# Patient Record
Sex: FEMALE | Race: WHITE | NOT HISPANIC OR LATINO | Employment: PART TIME | URBAN - METROPOLITAN AREA
[De-identification: names, ages, dates, MRNs, and addresses within clinical notes are randomized per-mention and may not be internally consistent; named-entity substitution may affect disease eponyms.]

---

## 2017-05-04 ENCOUNTER — ALLSCRIPTS OFFICE VISIT (OUTPATIENT)
Dept: OTHER | Facility: OTHER | Age: 57
End: 2017-05-04

## 2017-05-04 DIAGNOSIS — E87.6 HYPOKALEMIA: ICD-10-CM

## 2017-05-04 DIAGNOSIS — Z12.31 ENCOUNTER FOR SCREENING MAMMOGRAM FOR MALIGNANT NEOPLASM OF BREAST: ICD-10-CM

## 2017-05-04 DIAGNOSIS — J45.20 MILD INTERMITTENT ASTHMA, UNCOMPLICATED: ICD-10-CM

## 2017-05-04 DIAGNOSIS — E78.5 HYPERLIPIDEMIA: ICD-10-CM

## 2017-05-08 ENCOUNTER — GENERIC CONVERSION - ENCOUNTER (OUTPATIENT)
Dept: OTHER | Facility: OTHER | Age: 57
End: 2017-05-08

## 2017-05-08 ENCOUNTER — APPOINTMENT (OUTPATIENT)
Dept: LAB | Facility: CLINIC | Age: 57
End: 2017-05-08
Payer: COMMERCIAL

## 2017-05-08 DIAGNOSIS — E78.5 HYPERLIPIDEMIA: ICD-10-CM

## 2017-05-08 DIAGNOSIS — J45.20 MILD INTERMITTENT ASTHMA, UNCOMPLICATED: ICD-10-CM

## 2017-05-08 LAB
25(OH)D3 SERPL-MCNC: 8.8 NG/ML (ref 30–100)
ALBUMIN SERPL BCP-MCNC: 3.8 G/DL (ref 3.5–5)
ALP SERPL-CCNC: 97 U/L (ref 46–116)
ALT SERPL W P-5'-P-CCNC: 20 U/L (ref 12–78)
ANION GAP SERPL CALCULATED.3IONS-SCNC: 7 MMOL/L (ref 4–13)
AST SERPL W P-5'-P-CCNC: 13 U/L (ref 5–45)
BASOPHILS # BLD AUTO: 0.02 THOUSANDS/ΜL (ref 0–0.1)
BASOPHILS NFR BLD AUTO: 0 % (ref 0–1)
BILIRUB SERPL-MCNC: 0.41 MG/DL (ref 0.2–1)
BUN SERPL-MCNC: 7 MG/DL (ref 5–25)
CALCIUM SERPL-MCNC: 8.9 MG/DL (ref 8.3–10.1)
CHLORIDE SERPL-SCNC: 100 MMOL/L (ref 100–108)
CHOLEST SERPL-MCNC: 231 MG/DL (ref 50–200)
CO2 SERPL-SCNC: 28 MMOL/L (ref 21–32)
CREAT SERPL-MCNC: 0.62 MG/DL (ref 0.6–1.3)
EOSINOPHIL # BLD AUTO: 0.05 THOUSAND/ΜL (ref 0–0.61)
EOSINOPHIL NFR BLD AUTO: 1 % (ref 0–6)
ERYTHROCYTE [DISTWIDTH] IN BLOOD BY AUTOMATED COUNT: 13.5 % (ref 11.6–15.1)
GFR SERPL CREATININE-BSD FRML MDRD: >60 ML/MIN/1.73SQ M
GLUCOSE P FAST SERPL-MCNC: 87 MG/DL (ref 65–99)
HCT VFR BLD AUTO: 41 % (ref 34.8–46.1)
HDLC SERPL-MCNC: 35 MG/DL (ref 40–60)
HGB BLD-MCNC: 13.8 G/DL (ref 11.5–15.4)
LDLC SERPL CALC-MCNC: 165 MG/DL (ref 0–100)
LYMPHOCYTES # BLD AUTO: 1.76 THOUSANDS/ΜL (ref 0.6–4.47)
LYMPHOCYTES NFR BLD AUTO: 26 % (ref 14–44)
MCH RBC QN AUTO: 32.1 PG (ref 26.8–34.3)
MCHC RBC AUTO-ENTMCNC: 33.7 G/DL (ref 31.4–37.4)
MCV RBC AUTO: 95 FL (ref 82–98)
MONOCYTES # BLD AUTO: 0.76 THOUSAND/ΜL (ref 0.17–1.22)
MONOCYTES NFR BLD AUTO: 11 % (ref 4–12)
NEUTROPHILS # BLD AUTO: 4.13 THOUSANDS/ΜL (ref 1.85–7.62)
NEUTS SEG NFR BLD AUTO: 62 % (ref 43–75)
NRBC BLD AUTO-RTO: 0 /100 WBCS
PLATELET # BLD AUTO: 301 THOUSANDS/UL (ref 149–390)
PMV BLD AUTO: 11.2 FL (ref 8.9–12.7)
POTASSIUM SERPL-SCNC: 3.4 MMOL/L (ref 3.5–5.3)
PROT SERPL-MCNC: 7.4 G/DL (ref 6.4–8.2)
RBC # BLD AUTO: 4.3 MILLION/UL (ref 3.81–5.12)
SODIUM SERPL-SCNC: 135 MMOL/L (ref 136–145)
TRIGL SERPL-MCNC: 155 MG/DL
TSH SERPL DL<=0.05 MIU/L-ACNC: 2.55 UIU/ML (ref 0.36–3.74)
WBC # BLD AUTO: 6.73 THOUSAND/UL (ref 4.31–10.16)

## 2017-05-08 PROCEDURE — 36415 COLL VENOUS BLD VENIPUNCTURE: CPT

## 2017-05-08 PROCEDURE — 80061 LIPID PANEL: CPT

## 2017-05-08 PROCEDURE — 84443 ASSAY THYROID STIM HORMONE: CPT

## 2017-05-08 PROCEDURE — 85025 COMPLETE CBC W/AUTO DIFF WBC: CPT

## 2017-05-08 PROCEDURE — 82306 VITAMIN D 25 HYDROXY: CPT

## 2017-05-08 PROCEDURE — 80053 COMPREHEN METABOLIC PANEL: CPT

## 2017-05-11 ENCOUNTER — ALLSCRIPTS OFFICE VISIT (OUTPATIENT)
Dept: OTHER | Facility: OTHER | Age: 57
End: 2017-05-11

## 2017-12-06 ENCOUNTER — ALLSCRIPTS OFFICE VISIT (OUTPATIENT)
Dept: OTHER | Facility: OTHER | Age: 57
End: 2017-12-06

## 2017-12-06 ENCOUNTER — HOSPITAL ENCOUNTER (EMERGENCY)
Facility: HOSPITAL | Age: 57
Discharge: HOME | End: 2017-12-06
Attending: EMERGENCY MEDICINE | Admitting: EMERGENCY MEDICINE
Payer: COMMERCIAL

## 2017-12-06 VITALS
SYSTOLIC BLOOD PRESSURE: 121 MMHG | DIASTOLIC BLOOD PRESSURE: 57 MMHG | WEIGHT: 135 LBS | RESPIRATION RATE: 18 BRPM | OXYGEN SATURATION: 100 % | HEART RATE: 78 BPM | TEMPERATURE: 97.6 F

## 2017-12-06 DIAGNOSIS — S90.851A: ICD-10-CM

## 2017-12-06 DIAGNOSIS — Z12.31 ENCOUNTER FOR SCREENING MAMMOGRAM FOR MALIGNANT NEOPLASM OF BREAST: ICD-10-CM

## 2017-12-06 DIAGNOSIS — S90.859A FOREIGN BODY IN FOOT: Primary | ICD-10-CM

## 2017-12-06 PROCEDURE — 90715 TDAP VACCINE 7 YRS/> IM: CPT | Performed by: EMERGENCY MEDICINE

## 2017-12-06 PROCEDURE — 90471 IMMUNIZATION ADMIN: CPT

## 2017-12-06 PROCEDURE — 99283 EMERGENCY DEPT VISIT LOW MDM: CPT

## 2017-12-06 RX ORDER — PRAVASTATIN SODIUM 20 MG
20 TABLET ORAL DAILY
COMMUNITY
End: 2018-07-19 | Stop reason: SDUPTHER

## 2017-12-06 RX ORDER — MELOXICAM 7.5 MG/1
7.5 TABLET ORAL DAILY
COMMUNITY
End: 2018-02-27

## 2017-12-06 RX ORDER — MELATONIN
2000 DAILY
COMMUNITY
End: 2018-02-05

## 2017-12-06 RX ORDER — ALBUTEROL SULFATE 90 UG/1
2 AEROSOL, METERED RESPIRATORY (INHALATION) EVERY 6 HOURS PRN
COMMUNITY
End: 2019-11-13 | Stop reason: SDUPTHER

## 2017-12-06 RX ORDER — LIDOCAINE HYDROCHLORIDE AND EPINEPHRINE 10; 10 MG/ML; UG/ML
5 INJECTION, SOLUTION INFILTRATION; PERINEURAL ONCE
Status: COMPLETED | OUTPATIENT
Start: 2017-12-06 | End: 2017-12-06

## 2017-12-06 RX ORDER — MONTELUKAST SODIUM 10 MG/1
10 TABLET ORAL
COMMUNITY
End: 2018-02-05 | Stop reason: SDUPTHER

## 2017-12-06 RX ADMIN — TETANUS TOXOID, REDUCED DIPHTHERIA TOXOID AND ACELLULAR PERTUSSIS VACCINE, ADSORBED 0.5 ML: 5; 2.5; 8; 8; 2.5 SUSPENSION INTRAMUSCULAR at 12:14

## 2017-12-06 RX ADMIN — LIDOCAINE HYDROCHLORIDE,EPINEPHRINE BITARTRATE 5 ML: 10; .01 INJECTION, SOLUTION INFILTRATION; PERINEURAL at 11:57

## 2017-12-06 NOTE — DISCHARGE INSTRUCTIONS
Acute Wound Care   AMBULATORY CARE:   An acute wound  is an injury that causes a break in the skin  An acute wound can happen suddenly, last a short time, and may heal on its own  Common signs and symptoms of an acute wound:   · A cut, tear, or gash in your skin    · Bleeding, swelling, pain, or trouble moving the affected area    · Dirt or foreign objects inside the wound     · Milky, yellow, green, or brown pus in the wound     · Red, tender, or warm area around the pus    · Fever  Seek care immediately if:   · You have pus or a foul odor coming from the wound  · You have sudden trouble breathing or chest pain  · Blood soaks through your bandage  Contact your healthcare provider if:   · You have muscle, joint, or body aches, sweating, or a fever  · You have more swelling, redness, or bleeding in your wound  · Your skin is itchy, swollen, or you have a rash  · You have questions or concerns about your condition or care  Treatment for an acute wound  may include any of the following:  · Cleansing  is done with soap and water to wash away germs and decrease the risk of infection  Sterile water further cleans the wound  The cleaning is done under high pressure with a catheter tip and large syringe  A solution that kills germs may also be used  · Debridement  is done to clean and remove objects, dirt, or dead tissues from the open wound  Healthcare providers may also drain the wound to clean out pus  · Closure of the wound  is done with stitches, staples, skin adhesive, or other treatments  This may be done if the wound is wide or deep  Stitches may be needed if the wound is in an area that moves a lot, such as the hands, feet, and joints  Stitches may help to keep the wound from getting infected  They may also decrease the amount of scarring you have  Some wounds may heal better without stitches    Wound care:   · If your wound was closed with thin strips of medical tape, keep them clean and dry  The strips of medical tape will fall off on their own  Do not pull them off  · Keep the bandage clean and dry  Do not remove the bandage over your wound unless your healthcare provider says it is okay  · Wash your hands before and after you take care of your wound to prevent infection  · Clean the wound as directed  If you cannot reach the wound, have someone help you  · If you have packing, make sure all the gauze used to pack the wound is taken out and replaced as directed  Keep track of how many gauze dressings are placed inside the wound  Follow up with your healthcare provider as directed:  Write down your questions so you remember to ask them during your visits  © 2016 [employer] is for End User's use only and may not be sold, redistributed or otherwise used for commercial purposes  All illustrations and images included in CareNotes® are the copyrighted property of A D [employer] M [employer]  or Akil Dawson  The above information is an  only  It is not intended as medical advice for individual conditions or treatments  Talk to your doctor, nurse or pharmacist before following any medical regimen to see if it is safe and effective for you

## 2017-12-06 NOTE — ED PROCEDURE NOTE
PROCEDURE  Foreign Body - Embedded  Date/Time: 12/6/2017 12:06 PM  Performed by: Constanza Bond by: Ileana Lopez     Patient location:  ED  Consent:     Consent obtained:  Verbal    Consent given by:  Patient    Risks discussed:  Bleeding, infection and pain  Universal protocol:     Procedure explained and questions answered to patient or proxy's satisfaction: yes      Patient identity confirmed:  Verbally with patient  Location:     Location:  Foot    Foot location:  R sole    Depth:  Subcutaneous  Anesthesia (see MAR for exact dosages): Anesthesia method:  Local infiltration    Local anesthetic:  Lidocaine 1% WITH epi  Procedure details:     Scalpel size:  11    Incision length:  0 25    Localization method:  Visualized    Removal mechanism:  Hemostat    Foreign bodies recovered:  1    Intact foreign body removal: yes    Post-procedure details:     Neurovascular status: intact      Dressing:  Adhesive bandage    Patient tolerance of procedure:   Tolerated well, no immediate complications

## 2017-12-06 NOTE — ED PROVIDER NOTES
History  Chief Complaint   Patient presents with    Foreign Body in Skin     Pt states that she has a splinter or a thorn in her foot  Pt states that she went to her PCP today and they sent her here to have it removed  Pt states "they said they could not take it out there"  HPI the patient presents with complaints of foreign body in her right foot she was seen by her family doctor who said he could not take the splinter out and sent her to the ER  The patient is in need of a tetanus shot    the patient has no fever chills she thinks is a wooden piece from the floor no other complaints    Prior to Admission Medications   Prescriptions Last Dose Informant Patient Reported? Taking? albuterol (PROVENTIL HFA,VENTOLIN HFA) 90 mcg/act inhaler   Yes Yes   Sig: Inhale 2 puffs every 6 (six) hours as needed for wheezing   cholecalciferol (VITAMIN D3) 1,000 units tablet   Yes Yes   Sig: Take 2,000 Units by mouth daily   meloxicam (MOBIC) 7 5 mg tablet   Yes Yes   Sig: Take 7 5 mg by mouth daily   montelukast (SINGULAIR) 10 mg tablet   Yes Yes   Sig: Take 10 mg by mouth daily at bedtime   pravastatin (PRAVACHOL) 20 mg tablet   Yes Yes   Sig: Take 20 mg by mouth daily   tiotropium (SPIRIVA) 18 mcg inhalation capsule   Yes Yes   Sig: Place 18 mcg into inhaler and inhale daily      Facility-Administered Medications: None       Past Medical History:   Diagnosis Date    Asthma     COPD (chronic obstructive pulmonary disease) (HCC)     Hyperlipidemia        History reviewed  No pertinent surgical history  History reviewed  No pertinent family history  I have reviewed and agree with the history as documented      Social History   Substance Use Topics    Smoking status: Current Every Day Smoker    Smokeless tobacco: Never Used    Alcohol use No        Review of Systems   no fever no chills no leg swelling pain or leg swelling   no numbness or tingling in the foot  Physical Exam  ED Triage Vitals [12/06/17 1046] Temperature Pulse Respirations Blood Pressure SpO2   97 6 °F (36 4 °C) 78 18 121/57 100 %      Temp Source Heart Rate Source Patient Position - Orthostatic VS BP Location FiO2 (%)   Oral Monitor Sitting Right arm --      Pain Score       2           Orthostatic Vital Signs  Vitals:    12/06/17 1046   BP: 121/57   Pulse: 78   Patient Position - Orthostatic VS: Sitting       Physical Exam    well-appearing female no acute distress vital signs are stable she is afebrile   extremity examination   pulses are intact sensation is intact motor strength is intact no cellulitis noted on exam there is a small punctate foreign body appearing material on the plantar surface of the right foot between the 2nd and 3rd metatarsal    ED Medications  Medications   tetanus-diphtheria-acellular pertussis (BOOSTRIX) IM injection 0 5 mL (not administered)   lidocaine-epinephrine (XYLOCAINE/EPINEPHRINE) 1 %-1:100,000 injection 5 mL (5 mL Infiltration Given by Other 12/6/17 2813)       Diagnostic Studies  Results Reviewed     None                 XR foot 3+ views RIGHT    (Results Pending)              Procedures  Procedures       Phone Contacts  ED Phone Contact    ED Course  ED Course       I spoke with patient concerning possibility of foreign body she agreed to allow me to attempt to remove it                            MDM  CritCare Time    Disposition  Final diagnoses:   Foreign body in foot   Splinter of foot without infection, right, initial encounter     Time reflects when diagnosis was documented in both MDM as applicable and the Disposition within this note     Time User Action Codes Description Comment    12/6/2017 12:08 PM Kathy Locke Add [[id number]] Foreign body in foot     12/6/2017 12:09 PM Alba Torres Add Jamie Salts of foot without infection, right, initial encounter       ED Disposition     ED Disposition Condition Comment    Discharge  Antonia Ivy discharge to home/self care      Condition at discharge: Stable        Follow-up Information     Follow up With Specialties Details Why [address], DO Internal Medicine   [address]  [phone]          Patient's Medications   Discharge Prescriptions    No medications on file     No discharge procedures on file      ED Provider  Electronically Signed by           Nasreen Balderrama MD  12/06/17 7705

## 2017-12-07 NOTE — PROGRESS NOTES
Assessment    1  Chronic obstructive pulmonary disease (496) (J44 9)   2  Need for vaccination against Streptococcus pneumoniae (V03 82) (Z23)   3  Visit for screening mammogram (V76 12) (Z12 31)   4  Need for influenza vaccination (V04 81) (Z23)    Plan  Chronic obstructive pulmonary disease    · Montelukast Sodium 10 MG Oral Tablet (Singulair); TAKE 1 TABLET BY MOUTHONCE DAILY   · Albuterol Sulfate (2 5 MG/3ML) 0 083% Inhalation Nebulization Solution   · SPIROMETRY W/ BRONCHO- POC; Status:Complete;   Done: 39QYH2625  Chronic obstructive pulmonary disease, Mild intermittent asthma without complication    · Spiriva HandiHaler 18 MCG Inhalation Capsule; INHALE CONTENTS OFCAPSULE ONCE DAY  Need for influenza vaccination    · Influenza  Need for vaccination against Streptococcus pneumoniae    · Prevnar 13 Intramuscular Suspension  Visit for screening mammogram    · * MAMMO SCREENING BILATERAL W CAD; Status:Canceled - Scheduling;    · * MAMMO SCREENING BILATERAL W CAD; Status:Hold For - Scheduling; Requestedfor:59Xlh5280;     Discussion/Summary    *COPDis advised to take meds as directed  Given Rx for Spiriva and Singulair  painto go to the ED to r/o presence of object in the foot and if still there for them to extract it  ordered today  ordered todayrefused flu vaccine on the basis that she had previous reaction to it  She is not sure if she has allergy to eggs  did not get her mammogram in May 2017 as ordered  She reports she lost the script  Re-ordered today  Apt made by me for January 10th 2018, at 11am  Patient informed  Patient should be there by 10:30 am for registration  The patient was counseled regarding diagnostic results,-- instructions for management,-- risk factor reductions,-- risks and benefits of treatment options,-- importance of compliance with treatment  Possible side effects of new medications were reviewed with the patient/guardian today   The treatment plan was reviewed with the patient/guardian  The patient/guardian understands and agrees with the treatment plan   Educational resources provided:      Chief Complaint  Presents to the clinic for cold and cough      History of Present Illness  HPI: as abovereports she has been having intermittent cough and mucus, with sneezing, for a couple of weeks  Mild to moderate intensity, with intermittent SOB  has ventolin which she uses prn, and has been using it almost daily  review of her records show a Hx of COPD, and she currently smokes about 1/2 daily  Started at age 30 y/o approximately  She also reports right foot pain, for a few days, and she states it started when she stepped on a piece of woof and it pierced her foot  When she was trying to take out the piece of wood, she felt like it broke  Since then, she has not been able to bear weight on that foot  The pain is constant but worse with weight bearing, and better with rest  No radiation of the pain  No pus at the site and no drainage  Hospital Based Practices Required Assessment:  Pain Assessment  the patient states they have pain  The pain is located in the leg pain, burning, cramp  The patient describes the pain as aching and burning  (on a scale of 0 to 10, the patient rates the pain at 8 )   Prefered Language is  english  Primary Language is  [facility]  Review of Systems   Constitutional: No fever, no chills, feels well, no tiredness, no recent weight gain or loss  Cardiovascular: no complaints of slow or fast heart rate, no chest pain, no palpitations, no leg claudication or lower extremity edema  Respiratory: cough,-- wheezing-- and-- shortness of breath during exertion, but-- as noted in HPI  Gastrointestinal: no complaints of abdominal pain, no constipation, no nausea or diarrhea, no vomiting, no bloody stools  Musculoskeletal: arthralgias-- and-- myalgias, but-- as noted in HPI-- and-- no joint swelling    Integumentary: no complaints of skin rash or lesion, no itching or dry skin, no skin wounds  ROS reviewed  Active Problems  1  Absence of teeth (520 0) (K00 0)   2  Arthritis (716 90) (M19 90)   3  Chronic back pain (724 5,338 29) (M54 9,G89 29)   4  Chronic obstructive pulmonary disease (496) (J44 9)   5  Decreased hearing of left ear (389 9) (H91 92)   6  Dyslipidemia (272 4) (E78 5)   7  Menopause (627 2) (Z78 0)   8  Mild intermittent asthma without complication (682 32) (K86 55)   9  Varicose veins (454 9) (I83 90)   10  Visit for screening mammogram (V76 12) (Z12 31)   11  Vitamin D deficiency (268 9) (E55 9)    Past Medical History  Active Problems And Past Medical History Reviewed: The active problems and past medical history were reviewed and updated today  Surgical History  Surgical History Reviewed: The surgical history was reviewed and updated today  Social History     · Current every day smoker (305 1) (F17 200)   · 1/2 pack/day, started at about age 28 y/o   · Denied: History of Current smoker  The social history was reviewed and updated today  The social history was reviewed and is unchanged  Family History  Family History Reviewed: The family history was reviewed and updated today  Current Meds   1  Meloxicam 7 5 MG Oral Tablet; TAKE 1 TABLET DAILY AS NEEDED; Therapy: 82EJO9700 to (Evaluate:20Mar2013); Last Rx:59Oxg6604 Ordered   2  Montelukast Sodium 10 MG Oral Tablet; TAKE 1 TABLET BY MOUTH ONCE DAILY; Therapy: 96JPD1767 to (Evaluate:99Tqc5119); Last Rx:84Sug3098 Ordered   3  Nebulizer Device; USE AS DIRECTED; Therapy: 37ACZ4096 to (Last Rx:46Mlg3645) Ordered   4  Pravastatin Sodium 20 MG Oral Tablet; TAKE 1 TABLET AT BEDTIME; Therapy: 61OBC7230 to (Mari Jang)  Requested for: 25DEO9068; Last Rx:02Nov2017 Ordered   5  Spiriva HandiHaler 18 MCG Inhalation Capsule; INHALE CONTENTS OF CAPSULE ONCE DAY; Therapy: 34ALP0613 to (Last RZ:66CUS6754)  Requested for: 68JZH6686 Ordered   6   Ventolin  (90 Base) MCG/ACT Inhalation Aerosol Solution; inhale 1-2 puffs every 4-6 hours only for wheezing and severe shortness of breath not for daily use; Therapy: 94LPH3741 to (Tulio Pro)  Requested for: 18RMZ1888; Last NR:03GXI1285 Ordered   7  Vitamin D3 2000 UNIT Oral Capsule; TAKE 1 TABLET BY MOUTH ONCE DAILY; Therapy: 98ULF6946 to (Pawan Barreto)  Requested for: 94QUL1881; Last Rx:78Agz9968 Ordered    The medication list was reviewed and updated today  Allergies  1  No Known Drug Allergies  2  Seasonal    Vitals   Recorded: 65BPC8558 08:30AM   Temperature 97 7 F   Heart Rate 76   Systolic 763, RUE, Sitting   Diastolic 76, RUE, Sitting   Height 5 ft 1 5 in   Weight 135 lb    BMI Calculated 25 1   BSA Calculated 1 61       Physical Exam   Constitutional  General appearance: No acute distress, well appearing and well nourished  well developed,-- normal body odor,-- does not smell of feces,-- does not smell of urine,-- well nourished,-- clothing appropriate-- and-- well groomed  Eyes  Conjunctiva and lids: No swelling, erythema or discharge  Pupils and irises: Equal, round and reactive to light  Ears, Nose, Mouth, and Throat  Nasal mucosa, septum, and turbinates: Abnormal  -- 1+ turbinates, no significant nasal discharge  Oropharynx: Normal with no erythema, edema, exudate or lesions  Pulmonary  Respiratory effort: No increased work of breathing or signs of respiratory distress  Auscultation of lungs: Abnormal  -- no congestion in the chest  Minimal intermittent expiratory wheezes  No decreased breath sounds  Cardiovascular  Auscultation of heart: Normal rate and rhythm, normal S1 and S2, without murmurs  Skin  Skin and subcutaneous tissue: Normal without rashes or lesions     Psychiatric  Orientation to person, place, and time: Normal    Mood and affect: Normal          Attending Note  Collaborating Physician Note: Collaborating Physician: I supervised the Advanced Practitioner-- and-- I agree with the Advanced Practitioner note        Signatures   Electronically signed by : Carmen Sanford; Dec  6 2017 10:26AM EST                       (Author)    Electronically signed by : Nathanael Ramsay DO; Dec  6 2017 10:45AM EST                       (Review)

## 2018-01-10 ENCOUNTER — HOSPITAL ENCOUNTER (OUTPATIENT)
Dept: MAMMOGRAPHY | Facility: HOSPITAL | Age: 58
Discharge: HOME | End: 2018-01-10
Payer: COMMERCIAL

## 2018-01-10 DIAGNOSIS — Z12.31 ENCOUNTER FOR SCREENING MAMMOGRAM FOR MALIGNANT NEOPLASM OF BREAST: ICD-10-CM

## 2018-01-10 PROCEDURE — 77067 SCR MAMMO BI INCL CAD: CPT

## 2018-01-12 VITALS
BODY MASS INDEX: 23.21 KG/M2 | TEMPERATURE: 97.8 F | DIASTOLIC BLOOD PRESSURE: 82 MMHG | HEIGHT: 62 IN | SYSTOLIC BLOOD PRESSURE: 124 MMHG | WEIGHT: 126.1 LBS | HEART RATE: 62 BPM

## 2018-01-14 NOTE — RESULT NOTES
Verified Results  (1) COMPREHENSIVE METABOLIC PANEL [id number] 53:86WT May Reasoner Order Number: [id number]     Test Name Result Flag Reference   SODIUM 135 mmol/L L 136-145   POTASSIUM 3 4 mmol/L L 3 5-5 3   CHLORIDE 100 mmol/L  100-108   CARBON DIOXIDE 28 mmol/L  21-32   ANION GAP (CALC) 7 mmol/L  4-13   BLOOD UREA NITROGEN 7 mg/dL  5-25   CREATININE 0 62 mg/dL  0 60-1 30   Standardized to IDMS reference method   CALCIUM 8 9 mg/dL  8 3-10 1   BILI, TOTAL 0 41 mg/dL  0 20-1 00   ALK PHOSPHATAS 97 U/L     ALT (SGPT) 20 U/L  12-78   AST(SGOT) 13 U/L  5-45   ALBUMIN 3 8 g/dL  3 5-5 0   TOTAL PROTEIN 7 4 g/dL  6 4-8 2   eGFR Non-African American      >60 0 ml/min/1 73sq m   [facility] Disease Education Program recommendations are as follows:  GFR calculation is accurate only with a steady state creatinine  Chronic Kidney disease less than 60 ml/min/1 73 sq  meters  Kidney failure less than 15 ml/min/1 73 sq  meters  GLUCOSE FASTING 87 mg/dL  65-99     (1) CBC/PLT/DIFF 22MMO0722 09:55AM May Reasoner Order Number: [id number]     Test Name Result Flag Reference   WBC COUNT 6 73 Thousand/uL  4 31-10 16   RBC COUNT 4 30 Million/uL  3 81-5 12   HEMOGLOBIN 13 8 g/dL  11 5-15 4   HEMATOCRIT 41 0 %  34 8-46  1   MCV 95 fL  82-98   MCH 32 1 pg  26 8-34 3   MCHC 33 7 g/dL  31 4-37 4   RDW 13 5 %  11 6-15 1   MPV 11 2 fL  8 9-12 7   PLATELET COUNT 558 Thousands/uL  149-390   nRBC AUTOMATED 0 /100 WBCs     NEUTROPHILS RELATIVE PERCENT 62 %  43-75   LYMPHOCYTES RELATIVE PERCENT 26 %  14-44   MONOCYTES RELATIVE PERCENT 11 %  4-12   EOSINOPHILS RELATIVE PERCENT 1 %  0-6   BASOPHILS RELATIVE PERCENT 0 %  0-1   NEUTROPHILS ABSOLUTE COUNT 4 13 Thousands/? ??L  1 85-7 62   LYMPHOCYTES ABSOLUTE COUNT 1 76 Thousands/? ??L  0 60-4 47   MONOCYTES ABSOLUTE COUNT 0 76 Thousand/? ??L  0 17-1 22   EOSINOPHILS ABSOLUTE COUNT 0 05 Thousand/? ??L  0 00-0 61   BASOPHILS ABSOLUTE COUNT 0 02 Thousands/? ? ? L 0  00-0 10     (1) TSH WITH FT4 REFLEX [id number] 09:55AM Carla Martinez Order Number: [id number]     Test Name Result Flag Reference   TSH 2 550 uIU/mL  0 358-3 740   Patients undergoing fluorescein dye angiography may retain small amounts of fluorescein in the body for 48-72 hours post procedure  Samples containing fluorescein can produce falsely depressed TSH values  If the patient had this procedure,a specimen should be resubmitted post fluorescein clearance  The recommended reference ranges for TSH during pregnancy are as follows:  First trimester 0 1 to 2 5 uIU/mL  Second trimester  0 2 to 3 0 uIU/mL  Third trimester 0 3 to 3 0 uIU/m     (1) VITAMIN D 25-HYDROXY 04HTH2102 09:55AM Carla Martinez Order Number: [id number]     Test Name Result Flag Reference   VIT D 25-HYDROX 8 8 ng/mL L 30 0-100 0   This assay is a certified procedure of the CDC Vitamin D Standardization Certification Program (VDSCP)     Deficiency <20ng/ml   Insufficiency 20-30ng/ml   Sufficient  ng/ml     *Patients undergoing fluorescein dye angiography may retain small amounts of fluorescein in the body for 48-72 hours post procedure  Samples containing fluorescein can produce falsely elevated Vitamin D values  If the patient had this procedure, a specimen should be resubmitted post fluorescein clearance       (1) LIPID PANEL FASTING W DIRECT LDL REFLEX [id number] 09:55AM Carla Martinez Order Number: [id number]     Test Name Result Flag Reference   CHOLESTEROL 231 mg/dL H    LDL CHOLESTEROL CALCULATED 165 mg/dL H 0-100   Triglyceride:         Normal              <150 mg/dl       Borderline High    150-199 mg/dl       High               200-499 mg/dl       Very High          >499 mg/dl  Cholesterol:         Desirable        <200 mg/dl      Borderline High  200-239 mg/dl      High             >239 mg/dl  HDL Cholesterol:        High    >59 mg/dL      Low     <41 mg/dL  LDL Cholesterol: Optimal          <100 mg/dl        Near Optimal     100-129 mg/dl        Above Optimal          Borderline High   130-159 mg/dl          High              160-189 mg/dl          Very High        >189 mg/dl  LDL CALCULATED:    This screening LDL is a calculated result  It does not have the accuracy of the Direct Measured LDL in the monitoring of patients with hyperlipidemia and/or statin therapy  Direct Measure LDL (JSV865) must be ordered separately in these patients  TRIGLYCERIDES 155 mg/dL H <=150   Specimen collection should occur prior to N-Acetylcysteine or Metamizole administration due to the potential for falsely depressed results  HDL,DIRECT 35 mg/dL L 40-60   Specimen collection should occur prior to Metamizole administration due to the potential for falsely depressed results         Plan  Dyslipidemia    · From  Pravastatin Sodium 40 MG Oral Tablet TAKE 1 TABLET DAILY To  Pravastatin Sodium 20 MG Oral Tablet TAKE 1 TABLET AT BEDTIME

## 2018-01-15 VITALS
HEART RATE: 66 BPM | HEIGHT: 62 IN | DIASTOLIC BLOOD PRESSURE: 80 MMHG | WEIGHT: 126.54 LBS | SYSTOLIC BLOOD PRESSURE: 114 MMHG | TEMPERATURE: 97.9 F | BODY MASS INDEX: 23.29 KG/M2

## 2018-01-23 VITALS
WEIGHT: 135 LBS | SYSTOLIC BLOOD PRESSURE: 140 MMHG | HEART RATE: 76 BPM | DIASTOLIC BLOOD PRESSURE: 76 MMHG | BODY MASS INDEX: 24.84 KG/M2 | HEIGHT: 62 IN | TEMPERATURE: 97.7 F

## 2018-01-31 ENCOUNTER — APPOINTMENT (EMERGENCY)
Dept: RADIOLOGY | Facility: HOSPITAL | Age: 58
End: 2018-01-31
Payer: COMMERCIAL

## 2018-01-31 ENCOUNTER — HOSPITAL ENCOUNTER (EMERGENCY)
Facility: HOSPITAL | Age: 58
Discharge: HOME | End: 2018-01-31
Attending: EMERGENCY MEDICINE
Payer: COMMERCIAL

## 2018-01-31 ENCOUNTER — APPOINTMENT (EMERGENCY)
Dept: CT IMAGING | Facility: HOSPITAL | Age: 58
End: 2018-01-31
Payer: COMMERCIAL

## 2018-01-31 VITALS
DIASTOLIC BLOOD PRESSURE: 59 MMHG | HEART RATE: 76 BPM | RESPIRATION RATE: 18 BRPM | TEMPERATURE: 98.8 F | BODY MASS INDEX: 23.24 KG/M2 | SYSTOLIC BLOOD PRESSURE: 108 MMHG | WEIGHT: 125 LBS | OXYGEN SATURATION: 98 %

## 2018-01-31 DIAGNOSIS — R07.9 CHEST PAIN: Primary | ICD-10-CM

## 2018-01-31 LAB
ALBUMIN SERPL BCP-MCNC: 3.8 G/DL (ref 3.5–5)
ALP SERPL-CCNC: 95 U/L (ref 46–116)
ALT SERPL W P-5'-P-CCNC: 27 U/L (ref 12–78)
ANION GAP SERPL CALCULATED.3IONS-SCNC: 8 MMOL/L (ref 4–13)
AST SERPL W P-5'-P-CCNC: 19 U/L (ref 5–45)
ATRIAL RATE: 65 BPM
ATRIAL RATE: 69 BPM
BASOPHILS # BLD AUTO: 0.01 THOUSANDS/ΜL (ref 0–0.1)
BASOPHILS NFR BLD AUTO: 0 % (ref 0–1)
BILIRUB SERPL-MCNC: 0.2 MG/DL (ref 0.2–1)
BUN SERPL-MCNC: 7 MG/DL (ref 5–25)
CALCIUM SERPL-MCNC: 9.1 MG/DL (ref 8.3–10.1)
CHLORIDE SERPL-SCNC: 103 MMOL/L (ref 100–108)
CO2 SERPL-SCNC: 30 MMOL/L (ref 21–32)
CREAT SERPL-MCNC: 0.69 MG/DL (ref 0.6–1.3)
DEPRECATED D DIMER PPP: 1273 NG/ML (FEU) (ref 0–424)
EOSINOPHIL # BLD AUTO: 0.05 THOUSAND/ΜL (ref 0–0.61)
EOSINOPHIL NFR BLD AUTO: 1 % (ref 0–6)
ERYTHROCYTE [DISTWIDTH] IN BLOOD BY AUTOMATED COUNT: 13.3 % (ref 11.6–15.1)
GFR SERPL CREATININE-BSD FRML MDRD: 97 ML/MIN/1.73SQ M
GLUCOSE SERPL-MCNC: 87 MG/DL (ref 65–140)
HCT VFR BLD AUTO: 44.7 % (ref 34.8–46.1)
HGB BLD-MCNC: 14.6 G/DL (ref 11.5–15.4)
LIPASE SERPL-CCNC: 164 U/L (ref 73–393)
LYMPHOCYTES # BLD AUTO: 1.5 THOUSANDS/ΜL (ref 0.6–4.47)
LYMPHOCYTES NFR BLD AUTO: 35 % (ref 14–44)
MCH RBC QN AUTO: 31.3 PG (ref 26.8–34.3)
MCHC RBC AUTO-ENTMCNC: 32.7 G/DL (ref 31.4–37.4)
MCV RBC AUTO: 96 FL (ref 82–98)
MONOCYTES # BLD AUTO: 0.7 THOUSAND/ΜL (ref 0.17–1.22)
MONOCYTES NFR BLD AUTO: 16 % (ref 4–12)
NEUTROPHILS # BLD AUTO: 2.03 THOUSANDS/ΜL (ref 1.85–7.62)
NEUTS SEG NFR BLD AUTO: 48 % (ref 43–75)
P AXIS: 28 DEGREES
P AXIS: 32 DEGREES
PLATELET # BLD AUTO: 234 THOUSANDS/UL (ref 149–390)
PMV BLD AUTO: 10 FL (ref 8.9–12.7)
POTASSIUM SERPL-SCNC: 3.8 MMOL/L (ref 3.5–5.3)
PR INTERVAL: 136 MS
PR INTERVAL: 140 MS
PROT SERPL-MCNC: 7.8 G/DL (ref 6.4–8.2)
QRS AXIS: 11 DEGREES
QRS AXIS: 14 DEGREES
QRSD INTERVAL: 76 MS
QRSD INTERVAL: 76 MS
QT INTERVAL: 368 MS
QT INTERVAL: 390 MS
QTC INTERVAL: 394 MS
QTC INTERVAL: 405 MS
RBC # BLD AUTO: 4.67 MILLION/UL (ref 3.81–5.12)
SODIUM SERPL-SCNC: 141 MMOL/L (ref 136–145)
T WAVE AXIS: 48 DEGREES
T WAVE AXIS: 53 DEGREES
TROPONIN I SERPL-MCNC: <0.02 NG/ML
TROPONIN I SERPL-MCNC: <0.02 NG/ML
VENTRICULAR RATE: 65 BPM
VENTRICULAR RATE: 69 BPM
WBC # BLD AUTO: 4.29 THOUSAND/UL (ref 4.31–10.16)

## 2018-01-31 PROCEDURE — 84484 ASSAY OF TROPONIN QUANT: CPT | Performed by: EMERGENCY MEDICINE

## 2018-01-31 PROCEDURE — 85379 FIBRIN DEGRADATION QUANT: CPT | Performed by: EMERGENCY MEDICINE

## 2018-01-31 PROCEDURE — 71046 X-RAY EXAM CHEST 2 VIEWS: CPT

## 2018-01-31 PROCEDURE — 93005 ELECTROCARDIOGRAM TRACING: CPT

## 2018-01-31 PROCEDURE — 74177 CT ABD & PELVIS W/CONTRAST: CPT

## 2018-01-31 PROCEDURE — 36415 COLL VENOUS BLD VENIPUNCTURE: CPT | Performed by: EMERGENCY MEDICINE

## 2018-01-31 PROCEDURE — 99285 EMERGENCY DEPT VISIT HI MDM: CPT

## 2018-01-31 PROCEDURE — 85025 COMPLETE CBC W/AUTO DIFF WBC: CPT | Performed by: EMERGENCY MEDICINE

## 2018-01-31 PROCEDURE — 80053 COMPREHEN METABOLIC PANEL: CPT | Performed by: EMERGENCY MEDICINE

## 2018-01-31 PROCEDURE — 96374 THER/PROPH/DIAG INJ IV PUSH: CPT

## 2018-01-31 PROCEDURE — 71275 CT ANGIOGRAPHY CHEST: CPT

## 2018-01-31 PROCEDURE — 83690 ASSAY OF LIPASE: CPT | Performed by: EMERGENCY MEDICINE

## 2018-01-31 RX ORDER — KETOROLAC TROMETHAMINE 30 MG/ML
15 INJECTION, SOLUTION INTRAMUSCULAR; INTRAVENOUS ONCE
Status: COMPLETED | OUTPATIENT
Start: 2018-01-31 | End: 2018-01-31

## 2018-01-31 RX ADMIN — KETOROLAC TROMETHAMINE 15 MG: 30 INJECTION, SOLUTION INTRAMUSCULAR at 02:37

## 2018-01-31 RX ADMIN — IOHEXOL 100 ML: 350 INJECTION, SOLUTION INTRAVENOUS at 04:03

## 2018-01-31 NOTE — ED NOTES
Patient transported to [address] [place] [address] Way, [address] [place] [address]  01/31/18 5475

## 2018-01-31 NOTE — DISCHARGE INSTRUCTIONS
Chest Pain   WHAT YOU NEED TO KNOW:   Chest pain can be caused by a range of conditions, from not serious to life-threatening  Chest pain can be a symptom of a digestive problem, such as acid reflux or a stomach ulcer  An anxiety attack or a strong emotion, such as anger, can also cause chest pain  Infection, inflammation, or a fracture in the bones or cartilage in your chest can cause pain or discomfort  Sometimes chest pain or pressure is caused by poor blood flow to your heart (angina)  Chest pain may also be caused by life-threatening conditions such as a heart attack or blood clot in your lungs  DISCHARGE INSTRUCTIONS:   Call 911 if:   · You have any of the following signs of a heart attack:      ¨ Squeezing, pressure, or pain in your chest that lasts longer than 5 minutes or returns    ¨ Discomfort or pain in your back, neck, jaw, stomach, or arm     ¨ Trouble breathing    ¨ Nausea or vomiting    ¨ Lightheadedness or a sudden cold sweat, especially with chest pain or trouble breathing    Return to the emergency department if:   · You have chest discomfort that gets worse, even with medicine  · You cough or vomit blood  · Your bowel movements are black or bloody  · You cannot stop vomiting, or it hurts to swallow  Contact your healthcare provider if:   · You have questions or concerns about your condition or care  Medicines:   · Medicines  may be given to treat the cause of your chest pain  Examples include pain medicine, anxiety medicine, or medicines to increase blood flow to your heart  · Do not take certain medicines without asking your healthcare provider first   These include NSAIDs, herbal or vitamin supplements, or hormones (estrogen or progestin)  · Take your medicine as directed  Contact your healthcare provider if you think your medicine is not helping or if you have side effects  Tell him or her if you are allergic to any medicine   Keep a list of the medicines, vitamins, and herbs you take  Include the amounts, and when and why you take them  Bring the list or the pill bottles to follow-up visits  Carry your medicine list with you in case of an emergency  Follow up with your healthcare provider within 72 hours, or as directed: You may need to return for more tests to find the cause of your chest pain  You may be referred to a specialist, such as a cardiologist or gastroenterologist  Write down your questions so you remember to ask them during your visits  Healthy living tips: The following are general healthy guidelines  If your chest pain is caused by a heart problem, your healthcare provider will give you specific guidelines to follow  · Do not smoke  Nicotine and other chemicals in cigarettes and cigars can cause lung and heart damage  Ask your healthcare provider for information if you currently smoke and need help to quit  E-cigarettes or smokeless tobacco still contain nicotine  Talk to your healthcare provider before you use these products  · Eat a variety of healthy, low-fat foods  Healthy foods include fruits, vegetables, whole-grain breads, low-fat dairy products, beans, lean meats, and fish  Ask for more information about a heart healthy diet  · Ask about activity  Your healthcare provider will tell you which activities to limit or avoid  Ask when you can drive, return to work, and have sex  Ask about the best exercise plan for you  · Maintain a healthy weight  Ask your healthcare provider how much you should weigh  Ask him or her to help you create a weight loss plan if you are overweight  · Get the flu and pneumonia vaccines  All adults should get the influenza (flu) vaccine  Get it every year as soon as it becomes available  The pneumococcal vaccine is given to adults aged 72 years or older  The vaccine is given every 5 years to prevent pneumococcal disease, such as pneumonia    © 2017 [employer] Information is for End User's use only and may not be sold, redistributed or otherwise used for commercial purposes  All illustrations and images included in CareNotes® are the copyrighted property of A D A M [employer] Inc  or Akil Dawson  The above information is an  only  It is not intended as medical advice for individual conditions or treatments  Talk to your doctor, nurse or pharmacist before following any medical regimen to see if it is safe and effective for you

## 2018-02-03 NOTE — ED PROVIDER NOTES
History  Chief Complaint   Patient presents with    Abdominal Pain     Patient c/o epigastric pain that started 2 days ago  Also c/o "doing a lot of coughing "  Denies n/v/d  History provided by:  Patient   used: No    Abdominal Pain   Associated symptoms: chest pain and cough    Associated symptoms: no chills, no diarrhea, no dysuria, no fever, no nausea, no shortness of breath, no sore throat and no vomiting      Pt is a 61 y/o female presenting to ED with substernal and epigastric pain  Started 2 days ago  Constant for the last day  No n/v/d  No back pain  Has not had simialr before  Smoker  Coughing makes pain worse/ no h/o blood clots  No calf pain or swelling  No f/c  No ha  Low risk for PE  Heart score is less then 3    mdm ddimer, cardiac work up, treat pain, delta ecg and trop if negative work up    Pt discharge with stress test, also to follow up with gyn regarding endometrium findings and aware of nodes in chest and needs pcp follow up      Prior to Admission Medications   Prescriptions Last Dose Informant Patient Reported? Taking? albuterol (PROVENTIL HFA,VENTOLIN HFA) 90 mcg/act inhaler   Yes No   Sig: Inhale 2 puffs every 6 (six) hours as needed for wheezing   cholecalciferol (VITAMIN D3) 1,000 units tablet   Yes No   Sig: Take 2,000 Units by mouth daily   meloxicam (MOBIC) 7 5 mg tablet   Yes No   Sig: Take 7 5 mg by mouth daily   montelukast (SINGULAIR) 10 mg tablet   Yes No   Sig: Take 10 mg by mouth daily at bedtime   pravastatin (PRAVACHOL) 20 mg tablet   Yes No   Sig: Take 20 mg by mouth daily   tiotropium (SPIRIVA) 18 mcg inhalation capsule   Yes No   Sig: Place 18 mcg into inhaler and inhale daily      Facility-Administered Medications: None       Past Medical History:   Diagnosis Date    Asthma     COPD (chronic obstructive pulmonary disease) (HCC)     Hyperlipidemia        History reviewed  No pertinent surgical history  History reviewed   No pertinent family history  I have reviewed and agree with the history as documented  Social History   Substance Use Topics    Smoking status: Current Every Day Smoker    Smokeless tobacco: Never Used    Alcohol use No        Review of Systems   Constitutional: Negative for chills, diaphoresis and fever  HENT: Negative for congestion and sore throat  Respiratory: Positive for cough  Negative for shortness of breath, wheezing and stridor  Cardiovascular: Positive for chest pain  Negative for palpitations and leg swelling  Gastrointestinal: Positive for abdominal pain  Negative for blood in stool, diarrhea, nausea and vomiting  Genitourinary: Negative for dysuria, frequency and urgency  Musculoskeletal: Negative for neck pain and neck stiffness  Skin: Negative for pallor and rash  Neurological: Negative for dizziness, syncope, weakness, light-headedness and headaches  All other systems reviewed and are negative  Physical Exam  ED Triage Vitals   Temperature Pulse Respirations Blood Pressure SpO2   01/31/18 0115 01/31/18 0114 01/31/18 0114 01/31/18 0114 01/31/18 0114   98 8 °F (37 1 °C) 81 20 140/79 99 %      Temp Source Heart Rate Source Patient Position - Orthostatic VS BP Location FiO2 (%)   01/31/18 0115 01/31/18 0114 01/31/18 0114 01/31/18 0114 --   Oral Monitor Lying Left arm       Pain Score       01/31/18 0115       Worst Possible Pain           Orthostatic Vital Signs  Vitals:    01/31/18 0114 01/31/18 0415 01/31/18 0620 01/31/18 0700   BP: 140/79 116/55 112/60 108/59   Pulse: 81 97 68 76   Patient Position - Orthostatic VS: Lying Sitting Lying Sitting       Physical Exam   Constitutional: She is oriented to person, place, and time  She appears well-developed and well-nourished  HENT:   Head: Normocephalic and atraumatic  Eyes: Pupils are equal, round, and reactive to light  Neck: Neck supple     Cardiovascular: Normal rate, regular rhythm, normal heart sounds and intact distal pulses  Pulmonary/Chest: Effort normal and breath sounds normal  No respiratory distress  Abdominal: Soft  Bowel sounds are normal  There is no tenderness  Musculoskeletal: Normal range of motion  She exhibits no edema or tenderness  Neurological: She is alert and oriented to person, place, and time  Skin: Skin is warm and dry  Capillary refill takes less than 2 seconds  No rash noted  No erythema  Vitals reviewed  ED Medications  Medications   ketorolac (TORADOL) injection 15 mg (15 mg Intravenous Given 1/31/18 0237)   iohexol (OMNIPAQUE) 350 MG/ML injection (SINGLE-DOSE) 100 mL (100 mL Intravenous Given 1/31/18 0403)       Diagnostic Studies  Results Reviewed     Procedure Component Value Units Date/Time    Troponin I [[id number]]  (Normal) Collected:  01/31/18 0617    Lab Status:  Final result Specimen:  Blood from Arm, Left Updated:  01/31/18 0659     Troponin I <0 02 ng/mL     Narrative:         [employer] Chemistry analyzer 99% cutoff is > 0 04 ng/mL in network labs    o cTnI 99% cutoff is useful only when applied to patients in the clinical setting of myocardial ischemia  o cTnI 99% cutoff should be interpreted in the context of clinical history, ECG findings and possibly cardiac imaging to establish correct diagnosis  o cTnI 99% cutoff may be suggestive but clearly not indicative of a coronary event without the clinical setting of myocardial ischemia  Troponin I [[id number]]  (Normal) Collected:  01/31/18 0235    Lab Status:  Final result Specimen:  Blood from Arm, Right Updated:  01/31/18 0306     Troponin I <0 02 ng/mL     Narrative:         [employer] Chemistry analyzer 99% cutoff is > 0 04 ng/mL in network labs    o cTnI 99% cutoff is useful only when applied to patients in the clinical setting of myocardial ischemia  o cTnI 99% cutoff should be interpreted in the context of clinical history, ECG findings and possibly cardiac imaging to establish correct diagnosis    o cTnI 99% cutoff may be suggestive but clearly not indicative of a coronary event without the clinical setting of myocardial ischemia  Comprehensive metabolic panel [[id number]] Collected:  01/31/18 0235    Lab Status:  Final result Specimen:  Blood from Arm, Right Updated:  01/31/18 0304     Sodium 141 mmol/L      Potassium 3 8 mmol/L      Chloride 103 mmol/L      CO2 30 mmol/L      Anion Gap 8 mmol/L      BUN 7 mg/dL      Creatinine 0 69 mg/dL      Glucose 87 mg/dL      Calcium 9 1 mg/dL      AST 19 U/L      ALT 27 U/L      Alkaline Phosphatase 95 U/L      Total Protein 7 8 g/dL      Albumin 3 8 g/dL      Total Bilirubin 0 20 mg/dL      eGFR 97 ml/min/1 73sq m     Narrative:         National Kidney Disease Education Program recommendations are as follows:  GFR calculation is accurate only with a steady state creatinine  Chronic Kidney disease less than 60 ml/min/1 73 sq  meters  Kidney failure less than 15 ml/min/1 73 sq  meters      Lipase [[id number]]  (Normal) Collected:  01/31/18 0235    Lab Status:  Final result Specimen:  Blood from Arm, Right Updated:  01/31/18 0304     Lipase 164 u/L     D-Dimer [[id number]]  (Abnormal) Collected:  01/31/18 0235    Lab Status:  Final result Specimen:  Blood from Arm, Right Updated:  01/31/18 0301     D-Dimer, Quant 1,273 (H) ng/ml (FEU)     CBC and differential [[id number]]  (Abnormal) Collected:  01/31/18 0235    Lab Status:  Final result Specimen:  Blood from Arm, Right Updated:  01/31/18 0248     WBC 4 29 (L) Thousand/uL      RBC 4 67 Million/uL      Hemoglobin 14 6 g/dL      Hematocrit 44 7 %      MCV 96 fL      MCH 31 3 pg      MCHC 32 7 g/dL      RDW 13 3 %      MPV 10 0 fL      Platelets 907 Thousands/uL      Neutrophils Relative 48 %      Lymphocytes Relative 35 %      Monocytes Relative 16 (H) %      Eosinophils Relative 1 %      Basophils Relative 0 %      Neutrophils Absolute 2 03 Thousands/µL      Lymphocytes Absolute 1 50 Thousands/µL      Monocytes Absolute 0 70 Thousand/µL Eosinophils Absolute 0 05 Thousand/µL      Basophils Absolute 0 01 Thousands/µL                  XR chest 2 views   Final Result by Jose Carson DO (01/31 1084)      No active pulmonary disease  Workstation performed: [id number]         PE Study with CT Abdomen and Pelvis with contrast   Final Result by Jose Carson DO (01/31 0018)   1  No CT evidence of pulmonary embolism  2   Scattered, nonspecific groundglass infiltrates, likely related to the patient's history of emphysema  3   Hiatal hernia  4   Mild abnormal appearance of the endometrium  Recommend follow-up ultrasound of the pelvis for further evaluation  Findings are consistent with the preliminary report from [facility] which was provided shortly after completion of the exam                       Workstation performed: [id number]                    Procedures  Procedures       Phone Contacts  ED Phone Contact    ED Course  ED Course as of Feb 03 1605 Wed Jan 31, 2018   0446 1  Nonspecific small mediastinal and hilar nodes are present including esophageal nodes distally  2  Subtle groundglass attenuation and peripheral septal thickening may reflect minimal interstitial  edema  Equivocal endometrial prominence measuring up to 11 mm versus artifact of peripheral uterine  enhancement  If the patient is postmenopausal, pelvic sonography recommended to ensure a  normal/thin appearance of the endometrium  0150 Patient without pain currently  Will do delta EKG troponin at 5:35 a m  Josh Anderson [id number] Repeat ECG shows rate of 65, normal sinus, normal QRS, normal intervals, no significant ST or T-wave changes, no chills from previous EKG from 3 hours ago  Independently interpreted by me                                  MDM  CritCare Time    Disposition  Final diagnoses:   Chest pain     Time reflects when diagnosis was documented in both MDM as applicable and the Disposition within this note     Time User Action Codes Description Comment    1/31/2018  7:10 AM Rufina Ball Add [R07 9] Chest pain       ED Disposition     ED Disposition Condition Comment    Discharge  [facility] discharge to home/self care  Condition at discharge: Good        Follow-up Information     Follow up With Specialties Details Why Contact Info Additional 0524 Roddy Zeenat, DO Internal Medicine In 3 days  [facility] [address]       [address] Emergency Department Emergency Medicine  As needed, If symptoms worsen, chest pain, shortness of breath, lightheaded, dizzy, sweating or feeling worse overall [address] [id number] [phone] [facility] ED, [address], [place], [place], [address]        Discharge Medication List as of 1/31/2018  7:10 AM      CONTINUE these medications which have NOT CHANGED    Details   albuterol (PROVENTIL HFA,VENTOLIN HFA) 90 mcg/act inhaler Inhale 2 puffs every 6 (six) hours as needed for wheezing, Historical Med      cholecalciferol (VITAMIN D3) 1,000 units tablet Take 2,000 Units by mouth daily, Historical Med      meloxicam (MOBIC) 7 5 mg tablet Take 7 5 mg by mouth daily, Historical Med      montelukast (SINGULAIR) 10 mg tablet Take 10 mg by mouth daily at bedtime, Historical Med      pravastatin (PRAVACHOL) 20 mg tablet Take 20 mg by mouth daily, Historical Med      tiotropium (SPIRIVA) 18 mcg inhalation capsule Place 18 mcg into inhaler and inhale daily, Historical Med             Outpatient Discharge Orders  Stress test only, exercise   Standing Status: Future  Standing Exp   Date: 01/31/22         ED Provider  Electronically Signed by           Terry Magaña MD  02/03/18 9133

## 2018-02-05 ENCOUNTER — OFFICE VISIT (OUTPATIENT)
Dept: INTERNAL MEDICINE CLINIC | Facility: CLINIC | Age: 58
End: 2018-02-05
Payer: COMMERCIAL

## 2018-02-05 VITALS
DIASTOLIC BLOOD PRESSURE: 60 MMHG | TEMPERATURE: 97.7 F | SYSTOLIC BLOOD PRESSURE: 106 MMHG | HEIGHT: 62 IN | HEART RATE: 80 BPM | WEIGHT: 129.19 LBS | BODY MASS INDEX: 23.77 KG/M2

## 2018-02-05 DIAGNOSIS — R93.5 ABNORMAL CT OF THE ABDOMEN: ICD-10-CM

## 2018-02-05 DIAGNOSIS — J43.9 PULMONARY EMPHYSEMA, UNSPECIFIED EMPHYSEMA TYPE (HCC): ICD-10-CM

## 2018-02-05 DIAGNOSIS — R79.89 ELEVATED D-DIMER: Primary | ICD-10-CM

## 2018-02-05 DIAGNOSIS — R10.13 DYSPEPSIA: ICD-10-CM

## 2018-02-05 DIAGNOSIS — R93.5 ABNORMAL CT OF THE ABDOMEN: Primary | ICD-10-CM

## 2018-02-05 PROBLEM — I83.90 VARICOSE VEINS: Status: ACTIVE | Noted: 2017-05-04

## 2018-02-05 PROBLEM — M54.9 CHRONIC BACK PAIN: Status: ACTIVE | Noted: 2017-05-04

## 2018-02-05 PROBLEM — G89.29 CHRONIC BACK PAIN: Status: ACTIVE | Noted: 2017-05-04

## 2018-02-05 PROBLEM — K00.0: Status: ACTIVE | Noted: 2017-05-04

## 2018-02-05 PROBLEM — Z78.0 MENOPAUSE: Status: ACTIVE | Noted: 2017-05-04

## 2018-02-05 PROBLEM — E55.9 VITAMIN D DEFICIENCY: Status: ACTIVE | Noted: 2017-05-11

## 2018-02-05 PROBLEM — J45.20 MILD INTERMITTENT ASTHMA WITHOUT COMPLICATION: Status: ACTIVE | Noted: 2017-05-04

## 2018-02-05 PROBLEM — H91.92 DECREASED HEARING OF LEFT EAR: Status: ACTIVE | Noted: 2017-05-11

## 2018-02-05 PROCEDURE — 99213 OFFICE O/P EST LOW 20 MIN: CPT | Performed by: NURSE PRACTITIONER

## 2018-02-05 RX ORDER — ASPIRIN 81 MG/1
81 TABLET, CHEWABLE ORAL DAILY
Qty: 90 TABLET | Refills: 1 | Status: SHIPPED | OUTPATIENT
Start: 2018-02-05

## 2018-02-05 RX ORDER — ACETAMINOPHEN 160 MG
1 TABLET,DISINTEGRATING ORAL DAILY
COMMUNITY
Start: 2017-05-11 | End: 2019-11-13 | Stop reason: SDUPTHER

## 2018-02-05 RX ORDER — MONTELUKAST SODIUM 10 MG/1
10 TABLET ORAL
Qty: 90 TABLET | Refills: 1 | Status: SHIPPED | OUTPATIENT
Start: 2018-02-05 | End: 2018-02-13 | Stop reason: SDUPTHER

## 2018-02-05 RX ORDER — POTASSIUM CHLORIDE 600 MG/1
1 TABLET, FILM COATED, EXTENDED RELEASE ORAL DAILY
COMMUNITY
Start: 2017-05-11 | End: 2018-02-05

## 2018-02-05 RX ORDER — RANITIDINE 150 MG/1
150 TABLET ORAL 2 TIMES DAILY
Qty: 90 TABLET | Refills: 1 | Status: SHIPPED | OUTPATIENT
Start: 2018-02-05 | End: 2018-05-23 | Stop reason: SDUPTHER

## 2018-02-05 RX ORDER — FLUTICASONE PROPIONATE 50 MCG
2 SPRAY, SUSPENSION (ML) NASAL DAILY
COMMUNITY
Start: 2013-02-18 | End: 2018-02-13 | Stop reason: SDUPTHER

## 2018-02-05 RX ORDER — ALBUTEROL SULFATE 2.5 MG/3ML
1 SOLUTION RESPIRATORY (INHALATION)
COMMUNITY
Start: 2013-02-18 | End: 2018-02-08 | Stop reason: SDDI

## 2018-02-05 NOTE — PROGRESS NOTES
Hero Kessler  Noticed there was some comment made about endometrium on Ct  You ordered an [address] but it says it is "non-Ob " I was wondering if that shouldn't be trans-vaginal for a better look at that?  Thanks, Dr Enrico Perera

## 2018-02-05 NOTE — PROGRESS NOTES
I have supervised the advanced practitioner and agree with the assessment and plan except that a transvaginal US might be preferable in this situation      Dr Marquita Gillette

## 2018-02-05 NOTE — PATIENT INSTRUCTIONS
Asthma   WHAT YOU NEED TO KNOW:   Asthma is a lung disease that makes breathing difficult  Chronic inflammation and reactions to triggers narrow the airways in the lungs  Asthma can become life-threatening if it is not managed  DISCHARGE INSTRUCTIONS:   Return to the emergency department if:   · You have severe shortness of breath  · Your lips or nails turn blue or gray  · The skin around your neck and ribs pulls in with each breath  · You have shortness of breath, even after you take your short-term medicine as directed  · Your peak flow numbers are in the red zone of your AAP  Contact your healthcare provider if:   · You run out of medicine before your next refill is due  · Your symptoms get worse  · You need to take more medicine than usual to control your symptoms  · You have questions or concerns about your condition or care  Medicines:   · Medicines  decrease inflammation, open airways, and make it easier to breathe  Medicines may be inhaled, taken as a pill, or injected  Short-term medicines relieve your symptoms quickly  Long-term medicines are used to prevent future attacks  You may also need medicine to help control your allergies  Ask your healthcare provider for more information about the medicine you are given and how to take it safely  · Take your medicine as directed  Contact your healthcare provider if you think your medicine is not helping or if you have side effects  Tell him of her if you are allergic to any medicine  Keep a list of the medicines, vitamins, and herbs you take  Include the amounts, and when and why you take them  Bring the list or the pill bottles to follow-up visits  Carry your medicine list with you in case of an emergency  Follow up with your healthcare provider as directed: You will need to return to make sure your medicine is working and your symptoms are controlled   You may be referred to an asthma specialist  Jacy Edmonds may be asked to keep a record of your peak flow values and bring it with you to your appointments  Write down your questions so you remember to ask them during your visits  Manage your symptoms and prevent future attacks:   · Follow your Asthma Action Plan (AAP)  This is a written plan that you and your healthcare provider create  It explains which medicine you need and when to change doses if necessary  It also explains how you can monitor symptoms and use a peak flow meter  The meter measures how well your lungs are working  · Manage other health conditions , such as allergies, acid reflux, and sleep apnea  · Identify and avoid triggers  These may include pets, dust mites, mold, and cockroaches  · Do not smoke or be around others who smoke  Nicotine and other chemicals in cigarettes and cigars can cause lung damage  Ask your healthcare provider for information if you currently smoke and need help to quit  E-cigarettes or smokeless tobacco still contain nicotine  Talk to your healthcare provider before you use these products  · Ask about the flu vaccine  The flu can make your asthma worse  You may need a yearly flu shot  © 2017 [facility] Information is for End User's use only and may not be sold, redistributed or otherwise used for commercial purposes  All illustrations and images included in CareNotes® are the copyrighted property of A D A M [employer]  or Akil Dawson  The above information is an  only  It is not intended as medical advice for individual conditions or treatments  Talk to your doctor, nurse or pharmacist before following any medical regimen to see if it is safe and effective for you

## 2018-02-05 NOTE — PROGRESS NOTES
Assessment/Plan:    Asthma/COPD   - Dulera 100/5 per actuation  1 puff BID daily  Spiriva stopped as insurance does not pay for it    Elevated d-dimer  -     aspirin 81 mg chewable tablet; Chew 1 tablet (81 mg total) daily    Abnormal CT of the abdomen  -     US pelvis complete non OB; Future    Dyspepsia  -     ranitidine (ZANTAC) 150 mg tablet; Take 1 tablet (150 mg total) by mouth 2 (two) times a day      Vitals:    02/05/18 1139   BP: 106/60   Pulse: 80   Temp: 97 7 °F (36 5 °C)     Scheduled Meds:  Continuous Infusions:  No current facility-administered medications for this visit  PRN Meds:     Other orders/current med        -     Ventolin inhaler 90 mcg/actuation  -     albuterol (2 5 mg/3 mL) 0 083 % nebulizer solution; Inhale 1 each  -     Cholecalciferol (VITAMIN D3) 2000 units capsule; Take 1 tablet by mouth daily  -     fluticasone (FLONASE) 50 mcg/act nasal spray; 2 sprays into each nostril daily        Subjective:   Patient was seen at the ED for chest pain and abd pain  Patient ID: Grady Lee is a 62 y o  female  HPI  Lab tests were mostly negative except for positive D-dimer and abnormal abd CT of the uterus  EKG at the ED was negative  Echo Stress test has been ordered  She will get it done tomorrow  MA to give her directions  She reports she no longer has the chest pain, which yesterday was achy, sharp, radiation within the left chest from the sternal area outward  The abd pain was in the epigastric area  She says is worse with drinking coffee and sometimes associated with with bloating and gas  Denies diarrhea, constipation, dizziness, or syncope  CT of the abd showed mild stool retention  Hiatal hernia seen on CT of abd, filled with fat  She ran out of the spiriva  This is refilled today  Has SOB on exertion  Has a current diagnosis of asthma and emphysema         The following portions of the patient's history were reviewed and updated as appropriate: allergies, current medications, past family history, past medical history, past social history, past surgical history and problem list     Review of Systems    Constitutional: Negative for appetite change  Negative for activity change, fatigue and unexpected weight change  Respiratory: Negative for wheezing, cough  SOB with exertion  Hx of asthma  Cardiovascular: Negative for palpitations  Reports Intermittent chest pain  Gastrointestinal: Negative for abdominal pain, nausea and vomiting  Negative for diarrhea or constipation  Negative for blood in stool  Has a BM daily  Musculoskeletal: Negative for arthralgias  Neurological: Negative for dizziness, light-headedness and headaches  Objective:     Physical Exam    GEN: AAOx3, NAD  HEENT: PEERLA, EOMI, MM moist  No scleral icterus  CV: RRR, nml S1/S2, no murmur appreciated  Lungs: CTA bilaterally, no w/r/r  Abd: Soft, NT  +NABS  No rebound/guarding  Except for pain in the epigastric pain on palpation  No mass effect     Neuro: No focal deficits  Skin: No rashes or lesions noted  Psych: Normal mood and affect

## 2018-02-06 ENCOUNTER — HOSPITAL ENCOUNTER (OUTPATIENT)
Dept: NON INVASIVE DIAGNOSTICS | Facility: CLINIC | Age: 58
Discharge: HOME | End: 2018-02-06
Payer: COMMERCIAL

## 2018-02-06 DIAGNOSIS — R07.9 CHEST PAIN: ICD-10-CM

## 2018-02-06 PROCEDURE — 93016 CV STRESS TEST SUPVJ ONLY: CPT | Performed by: INTERNAL MEDICINE

## 2018-02-06 PROCEDURE — 93018 CV STRESS TEST I&R ONLY: CPT | Performed by: INTERNAL MEDICINE

## 2018-02-06 PROCEDURE — 93017 CV STRESS TEST TRACING ONLY: CPT

## 2018-02-07 LAB
CHEST PAIN STATEMENT: NORMAL
MAX DIASTOLIC BP: 70 MMHG
MAX HEART RATE: 150 BPM
MAX PREDICTED HEART RATE: 163 BPM
MAX. SYSTOLIC BP: 154 MMHG
PROTOCOL NAME: NORMAL
TARGET HR FORMULA: NORMAL
TEST INDICATION: NORMAL
TIME IN EXERCISE PHASE: NORMAL

## 2018-02-08 ENCOUNTER — HOSPITAL ENCOUNTER (OUTPATIENT)
Dept: RADIOLOGY | Age: 58
Discharge: HOME | End: 2018-02-08
Payer: COMMERCIAL

## 2018-02-08 ENCOUNTER — OFFICE VISIT (OUTPATIENT)
Dept: URGENT CARE | Age: 58
End: 2018-02-08
Payer: COMMERCIAL

## 2018-02-08 ENCOUNTER — APPOINTMENT (OUTPATIENT)
Dept: RADIOLOGY | Age: 58
End: 2018-02-08
Attending: PHYSICIAN ASSISTANT
Payer: COMMERCIAL

## 2018-02-08 VITALS
HEIGHT: 60 IN | TEMPERATURE: 98 F | HEART RATE: 76 BPM | SYSTOLIC BLOOD PRESSURE: 142 MMHG | DIASTOLIC BLOOD PRESSURE: 73 MMHG | OXYGEN SATURATION: 97 % | RESPIRATION RATE: 20 BRPM | WEIGHT: 134 LBS | BODY MASS INDEX: 26.31 KG/M2

## 2018-02-08 DIAGNOSIS — R93.5 ABNORMAL CT OF THE ABDOMEN: ICD-10-CM

## 2018-02-08 DIAGNOSIS — S69.91XA INJURY OF RIGHT WRIST, INITIAL ENCOUNTER: Primary | ICD-10-CM

## 2018-02-08 DIAGNOSIS — S69.91XD INJURY OF RIGHT WRIST, SUBSEQUENT ENCOUNTER: ICD-10-CM

## 2018-02-08 PROCEDURE — 73110 X-RAY EXAM OF WRIST: CPT

## 2018-02-08 PROCEDURE — 99283 EMERGENCY DEPT VISIT LOW MDM: CPT | Performed by: FAMILY MEDICINE

## 2018-02-08 PROCEDURE — 76830 TRANSVAGINAL US NON-OB: CPT

## 2018-02-08 PROCEDURE — 76856 US EXAM PELVIC COMPLETE: CPT

## 2018-02-08 PROCEDURE — 29260 STRAPPING OF ELBOW OR WRIST: CPT | Performed by: FAMILY MEDICINE

## 2018-02-08 PROCEDURE — 73090 X-RAY EXAM OF FOREARM: CPT

## 2018-02-08 PROCEDURE — G0382 LEV 3 HOSP TYPE B ED VISIT: HCPCS | Performed by: FAMILY MEDICINE

## 2018-02-08 PROCEDURE — 73130 X-RAY EXAM OF HAND: CPT

## 2018-02-08 NOTE — PROGRESS NOTES
[facility]        NAME: Rosa Ramírez is a 62 y o  female  : 1960    MRN: [id number]  DATE: 2018  TIME: 4:54 PM    Assessment and Plan   Injury of right wrist, initial encounter [S69 [id number]]  1  Injury of right wrist, initial encounter  XR forearm 2 vw right    XR wrist 3+ vw right    Compression bandages    CANCELED: XR hand 2 vw right         Patient Instructions       Ice, elevate, Ace wrap for comfort during the day  Motrin as directed  Follow up with PCP or orthopedics if no improvement  Proceed to  ER if symptoms worsen  Chief Complaint     Chief Complaint   Patient presents with    Wrist Injury         History of Present Illness   Rosa Ramírez presents to the clinic c/o    51-year-old woman RHD female states that she tripped going up a step today in her basement, hit her right wrist against the corner of the wall  She is complaining of right wrist pain and pain into her forearm and hand  She took aspirin and applied ice with some relief  Review of Systems   Review of Systems   Constitutional: Negative  Musculoskeletal:        Right wrist pain, right hand pain  Skin: Negative  All other systems reviewed and are negative          Current Medications     Long-Term Prescriptions   Medication Sig Dispense Refill    aspirin 81 mg chewable tablet Chew 1 tablet (81 mg total) daily 90 tablet 1    Cholecalciferol (VITAMIN D3) 2000 units capsule Take 1 tablet by mouth daily      fluticasone (FLONASE) 50 mcg/act nasal spray 2 sprays into each nostril daily      meloxicam (MOBIC) 7 5 mg tablet Take 7 5 mg by mouth daily      montelukast (SINGULAIR) 10 mg tablet Take 1 tablet (10 mg total) by mouth daily at bedtime 90 tablet 1    pravastatin (PRAVACHOL) 20 mg tablet Take 20 mg by mouth daily      ranitidine (ZANTAC) 150 mg tablet Take 1 tablet (150 mg total) by mouth 2 (two) times a day 90 tablet 1    tiotropium (SPIRIVA) 18 mcg inhalation capsule Place 18 mcg into inhaler and inhale daily         Current Allergies     Allergies as of 02/08/2018 - Reviewed 02/08/2018   Allergen Reaction Noted    Seasonal ic  [cholestatin]  05/04/2017            The following portions of the patient's history were reviewed and updated as appropriate: allergies, current medications, past family history, past medical history, past social history, past surgical history and problem list     Objective   /73   Pulse 76   Temp 98 °F (36 7 °C) (Temporal)   Resp 20   Ht 5' (1 524 m)   Wt 60 8 kg (134 lb)   SpO2 97%   BMI 26 17 kg/m²        Physical Exam     Physical Exam   Constitutional: She is oriented to person, place, and time  She appears well-developed and well-nourished  Musculoskeletal:        Right wrist: She exhibits normal range of motion, no tenderness, no bony tenderness, no swelling and no effusion  Neurological: She is alert and oriented to person, place, and time  Skin: Skin is warm and dry  No erythema  Psychiatric: She has a normal mood and affect  Her behavior is normal    Nursing note and vitals reviewed  Xrays reviewed: no acute findings noted  Preliminary report  Will await official radiology report

## 2018-02-08 NOTE — PATIENT INSTRUCTIONS
Ice, elevate, Ace wrap for comfort during the day  Motrin as directed  Follow up with PCP or orthopedics if no improvement  Proceed to  ER if symptoms worsen  Wrist Injury   WHAT YOU NEED TO KNOW:   A wrist injury happens when the tissues of your wrist joint are damaged  Your wrist joint is made up of tendons, ligaments, nerves, and bones  Two common types of injuries that can happen to your wrist are sprains and strains  A sprain can happen when the ligaments are stretched or torn  Ligaments are bands of elastic tissue that connect and hold the bones together  A strain happens when a tendon or muscle is overused, stretched, or torn  Tendons attach your hand and arm muscles to the bones of the wrist    DISCHARGE INSTRUCTIONS:   Medicines:   · NSAIDs:  These medicines decrease swelling, pain, and fever  NSAIDs are available without a doctor's order  Ask which medicine is right for you  Ask how much to take and when to take it  Take as directed  NSAIDs can cause stomach bleeding and kidney problems if not taken correctly  · Pain medicine: You may be given a prescription medicine to decrease pain  Do not wait until the pain is severe before you take this medicine  · Take your medicine as directed  Contact your healthcare provider if you think your medicine is not helping or if you have side effects  Tell him or her if you are allergic to any medicine  Keep a list of the medicines, vitamins, and herbs you take  Include the amounts, and when and why you take them  Bring the list or the pill bottles to follow-up visits  Carry your medicine list with you in case of an emergency  Follow up with your healthcare provider as directed:  Write down your questions so you remember to ask them during your visits  Manage your symptoms:   · Wrist supports:  A cast or splint may be put on your fingers, hand, and wrist to support your wrist and prevent further damage  Wear these as directed   Ask for instructions on how to bathe while you are wearing a splint or case  · Rest:  You may need to rest your wrist for at least 48 hours and avoid activities that cause pain  Ask what activities you should avoid and for how long  · Ice:  Ice helps decrease swelling and pain  Ice may also help prevent tissue damage  Use an ice pack or put crushed ice in a plastic bag  Cover it with a towel and place it on your injured wrist for 15 to 20 minutes every hour as directed  · Compression:  Your healthcare provider may suggest you wrap your wrist with an elastic bandage  This will help decrease swelling, support your wrist, and help it heal  Wear your wrist wrap as directed  Ask for instructions on how to wrap your wrist     · Elevation:  When you sit or lie down, keep your wrist at or above the level of your heart  This may help decrease pain and swelling  Physical therapy:  Your healthcare provider may recommend that you go to physical therapy  A physical therapist shows you how to do exercises that can help to strengthen your wrist and improve its range of movement  These exercises may also help decrease your pain  Prevent another wrist injury:   · Do strengthening exercises: Your healthcare provider or physical therapist may suggest that you do exercises to strengthen your hand and arm muscles  Ask when you may return to your regular physical activities or sports  If you start to exercise too soon it may cause you to injure your wrist again  · Protect your wrists:  Wrist guard splints or protective tape can help to support your wrist during exercise and sports  These devices may also keep your wrist from bending too far back  Ask for more information about the type of wrist support that you should use  Contact your healthcare provider if:   · You have a fever  · The bruising, swelling, or pain in your wrist gets worse  · You have questions or concerns about your condition or care    Seek care immediately or call 911 if:   · The skin on or near your wrist or hand feels cold, or it turns blue or white  · The skin on or near your wrist or hand is very tight, raised, and swollen  · You have new trouble moving and using your hands, fingers, or wrist     · Your wrist, hands, or fingers become swollen, red, numb, or they tingle  · Your wrist has any open wounds, including from surgery, that are red, swollen, warm, or have pus coming from them  © 2017 [facility] Information is for End User's use only and may not be sold, redistributed or otherwise used for commercial purposes  All illustrations and images included in CareNotes® are the copyrighted property of [employer]  or Akil Dawson  The above information is an  only  It is not intended as medical advice for individual conditions or treatments  Talk to your doctor, nurse or pharmacist before following any medical regimen to see if it is safe and effective for you

## 2018-02-13 ENCOUNTER — OFFICE VISIT (OUTPATIENT)
Dept: INTERNAL MEDICINE CLINIC | Facility: CLINIC | Age: 58
End: 2018-02-13
Payer: COMMERCIAL

## 2018-02-13 VITALS
TEMPERATURE: 98.1 F | WEIGHT: 132.28 LBS | DIASTOLIC BLOOD PRESSURE: 82 MMHG | SYSTOLIC BLOOD PRESSURE: 112 MMHG | HEIGHT: 60 IN | HEART RATE: 72 BPM | BODY MASS INDEX: 25.97 KG/M2

## 2018-02-13 DIAGNOSIS — J43.9 PULMONARY EMPHYSEMA, UNSPECIFIED EMPHYSEMA TYPE (HCC): ICD-10-CM

## 2018-02-13 DIAGNOSIS — J30.9 CHRONIC ALLERGIC RHINITIS, UNSPECIFIED SEASONALITY, UNSPECIFIED TRIGGER: ICD-10-CM

## 2018-02-13 DIAGNOSIS — M79.601 ARM PAIN, DIFFUSE, RIGHT: Primary | ICD-10-CM

## 2018-02-13 DIAGNOSIS — E55.9 VITAMIN D DEFICIENCY: ICD-10-CM

## 2018-02-13 PROCEDURE — 99214 OFFICE O/P EST MOD 30 MIN: CPT | Performed by: NURSE PRACTITIONER

## 2018-02-13 RX ORDER — ACETAMINOPHEN 160 MG
2000 TABLET,DISINTEGRATING ORAL DAILY
Qty: 90 CAPSULE | Refills: 3 | Status: SHIPPED | OUTPATIENT
Start: 2018-02-13 | End: 2018-02-27

## 2018-02-13 RX ORDER — FLUTICASONE PROPIONATE 50 MCG
2 SPRAY, SUSPENSION (ML) NASAL DAILY
Qty: 16 G | Refills: 0 | Status: SHIPPED | OUTPATIENT
Start: 2018-02-13 | End: 2018-04-26 | Stop reason: SDUPTHER

## 2018-02-13 RX ORDER — CYCLOBENZAPRINE HCL 5 MG
10 TABLET ORAL 3 TIMES DAILY PRN
Qty: 30 TABLET | Refills: 0 | Status: SHIPPED | OUTPATIENT
Start: 2018-02-13 | End: 2018-02-27 | Stop reason: SDUPTHER

## 2018-02-13 RX ORDER — NAPROXEN 500 MG/1
500 TABLET ORAL 2 TIMES DAILY WITH MEALS
Qty: 60 TABLET | Refills: 0 | Status: SHIPPED | OUTPATIENT
Start: 2018-02-13 | End: 2018-03-15 | Stop reason: SDUPTHER

## 2018-02-13 RX ORDER — MONTELUKAST SODIUM 10 MG/1
10 TABLET ORAL
Qty: 90 TABLET | Refills: 0 | Status: SHIPPED | OUTPATIENT
Start: 2018-02-13 | End: 2018-07-06 | Stop reason: SDUPTHER

## 2018-02-13 NOTE — PROGRESS NOTES
Assessment/Plan:   Diagnoses and all orders for this visit:    Arm pain, diffuse, right  -     cyclobenzaprine (FLEXERIL) 5 mg tablet; Take 2 tablets (10 mg total) by mouth 3 (three) times a day as needed for muscle spasms  -     naproxen (EC NAPROSYN) 500 MG EC tablet; Take 1 tablet (500 mg total) by mouth 2 (two) times a day with meals    Pulmonary emphysema, unspecified emphysema type (HCC)  -     montelukast (SINGULAIR) 10 mg tablet; Take 1 tablet (10 mg total) by mouth daily at bedtime    Vitamin D deficiency  -     Cholecalciferol (VITAMIN D3) 2000 units capsule; Take 1 capsule (2,000 Units total) by mouth daily    Chronic allergic rhinitis, unspecified seasonality, unspecified trigger  -     fluticasone (FLONASE) 50 mcg/act nasal spray; 2 sprays into each nostril daily      Patient was taken to the  for helping making apt with GYN  She will f/u with me in 2 weeks for re-evaluation of the pain  Sooner if s/s worsen  Subjective:  Presents to the clinic for follow-up pain secondary to fall     Patient ID: Norm Monet is a 62 y o  female  HPI  Patient reports that she fell at home and hurt her wrist and arm  She went to the urgent care clinic and x-rays done at clinic at that time was negative for fracture  She reports that this pain on the right wrist and right arm are of moderate intensity, constant, achy and squeezing pain  Worse when lifting any objects and better with rest   She was not prescribed any pain medications at the urgent clinic but she has been taking Motrin 200 mg over-the-counter  She reports that she is not helping her with the pain  She reports she has a also feeling some pain on the right foot, of mild-to-moderate intensity  No radiation of the pain  Does not increase with weight-bearing or walking  The transvaginal ultrasound of the abdomen secondary to abnormality on her uterus was negative except for inability for proper visualization of the endometrium    The radiologist recommended saline sonohysterography or sampling  Patient is advised to make an appointment with the gyn for further evaluation  Requesting refill for vitamin-D, Flonase, and singulair  These meds sent to pharmacy  The following portions of the patient's history were reviewed and updated as appropriate: allergies, current medications, past family history, past medical history, past social history, past surgical history and problem list     Review of Systems   Constitutional: Negative for activity change, appetite change, fatigue and unexpected weight change  Respiratory: Negative for apnea, chest tightness, shortness of breath and wheezing  Cardiovascular: Negative for chest pain and palpitations  Genitourinary: Negative for frequency, hematuria, menstrual problem, urgency and vaginal bleeding  Musculoskeletal: Positive for myalgias (right arm pain; as described above) and neck pain (secondary to fall, mild intensity, tightening pain, no radiation  )  Negative for back pain, gait problem and neck stiffness  Objective:    Vitals:    02/13/18 0856   BP: 112/82   Pulse: 72   Temp: 98 1 °F (36 7 °C)        Physical Exam   Constitutional: She is oriented to person, place, and time  She appears well-developed and well-nourished  No distress  Cardiovascular: Normal rate, regular rhythm and normal heart sounds  No murmur heard  Pulmonary/Chest: Effort normal and breath sounds normal  No respiratory distress  She has no wheezes  Musculoskeletal: Normal range of motion  She exhibits tenderness (palpation of the right wrist, and right forearm elicited pain  Decreased ROM on the R wrist secondary to pain  No swelling or redness  Movement of fingers WNL  )  She exhibits no edema  Neurological: She is alert and oriented to person, place, and time  Skin: Skin is warm and dry  She is not diaphoretic

## 2018-02-13 NOTE — PATIENT INSTRUCTIONS
Below are four things you can do to prevent falls:   1  Begin an exercise program to improve your leg strength & balance  2  Ask your doctor or pharmacist to review your medicines   3  Get annual eye check-ups & update your eyeglasses  4  Make your home safer by:  · Removing clutter & tripping hazards  · Putting railings on all stairs & adding grab bars in the bathroom  · Having good lighting, especially on stairs    Contact your local community or senior center for information on exercise, fall prevention programs, or options for improving home safety

## 2018-02-14 ENCOUNTER — OFFICE VISIT (OUTPATIENT)
Dept: OBGYN CLINIC | Facility: HOSPITAL | Age: 58
End: 2018-02-14
Payer: COMMERCIAL

## 2018-02-14 VITALS — BODY MASS INDEX: 25.97 KG/M2 | DIASTOLIC BLOOD PRESSURE: 80 MMHG | WEIGHT: 133 LBS | SYSTOLIC BLOOD PRESSURE: 121 MMHG

## 2018-02-14 DIAGNOSIS — Z12.39 SCREENING FOR BREAST CANCER: Primary | ICD-10-CM

## 2018-02-14 DIAGNOSIS — Z01.419 CERVICAL SMEAR, AS PART OF ROUTINE GYNECOLOGICAL EXAMINATION: ICD-10-CM

## 2018-02-14 PROCEDURE — G0145 SCR C/V CYTO,THINLAYER,RESCR: HCPCS | Performed by: OBSTETRICS & GYNECOLOGY

## 2018-02-14 PROCEDURE — 99203 OFFICE O/P NEW LOW 30 MIN: CPT | Performed by: OBSTETRICS & GYNECOLOGY

## 2018-02-14 PROCEDURE — 87624 HPV HI-RISK TYP POOLED RSLT: CPT | Performed by: OBSTETRICS & GYNECOLOGY

## 2018-02-14 NOTE — PROGRESS NOTES
Loli Kaminski is a 62 y o  female who presents for annual exam  She has been menopausal for the past 12 years, no PMB, no pelvic pain, no abnormal discharge, no incontinence  Patient has not had a pap for at least ten years and per her they have all been normal  She denies history of any STDs  The patient has no complaints today  The patient is not sexually active  GYN screening history: no prior history of gyn screening tests  The patient is not taking hormone replacement therapy  Patient denies post-menopausal vaginal bleeding    The patient wears seatbelts: yes  The patient participates in regular exercise: no  The patient reports that there is not domestic violence in her life  Menstrual History:  OB History     No data available           No LMP recorded  Patient is postmenopausal        The following portions of the patient's history were reviewed and updated as appropriate: allergies, current medications, past family history, past medical history, past social history, past surgical history and problem list     Review of Systems  Review of Systems   Constitutional: Negative for chills and fever  Eyes: Negative for visual disturbance  Respiratory: Negative for chest tightness and shortness of breath  Cardiovascular: Negative for chest pain  Gastrointestinal: Negative for abdominal pain, nausea and vomiting  Genitourinary: Negative for vaginal bleeding  Neurological: Negative for dizziness and headaches       Objective      /80   Wt 60 3 kg (133 lb)   BMI 25 97 kg/m²     General:   alert and oriented, in no acute distress   Heart: S1, S2 normal   Lungs: clear to auscultation bilaterally   Abdomen: soft, non-tender, without masses or organomegaly   Vulva: normal   Vagina: normal mucosa, normal discharge   Cervix: anteverted, multiparous appearance, no cervical motion tenderness and no lesions   Uterus: normal size, mobile, non-tender   Adnexa: normal adnexa Assessment/Plan    62year old [id number] here for annual exam      1  Annual exam  -mammogram up to date and wnl (per her PCP)  -Pap collected today     Diagnoses and all orders for this visit:    Screening for breast cancer      -      current    Cervical smear, as part of routine gynecological examination  -     Liquid-based pap, screening;  Future  -     Liquid-based pap, screening

## 2018-02-16 LAB
HPV RRNA GENITAL QL NAA+PROBE: NORMAL
LAB AP GYN PRIMARY INTERPRETATION: NORMAL
Lab: NORMAL
PATH INTERP SPEC-IMP: NORMAL

## 2018-02-27 ENCOUNTER — OFFICE VISIT (OUTPATIENT)
Dept: INTERNAL MEDICINE CLINIC | Facility: CLINIC | Age: 58
End: 2018-02-27
Payer: COMMERCIAL

## 2018-02-27 VITALS
WEIGHT: 132.72 LBS | BODY MASS INDEX: 26.06 KG/M2 | SYSTOLIC BLOOD PRESSURE: 118 MMHG | HEIGHT: 60 IN | HEART RATE: 84 BPM | TEMPERATURE: 97.7 F | DIASTOLIC BLOOD PRESSURE: 72 MMHG

## 2018-02-27 DIAGNOSIS — M79.601 ARM PAIN, DIFFUSE, RIGHT: ICD-10-CM

## 2018-02-27 PROCEDURE — 99213 OFFICE O/P EST LOW 20 MIN: CPT | Performed by: NURSE PRACTITIONER

## 2018-02-27 RX ORDER — CYCLOBENZAPRINE HCL 5 MG
5 TABLET ORAL
Qty: 30 TABLET | Refills: 0 | Status: SHIPPED | OUTPATIENT
Start: 2018-02-27 | End: 2018-04-11 | Stop reason: SDUPTHER

## 2018-02-27 NOTE — PATIENT INSTRUCTIONS
Wrist Injury   WHAT YOU NEED TO KNOW:   A wrist injury happens when the tissues of your wrist joint are damaged  Your wrist joint is made up of tendons, ligaments, nerves, and bones  Two common types of injuries that can happen to your wrist are sprains and strains  A sprain can happen when the ligaments are stretched or torn  Ligaments are bands of elastic tissue that connect and hold the bones together  A strain happens when a tendon or muscle is overused, stretched, or torn  Tendons attach your hand and arm muscles to the bones of the wrist    DISCHARGE INSTRUCTIONS:   Medicines:   · NSAIDs:  These medicines decrease swelling, pain, and fever  NSAIDs are available without a doctor's order  Ask which medicine is right for you  Ask how much to take and when to take it  Take as directed  NSAIDs can cause stomach bleeding and kidney problems if not taken correctly  · Pain medicine: You may be given a prescription medicine to decrease pain  Do not wait until the pain is severe before you take this medicine  · Take your medicine as directed  Contact your healthcare provider if you think your medicine is not helping or if you have side effects  Tell him of her if you are allergic to any medicine  Keep a list of the medicines, vitamins, and herbs you take  Include the amounts, and when and why you take them  Bring the list or the pill bottles to follow-up visits  Carry your medicine list with you in case of an emergency  Follow up with your healthcare provider as directed:  Write down your questions so you remember to ask them during your visits  Manage your symptoms:   · Wrist supports:  A cast or splint may be put on your fingers, hand, and wrist to support your wrist and prevent further damage  Wear these as directed  Ask for instructions on how to bathe while you are wearing a splint or case  · Rest:  You may need to rest your wrist for at least 48 hours and avoid activities that cause pain   Ask what activities you should avoid and for how long  · Ice:  Ice helps decrease swelling and pain  Ice may also help prevent tissue damage  Use an ice pack or put crushed ice in a plastic bag  Cover it with a towel and place it on your injured wrist for 15 to 20 minutes every hour as directed  · Compression:  Your healthcare provider may suggest you wrap your wrist with an elastic bandage  This will help decrease swelling, support your wrist, and help it heal  Wear your wrist wrap as directed  Ask for instructions on how to wrap your wrist     · Elevation:  When you sit or lie down, keep your wrist at or above the level of your heart  This may help decrease pain and swelling  Physical therapy:  Your healthcare provider may recommend that you go to physical therapy  A physical therapist shows you how to do exercises that can help to strengthen your wrist and improve its range of movement  These exercises may also help decrease your pain  Prevent another wrist injury:   · Do strengthening exercises: Your healthcare provider or physical therapist may suggest that you do exercises to strengthen your hand and arm muscles  Ask when you may return to your regular physical activities or sports  If you start to exercise too soon it may cause you to injure your wrist again  · Protect your wrists:  Wrist guard splints or protective tape can help to support your wrist during exercise and sports  These devices may also keep your wrist from bending too far back  Ask for more information about the type of wrist support that you should use  Contact your healthcare provider if:   · You have a fever  · The bruising, swelling, or pain in your wrist gets worse  · You have questions or concerns about your condition or care  Return to the emergency department if:   · The skin on or near your wrist or hand feels cold, or it turns blue or white      · The skin on or near your wrist or hand is very tight, raised, and swollen  · You have new trouble moving and using your hands, fingers, or wrist     · Your wrist, hands, or fingers become swollen, red, numb, or they tingle  · Your wrist has any open wounds, including from surgery, that are red, swollen, warm, or have pus coming from them  © 2017 [employer] Information is for End User's use only and may not be sold, redistributed or otherwise used for commercial purposes  All illustrations and images included in CareNotes® are the copyrighted property of [employer]  or Akil Dawson  The above information is an  only  It is not intended as medical advice for individual conditions or treatments  Talk to your doctor, nurse or pharmacist before following any medical regimen to see if it is safe and effective for you

## 2018-02-27 NOTE — PROGRESS NOTES
Assessment/Plan:     Diagnoses and all orders for this visit:    Arm pain, diffuse, right  -     cyclobenzaprine (FLEXERIL) 5 mg tablet; Take 1 tablet (5 mg total) by mouth daily at bedtime  -     Continue taking naproxen for the pain prn  -     OK to use a wrist splint when at work  -     If pain returns, RTC to see me  Otherwise f/u with me prn  -     Can return to work tomorrow AM          Subjective: Presents to the clinic for f/u fiffuse R arm and wrist pain after a fall at home     Patient ID: Stephen Tucker is a 62 y o  female  HPI  Was last seen at the clinic by me on Feb 13th 2018  Was treated with naproxen and cyclobenzaprine  She reports the pain on the arm is completely gone and on the wrist is almost all gone except for residual intermittent discomfort, but not actual pain  The pain on the R ankle/foot has completely resolved  The pain now rates at 2/10 when it occurs, worse with overuse, better with rest and med  No radiation  Achy  No weakness or numbness/tingling of the fingers  The following portions of the patient's history were reviewed and updated as appropriate: allergies, current medications, past family history, past medical history, past social history, past surgical history and problem list     Review of Systems   Constitutional: Negative for appetite change, chills, fatigue and fever  Respiratory: Negative for cough, chest tightness, shortness of breath and wheezing  Cardiovascular: Negative for chest pain and palpitations  Musculoskeletal: Positive for myalgias (minimal residual pain, R wrist)  Negative for back pain, gait problem, joint swelling and neck pain  Skin: Negative for color change, pallor, rash and wound  Objective:    /72   Pulse 84   Temp 97 7 °F (36 5 °C)   Ht 5' (1 524 m)   Wt 60 2 kg (132 lb 11 5 oz)   BMI 25 92 kg/m²        Physical Exam   Constitutional: She is oriented to person, place, and time   She appears well-developed and well-nourished  No distress  Cardiovascular: Normal rate, regular rhythm and normal heart sounds  No murmur heard  Pulmonary/Chest: Effort normal and breath sounds normal  No respiratory distress  She has no wheezes  Musculoskeletal: Normal range of motion  She exhibits tenderness (minimal discomfort of the R wrist with palpation  Full ROM with no pain  Squeeze strength  5/5, no weakness, cap refill < 3 secs  )  She exhibits no edema or deformity  Neurological: She is alert and oriented to person, place, and time  Skin: She is not diaphoretic  Psychiatric: She has a normal mood and affect   Her behavior is normal  Judgment and thought content normal

## 2018-02-27 NOTE — LETTER
February 27, 2018     Patient: Claudetta Repine   YOB: 1960   Date of Visit: 2/27/2018       To Whom it May Concern:    Claudetta Repine was seen in my clinic on 2/27/2018  She may return to work on 02/28/2018  She was seen initially on 02/13/2018 and has been off work since that time  If you have any questions or concerns, please don't hesitate to call           Sincerely,          MARVA Serrato        CC: No Recipients

## 2018-03-15 ENCOUNTER — TELEPHONE (OUTPATIENT)
Dept: INTERNAL MEDICINE CLINIC | Facility: CLINIC | Age: 58
End: 2018-03-15

## 2018-03-15 DIAGNOSIS — M19.90 ARTHRITIS: Primary | ICD-10-CM

## 2018-03-15 DIAGNOSIS — M79.601 ARM PAIN, DIFFUSE, RIGHT: ICD-10-CM

## 2018-03-15 RX ORDER — NAPROXEN 500 MG/1
500 TABLET ORAL 2 TIMES DAILY WITH MEALS
Qty: 60 TABLET | Refills: 0 | Status: SHIPPED | OUTPATIENT
Start: 2018-03-15 | End: 2018-03-20

## 2018-03-20 ENCOUNTER — TELEPHONE (OUTPATIENT)
Dept: INTERNAL MEDICINE CLINIC | Facility: CLINIC | Age: 58
End: 2018-03-20

## 2018-03-20 DIAGNOSIS — M19.90 ARTHRITIS: Primary | ICD-10-CM

## 2018-03-20 RX ORDER — NAPROXEN 375 MG/1
375 TABLET ORAL 2 TIMES DAILY WITH MEALS
Qty: 180 TABLET | Refills: 0 | Status: SHIPPED | OUTPATIENT
Start: 2018-03-20 | End: 2018-05-15 | Stop reason: SDUPTHER

## 2018-03-21 NOTE — TELEPHONE ENCOUNTER
PHARMACIST MADE AWARE NEW SCRIPT SENT  ORIGINAL MESSAGE WAS TAKEN INCORRECTLY   IF SHOULD SAY EITHER 250

## 2018-04-11 DIAGNOSIS — M79.601 ARM PAIN, DIFFUSE, RIGHT: ICD-10-CM

## 2018-04-11 RX ORDER — CYCLOBENZAPRINE HCL 5 MG
5 TABLET ORAL
Qty: 30 TABLET | Refills: 0 | Status: SHIPPED | OUTPATIENT
Start: 2018-04-11 | End: 2018-05-22 | Stop reason: SDUPTHER

## 2018-04-26 DIAGNOSIS — J30.9 ALLERGIC SINUSITIS: Primary | ICD-10-CM

## 2018-04-26 RX ORDER — FLUTICASONE PROPIONATE 50 MCG
2 SPRAY, SUSPENSION (ML) NASAL DAILY
Qty: 16 G | Refills: 0 | Status: SHIPPED | OUTPATIENT
Start: 2018-04-26 | End: 2018-05-23 | Stop reason: SDUPTHER

## 2018-05-07 DIAGNOSIS — M79.601 ARM PAIN, DIFFUSE, RIGHT: ICD-10-CM

## 2018-05-07 RX ORDER — CYCLOBENZAPRINE HCL 5 MG
5 TABLET ORAL
Qty: 30 TABLET | Refills: 0 | OUTPATIENT
Start: 2018-05-07

## 2018-05-08 NOTE — TELEPHONE ENCOUNTER
Called patient  Message indicated "voicemail box not set up yet" could not leave a message -- will try another day

## 2018-05-14 ENCOUNTER — OFFICE VISIT (OUTPATIENT)
Dept: INTERNAL MEDICINE CLINIC | Facility: CLINIC | Age: 58
End: 2018-05-14
Payer: COMMERCIAL

## 2018-05-14 VITALS
WEIGHT: 128.75 LBS | TEMPERATURE: 98.3 F | BODY MASS INDEX: 25.28 KG/M2 | SYSTOLIC BLOOD PRESSURE: 122 MMHG | HEART RATE: 84 BPM | HEIGHT: 60 IN | DIASTOLIC BLOOD PRESSURE: 80 MMHG

## 2018-05-14 DIAGNOSIS — R29.898 BILATERAL ARM WEAKNESS: Primary | ICD-10-CM

## 2018-05-14 PROCEDURE — 99213 OFFICE O/P EST LOW 20 MIN: CPT | Performed by: NURSE PRACTITIONER

## 2018-05-14 NOTE — PATIENT INSTRUCTIONS

## 2018-05-14 NOTE — PROGRESS NOTES
Assessment/Plan:     Diagnoses and all orders for this visit:    Bilateral arm weakness  -     Ambulatory referral to Physical Therapy; Future  -     EMG 2 Limb Upper Extremity; Future        Subjective: patient presents to the clinic for B/l arm pain and weakness     Patient ID: Scooter Whitehead is a 62 y o  female  HPI  She has been treated with naproxen and flexeril since Feb for B/L arm pain  The pain had completely resolved but returned after she stopped the meds  Her record show x-ray of the R and L hands and R fore arm in 2018 are all negative  She says she sometimes experiences some weakness to the arms when the pain is very elevated  She is not sure what makes the pain worse, but thinks it might be after using her arms a lot  Better with med  Denies neck pain or shoulder pain  Pain described as achy  No radiation  Intermittent  Today EMG will be ordered  She is also referred to P/T  If no improvement, we will refer to orthopedic specialist      The following portions of the patient's history were reviewed and updated as appropriate: allergies, current medications, past family history, past medical history, past social history, past surgical history and problem list     Review of Systems   Constitutional: Negative for appetite change, diaphoresis, fatigue and unexpected weight change  Respiratory: Negative for cough, chest tightness, shortness of breath and wheezing  Cardiovascular: Negative for chest pain and palpitations  Musculoskeletal: Positive for myalgias  Negative for gait problem, joint swelling, neck pain and neck stiffness  Neurological: Positive for weakness (arms, as described above)  Negative for tremors, syncope and numbness           Objective:      /80 (BP Location: Right arm, Patient Position: Sitting, Cuff Size: Adult)   Pulse 84   Temp 98 3 °F (36 8 °C) (Oral)   Ht 5' (1 524 m)   Wt 58 4 kg (128 lb 12 oz)   BMI 25 14 kg/m²        Physical Exam   Constitutional: She appears well-developed and well-nourished  No distress  Cardiovascular: Normal rate, regular rhythm and normal heart sounds  No murmur heard  Pulmonary/Chest: Effort normal and breath sounds normal  No respiratory distress  She has no wheezes  Musculoskeletal: She exhibits tenderness (palpation of the upper arm elicited minimal muscular pain, mainly on the upper arms, B/L)  She exhibits no edema or deformity  Pushing or pulling did not elicit any pain, however mild weakness was noted when pushing against resistance but not with squeezing  4/5  Distal pulses 2/2  Skin WNL  Skin: She is not diaphoretic

## 2018-05-15 DIAGNOSIS — M19.90 ARTHRITIS: ICD-10-CM

## 2018-05-15 NOTE — TELEPHONE ENCOUNTER
PATIENT HAD APPOINTMENT ON 05/14/2018 AND YOU DIDN'T SENT THE NAPROXEN TO THE PHARMACY AND SH SAID SHE NEEDS THE MEDICATION

## 2018-05-16 RX ORDER — NAPROXEN 375 MG/1
375 TABLET ORAL 2 TIMES DAILY WITH MEALS
Qty: 180 TABLET | Refills: 0 | Status: SHIPPED | OUTPATIENT
Start: 2018-05-16 | End: 2019-11-13

## 2018-05-18 ENCOUNTER — HOSPITAL ENCOUNTER (OUTPATIENT)
Dept: NEUROLOGY | Facility: AMBULATORY SURGERY CENTER | Age: 58
Discharge: HOME | End: 2018-05-18
Payer: COMMERCIAL

## 2018-05-18 DIAGNOSIS — R29.898 BILATERAL ARM WEAKNESS: ICD-10-CM

## 2018-05-18 PROCEDURE — 95886 MUSC TEST DONE W/N TEST COMP: CPT | Performed by: PSYCHIATRY & NEUROLOGY

## 2018-05-18 PROCEDURE — 95912 NRV CNDJ TEST 11-12 STUDIES: CPT | Performed by: PSYCHIATRY & NEUROLOGY

## 2018-05-21 ENCOUNTER — EVALUATION (OUTPATIENT)
Dept: PHYSICAL THERAPY | Facility: CLINIC | Age: 58
End: 2018-05-21
Payer: COMMERCIAL

## 2018-05-21 DIAGNOSIS — R29.898 BILATERAL ARM WEAKNESS: ICD-10-CM

## 2018-05-21 PROCEDURE — G8984 CARRY CURRENT STATUS: HCPCS | Performed by: PHYSICAL THERAPIST

## 2018-05-21 PROCEDURE — 97110 THERAPEUTIC EXERCISES: CPT | Performed by: PHYSICAL THERAPIST

## 2018-05-21 PROCEDURE — 97162 PT EVAL MOD COMPLEX 30 MIN: CPT | Performed by: PHYSICAL THERAPIST

## 2018-05-21 PROCEDURE — G8985 CARRY GOAL STATUS: HCPCS | Performed by: PHYSICAL THERAPIST

## 2018-05-21 NOTE — PROGRESS NOTES
PT Evaluation     Today's date: 2018  Patient name: Scooter Whitehead  : 1960  MRN: [id number]  Referring provider: MARVA Culp  Dx:   Encounter Diagnosis     ICD-10-CM    1  Bilateral arm weakness R29 898 Ambulatory referral to Physical Therapy                  Assessment    Assessment details: Pt presents with s/s consistent with a mild R wrist sprain however pt demonstrated mildly inconsistent clinical exam, floating pain, and (+) cogwheeling during MMT indicative of possible inorganic pain  She demonstrates significant impairments of R > L elbow, forearm, wrist, and hand strength with pain on the R  Understanding of Dx/Px/POC: fair   Prognosis: fair    Goals  STG - 4 wks  1) pt will demonstrate > 25 lbs  strength  2) pt will demonstrate > 4-/5 B UE strength  3) pt will improve pain levels > 50%    LTG - 8 wks  1) pt will report a resolution of functional limitations  2) pt will improve pain levels > 75%  3) pt will demonstrate > 30 lbs  strength  4) pt will demonstrate > 4/5 B UE strength    Plan  Frequency: 2x week  Duration in weeks: 8  Treatment plan discussed with: patient  Plan details: Pt will benefit from skilled PT services to address her impairments in order to decrease pain and improve function  Subjective Evaluation    History of Present Illness  Mechanism of injury: Pt is a 62 y o  female with PMHx significant for asthma, COPD  She reports sudden onset of R forearm and wrist pain s/p fall 2018  She reports (+) floating pain in her R forearm, wrist, and hand but denies R palmar hand pain  She now also reports insidious onset of L hand weakness with 2 episodes of dropping things 1 wk ago  She denies paraesthesias, cv pain, radicular symptoms, neuro Hx    Pain  Current pain ratin  At best pain ratin  At worst pain ratin  Relieving factors: rest    Hand dominance: right      Diagnostic Tests  X-ray: normal  EMG: normal  Treatments  No previous or current treatments  Patient Goals  Patient goals for therapy: decreased pain and increased strength          Objective     Palpation     Right Tenderness of the wrist extensors  Trigger point to wrist extensors  Tenderness     Right Elbow   No tenderness in the lateral epicondyle, olecranon process and radial head  Right Wrist/Hand   Tenderness in the scaphoid  No tenderness in the common extensor tendon, lateral epicondyle, olecranon process and triangular fibrocartilage complex        Additional Tenderness Details  ttp: (-) anatomical snuff box    Neurological Testing     Reflexes   Left   Biceps (C5/C6): trace (1+)  Brachioradialis (C6): absent (0)  Triceps (C7): absent (0)    Right   Biceps (C5/C6): trace (1+)  Brachioradialis (C6): absent (0)  Triceps (C7): absent (0)    Active Range of Motion   Cervical/Thoracic Spine   Cervical    Flexion: 40 degrees   Extension: 45 degrees   Left lateral flexion: 25 degrees   Right lateral flexion: 30 degrees   Left rotation: 52 degrees   Right rotation: 51 degrees     Right Elbow   Flexion: 140 degrees   Extension: 0 degrees   Forearm supination: 80 degrees   Forearm pronation: 80 degrees     Right Wrist   Wrist flexion: 60 degrees   Wrist extension: 60 degrees   Radial deviation: 30 degrees   Ulnar deviation: 30 degrees     Passive Range of Motion     Right Wrist   Wrist flexion: 80 degrees with pain  Wrist extension: 70 degrees with pain  Radial deviation: 30 degrees   Ulnar deviation: 40 degrees     Strength/Myotome Testing     Left Elbow   Flexion: 3+  Extension: 3+  Forearm supination: 3+  Forearm pronation: 3+    Right Elbow   Flexion: 3+  Extension: 3+  Forearm supination: 3+  Forearm pronation: 3+    Left Wrist/Hand   Wrist extension: 3+  Wrist flexion: 3+  Radial deviation: 3+  Ulnar deviation: 3+     (2nd hand position)     Trial 1: 20    Trial 2: 10    Trial 3: 15    Right Wrist/Hand   Wrist extension: 3  Wrist flexion: 3  Radial deviation: 3  Ulnar deviation: 3     (2nd hand position)     Trial 1: 20    Trial 2: 5    Trial 3: 10    Additional Strength Details  O: (+) cogwheeling noted R > L during elbow flex/ext MMTs    Tests   Cervical     Left   Negative Spurling's sign  Right   Negative Spurling's sign  Right Wrist/Hand   Positive scapholunate shear  Negative extrinsic extensor tightness, extrinsic flexor tightness, lunotriquetral shear and ulnotriquetral shear  Precautions:  PMHx: asthma, COPD    Daily Treatment Diary     Manual  5/21            Jane R wrist extensors             Laser: R wrist roxane                                                        Exercise Diary  5/21            digiflex B/  3"x10            Resisted B elbow flexion Y/  1x10            Resisted R wrist flexion Y/  1x10            Resisted L wrist flexion Y/  2x10            Resisted R wrist extension Y/  1x10            Resisted L wrist extension Y/  2x10            Resisted B elbow extension             Resisted R supi/pronation             Resisted L supi/pronation                                                                                                                                                                Modalities

## 2018-05-22 DIAGNOSIS — M79.601 ARM PAIN, DIFFUSE, RIGHT: ICD-10-CM

## 2018-05-22 RX ORDER — CYCLOBENZAPRINE HCL 5 MG
5 TABLET ORAL
Qty: 30 TABLET | Refills: 0 | Status: SHIPPED | OUTPATIENT
Start: 2018-05-22 | End: 2018-06-18 | Stop reason: SDUPTHER

## 2018-05-23 ENCOUNTER — OFFICE VISIT (OUTPATIENT)
Dept: PHYSICAL THERAPY | Facility: CLINIC | Age: 58
End: 2018-05-23
Payer: COMMERCIAL

## 2018-05-23 DIAGNOSIS — R10.13 DYSPEPSIA: ICD-10-CM

## 2018-05-23 DIAGNOSIS — R29.898 BILATERAL ARM WEAKNESS: Primary | ICD-10-CM

## 2018-05-23 DIAGNOSIS — J30.9 ALLERGIC SINUSITIS: ICD-10-CM

## 2018-05-23 PROCEDURE — 97140 MANUAL THERAPY 1/> REGIONS: CPT

## 2018-05-23 PROCEDURE — 97110 THERAPEUTIC EXERCISES: CPT

## 2018-05-23 RX ORDER — FLUTICASONE PROPIONATE 50 MCG
2 SPRAY, SUSPENSION (ML) NASAL DAILY
Qty: 1 BOTTLE | Refills: 2 | Status: SHIPPED | OUTPATIENT
Start: 2018-05-23 | End: 2018-10-03 | Stop reason: SDUPTHER

## 2018-05-23 RX ORDER — RANITIDINE 150 MG/1
150 TABLET ORAL 2 TIMES DAILY
Qty: 90 TABLET | Refills: 1 | Status: SHIPPED | OUTPATIENT
Start: 2018-05-23 | End: 2018-05-31 | Stop reason: SDUPTHER

## 2018-05-23 NOTE — PROGRESS NOTES
Daily Note     Today's date: 2018  Patient name: William Patel  : 1960  MRN: [id number]  Referring provider: MARVA Montilla  Dx:   Encounter Diagnosis     ICD-10-CM    1  Bilateral arm weakness R29 898                   Subjective: Pt reports good compliance with HEP  Objective: See treatment diary below    Assessment: Pt demonstrated fair tolerance to TE  Plan: Assess response NV  Precautions:  PMHx: asthma, COPD     Daily Treatment Diary      Manual                     Graston R wrist extensors    5 min                   Laser: R wrist roxane    2300J                                                                                                 Exercise Diary                     digiflex B/  3"x10  R/yellow   L/red   3"x10   ea                   Resisted B elbow flexion Y/  1x10  Y/   2x10                   Resisted R wrist flexion Y/  1x10  Y/   1x10                   Resisted L wrist flexion Y/  2x10  Y/   2x10                   Resisted R wrist extension Y/  1x10  Y/   1x10                   Resisted L wrist extension Y/  2x10  Y/   2x20                   Resisted B elbow extension    Y/   2x10                   Resisted R supi/pronation    1#/   1x10                   Resisted L supi/pronation    2#/   2x10

## 2018-05-30 ENCOUNTER — OFFICE VISIT (OUTPATIENT)
Dept: PHYSICAL THERAPY | Facility: CLINIC | Age: 58
End: 2018-05-30
Payer: COMMERCIAL

## 2018-05-30 DIAGNOSIS — R29.898 BILATERAL ARM WEAKNESS: Primary | ICD-10-CM

## 2018-05-30 PROCEDURE — 97140 MANUAL THERAPY 1/> REGIONS: CPT

## 2018-05-30 PROCEDURE — 97110 THERAPEUTIC EXERCISES: CPT

## 2018-05-31 ENCOUNTER — OFFICE VISIT (OUTPATIENT)
Dept: PHYSICAL THERAPY | Facility: CLINIC | Age: 58
End: 2018-05-31
Payer: COMMERCIAL

## 2018-05-31 DIAGNOSIS — R29.898 BILATERAL ARM WEAKNESS: Primary | ICD-10-CM

## 2018-05-31 DIAGNOSIS — R10.13 DYSPEPSIA: ICD-10-CM

## 2018-05-31 PROCEDURE — 97140 MANUAL THERAPY 1/> REGIONS: CPT

## 2018-05-31 PROCEDURE — 97110 THERAPEUTIC EXERCISES: CPT

## 2018-05-31 NOTE — PROGRESS NOTES
Daily Note     Today's date: 2018  Patient name: Kylie Garcia  : 1960  MRN: [id number]  Referring provider: MARVA Tejeda  Dx:   Encounter Diagnosis     ICD-10-CM    1  Bilateral arm weakness R29 898        Start Time:   Stop Time:   Total time in clinic (min): 44 minutes    Subjective: Patient reports her left arm is good but her right still bothers her  She stated she gets random burning pains throughout her forearm but does feel better at the front of her right wrist       Objective: See treatment diary below      Assessment: Slight increase in pain with R wrist flexion  C/o burning sensations on dorsum of R hand randomly with different TE  No progressions made due to soreness today  Cont  To monitor sx's and progress as livan  Plan: Continue per plan of care  Precautions:  PMHx: asthma, COPD     Daily Treatment Diary      Manual                 Graston R wrist extensors    5 min  5 min  5 min               Laser: R wrist roxane    2300J  2000J  2000J                                                                                             Exercise Diary                 digiflex B/  3"x10  R/yellow   L/red   3"x10   ea  R/yellow   L/red   3"x5   ea Yellow x10  Y 3" x10 ea               Resisted B elbow flexion Y/  1x10  Y/   2x10  Y/2x10  Y/2x10               Resisted R wrist flexion Y/  1x10  Y/   1x10  Y/2x10  Y/ 2x10               Resisted L wrist flexion Y/  2x10  Y/   2x10  Y/   2x10  Y/2x10               Resisted R wrist extension Y/  1x10  Y/   1x10  Y/   2x10  Y/2x10               Resisted L wrist extension Y/  2x10  Y/   2x20  Y/   2x20  Y/2x10               Resisted B elbow extension    Y/   2x10  Y/ 2x10 Y 3x10               Resisted R supi/pronation    1#/   1x10  1# 2x10  1# 2x10               Resisted L supi/pronation    2#/   2x10  2# 2x10  2# 2x10

## 2018-06-01 RX ORDER — RANITIDINE 150 MG/1
150 TABLET ORAL 2 TIMES DAILY
Qty: 180 TABLET | Refills: 1 | Status: SHIPPED | OUTPATIENT
Start: 2018-06-01 | End: 2019-04-05 | Stop reason: SDUPTHER

## 2018-06-04 ENCOUNTER — OFFICE VISIT (OUTPATIENT)
Dept: PHYSICAL THERAPY | Facility: CLINIC | Age: 58
End: 2018-06-04
Payer: COMMERCIAL

## 2018-06-04 DIAGNOSIS — R29.898 BILATERAL ARM WEAKNESS: Primary | ICD-10-CM

## 2018-06-04 PROCEDURE — G8985 CARRY GOAL STATUS: HCPCS

## 2018-06-04 PROCEDURE — G8984 CARRY CURRENT STATUS: HCPCS

## 2018-06-04 PROCEDURE — 97140 MANUAL THERAPY 1/> REGIONS: CPT

## 2018-06-04 PROCEDURE — 97110 THERAPEUTIC EXERCISES: CPT

## 2018-06-04 NOTE — PROGRESS NOTES
Daily Note     Today's date: 2018  Patient name: Yumiko Parkinson  : 1960  MRN: [id number]  Referring provider: MARVA Esposito  Dx:   Encounter Diagnosis     ICD-10-CM    1  Bilateral arm weakness R29 898                   Subjective: Patient reports 5/10 right wrist pain pre tx  Objective: See treatment diary below      Assessment: Pt demonstrated good tolerance to all strength progressions and reported decreased wrist pain following laser  Plan: Continue poc as per PT  Precautions:  PMHx: asthma, COPD     Daily Treatment Diary      Manual               Graston R wrist extensors    5 min  5 min  5 min  5 min             Laser: R wrist roxane    2300J  2000J  2000J  2000J                                                                                           Exercise Diary               digiflex B/  3"x10  R/yellow   L/red   3"x10   ea  R/yellow   L/red   3"x5   ea Yellow x10  Y 3" x10 ea  R/yellow  L red  3"x10 ea             Resisted B elbow flexion Y/  1x10  Y/   2x10  Y/2x10  Y/2x10  Y/2x12             Resisted R wrist flexion Y/  1x10  Y/   1x10  Y/2x10  Y/ 2x10  Y/2x12             Resisted L wrist flexion Y/  2x10  Y/   2x10  Y/   2x10  Y/2x10  Y/2x12             Resisted R wrist extension Y/  1x10  Y/   1x10  Y/   2x10  Y/2x10  Y/2x12             Resisted L wrist extension Y/  2x10  Y/   2x20  Y/   2x20  Y/2x10  Y/2x12             Resisted B elbow extension    Y/   2x10  Y/ 2x10 Y 3x10 Y/3x12             Resisted R supi/pronation    1#/   1x10  1# 2x10  1# 2x10  1# 2x12             Resisted L supi/pronation    2#/   2x10  2# 2x10  2# 2x10  2# 2x12

## 2018-06-06 ENCOUNTER — OFFICE VISIT (OUTPATIENT)
Dept: PHYSICAL THERAPY | Facility: CLINIC | Age: 58
End: 2018-06-06
Payer: COMMERCIAL

## 2018-06-06 DIAGNOSIS — R29.898 BILATERAL ARM WEAKNESS: Primary | ICD-10-CM

## 2018-06-06 PROCEDURE — 97110 THERAPEUTIC EXERCISES: CPT | Performed by: PHYSICAL THERAPIST

## 2018-06-06 NOTE — PROGRESS NOTES
Daily Note     Today's date: 2018  Patient name: Rebeka Bautista  : 1960  MRN: [id number]  Referring provider: MARVA Robins  Dx:   Encounter Diagnosis     ICD-10-CM    1  Bilateral arm weakness R29 898                   Subjective: Pt reports no L UE pain x 3 days, no episodes of dropping things since IE  She reports dorsal hand "burning" with gripping and lifting  Objective: See treatment diary below  Added R finger tendon glides to POC    Assessment: Pt demonstrated improved R wrist extensor fascial restrictions and tolerance to strengthening  Plan: Assess response to tendon glides NV  Precautions:  PMHx: asthma, COPD     Daily Treatment Diary      Manual             Graston R wrist extensors    5 min  5 min  5 min  5 min  5 min           Laser: R wrist roxane    2300J  2000J  2000J  2000J  2000J                                                                                         Exercise Diary             digiflex B/  3"x10  R/yellow   L/red   3"x10   ea  R/yellow   L/red   3"x5   ea Yellow x10  Y 3" x10 ea  R/yellow  L red  3"x10 ea  R/yellow   3"x15   L/red   2"A42           Resisted B elbow flexion Y/  1x10  Y/   2x10  Y/2x10  Y/2x10  Y/2x12  2#/   neutral/   2x12           Resisted R wrist flexion Y/  1x10  Y/   1x10  Y/2x10  Y/ 2x10  Y/2x12  1#/2x12           Resisted L wrist flexion Y/  2x10  Y/   2x10  Y/   2x10  Y/2x10  Y/2x12  2#/2x12           Resisted R wrist extension Y/  1x10  Y/   1x10  Y/   2x10  Y/2x10  Y/2x12  1#/2x12           Resisted L wrist extension Y/  2x10  Y/   2x20  Y/   2x20  Y/2x10  Y/2x12  2#/3x10           Resisted B elbow extension    Y/   2x10  Y/ 2x10 Y 3x10 Y/3x12  Y/3x12           Resisted R supi/pronation    1#/   1x10  1# 2x10  1# 2x10  1# 2x12  1#/   2x12           Resisted L supi/pronation    2#/   2x10  2# 2x10  2# 2x10  2# 2x12  2#/   2x12           R finger tendon glides            5 sets

## 2018-06-07 ENCOUNTER — OFFICE VISIT (OUTPATIENT)
Dept: INTERNAL MEDICINE CLINIC | Facility: CLINIC | Age: 58
End: 2018-06-07
Payer: COMMERCIAL

## 2018-06-07 VITALS
HEIGHT: 64 IN | SYSTOLIC BLOOD PRESSURE: 116 MMHG | WEIGHT: 130.07 LBS | TEMPERATURE: 98.2 F | HEART RATE: 84 BPM | DIASTOLIC BLOOD PRESSURE: 76 MMHG | BODY MASS INDEX: 22.21 KG/M2

## 2018-06-07 DIAGNOSIS — R29.898 BILATERAL ARM WEAKNESS: Primary | ICD-10-CM

## 2018-06-07 PROCEDURE — 3008F BODY MASS INDEX DOCD: CPT | Performed by: NURSE PRACTITIONER

## 2018-06-07 PROCEDURE — 99213 OFFICE O/P EST LOW 20 MIN: CPT | Performed by: NURSE PRACTITIONER

## 2018-06-07 NOTE — PROGRESS NOTES
Assessment/Plan:     Diagnoses linked to this encounter  B/L arm weakness       -    Patient is advised to continue with P/T       -    RTC after completion of P/T and we will re-evaluate at that time        Subjective: patient presents to the clinic for f/u arm weakness     Patient ID: Antonia Ivy is a 62 y o  female  HPI  She was last seen by me about 2 weeks ago with c/o arm pain and weakness  She was referred to P/T  She has started P/T and states the she is seeing some improvement  Before starting therapy, she was barely able to lift 2 plates  Now she is able to lift 3-4 plates at a time  The pain on her forearm persist, slightly improved, mild intensity, achy, worse with exertion, better with rest  She is advised to continue with therapy and we will re-evaluate after completion of therapy  Her EMG test is negative  The following portions of the patient's history were reviewed and updated as appropriate: allergies, current medications, past family history, past medical history, past social history, past surgical history and problem list     Review of Systems   Constitutional: Negative for appetite change, diaphoresis, fatigue and unexpected weight change  Respiratory: Negative for cough, chest tightness, shortness of breath and wheezing  Cardiovascular: Negative for chest pain and palpitations  Musculoskeletal: Positive for myalgias (R fore arm, improving)  Negative for back pain, joint swelling and neck stiffness  Skin: Negative for color change, pallor, rash and wound  Neurological: Positive for weakness (improving)  Negative for tremors, numbness and headaches  Objective:      /76 (BP Location: Left arm, Patient Position: Sitting, Cuff Size: Standard)   Pulse 84   Temp 98 2 °F (36 8 °C)   Ht 5' 3 6" (1 615 m)   Wt 59 kg (130 lb 1 1 oz)   BMI 22 61 kg/m²      Physical Exam   Constitutional: She appears well-developed and well-nourished  No distress     Neck: Normal range of motion  Neck supple  Cardiovascular: Normal rate and regular rhythm  No murmur heard  Pulmonary/Chest: Effort normal and breath sounds normal  No respiratory distress  She has no wheezes  Musculoskeletal: Normal range of motion  She exhibits tenderness (mild tenderness to palpation, Full ROM, No weakness to pushing and pulling against resistance  pulses 2/2)  She exhibits no edema or deformity  Lymphadenopathy:     She has no cervical adenopathy  Skin: Skin is warm and dry  No rash noted  She is not diaphoretic  No erythema  No pallor

## 2018-06-07 NOTE — PATIENT INSTRUCTIONS
Fatigue   AMBULATORY CARE:   Fatigue  is mental and physical exhaustion that does not get better with rest  Fatigue may make daily activities difficult or cause extreme sleepiness  It is normal to feel tired sometimes, but long-term fatigue may be a sign of serious illness  Seek care immediately if:   · You have chest pain  · You have difficulty breathing  Contact your healthcare provider if:   · You have a cough that gets worse, or does not go away  · You see blood in your urine or bowel movement  · You have numbness or tingling around your mouth or in an arm or leg  · You faint, feel dizzy, or have vision changes  · You have swelling in your lymph nodes  · You are a woman and have vaginal bleeding that is not normal for you, or is not expected  · You lose weight without trying, or you have trouble eating  · You feel weak or have muscle pain  · You have pain or swelling in your joints  · You have questions or concerns about your condition or care  Manage fatigue:   · Keep a fatigue diary  Include anything that makes you feel more tired or less tired  Bring the diary with you to follow-up visits with your provider  · Exercise as directed  Exercise can help you feel more alert  Exercise can also help you manage stress or relieve depression  Try to get at least 30 minutes of exercise most days of the week  · Keep a regular sleep schedule  Go to bed and wake up at the same times every day  Limit naps to 1 hour each day  A nap can improve fatigue, but a long nap may make it harder to go to sleep at night  · Plan and limit your activities  Limit the number of activities such as shopping and cleaning you do each day  If possible, try to spread out your trips throughout the week  Plan ahead so you are not rushing to get something done  Only do activities that you have the energy to complete  Take breaks between activities  Ask for help if you need it   Another person may be able to drive you or help with daily activities  · Eat a variety of healthy foods  Healthy foods include fruits, vegetables, whole-grain breads, low-fat dairy products, beans, lean meats, and fish  Good nutrition can help manage fatigue  · Limit caffeine and alcohol  These can make it difficult to fall or stay asleep  Women should limit alcohol to 1 drink a day  Men should limit alcohol to 2 drinks a day  A drink of alcohol is 12 ounces of beer, 5 ounces of wine, or 1½ ounces of liquor  Ask our healthcare provider how much caffeine is safe for you  · Do not smoke  Nicotine and other chemicals in cigarettes and cigars can cause lung damage and increase fatigue  Ask your healthcare provider for information if you currently smoke and need help to quit  E-cigarettes or smokeless tobacco still contain nicotine  Talk to your healthcare provider before you use these products  Follow up with your healthcare provider as directed: You may need more tests  Your healthcare provider may refer you to a specialist  Write down your questions so you remember to ask them during your visits  © 2017 [employer] Information is for End User's use only and may not be sold, redistributed or otherwise used for commercial purposes  All illustrations and images included in CareNotes® are the copyrighted property of [employer]  or [employer]  The above information is an  only  It is not intended as medical advice for individual conditions or treatments  Talk to your doctor, nurse or pharmacist before following any medical regimen to see if it is safe and effective for you

## 2018-06-11 ENCOUNTER — OFFICE VISIT (OUTPATIENT)
Dept: PHYSICAL THERAPY | Facility: CLINIC | Age: 58
End: 2018-06-11
Payer: COMMERCIAL

## 2018-06-11 DIAGNOSIS — R29.898 BILATERAL ARM WEAKNESS: Primary | ICD-10-CM

## 2018-06-11 PROCEDURE — 97110 THERAPEUTIC EXERCISES: CPT

## 2018-06-11 NOTE — PROGRESS NOTES
Daily Note     Today's date: 2018  Patient name: Antonia Ivy  : 1960  MRN: [id number]  Referring provider: MARVA Farmer  Dx:   Encounter Diagnosis     ICD-10-CM    1  Bilateral arm weakness R29 898                   Subjective: Pt reports mild pain on the top and bottom of her right wrist upon waking today, good response to last tx session  Objective: See treatment diary below    Assessment: Pt demonstrated increased tolerance to  strengthening, good response to tendon glides  Plan: Continue poc as per PT  Georgette Ormond Precautions:  PMHx: asthma, COPD     Daily Treatment Diary      Manual           Graston R wrist extensors    5 min  5 min  5 min  5 min  5 min  5 min         Laser: R wrist roxane    2300J  2000J  2000J  2000J  2000J  2000J                                                                                       Exercise Diary           digiflex B/  3"x10  R/yellow   L/red   3"x10   ea  R/yellow   L/red   3"x5   ea Yellow x10  Y 3" x10 ea  R/yellow  L red  3"x10 ea  R/yellow   3"x15   L/red   3"x15  R/yellow   3"x20   L/red   3"x20         Resisted B elbow flexion Y/  1x10  Y/   2x10  Y/2x10  Y/2x10  Y/2x12  2#/   neutral/   2x12  2#/   neutral/   2x12         Resisted R wrist flexion Y/  1x10  Y/   1x10  Y/2x10  Y/ 2x10  Y/2x12  1#/2x12 1#/ 2x12         Resisted L wrist flexion Y/  2x10  Y/   2x10  Y/   2x10  Y/2x10  Y/2x12  2#/2x12  2#/ 2x12         Resisted R wrist extension Y/  1x10  Y/   1x10  Y/   2x10  Y/2x10  Y/2x12  1#/2x12  1#/ 2x12         Resisted L wrist extension Y/  2x10  Y/   2x20  Y/   2x20  Y/2x10  Y/2x12  2#/3x10  2#/ 3x12         Resisted B elbow extension    Y/   2x10  Y/ 2x10 Y 3x10 Y/3x12  Y/3x12 Y/ 3x15         Resisted R supi/pronation    1#/   1x10  1# 2x10  1# 2x10  1# 2x12  1#/   2x12  1#  3x10         Resisted L supi/pronation    2#/   2x10  2# 2x10  2# 2x10  2# 2x12  2#/   2x12  2#  3x10         R finger tendon glides            5 sets  5 sets

## 2018-06-13 ENCOUNTER — OFFICE VISIT (OUTPATIENT)
Dept: PHYSICAL THERAPY | Facility: CLINIC | Age: 58
End: 2018-06-13
Payer: COMMERCIAL

## 2018-06-13 DIAGNOSIS — R29.898 BILATERAL ARM WEAKNESS: Primary | ICD-10-CM

## 2018-06-13 PROCEDURE — 97110 THERAPEUTIC EXERCISES: CPT | Performed by: PHYSICAL THERAPIST

## 2018-06-13 NOTE — PROGRESS NOTES
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  She reports a resolution of L UE pain and dropping items  Objective: See treatment diary below    Assessment: Pt demonstrated improved R extensor tendon fascial restrictions  Plan: Cont  POC  Precautions:  PMHx: asthma, COPD     Daily Treatment Diary      Manual         Graston R wrist extensors    5 min  5 min  5 min  5 min  5 min  5 min  3 min       Laser: R wrist roxane    2300J  2000J  2000J  2000J  J  J  J                                                                                     Exercise Diary         digiflex B/  3"x10  R/yellow   L/red   3"x10   ea  R/yellow   L/red   3"x5   ea Yellow x10  Y 3" x10 ea  R/yellow  L red  3"x10 ea  R/yellow   3"x15   L/red   3"x15  R/yellow   3"x20   L/red   3"x20  R/yellow   3"x20   L/red   3"x30       Resisted B elbow flexion Y/  1x10  Y/   2x10  Y/2x10  Y/2x10  Y/2x12  2#/   neutral/   2x12  2#/   neutral/   2x12  2#/   neutral/   3x10       Resisted R wrist flexion Y/  1x10  Y/   1x10  Y/2x10  Y/ 2x10  Y/2x12  1#/2x12 1#/ 2x12  1#/   2x12       Resisted L wrist flexion Y/  2x10  Y/   2x10  Y/   2x10  Y/2x10  Y/2x12  2#/2x12  2#/ 2x12  2#/   3x10       Resisted R wrist extension Y/  1x10  Y/   1x10  Y/   2x10  Y/2x10  Y/2x12  1#/2x12  1#/ 2x12  1#/   2x12       Resisted L wrist extension Y/  2x10  Y/   2x20  Y/   2x20  Y/2x10  Y/2x12  2#/3x10  2#/ 3x12  2#/   3x10       Resisted B elbow extension    Y/   2x10  Y/ 2x10 Y 3x10 Y/3x12  Y/3x12 Y/ 3x15  Y/   3x15       Resisted R supi/pronation    1#/   1x10  1# 2x10  1# 2x10  1# 2x12  1#/   2x12  1#  3x10  1#/   2x12       Resisted L supi/pronation    2#/   2x10  2# 2x10  2# 2x10  2# 2x12  2#/   2x12  2#  3x10  2#/   3x10       R finger tendon glides            5 sets  5 sets  5 sets

## 2018-06-18 ENCOUNTER — OFFICE VISIT (OUTPATIENT)
Dept: PHYSICAL THERAPY | Facility: CLINIC | Age: 58
End: 2018-06-18
Payer: COMMERCIAL

## 2018-06-18 DIAGNOSIS — R29.898 BILATERAL ARM WEAKNESS: Primary | ICD-10-CM

## 2018-06-18 DIAGNOSIS — M79.601 ARM PAIN, DIFFUSE, RIGHT: ICD-10-CM

## 2018-06-18 PROCEDURE — 97110 THERAPEUTIC EXERCISES: CPT

## 2018-06-18 RX ORDER — CYCLOBENZAPRINE HCL 5 MG
5 TABLET ORAL
Qty: 30 TABLET | Refills: 0 | Status: SHIPPED | OUTPATIENT
Start: 2018-06-18 | End: 2018-07-16 | Stop reason: SDUPTHER

## 2018-06-18 NOTE — PROGRESS NOTES
Daily Note     Today's date: 2018  Patient name: Kalen Ordoñez  : 1960  MRN: [id number]  Referring provider: MARVA Perdue  Dx:   Encounter Diagnosis     ICD-10-CM    1  Bilateral arm weakness R29 898                   Subjective: Pt reports 3/10 right radial wrist pain with driving earlier today  Pt reports good response to last tx session  Objective: See treatment diary below    Assessment: Pt demonstrated increased tolerance to  and wrist strengthening, low irritability  Plan: Cont  POC as per PT  Precautions:  PMHx: asthma, COPD     Daily Treatment Diary      Manual       Graston R wrist extensors    5 min  5 min  5 min  5 min  5 min  5 min  3 min  3 min     Laser: R wrist roxane    2300J  2000J  2000J  2000J  2000J  2000J  J  J                                                                                   Exercise Diary    6     digiflex B/  3"x10  R/yellow   L/red   3"x10   ea  R/yellow   L/red   3"x5   ea Yellow x10  Y 3" x10 ea  R/yellow  L red  3"x10 ea  R/yellow   3"x15   L/red   3"x15  R/yellow   3"x20   L/red   3"x20  R/yellow   3"x20   L/red   3"x30   R/yellow   3"x25   L/red   3"x35     Resisted B elbow flexion Y/  1x10  Y/   2x10  Y/2x10  Y/2x10  Y/2x12  2#/   neutral/   2x12  2#/   neutral/   2x12  2#/   neutral/   3x10   2#/   neutral/   3x12     Resisted R wrist flexion Y/  1x10  Y/   1x10  Y/2x10  Y/ 2x10  Y/2x12  1#/2x12 1#/ 2x12  1#/   2x12  1#/  3x10     Resisted L wrist flexion Y/  2x10  Y/   2x10  Y/   2x10  Y/2x10  Y/2x12  2#/2x12  2#/ 2x12  2#/   3x10  2#/  3x12     Resisted R wrist extension Y/  1x10  Y/   1x10  Y/   2x10  Y/2x10  Y/2x12  1#/2x12  1#/ 2x12  1#/   2x12  1#/  3x10     Resisted L wrist extension Y/  2x10  Y/   2x20  Y/   2x20  Y/2x10  Y/2x12  2#/3x10  2#/ 3x12  2#/   3x10  2#/  3x12     Resisted B elbow extension    Y/   2x10  Y/ 2x10 Y 3x10 Y/3x12  Y/3x12 Y/ 3x15  Y/   3x15  Y/  3x15     Resisted R supi/pronation    1#/   1x10  1# 2x10  1# 2x10  1# 2x12  1#/   2x12  1#  3x10  1#/   2x12  1#/  2x12     Resisted L supi/pronation    2#/   2x10  2# 2x10  2# 2x10  2# 2x12  2#/   2x12  2#  3x10  2#/   3x10  2#/  3x10     R finger tendon glides            5 sets  5 sets  5 sets

## 2018-06-20 ENCOUNTER — OFFICE VISIT (OUTPATIENT)
Dept: PHYSICAL THERAPY | Facility: CLINIC | Age: 58
End: 2018-06-20
Payer: COMMERCIAL

## 2018-06-20 DIAGNOSIS — R29.898 BILATERAL ARM WEAKNESS: Primary | ICD-10-CM

## 2018-06-20 PROCEDURE — 97110 THERAPEUTIC EXERCISES: CPT

## 2018-06-20 NOTE — PROGRESS NOTES
Daily Note     Today's date: 2018  Patient name: Jailyn Lagos  : 1960  MRN: [id number]  Referring provider: MARVA Giron  Dx:   Encounter Diagnosis     ICD-10-CM    1  Bilateral arm weakness R29 898                   Subjective: Pt reports no wrist pain today with work activities  Objective: See treatment diary below    Assessment: Pt demonstrated increased  strength, decreased intensity of pain with ADLs  Pt required vcs to rest between sets of Lino  Plan: Cont  POC as per PT  Precautions:  PMHx: asthma, COPD     Daily Treatment Diary      Manual  /   Graston R wrist extensors    5 min  5 min  5 min  5 min  5 min  5 min  3 min  3 min  3 min   Laser: R wrist roxane    2300J  2000J  2000J  2000J  2000J  J  J  J  J                                                                                 Exercise Diary    6  6/   digiflex B/  3"x10  R/yellow   L/red   3"x10   ea  R/yellow   L/red   3"x5   ea Yellow x10  Y 3" x10 ea  R/yellow  L red  3"x10 ea  R/yellow   3"x15   L/red   3"x15  R/yellow   3"x20   L/red   3"x20  R/yellow   3"x20   L/red   3"x30   R/yellow   3"x25   L/red   3"x35    R/yellow   3"x30   L/red   3"x40   Resisted B elbow flexion Y/  1x10  Y/   2x10  Y/2x10  Y/2x10  Y/2x12  2#/   neutral/   2x12  2#/   neutral/   2x12  2#/   neutral/   3x10   2#/   neutral/   3x12  2#/   neutral/   3x12   Resisted R wrist flexion Y/  1x10  Y/   1x10  Y/2x10  Y/ 2x10  Y/2x12  1#/2x12 1#/ 2x12  1#/   2x12  1#/  3x10   1#/  3x10   Resisted L wrist flexion Y/  2x10  Y/   2x10  Y/   2x10  Y/2x10  Y/2x12  2#/2x12  2#/ 2x12  2#/   3x10  2#/  3x12  2#/  3x12   Resisted R wrist extension Y/  1x10  Y/   1x10  Y/   2x10  Y/2x10  Y/2x12  1#/2x12  1#/ 2x12  1#/   2x12  1#/  3x10   1#/  3x10   Resisted L wrist extension Y/  2x10  Y/   2x20  Y/   2x20  Y/2x10  Y/2x12  2#/3x10  2#/ 3x12  2#/   3x10  2#/  3x12  2#/  3x12   Resisted B elbow extension    Y/   2x10  Y/ 2x10 Y 3x10 Y/3x12  Y/3x12 Y/ 3x15  Y/   3x15  Y/  3x15  Y/  3x15   Resisted R supi/pronation    1#/   1x10  1# 2x10  1# 2x10  1# 2x12  1#/   2x12  1#  3x10  1#/   2x12  1#/  2x12  1#/  2x12   Resisted L supi/pronation    2#/   2x10  2# 2x10  2# 2x10  2# 2x12  2#/   2x12  2#  3x10  2#/   3x10  2#/  3x10  2#/  3x10   R finger tendon glides            5 sets  5 sets  5 sets  5 sets 5 sets

## 2018-06-25 ENCOUNTER — OFFICE VISIT (OUTPATIENT)
Dept: PHYSICAL THERAPY | Facility: CLINIC | Age: 58
End: 2018-06-25
Payer: COMMERCIAL

## 2018-06-25 DIAGNOSIS — R29.898 BILATERAL ARM WEAKNESS: Primary | ICD-10-CM

## 2018-06-25 PROCEDURE — 97110 THERAPEUTIC EXERCISES: CPT

## 2018-06-25 NOTE — PROGRESS NOTES
Daily Note     Today's date: 2018  Patient name: Arin Bowman  : 1960  MRN: [id number]  Referring provider: MARVA Leggett  Dx:   Encounter Diagnosis     ICD-10-CM    1  Bilateral arm weakness R29 898                   Subjective: Pt reports very mild radial right wrist pain with driving over the past couple of days  Objective: See treatment diary below    Assessment: Pt demonstrated increased  and wrist strength, fatigue with R wrist extensor strengthening  Plan: Cont  POC as per PT  Precautions:  PMHx: asthma, COPD     Daily Treatment Diary      Manual     Graston R wrist extensors  3 min          3 min   Laser: R wrist roxane                                                                                   Exercise Diary     digiflex   R/yellow   3"x30   L/red   3"x40            R/yellow   3"x30   L/red   3"x40   Resisted B elbow flexion 2#/   neutral/   3x12          2#/   neutral/   3x12   Resisted R wrist flexion 1#/  3x12           1#/  3x10   Resisted L wrist flexion  2#/  3x15          2#/  3x12   Resisted R wrist extension 1#/  3x12           1#/  3x10   Resisted L wrist extension  2#/  3x15          2#/  3x12   Resisted B elbow extension  Y/  3x18          Y/  3x15   Resisted R supi/pronation  1#/  3x10          1#/  2x12   Resisted L supi/pronation  2#/  3x12          2#/  3x10   R finger tendon glides  5 sets         5 sets

## 2018-06-27 ENCOUNTER — OFFICE VISIT (OUTPATIENT)
Dept: PHYSICAL THERAPY | Facility: CLINIC | Age: 58
End: 2018-06-27
Payer: COMMERCIAL

## 2018-06-27 DIAGNOSIS — R29.898 BILATERAL ARM WEAKNESS: Primary | ICD-10-CM

## 2018-06-27 PROCEDURE — 97110 THERAPEUTIC EXERCISES: CPT

## 2018-06-27 NOTE — PROGRESS NOTES
Daily Note     Today's date: 2018  Patient name: Ana Gongora  : 1960  MRN: [id number]  Referring provider: MARVA Lima  Dx:   Encounter Diagnosis     ICD-10-CM    1  Bilateral arm weakness R29 898                   Subjective: Pt reports continued mild radial right wrist pain since last tx session  Objective: See treatment diary below    Assessment: Pt demonstrated fatigue with right wrist extension strengthening, low pain levels with ADLs  Plan: Cont  POC as per PT  Precautions:  PMHx: asthma, COPD     Daily Treatment Diary      Manual     Graston R wrist extensors  3 min 3 min         3 min   Laser: R wrist roxane                                                                                   Exercise Diary     digiflex   R/yellow   3"x30   L/red   3"x40  R/yellow   3"x35   L/red   3"x45           R/yellow   3"x30   L/red   3"x40   Resisted B elbow flexion 2#/   neutral/   3x12 2#/   neutral/   3x12         2#/   neutral/   3x12   Resisted R wrist flexion 1#/  3x12 1#/  3x12          1#/  3x10   Resisted L wrist flexion  2#/  3x15 2#/  3x15         2#/  3x12   Resisted R wrist extension 1#/  3x12 1#/  3x12          1#/  3x10   Resisted L wrist extension  2#/  3x15 2#/  3x15         2#/  3x12   Resisted B elbow extension  Y/  3x18 Y/  3x18         Y/  3x15   Resisted R supi/pronation  1#/  3x10 1#  3x10         1#/  2x12   Resisted L supi/pronation  2#/  3x12 2#/  3x12         2#/  3x10   R finger tendon glides  5 sets 5 sets        5 sets

## 2018-07-02 ENCOUNTER — OFFICE VISIT (OUTPATIENT)
Dept: PHYSICAL THERAPY | Facility: CLINIC | Age: 58
End: 2018-07-02
Payer: COMMERCIAL

## 2018-07-02 DIAGNOSIS — S63.501D RIGHT WRIST SPRAIN, SUBSEQUENT ENCOUNTER: ICD-10-CM

## 2018-07-02 DIAGNOSIS — R29.898 BILATERAL ARM WEAKNESS: Primary | ICD-10-CM

## 2018-07-02 PROCEDURE — 97110 THERAPEUTIC EXERCISES: CPT | Performed by: PHYSICAL THERAPIST

## 2018-07-02 PROCEDURE — G8985 CARRY GOAL STATUS: HCPCS | Performed by: PHYSICAL THERAPIST

## 2018-07-02 PROCEDURE — G8984 CARRY CURRENT STATUS: HCPCS | Performed by: PHYSICAL THERAPIST

## 2018-07-02 NOTE — PROGRESS NOTES
Daily Note     Today's date: 2018  Patient name: Aroldo Zapata  : 1960  MRN: [id number]  Referring provider: MARVA Goodman  Dx:   No diagnosis found  Subjective: Pt reports R dorsal, volar, and ulnar wrist pain since an episode of her fingers "locking up" while trying to open a tight jar lid this weekend  Objective: See treatment diary below  Decreased intensity of R UE strengthening    Assessment: Pt demonstrated good tolerance to current R UE strengthening intensity  Plan: Assess response NV  Precautions:  PMHx: asthma, COPD     Daily Treatment Diary      Manual            Graston R wrist extensors  3 min 3 min  5 min          Laser: R wrist roxane                                                                                          Exercise Diary            digiflex   R/yellow   3"x30   L/red   3"x40  R/yellow   3"x35   L/red   3"x45  R/yellow   3"x30   L/red   3"x45          Resisted B elbow flexion 2#/   neutral/   3x12 2#/   neutral/   3x12  2#/   neutral/   3x12          Resisted R wrist flexion 1#/  3x12 1#/  3x12  1#/   3x10          Resisted L wrist flexion  2#/  3x15 2#/  3x15  2#/   3x15          Resisted R wrist extension 1#/  3x12 1#/  3x12  1#/   3x10          Resisted L wrist extension  2#/  3x15 2#/  3x15  2#/   3x15          Resisted B elbow extension  Y/  3x18 Y/  3x18  Y/   3x18          Resisted R supi/pronation  1#/  3x10 1#  3x10  1#/   3x10          Resisted L supi/pronation  2#/  3x12 2#/  3x12  2#/   3x15          R finger tendon glides  5 sets 5 sets  5 sets

## 2018-07-05 ENCOUNTER — OFFICE VISIT (OUTPATIENT)
Dept: PHYSICAL THERAPY | Facility: CLINIC | Age: 58
End: 2018-07-05
Payer: COMMERCIAL

## 2018-07-05 DIAGNOSIS — S63.501D RIGHT WRIST SPRAIN, SUBSEQUENT ENCOUNTER: ICD-10-CM

## 2018-07-05 DIAGNOSIS — R29.898 BILATERAL ARM WEAKNESS: Primary | ICD-10-CM

## 2018-07-05 PROCEDURE — 97140 MANUAL THERAPY 1/> REGIONS: CPT

## 2018-07-05 PROCEDURE — 97110 THERAPEUTIC EXERCISES: CPT

## 2018-07-05 NOTE — PROGRESS NOTES
Daily Note     Today's date: 2018  Patient name: Filiberto Suresh  : 1960  MRN: [id number]  Referring provider: MARVA Kaur  Dx:   Encounter Diagnosis     ICD-10-CM    1  Bilateral arm weakness R29 898    2  Right wrist sprain, subsequent encounter S63 284D                   Subjective: Pt reports having an "aching" feeling directly  Near the bottom of her thumb only on the right hand  Notes that she has been using her hands a lot at work which my have caused the minimal pain 1-2/10  Objective: See treatment diary below  Decreased intensity of R UE strengthening    Assessment: Pt demonstrated good tolerance to current R UE strengthening intensity  Minimal fatigue noted in R> L  Plan: Assess response NV  Precautions:  PMHx: asthma, COPD     Daily Treatment Diary      Manual           Graston R wrist extensors  3 min 3 min  5 min 5 min         Laser: R wrist roxane  2000J 2000J  2000J 2000J                                                                                       Exercise Diary           digiflex   R/yellow   3"x30   L/red   3"x40  R/yellow   3"x35   L/red   3"x45  R/yellow   3"x30   L/red   3"x45 R/yellow 3"x30 L/red 3"x45         Resisted B elbow flexion 2#/   neutral/   3x12 2#/   neutral/   3x12  2#/   neutral/   3x12 2#/ neutral/ 3x12         Resisted R wrist flexion 1#/  3x12 1#/  3x12  1#/   3x10 1#/ 3x10         Resisted L wrist flexion  2#/  3x15 2#/  3x15  2#/   3x15 2#/ 3x15         Resisted R wrist extension 1#/  3x12 1#/  3x12  1#/   3x10 1#/ 3x12         Resisted L wrist extension  2#/  3x15 2#/  3x15  2#/   3x15 2#/3x15         Resisted B elbow extension  Y/  3x18 Y/  3x18  Y/   3x18 Y/3x18         Resisted R supi/pronation  1#/  3x10 1#  3x10  1#/   3x10 1#/ 3x10         Resisted L supi/pronation  2#/  3x12 2#/  3x12  2#/   3x15 2#/ 3x15         R finger tendon glides  5 sets 5 sets  5 sets 5 sets

## 2018-07-06 DIAGNOSIS — J43.9 PULMONARY EMPHYSEMA, UNSPECIFIED EMPHYSEMA TYPE (HCC): ICD-10-CM

## 2018-07-06 RX ORDER — MONTELUKAST SODIUM 10 MG/1
10 TABLET ORAL DAILY
Qty: 30 TABLET | Refills: 0 | Status: SHIPPED | OUTPATIENT
Start: 2018-07-06 | End: 2018-08-05 | Stop reason: SDUPTHER

## 2018-07-09 ENCOUNTER — OFFICE VISIT (OUTPATIENT)
Dept: PHYSICAL THERAPY | Facility: CLINIC | Age: 58
End: 2018-07-09
Payer: COMMERCIAL

## 2018-07-09 DIAGNOSIS — R29.898 BILATERAL ARM WEAKNESS: Primary | ICD-10-CM

## 2018-07-09 DIAGNOSIS — S63.501D RIGHT WRIST SPRAIN, SUBSEQUENT ENCOUNTER: ICD-10-CM

## 2018-07-09 PROCEDURE — 97140 MANUAL THERAPY 1/> REGIONS: CPT | Performed by: PHYSICAL MEDICINE & REHABILITATION

## 2018-07-09 PROCEDURE — 97110 THERAPEUTIC EXERCISES: CPT | Performed by: PHYSICAL MEDICINE & REHABILITATION

## 2018-07-09 NOTE — PROGRESS NOTES
Daily Note     Today's date: 2018  Patient name: Laine Summers  : 1960  MRN: [id number]  Referring provider: MARVA Nesbitt  Dx:   Encounter Diagnosis     ICD-10-CM    1  Bilateral arm weakness R29 898    2  Right wrist sprain, subsequent encounter S63 329D                   Subjective: Patient reports "a little bit" of intermittent pain in the right wrist and forearm today, notes "it comes and goes "    Objective: See treatment diary below    Assessment: Patient demonstrates fair tolerance to intervention as charted, moves through TE slowly and cautiously with minimal symptom provocation  Will consider increasing resistance NV secondary to minimal objective difficulty with current program      Plan: Continue to progress per patient tolerance NV  Precautions:  PMHx: asthma, COPD     Daily Treatment Diary      Manual          Graston R wrist extensors  3 min 3 min  5 min 5 min 10'        Laser: R wrist roxane  2000J 2000J  2000J 2000J NP- in use                                                                                      Exercise Diary          digiflex   R/yellow   3"x30   L/red   3"x40  R/yellow   3"x35   L/red   3"x45  R/yellow   3"x30   L/red   3"x45 R/yellow 3"x30 L/red 3"x45 R/yellow 30x3", L/Red 45x3"        Resisted B elbow flexion 2#/   neutral/   3x12 2#/   neutral/   3x12  2#/   neutral/   3x12 2#/ neutral/ 3x12 2# neutral/ 3x12        Resisted R wrist flexion 1#/  3x12 1#/  3x12  1#/   3x10 1#/ 3x10 1# 3x10        Resisted L wrist flexion  2#/  3x15 2#/  3x15  2#/   3x15 2#/ 3x15 2# 3x15        Resisted R wrist extension 1#/  3x12 1#/  3x12  1#/   3x10 1#/ 3x12 1# 3x12        Resisted L wrist extension  2#/  3x15 2#/  3x15  2#/   3x15 2#/3x15 2# 3x15        Resisted B elbow extension  Y/  3x18 Y/  3x18  Y/   3x18 Y/3x18 R/2x10        Resisted R supi/pronation  1#/  3x10 1#  3x10  1#/   3x10 1#/ 3x10 1# 3x10        Resisted L supi/pronation  2#/  3x12 2#/  3x12  2#/   3x15 2#/ 3x15 2# 3x15        R finger tendon glides  5 sets 5 sets  5 sets 5 sets 5 sets

## 2018-07-11 ENCOUNTER — OFFICE VISIT (OUTPATIENT)
Dept: PHYSICAL THERAPY | Facility: CLINIC | Age: 58
End: 2018-07-11
Payer: COMMERCIAL

## 2018-07-11 DIAGNOSIS — R29.898 BILATERAL ARM WEAKNESS: Primary | ICD-10-CM

## 2018-07-11 DIAGNOSIS — S63.501D RIGHT WRIST SPRAIN, SUBSEQUENT ENCOUNTER: ICD-10-CM

## 2018-07-11 PROCEDURE — 97110 THERAPEUTIC EXERCISES: CPT

## 2018-07-11 PROCEDURE — 97140 MANUAL THERAPY 1/> REGIONS: CPT

## 2018-07-11 NOTE — PROGRESS NOTES
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  Objective: See treatment diary below    Assessment: Patient demonstrated increased tolerance to B wrist strengthening but continues to demonstrate mild irritability with work activities 2* R wrist pain  Plan: Continue to progress per patient tolerance NV  Precautions:  PMHx: asthma, COPD     Daily Treatment Diary      Manual         Jane R wrist extensors  3 min 3 min  5 min 5 min 10' 5 min       Laser: R wrist roxane   NP- in use                                                                                      Exercise Diary         digiflex   R/yellow   3"x30   L/red   3"x40  R/yellow   3"x35   L/red   3"x45  R/yellow   3"x30   L/red   3"x45 R/yellow 3"x30 L/red 3"x45 R/yellow 30x3", L/Red 45x3" R/yellow 35x3", L/Red 45x3"       Resisted B elbow flexion 2#/   neutral/   3x12 2#/   neutral/   3x12  2#/   neutral/   3x12 2#/ neutral/ 3x12 2# neutral/ 3x12 2# neutral/ 3x15       Resisted R wrist flexion 1#/  3x12 1#/  3x12  1#/   3x10 1#/ 3x10 1# 3x10 1# 3x12       Resisted L wrist flexion  2#/  3x15 2#/  3x15  2#/   3x15 2#/ 3x15 2# 3x15 2# 3x18       Resisted R wrist extension 1#/  3x12 1#/  3x12  1#/   3x10 1#/ 3x12 1# 3x12 1# 3x12       Resisted L wrist extension  2#/  3x15 2#/  3x15  2#/   3x15 2#/3x15 2# 3x15 2# 3x18       Resisted B elbow extension  Y/  3x18 Y/  3x18  Y/   3x18 Y/3x18 R/2x10 R/3x10       Resisted R supi/pronation  1#/  3x10 1#  3x10  1#/   3x10 1#/ 3x10 1# 3x10 1# 3x12       Resisted L supi/pronation  2#/  3x12 2#/  3x12  2#/   3x15 2#/ 3x15 2# 3x15 2# 3x18       R finger tendon glides  5 sets 5 sets  5 sets 5 sets 5 sets 5 sets

## 2018-07-16 ENCOUNTER — OFFICE VISIT (OUTPATIENT)
Dept: PHYSICAL THERAPY | Facility: CLINIC | Age: 58
End: 2018-07-16
Payer: COMMERCIAL

## 2018-07-16 DIAGNOSIS — M79.601 ARM PAIN, DIFFUSE, RIGHT: ICD-10-CM

## 2018-07-16 DIAGNOSIS — R29.898 BILATERAL ARM WEAKNESS: Primary | ICD-10-CM

## 2018-07-16 DIAGNOSIS — S63.501D RIGHT WRIST SPRAIN, SUBSEQUENT ENCOUNTER: ICD-10-CM

## 2018-07-16 PROCEDURE — 97140 MANUAL THERAPY 1/> REGIONS: CPT

## 2018-07-16 PROCEDURE — 97110 THERAPEUTIC EXERCISES: CPT

## 2018-07-16 RX ORDER — CYCLOBENZAPRINE HCL 5 MG
5 TABLET ORAL
Qty: 60 TABLET | Refills: 1 | Status: SHIPPED | OUTPATIENT
Start: 2018-07-16 | End: 2019-11-13

## 2018-07-16 NOTE — PROGRESS NOTES
Daily Note     Today's date: 2018  Patient name: Aleyda Hudson  : 1960  MRN: [id number]  Referring provider: MARVA Posadas  Dx:   Encounter Diagnosis     ICD-10-CM    1  Bilateral arm weakness R29 898    2  Right wrist sprain, subsequent encounter S63 501D      1:1 with PTA CR 9:30- 10:15  Subjective: Pain currently minimal but states that she did not have to work today  Patient does report overall less pain  Objective: See treatment diary below    Assessment: No complaints of increased pain with current POC  General fatigue observed with cues provided for proper technique with pronation/supination  Plan: Continue to progress per patient tolerance NV  Precautions:  PMHx: asthma, COPD     Daily Treatment Diary      Manual        Graston R wrist extensors  3 min 3 min  5 min 5 min 10' 5 min 5 min      Laser: R wrist roxane   NP- in use                                                                                     Exercise Diary        digiflex   R/yellow   3"x30   L/red   3"x40  R/yellow   3"x35   L/red   3"x45  R/yellow   3"x30   L/red   3"x45 R/yellow 3"x30 L/red 3"x45 R/yellow 30x3", L/Red 45x3" R/yellow 35x3", L/Red 45x3" R/yellow  35x3"  L/Red  45x3"      Resisted B elbow flexion 2#/   neutral/   3x12 2#/   neutral/   3x12  2#/   neutral/   3x12 2#/ neutral/ 3x12 2# neutral/ 3x12 2# neutral/ 3x15 2#  Neutral  3x15      Resisted R wrist flexion 1#/  3x12 1#/  3x12  1#/   3x10 1#/ 3x10 1# 3x10 1# 3x12 1#  3x12      Resisted L wrist flexion  2#/  3x15 2#/  3x15  2#/   3x15 2#/ 3x15 2# 3x15 2# 3x18 2#  3x18      Resisted R wrist extension 1#/  3x12 1#/  3x12  1#/   3x10 1#/ 3x12 1# 3x12 1# 3x12 1#  3x12      Resisted L wrist extension  2#/  3x15 2#/  3x15  2#/   3x15 2#/3x15 2# 3x15 2# 3x18 2#  3x18      Resisted B elbow extension  Y/  3x18 Y/  3x18  Y/   3x18 Y/3x18 R/2x10 R/3x10 R  3x10      Resisted R supi/pronation  1#/  3x10 1#  3x10  1#/   3x10 1#/ 3x10 1# 3x10 1# 3x12 1#  3x12      Resisted L supi/pronation  2#/  3x12 2#/  3x12  2#/   3x15 2#/ 3x15 2# 3x15 2# 3x18 2#  3x18      R finger tendon glides  5 sets 5 sets  5 sets 5 sets 5 sets 5 sets 5 sets

## 2018-07-18 ENCOUNTER — OFFICE VISIT (OUTPATIENT)
Dept: PHYSICAL THERAPY | Facility: CLINIC | Age: 58
End: 2018-07-18
Payer: COMMERCIAL

## 2018-07-18 DIAGNOSIS — S63.501D RIGHT WRIST SPRAIN, SUBSEQUENT ENCOUNTER: ICD-10-CM

## 2018-07-18 DIAGNOSIS — R29.898 BILATERAL ARM WEAKNESS: Primary | ICD-10-CM

## 2018-07-18 PROCEDURE — 97140 MANUAL THERAPY 1/> REGIONS: CPT

## 2018-07-18 PROCEDURE — 97110 THERAPEUTIC EXERCISES: CPT

## 2018-07-18 NOTE — PROGRESS NOTES
Daily Note     Today's date: 2018  Patient name: Scooter Whitehead  : 1960  MRN: [id number]  Referring provider: MARVA Culp  Dx:   Encounter Diagnosis     ICD-10-CM    1  Bilateral arm weakness R29 898    2  Right wrist sprain, subsequent encounter S63 501D                 Subjective: Pain reports /10 right radial wrist pain pre tx  Objective: See treatment diary below    Assessment: Pt demonstrated difficulty progressing B wrist strengthening 2* fatigue  Pt demonstrated low irritability  Plan: Continue to progress strengthening as per pt tolerance  Precautions:  PMHx: asthma, COPD     Daily Treatment Diary      Manual       Graston R wrist extensors  3 min 3 min  5 min 5 min 10' 5 min 5 min 5 min     Laser: R wrist roxane   NP- in use                                                                                    Exercise Diary       digiflex   R/yellow   3"x30   L/red   3"x40  R/yellow   3"x35   L/red   3"x45  R/yellow   3"x30   L/red   3"x45 R/yellow 3"x30 L/red 3"x45 R/yellow 30x3", L/Red 45x3" R/yellow 35x3", L/Red 45x3" R/yellow  35x3"  L/Red  45x3" R/Red  20x3"  L/Green  30x3"     Resisted B elbow flexion 2#/   neutral/   3x12 2#/   neutral/   3x12  2#/   neutral/   3x12 2#/ neutral/ 3x12 2# neutral/ 3x12 2# neutral/ 3x15 2#  Neutral  3x15 2#  Neutral  3x18     Resisted R wrist flexion 1#/  3x12 1#/  3x12  1#/   3x10 1#/ 3x10 1# 3x10 1# 3x12 1#  3x12 1#  3x14     Resisted L wrist flexion  2#/  3x15 2#/  3x15  2#/   3x15 2#/ 3x15 2# 3x15 2# 3x18 2#  3x18 2#  3x18     Resisted R wrist extension 1#/  3x12 1#/  3x12  1#/   3x10 1#/ 3x12 1# 3x12 1# 3x12 1#  3x12 1#  3x14     Resisted L wrist extension  2#/  3x15 2#/  3x15  2#/   3x15 2#/3x15 2# 3x15 2# 3x18 2#  3x18 2#  3x18     Resisted B elbow extension  Y/  3x18 Y/  3x18  Y/   3x18 Y/3x18 R/2x10 R/3x10 R  3x10 Resisted R supi/pronation  1#/  3x10 1#  3x10  1#/   3x10 1#/ 3x10 1# 3x10 1# 3x12 1#  3x12 1#  3x14     Resisted L supi/pronation  2#/  3x12 2#/  3x12  2#/   3x15 2#/ 3x15 2# 3x15 2# 3x18 2#  3x18 2#  3x20     R finger tendon glides  5 sets 5 sets  5 sets 5 sets 5 sets 5 sets 5 sets 5 sets

## 2018-07-19 DIAGNOSIS — E78.49 OTHER HYPERLIPIDEMIA: Primary | ICD-10-CM

## 2018-07-19 RX ORDER — PRAVASTATIN SODIUM 20 MG
20 TABLET ORAL DAILY
Qty: 90 TABLET | Refills: 1 | Status: SHIPPED | OUTPATIENT
Start: 2018-07-19 | End: 2018-07-20

## 2018-07-19 NOTE — TELEPHONE ENCOUNTER
Pt came in to request Rx refill Pravastatin Sodium 20 MG to be sent to [facility], [address] [place] [address] Olivier Stinson  Pt states she is completely out Rx

## 2018-07-20 ENCOUNTER — TELEPHONE (OUTPATIENT)
Dept: INTERNAL MEDICINE CLINIC | Facility: CLINIC | Age: 58
End: 2018-07-20

## 2018-07-20 DIAGNOSIS — E78.5 DYSLIPIDEMIA: Primary | ICD-10-CM

## 2018-07-20 RX ORDER — ATORVASTATIN CALCIUM 20 MG/1
20 TABLET, FILM COATED ORAL DAILY
Qty: 90 TABLET | Refills: 1 | Status: SHIPPED | OUTPATIENT
Start: 2018-07-20 | End: 2019-11-19 | Stop reason: SDUPTHER

## 2018-07-23 ENCOUNTER — OFFICE VISIT (OUTPATIENT)
Dept: PHYSICAL THERAPY | Facility: CLINIC | Age: 58
End: 2018-07-23
Payer: COMMERCIAL

## 2018-07-23 DIAGNOSIS — R29.898 BILATERAL ARM WEAKNESS: Primary | ICD-10-CM

## 2018-07-23 DIAGNOSIS — S63.501D RIGHT WRIST SPRAIN, SUBSEQUENT ENCOUNTER: ICD-10-CM

## 2018-07-23 PROCEDURE — 97110 THERAPEUTIC EXERCISES: CPT | Performed by: PHYSICAL THERAPIST

## 2018-07-23 PROCEDURE — 97140 MANUAL THERAPY 1/> REGIONS: CPT | Performed by: PHYSICAL THERAPIST

## 2018-07-23 NOTE — PROGRESS NOTES
Daily Note     Today's date: 2018  Patient name: Ari Lynne  : 1960  MRN: [id number]  Referring provider: MARVA James  Dx:   Encounter Diagnosis     ICD-10-CM    1  Bilateral arm weakness R29 898    2  Right wrist sprain, subsequent encounter S63 501D                 Subjective: Patient reports "stinging" on the dorsal- lateral aspect of the right wrist  Patient reports 80-90% improvements since starting physical therapy  Objective: See treatment diary below    Assessment: Good tolerance to gradual progressions for wrist strengthening, however unable to progress band for resisted elbow extension as patient was unable to achieve full elbow extension secondary to strength deficits, therefore increased reps with red band  Good tolerance to IASTM  Plan: Continue to progress strengthening as per pt tolerance  Precautions:  PMHx: asthma, COPD     Daily Treatment Diary      Manual      Graston R wrist extensors  3 min 3 min  5 min 5 min 10' 5 min 5 min 5 min 5'    Laser: R wrist roxane   NP- in use                                                                                   Exercise Diary      digiflex   R/yellow   3"x30   L/red   3"x40  R/yellow   3"x35   L/red   3"x45  R/yellow   3"x30   L/red   3"x45 R/yellow 3"x30 L/red 3"x45 R/yellow 30x3", L/Red 45x3" R/yellow 35x3", L/Red 45x3" R/yellow  35x3"  L/Red  45x3" R/Red  20x3"  L/Green  30x3" R/Red 30x3"  L/Green  30x3"      Resisted B elbow flexion 2#/   neutral/   3x12 2#/   neutral/   3x12  2#/   neutral/   3x12 2#/ neutral/ 3x12 2# neutral/ 3x12 2# neutral/ 3x15 2#  Neutral  3x15 2#  Neutral  3x18 2# neutral 3x20    Resisted R wrist flexion 1#/  3x12 1#/  3x12  1#/   3x10 1#/ 3x10 1# 3x10 1# 3x12 1#  3x12 1#  3x14 1#   3x15    Resisted L wrist flexion  2#/  3x15 2#/  3x15  2#/   3x15 2#/ 3x15 2# 3x15 2# 3x18 2#  3x18 2#  3x18 2#  3x20    Resisted R wrist extension 1#/  3x12 1#/  3x12  1#/   3x10 1#/ 3x12 1# 3x12 1# 3x12 1#  3x12 1#  3x14 1#  3x15    Resisted L wrist extension  2#/  3x15 2#/  3x15  2#/   3x15 2#/3x15 2# 3x15 2# 3x18 2#  3x18 2#  3x18 2#  3x20    Resisted B elbow extension  Y/  3x18 Y/  3x18  Y/   3x18 Y/3x18 R/2x10 R/3x10 R  3x10  R 3x15    Resisted R supi/pronation  1#/  3x10 1#  3x10  1#/   3x10 1#/ 3x10 1# 3x10 1# 3x12 1#  3x12 1#  3x14 1#  3x15    Resisted L supi/pronation  2#/  3x12 2#/  3x12  2#/   3x15 2#/ 3x15 2# 3x15 2# 3x18 2#  3x18 2#  3x20 2#  3x20    R finger tendon glides  5 sets 5 sets  5 sets 5 sets 5 sets 5 sets 5 sets 5 sets 5 sets

## 2018-07-25 ENCOUNTER — OFFICE VISIT (OUTPATIENT)
Dept: PHYSICAL THERAPY | Facility: CLINIC | Age: 58
End: 2018-07-25
Payer: COMMERCIAL

## 2018-07-25 DIAGNOSIS — R29.898 BILATERAL ARM WEAKNESS: Primary | ICD-10-CM

## 2018-07-25 DIAGNOSIS — S63.501D RIGHT WRIST SPRAIN, SUBSEQUENT ENCOUNTER: ICD-10-CM

## 2018-07-25 PROCEDURE — 97140 MANUAL THERAPY 1/> REGIONS: CPT

## 2018-07-25 PROCEDURE — 97110 THERAPEUTIC EXERCISES: CPT

## 2018-07-25 NOTE — PROGRESS NOTES
Daily Note     Today's date: 2018  Patient name: Eder Cabrera  : 1960  MRN: [id number]  Referring provider: MARVA Ward  Dx:   Encounter Diagnosis     ICD-10-CM    1  Bilateral arm weakness R29 898    2  Right wrist sprain, subsequent encounter S63 501D                 Subjective: Patient reports decreased frequency of pain in her wrist extensors and decreased episodes of "burning" pain  Objective: See treatment diary below    Assessment: Pt demonstrated increased tolerance to wrist and forearm strengthening, slowly improving tolerance to ADLs  Plan: Continue to progress strengthening as per pt tolerance  Precautions:  PMHx: asthma, COPD     Daily Treatment Diary      Manual     Graston R wrist extensors  3 min 3 min  5 min 5 min 10' 5 min 5 min 5 min 5' 5 min   Laser: R wrist roxane   NP- in use                                                                                  Exercise Diary     digiflex   R/yellow   3"x30   L/red   3"x40  R/yellow   3"x35   L/red   3"x45  R/yellow   3"x30   L/red   3"x45 R/yellow 3"x30 L/red 3"x45 R/yellow 30x3", L/Red 45x3" R/yellow 35x3", L/Red 45x3" R/yellow  35x3"  L/Red  45x3" R/Red  20x3"  L/Green  30x3" R/Red 30x3"  L/Green  30x3"   R/Red 35x3"  L/Green  35x3"   Resisted B elbow flexion 2#/   neutral/   3x12 2#/   neutral/   3x12  2#/   neutral/   3x12 2#/ neutral/ 3x12 2# neutral/ 3x12 2# neutral/ 3x15 2#  Neutral  3x15 2#  Neutral  3x18 2# neutral 3x20 3#   Neutral  3x10   Resisted R wrist flexion 1#/  3x12 1#/  3x12  1#/   3x10 1#/ 3x10 1# 3x10 1# 3x12 1#  3x12 1#  3x14 1#   3x15 1#  3x18   Resisted L wrist flexion  2#/  3x15 2#/  3x15  2#/   3x15 2#/ 3x15 2# 3x15 2# 3x18 2#  3x18 2#  3x18 2#  3x20 3#  3x10   Resisted R wrist extension 1#/  3x12 1#/  3x12  1#/   3x10 1#/ 3x12 1# 3x12 1# 3x12 1#  3x12 1#  3x14 1#  3x15 1#  3x15   Resisted L wrist extension  2#/  3x15 2#/  3x15  2#/   3x15 2#/3x15 2# 3x15 2# 3x18 2#  3x18 2#  3x18 2#  3x20 3#  2x10   Resisted B elbow extension  Y/  3x18 Y/  3x18  Y/   3x18 Y/3x18 R/2x10 R/3x10 R  3x10  R 3x15 R 3x15   Resisted R supi/pronation  1#/  3x10 1#  3x10  1#/   3x10 1#/ 3x10 1# 3x10 1# 3x12 1#  3x12 1#  3x14 1#  3x15 1#  3x15   Resisted L supi/pronation  2#/  3x12 2#/  3x12  2#/   3x15 2#/ 3x15 2# 3x15 2# 3x18 2#  3x18 2#  3x20 2#  3x20 3#  3x10   R finger tendon glides  5 sets 5 sets  5 sets 5 sets 5 sets 5 sets 5 sets 5 sets 5 sets 5 sets

## 2018-07-27 DIAGNOSIS — J43.9 PULMONARY EMPHYSEMA, UNSPECIFIED EMPHYSEMA TYPE (HCC): ICD-10-CM

## 2018-07-30 ENCOUNTER — OFFICE VISIT (OUTPATIENT)
Dept: PHYSICAL THERAPY | Facility: CLINIC | Age: 58
End: 2018-07-30
Payer: COMMERCIAL

## 2018-07-30 DIAGNOSIS — S63.501D RIGHT WRIST SPRAIN, SUBSEQUENT ENCOUNTER: ICD-10-CM

## 2018-07-30 DIAGNOSIS — R29.898 BILATERAL ARM WEAKNESS: Primary | ICD-10-CM

## 2018-07-30 PROCEDURE — 97110 THERAPEUTIC EXERCISES: CPT

## 2018-07-30 PROCEDURE — 97140 MANUAL THERAPY 1/> REGIONS: CPT

## 2018-07-30 NOTE — PROGRESS NOTES
Daily Note     Today's date: 2018  Patient name: Glynn Avila  : 1960  MRN: [id number]  Referring provider: MARVA Milian  Dx:   Encounter Diagnosis     ICD-10-CM    1  Bilateral arm weakness R29 898    2  Right wrist sprain, subsequent encounter S63 609D                 Subjective: Patient reports low pain levels over the weekend  Objective: See treatment diary below    Assessment: Pt demonstrated increased  strength, difficulty progressing wrist strengthening 2* fatigue  Plan: Continue poc as per PT  Precautions:  PMHx: asthma, COPD     Daily Treatment Diary      Manual     Graston R wrist extensors  3 min         5 min   Laser: R wrist roxane                                                                                   Exercise Diary     digiflex   R/Red   3"x40 L/Green   3"x40         R/Red 35x3"  L/Green  35x3"   Resisted B elbow flexion 3#/   neutral/   3x10         3#   Neutral  3x10   Resisted R wrist flexion 1#/  3x18         1#  3x18   Resisted L wrist flexion  3#/  3x10         3#  3x10   Resisted R wrist extension 1#/  3x15         1#  3x15   Resisted L wrist extension  3#/  2x10         3#  2x10   Resisted B elbow extension  R/  3x18         R 3x15   Resisted R supi/pronation  1#/  3x15         1#  3x15   Resisted L supi/pronation  3#/  3x10         3#  3x10   R finger tendon glides  5 sets         5 sets

## 2018-08-05 DIAGNOSIS — J43.9 PULMONARY EMPHYSEMA, UNSPECIFIED EMPHYSEMA TYPE (HCC): ICD-10-CM

## 2018-08-06 ENCOUNTER — OFFICE VISIT (OUTPATIENT)
Dept: PHYSICAL THERAPY | Facility: CLINIC | Age: 58
End: 2018-08-06
Payer: COMMERCIAL

## 2018-08-06 DIAGNOSIS — R29.898 BILATERAL ARM WEAKNESS: Primary | ICD-10-CM

## 2018-08-06 DIAGNOSIS — S63.501D RIGHT WRIST SPRAIN, SUBSEQUENT ENCOUNTER: ICD-10-CM

## 2018-08-06 PROCEDURE — 97110 THERAPEUTIC EXERCISES: CPT

## 2018-08-06 PROCEDURE — 97140 MANUAL THERAPY 1/> REGIONS: CPT

## 2018-08-06 RX ORDER — MONTELUKAST SODIUM 10 MG/1
TABLET ORAL
Qty: 90 TABLET | Refills: 0 | Status: SHIPPED | OUTPATIENT
Start: 2018-08-06 | End: 2019-11-13 | Stop reason: SDUPTHER

## 2018-08-06 NOTE — PROGRESS NOTES
Daily Note     Today's date: 2018  Patient name: Ltanya Krabbe  : 1960  MRN: [id number]  Referring provider: MARVA Dahl  Dx:   Encounter Diagnosis     ICD-10-CM    1  Bilateral arm weakness R29 898    2  Right wrist sprain, subsequent encounter S63 501D      Start Time: 794  Stop Time: 1615  Total time in clinic (min): 45 minutes    Subjective: Patient reports L shoulder discomfort that may have been caused by "sleeping on it wrong"  Patient has no complaints of elbow or wrist pain  Patient is compliant with their HEP  Objective: See treatment diary below  RTB elbow extension was the most difficult task  Additional, patient only completed 2x10 L elbow flexion secondary to shoulder pain  Assessment: Pt demonstrated good form t/o TE exercise and increased strength overall  Plan: Continue poc as per PT  Precautions:  PMHx: asthma, COPD     Daily Treatment Diary      Manual     Graston R wrist extensors  3 min 3 min        5 min   Laser: R wrist roxane  2000J 2000J        2000J                                                                                 Exercise Diary     digiflex   R/Red   3"x40 L/Green   3"x40 R/Red   3"x40 L/Green   3"x40        R/Red 35x3"  L/Green  35x3"   Resisted B elbow flexion 3#/   neutral/   3x10 3#   neurtral   R/3x10   L/3x10        3#   Neutral  3x10   Resisted R wrist flexion 1#/  3x18 1#  3x18        1#  3x18   Resisted L wrist flexion  3#/  3x10  3#/  3x10        3#  3x10   Resisted R wrist extension 1#/  3x15 1#/  3x15        1#  3x15   Resisted L wrist extension  3#/  2x10 3#/  3x10        3#  2x10   Resisted B elbow extension  R/  3x18  RTB/  3x18        R 3x15   Resisted R supi/pronation  1#/  3x15  1#  3x15        1#  3x15   Resisted L supi/pronation  3#/  3x10  3#/  3x10        3#  3x10   R finger tendon glides  5 sets  5"x5 ea        5 sets

## 2018-08-08 ENCOUNTER — OFFICE VISIT (OUTPATIENT)
Dept: PHYSICAL THERAPY | Facility: CLINIC | Age: 58
End: 2018-08-08
Payer: COMMERCIAL

## 2018-08-08 DIAGNOSIS — S63.501D RIGHT WRIST SPRAIN, SUBSEQUENT ENCOUNTER: ICD-10-CM

## 2018-08-08 DIAGNOSIS — R29.898 BILATERAL ARM WEAKNESS: Primary | ICD-10-CM

## 2018-08-08 PROCEDURE — 97110 THERAPEUTIC EXERCISES: CPT

## 2018-08-08 PROCEDURE — G8984 CARRY CURRENT STATUS: HCPCS

## 2018-08-08 PROCEDURE — 97140 MANUAL THERAPY 1/> REGIONS: CPT

## 2018-08-08 PROCEDURE — G8985 CARRY GOAL STATUS: HCPCS

## 2018-08-08 NOTE — PROGRESS NOTES
PT Re-Evaluation  and PT Discharge    Today's date: 2018  Patient name: Katlyn Friedman  : 1960  MRN: [id number]  Referring provider: MARVA Waters  Dx:   Encounter Diagnosis     ICD-10-CM    1  Bilateral arm weakness R29 898    2  Right wrist sprain, subsequent encounter S63 501D        Start Time: 1530  Stop Time: 1615  Total time in clinic (min): 45 minutes    Assessment  Impairments: impaired physical strength    Assessment details: Pt demonstrates significantly improved B UE strength,  strength, activity tolerance, and pain  She is I with her HEP and is appropriate for d/c to a maintenance HEP at this time  Understanding of Dx/Px/POC: good   Prognosis: good    Plan  Treatment plan discussed with: patient        Subjective Evaluation    History of Present Illness  Mechanism of injury: Pt reports a resolution of B UE weakness and L wrist pain, 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,     Pain  Current pain ratin  At best pain ratin  At worst pain ratin  Progression: improved    Hand dominance: right    Treatments  Previous treatment: physical therapy  Current treatment: physical therapy  Patient Goals  Patient goals for therapy: decreased pain, independence with ADLs/IADLs and return to work          Objective     Strength/Myotome Testing     Left Elbow   Flexion: 4  Extension: 4  Forearm supination: 4-  Forearm pronation: 4    Right Elbow   Flexion: 4  Extension: 4  Forearm supination: 4-  Forearm pronation: 4    Left Wrist/Hand   Wrist extension: 4  Wrist flexion: 4  Radial deviation: 4-  Ulnar deviation: 4-     (2nd hand position)     Trial 1: 30    Right Wrist/Hand   Wrist extension: 4  Wrist flexion: 4  Radial deviation: 4-  Ulnar deviation: 4-     (2nd hand position)     Trial 1: 25      Flowsheet Rows      Most Recent Value   PT/OT G-Codes   Assessment Type  Re-evaluation   G code set  Carrying, Moving & Handling Objects   Carrying, Moving and Handling Objects Current Status ()  CK   Carrying, Moving and Handling Objects Goal Status ()  CK

## 2018-08-08 NOTE — PROGRESS NOTES
Daily Note     Today's date: 2018  Patient name: Rios Schmitz  : 1960  MRN: [id number]  Referring provider: Loree Skiff, CRNP  Dx:   Encounter Diagnosis     ICD-10-CM    1  Bilateral arm weakness R29 898    2  Right wrist sprain, subsequent encounter S63 501D      Start Time: 1530  Stop Time: 8440  Total time in clinic (min): 45 minutes    Subjective: Patient reports no episodes of pain since last tx session  Objective: See treatment diary below     Assessment: Pt demonstrated good tolerance to TE program, decreased pain levels with work activities  Plan: Pt will be d/c'd to independent HEP  Precautions:  PMHx: asthma, COPD     Daily Treatment Diary      Manual            Graston R wrist extensors  3 min 3 min 3 min          Laser: R wrist roxane  2000J J J                                                                                        Exercise Diary            digiflex   R/Red   3"x40 L/Green   3"x40 R/Red   3"x40 L/Green   3"x40 R/Red   3"x40 L/Green   3"x40          Resisted B elbow flexion 3#/   neutral/   3x10 3#   neutral   R/3x10   L/3x10 3#   neutral   R/3x10   L/3x10          Resisted R wrist flexion 1#/  3x18 1#  3x18 1#  3x18          Resisted L wrist flexion  3#/  3x10  3#/  3x10 3#/  3x10          Resisted R wrist extension 1#/  3x15 1#/  3x15 1#/  3x15          Resisted L wrist extension  3#/  2x10 3#/  3x10 3#/  3x10          Resisted B elbow extension  R/  3x18  RTB/  3x18 RTB/  3x18          Resisted R supi/pronation  1#/  3x15  1#  3x15 1#  3x15          Resisted L supi/pronation  3#/  3x10  3#/  3x10 3#/  3x10          R finger tendon glides  5 sets  5"x5 ea 5 sets

## 2018-10-03 DIAGNOSIS — J30.9 ALLERGIC SINUSITIS: ICD-10-CM

## 2018-10-04 RX ORDER — FLUTICASONE PROPIONATE 50 MCG
SPRAY, SUSPENSION (ML) NASAL
Qty: 1 BOTTLE | Refills: 2 | Status: SHIPPED | OUTPATIENT
Start: 2018-10-04 | End: 2019-11-13 | Stop reason: SDUPTHER

## 2019-04-04 DIAGNOSIS — R10.13 DYSPEPSIA: ICD-10-CM

## 2019-04-05 RX ORDER — RANITIDINE 150 MG/1
150 TABLET ORAL 2 TIMES DAILY
Qty: 90 TABLET | Refills: 1 | Status: SHIPPED | OUTPATIENT
Start: 2019-04-05 | End: 2019-06-24 | Stop reason: SDUPTHER

## 2019-06-24 DIAGNOSIS — R10.13 DYSPEPSIA: ICD-10-CM

## 2019-06-24 RX ORDER — RANITIDINE 150 MG/1
TABLET ORAL
Qty: 60 TABLET | Refills: 2 | Status: SHIPPED | OUTPATIENT
Start: 2019-06-24 | End: 2019-09-23 | Stop reason: SDUPTHER

## 2019-09-23 DIAGNOSIS — R10.13 DYSPEPSIA: ICD-10-CM

## 2019-09-23 RX ORDER — RANITIDINE 150 MG/1
TABLET ORAL
Qty: 60 TABLET | Refills: 2 | Status: SHIPPED | OUTPATIENT
Start: 2019-09-23 | End: 2019-12-20 | Stop reason: SDUPTHER

## 2019-11-13 ENCOUNTER — OFFICE VISIT (OUTPATIENT)
Dept: INTERNAL MEDICINE CLINIC | Facility: CLINIC | Age: 59
End: 2019-11-13

## 2019-11-13 VITALS
HEIGHT: 64 IN | WEIGHT: 132.28 LBS | SYSTOLIC BLOOD PRESSURE: 122 MMHG | HEART RATE: 76 BPM | DIASTOLIC BLOOD PRESSURE: 90 MMHG | TEMPERATURE: 98.6 F | BODY MASS INDEX: 22.58 KG/M2 | OXYGEN SATURATION: 98 %

## 2019-11-13 DIAGNOSIS — Z12.39 SCREENING FOR MALIGNANT NEOPLASM OF BREAST: ICD-10-CM

## 2019-11-13 DIAGNOSIS — S29.012A UPPER BACK STRAIN, INITIAL ENCOUNTER: ICD-10-CM

## 2019-11-13 DIAGNOSIS — E55.9 VITAMIN D DEFICIENCY: ICD-10-CM

## 2019-11-13 DIAGNOSIS — Z23 NEED FOR PNEUMOCOCCAL VACCINATION: ICD-10-CM

## 2019-11-13 DIAGNOSIS — Z11.4 SCREENING FOR HIV (HUMAN IMMUNODEFICIENCY VIRUS): ICD-10-CM

## 2019-11-13 DIAGNOSIS — J45.20 MILD INTERMITTENT ASTHMA WITHOUT COMPLICATION: Primary | ICD-10-CM

## 2019-11-13 DIAGNOSIS — M79.601 ARM PAIN, DIFFUSE, RIGHT: ICD-10-CM

## 2019-11-13 DIAGNOSIS — Z11.59 ENCOUNTER FOR HEPATITIS C SCREENING TEST FOR LOW RISK PATIENT: ICD-10-CM

## 2019-11-13 DIAGNOSIS — R21 RASH OF HANDS: ICD-10-CM

## 2019-11-13 DIAGNOSIS — E78.5 DYSLIPIDEMIA: ICD-10-CM

## 2019-11-13 DIAGNOSIS — J43.9 PULMONARY EMPHYSEMA, UNSPECIFIED EMPHYSEMA TYPE (HCC): ICD-10-CM

## 2019-11-13 DIAGNOSIS — Z12.11 SCREENING FOR MALIGNANT NEOPLASM OF COLON: ICD-10-CM

## 2019-11-13 DIAGNOSIS — J30.9 ALLERGIC SINUSITIS: ICD-10-CM

## 2019-11-13 PROCEDURE — 3725F SCREEN DEPRESSION PERFORMED: CPT | Performed by: PHYSICIAN ASSISTANT

## 2019-11-13 PROCEDURE — 90732 PPSV23 VACC 2 YRS+ SUBQ/IM: CPT | Performed by: PHYSICIAN ASSISTANT

## 2019-11-13 PROCEDURE — 90471 IMMUNIZATION ADMIN: CPT | Performed by: PHYSICIAN ASSISTANT

## 2019-11-13 PROCEDURE — 99214 OFFICE O/P EST MOD 30 MIN: CPT | Performed by: PHYSICIAN ASSISTANT

## 2019-11-13 RX ORDER — FLUTICASONE PROPIONATE 50 MCG
2 SPRAY, SUSPENSION (ML) NASAL DAILY
Qty: 1 BOTTLE | Refills: 3 | Status: SHIPPED | OUTPATIENT
Start: 2019-11-13 | End: 2020-04-21

## 2019-11-13 RX ORDER — ALBUTEROL SULFATE 90 UG/1
2 AEROSOL, METERED RESPIRATORY (INHALATION) EVERY 6 HOURS PRN
Qty: 1 INHALER | Refills: 3 | Status: SHIPPED | OUTPATIENT
Start: 2019-11-13 | End: 2021-06-25 | Stop reason: SDUPTHER

## 2019-11-13 RX ORDER — MONTELUKAST SODIUM 10 MG/1
10 TABLET ORAL DAILY
Qty: 90 TABLET | Refills: 1 | Status: SHIPPED | OUTPATIENT
Start: 2019-11-13 | End: 2020-05-01

## 2019-11-13 RX ORDER — ACETAMINOPHEN 160 MG
2000 TABLET,DISINTEGRATING ORAL DAILY
Qty: 30 CAPSULE | Refills: 5 | Status: SHIPPED | OUTPATIENT
Start: 2019-11-13 | End: 2020-05-01

## 2019-11-13 RX ORDER — CYCLOBENZAPRINE HCL 5 MG
5 TABLET ORAL
Qty: 30 TABLET | Refills: 2 | Status: SHIPPED | OUTPATIENT
Start: 2019-11-13 | End: 2019-11-14 | Stop reason: SDUPTHER

## 2019-11-13 NOTE — PROGRESS NOTES
Assessment/Plan:    Mild intermittent asthma without complication  Stable  Restart singulair  Continue dulera, rinse mouth out after every use  Albuterol rescue inhaler refilled for p r n  use  Chronic obstructive pulmonary disease ([facility] [address] )  Patient doing well and asymptomatic on Dulera 1 inhaled twice a day  Her lungs are clear on examination today  Albuterol rescue inhaler refilled for p r n  use  She continues to smoke but has cut back significantly and encouraged complete cessation  Vitamin D deficiency  Patient off vitamin-D supplements  Restarted on 2000 units of vitamin-D a day  Will check vitamin-D in blood work  Dyslipidemia  Patient is off her statin  Will recheck updated lipid panel and restart if necessary  ASCVD risk score is 9 1%    Allergic sinusitis  Patient to restart Singulair as well as fluticasone nasal spray daily  Diagnoses and all orders for this visit:    Mild intermittent asthma without complication  -     albuterol (PROVENTIL HFA,VENTOLIN HFA) 90 mcg/act inhaler; Inhale 2 puffs every 6 (six) hours as needed for wheezing    Pulmonary emphysema, unspecified emphysema type (HCC)  -     mometasone-formoterol (DULERA) 100-5 MCG/ACT inhaler; Inhale 1 puff 2 (two) times a day  -     montelukast (SINGULAIR) 10 mg tablet; Take 1 tablet (10 mg total) by mouth daily    Dyslipidemia  -     CBC and differential; Future  -     Comprehensive metabolic panel; Future  -     Lipid panel; Future    Vitamin D deficiency  -     Cholecalciferol (VITAMIN D3) 50 MCG (2000 UT) capsule; Take 1 capsule (2,000 Units total) by mouth daily  -     CBC and differential; Future  -     Vitamin D 25 hydroxy; Future    Allergic sinusitis  -     fluticasone (FLONASE) 50 mcg/act nasal spray; 2 sprays into each nostril daily    Rash of hands  -     triamcinolone (KENALOG) 0 1 % ointment;  Apply topically daily at bedtime    Upper back strain, initial encounter  -     cyclobenzaprine (FLEXERIL) 5 mg tablet; Take 1 tablet (5 mg total) by mouth daily at bedtime    Screening for HIV (human immunodeficiency virus)  -     HIV 1/2 AG-AB combo; Future    Encounter for hepatitis C screening test for low risk patient  -     Hepatitis C antibody; Future    Screening for malignant neoplasm of breast  -     Mammo screening bilateral w cad; Future    Screening for malignant neoplasm of colon  -     Ambulatory referral to Gastroenterology; Future    Arm pain, diffuse, right  -     Discontinue: cyclobenzaprine (FLEXERIL) 5 mg tablet; Take 1 tablet (5 mg total) by mouth daily at bedtime    Need for pneumococcal vaccination  -     PNEUMOCOCCAL POLYSACCHARIDE VACCINE 23-VALENT =>3YO SQ IM      patient is a pleasant 78-year-old female presenting today for routine follow-up of her medical conditions with assessment plan and discussion as above  She is far overdue for fasting labs which will include CBC, CMP, lipids and will also include hepatitis C and HIV screenings  Also will include vitamin-D level  Two new complaints addressed today including rash of hands which could be partially from the cold weather in addition to irritation from 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  I emphasized the importance of utilizing gloves at all times to avoid the chemicals coming in contact with her skin  In the meantime I did prescribe triamcinolone topical steroid cream she can apply at bedtime to the tops of her hands to reduce the itching and erythema  She should apply Aquaphor ointment 2 to 3 times a day to maintain hydration and moisture of her skin  The 2nd issue we addressed which was at the end of the visit after I had completed my physical exam and finish my discussion in closing with the patient, she reported some upper back discomfort on the left side in the trapezius and scapular area  Could be muscle strain    She has been on cyclobenzaprine in the past so I will restart that in addition to Tylenol as needed  She should do some gentle stretches of her neck and upper back in addition to moist heat  She should call or follow up for more detailed examination and discussion in 2-3 weeks if no better  Patient was provided referral to GI for colonoscopy in addition to mammogram order for screening mammogram   Her Pneumovax 23 was updated today  Unfortunately despite discussion she declines influenza vaccine  We will see patient back in about 4 months for routine follow-up  Chief Complaint   Patient presents with    Follow-up     Transfer from [facility] Face are irritated       Subjective:      Patient ID: Vicente Beebe is a 61 y o  female     57y/o female here today for routine f/u, last seen 6/2018 for arm weakness  She has COPD, asthma and hyperlipidemia, vit D def  She notes asthma and COPD has been stable  She uses dulera every day, she has not needed her albuterol inhaler, but also doesn't have one at home  She does note some issues with sinus, runny nose and drainage  She is off her nasal spray and allergy medication  She also notes dryness on her hands and some redness  She 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  She sometimes forgets to use gloves  States has only happened since starting to work there  She is due for labs, mammogram  Hep C and HIV screenings  She continues smoking 1/2ppd  She refuses flu vaccine but states she will get the pneumonia vaccine  The following portions of the patient's history were reviewed and updated as appropriate: allergies, current medications, past family history, past medical history, past social history, past surgical history and problem list     Review of Systems   Constitutional: Negative  HENT:        As in HPI   Respiratory: Negative  Cardiovascular: Negative  Gastrointestinal: Negative  Genitourinary: Negative  Musculoskeletal: Negative      Skin:        As in HPI Neurological: Negative  Psychiatric/Behavioral: Negative  Objective:      /90 (BP Location: Right arm, Patient Position: Sitting, Cuff Size: Adult)   Pulse 76   Temp 98 6 °F (37 °C) (Oral)   Ht 5' 4" (1 626 m)   Wt 60 kg (132 lb 4 4 oz)   SpO2 98%   BMI 22 71 kg/m²          Physical Exam   Constitutional: She is oriented to person, place, and time  She appears well-developed and well-nourished  No distress  HENT:   Head: Normocephalic  Right Ear: Tympanic membrane and ear canal normal    Left Ear: Tympanic membrane and ear canal normal    Nose: Nose normal    Mouth/Throat: Oropharynx is clear and moist    Eyes: Conjunctivae and lids are normal    Neck: Phonation normal  Neck supple  Normal carotid pulses present  Carotid bruit is not present  Cardiovascular: Normal rate, regular rhythm, normal heart sounds and normal pulses  Pulmonary/Chest: Effort normal and breath sounds normal    Abdominal: Soft  Normal appearance and bowel sounds are normal  There is no tenderness  Lymphadenopathy:        Head (right side): No submandibular and no tonsillar adenopathy present  Head (left side): No submandibular and no tonsillar adenopathy present  Neurological: She is alert and oriented to person, place, and time  Coordination and gait normal    Skin:   B/Ldorsal hands with mild erythema, dryness  No papules, pustules, vesicles or open areas   Psychiatric: She has a normal mood and affect  Her speech is normal and behavior is normal    Vitals reviewed

## 2019-11-13 NOTE — PATIENT INSTRUCTIONS
For your hands, please remember to wear gloves with any chemical contact! Start triamcinolone steriod cream applying sparing amount to tops of both hands at bedtime  Use Aquaphor ointment 2-3 x a day

## 2019-11-14 PROBLEM — R29.898 BILATERAL ARM WEAKNESS: Status: RESOLVED | Noted: 2018-05-14 | Resolved: 2019-11-14

## 2019-11-14 PROBLEM — J30.9 ALLERGIC SINUSITIS: Status: ACTIVE | Noted: 2019-11-14

## 2019-11-14 RX ORDER — CYCLOBENZAPRINE HCL 5 MG
5 TABLET ORAL
Qty: 30 TABLET | Refills: 2
Start: 2019-11-14 | End: 2020-02-11 | Stop reason: SDUPTHER

## 2019-11-14 NOTE — ASSESSMENT & PLAN NOTE
Patient off vitamin-D supplements  Restarted on 2000 units of vitamin-D a day  Will check vitamin-D in blood work

## 2019-11-14 NOTE — ASSESSMENT & PLAN NOTE
Patient is off her statin  Will recheck updated lipid panel and restart if necessary     ASCVD risk score is 9 1%

## 2019-11-14 NOTE — ASSESSMENT & PLAN NOTE
Stable  Restart singulair  Continue dulera, rinse mouth out after every use  Albuterol rescue inhaler refilled for p r n  use

## 2019-11-14 NOTE — ASSESSMENT & PLAN NOTE
Patient doing well and asymptomatic on Dulera 1 inhaled twice a day  Her lungs are clear on examination today  Albuterol rescue inhaler refilled for p r n  use  She continues to smoke but has cut back significantly and encouraged complete cessation

## 2019-11-15 ENCOUNTER — APPOINTMENT (OUTPATIENT)
Dept: LAB | Age: 59
End: 2019-11-15
Payer: COMMERCIAL

## 2019-11-15 DIAGNOSIS — E78.5 DYSLIPIDEMIA: ICD-10-CM

## 2019-11-15 DIAGNOSIS — Z11.59 ENCOUNTER FOR HEPATITIS C SCREENING TEST FOR LOW RISK PATIENT: ICD-10-CM

## 2019-11-15 DIAGNOSIS — Z11.4 SCREENING FOR HIV (HUMAN IMMUNODEFICIENCY VIRUS): ICD-10-CM

## 2019-11-15 DIAGNOSIS — E55.9 VITAMIN D DEFICIENCY: ICD-10-CM

## 2019-11-15 LAB
ALBUMIN SERPL BCP-MCNC: 4.4 G/DL (ref 3.5–5)
ALP SERPL-CCNC: 104 U/L (ref 46–116)
ALT SERPL W P-5'-P-CCNC: 18 U/L (ref 12–78)
ANION GAP SERPL CALCULATED.3IONS-SCNC: 10 MMOL/L (ref 4–13)
AST SERPL W P-5'-P-CCNC: 15 U/L (ref 5–45)
BASOPHILS # BLD AUTO: 0.03 THOUSANDS/ΜL (ref 0–0.1)
BASOPHILS NFR BLD AUTO: 0 % (ref 0–1)
BILIRUB SERPL-MCNC: 0.5 MG/DL (ref 0.2–1)
BUN SERPL-MCNC: 13 MG/DL (ref 5–25)
CALCIUM SERPL-MCNC: 9.2 MG/DL (ref 8.3–10.1)
CHLORIDE SERPL-SCNC: 104 MMOL/L (ref 100–108)
CHOLEST SERPL-MCNC: 251 MG/DL (ref 50–200)
CO2 SERPL-SCNC: 27 MMOL/L (ref 21–32)
CREAT SERPL-MCNC: 0.7 MG/DL (ref 0.6–1.3)
EOSINOPHIL # BLD AUTO: 0.08 THOUSAND/ΜL (ref 0–0.61)
EOSINOPHIL NFR BLD AUTO: 1 % (ref 0–6)
ERYTHROCYTE [DISTWIDTH] IN BLOOD BY AUTOMATED COUNT: 13.5 % (ref 11.6–15.1)
GFR SERPL CREATININE-BSD FRML MDRD: 95 ML/MIN/1.73SQ M
GLUCOSE P FAST SERPL-MCNC: 74 MG/DL (ref 65–99)
HCT VFR BLD AUTO: 43.2 % (ref 34.8–46.1)
HDLC SERPL-MCNC: 41 MG/DL
HGB BLD-MCNC: 14 G/DL (ref 11.5–15.4)
IMM GRANULOCYTES # BLD AUTO: 0.02 THOUSAND/UL (ref 0–0.2)
IMM GRANULOCYTES NFR BLD AUTO: 0 % (ref 0–2)
LDLC SERPL CALC-MCNC: 190 MG/DL (ref 0–100)
LYMPHOCYTES # BLD AUTO: 1.75 THOUSANDS/ΜL (ref 0.6–4.47)
LYMPHOCYTES NFR BLD AUTO: 26 % (ref 14–44)
MCH RBC QN AUTO: 31.5 PG (ref 26.8–34.3)
MCHC RBC AUTO-ENTMCNC: 32.4 G/DL (ref 31.4–37.4)
MCV RBC AUTO: 97 FL (ref 82–98)
MONOCYTES # BLD AUTO: 0.75 THOUSAND/ΜL (ref 0.17–1.22)
MONOCYTES NFR BLD AUTO: 11 % (ref 4–12)
NEUTROPHILS # BLD AUTO: 4.13 THOUSANDS/ΜL (ref 1.85–7.62)
NEUTS SEG NFR BLD AUTO: 62 % (ref 43–75)
NONHDLC SERPL-MCNC: 210 MG/DL
NRBC BLD AUTO-RTO: 0 /100 WBCS
PLATELET # BLD AUTO: 289 THOUSANDS/UL (ref 149–390)
PMV BLD AUTO: 10.7 FL (ref 8.9–12.7)
POTASSIUM SERPL-SCNC: 3.8 MMOL/L (ref 3.5–5.3)
PROT SERPL-MCNC: 7.7 G/DL (ref 6.4–8.2)
RBC # BLD AUTO: 4.44 MILLION/UL (ref 3.81–5.12)
SODIUM SERPL-SCNC: 141 MMOL/L (ref 136–145)
TRIGL SERPL-MCNC: 101 MG/DL
WBC # BLD AUTO: 6.76 THOUSAND/UL (ref 4.31–10.16)

## 2019-11-15 PROCEDURE — 87389 HIV-1 AG W/HIV-1&-2 AB AG IA: CPT

## 2019-11-15 PROCEDURE — 36415 COLL VENOUS BLD VENIPUNCTURE: CPT

## 2019-11-15 PROCEDURE — 86803 HEPATITIS C AB TEST: CPT

## 2019-11-15 PROCEDURE — 85025 COMPLETE CBC W/AUTO DIFF WBC: CPT

## 2019-11-15 PROCEDURE — 80061 LIPID PANEL: CPT

## 2019-11-15 PROCEDURE — 82306 VITAMIN D 25 HYDROXY: CPT

## 2019-11-15 PROCEDURE — 80053 COMPREHEN METABOLIC PANEL: CPT

## 2019-11-16 LAB
25(OH)D3 SERPL-MCNC: 19.3 NG/ML (ref 30–100)
HCV AB SER QL: NORMAL

## 2019-11-17 LAB — HIV 1+2 AB+HIV1 P24 AG SERPL QL IA: NORMAL

## 2019-11-19 ENCOUNTER — OFFICE VISIT (OUTPATIENT)
Dept: GASTROENTEROLOGY | Facility: CLINIC | Age: 59
End: 2019-11-19
Payer: COMMERCIAL

## 2019-11-19 ENCOUNTER — TELEPHONE (OUTPATIENT)
Dept: MULTI SPECIALTY CLINIC | Facility: CLINIC | Age: 59
End: 2019-11-19

## 2019-11-19 VITALS
HEART RATE: 83 BPM | HEIGHT: 61 IN | BODY MASS INDEX: 24.73 KG/M2 | WEIGHT: 131 LBS | DIASTOLIC BLOOD PRESSURE: 75 MMHG | TEMPERATURE: 97.5 F | SYSTOLIC BLOOD PRESSURE: 132 MMHG

## 2019-11-19 DIAGNOSIS — E78.5 DYSLIPIDEMIA: ICD-10-CM

## 2019-11-19 DIAGNOSIS — J45.20 MILD INTERMITTENT ASTHMA WITHOUT COMPLICATION: ICD-10-CM

## 2019-11-19 DIAGNOSIS — Z12.11 SCREENING FOR MALIGNANT NEOPLASM OF COLON: ICD-10-CM

## 2019-11-19 DIAGNOSIS — J43.9 PULMONARY EMPHYSEMA, UNSPECIFIED EMPHYSEMA TYPE (HCC): Primary | ICD-10-CM

## 2019-11-19 PROCEDURE — 99244 OFF/OP CNSLTJ NEW/EST MOD 40: CPT | Performed by: INTERNAL MEDICINE

## 2019-11-19 RX ORDER — FLUTICASONE FUROATE AND VILANTEROL 100; 25 UG/1; UG/1
1 POWDER RESPIRATORY (INHALATION) DAILY
Qty: 1 INHALER | Refills: 5 | Status: SHIPPED | OUTPATIENT
Start: 2019-11-19 | End: 2019-11-20

## 2019-11-19 RX ORDER — ATORVASTATIN CALCIUM 20 MG/1
20 TABLET, FILM COATED ORAL DAILY
Qty: 90 TABLET | Refills: 1 | Status: SHIPPED | OUTPATIENT
Start: 2019-11-19 | End: 2020-05-08

## 2019-11-19 NOTE — PATIENT INSTRUCTIONS
Colonoscopy scheduled 1/9/20 with Dr Jcarlos Cabrera at [facility] instructions given to pt during [address]

## 2019-11-19 NOTE — PROGRESS NOTES
[facility] - Outpatient Consultation  Carole Pham 61 y o  female MRN: [id number]  Encounter: [id number]          ASSESSMENT AND PLAN:      1  Screening for malignant neoplasm of colon  - Patient is 61 y o , has  been screened for colon cancer in the past, last colonoscopy was in 2013 by DR Greyson Calderon and was reported as normal but the prep was reported as fair, she denies any family history of GI malignancy or IBD, no alarm symptoms at this point, currently having normal bowel movements  - Currently average risk for colonoscopy, other options for colorectal cancer screening were discussed with the patient, will perform colonoscopy, risk and benefits of the procedure were discussed with the patient including but not limited to bleeding, infection, perforation and missing an adenoma   ______________________________________________________________________    HPI:  22-year-old female patient with past medical history significant for asthma and chronic smoking, she was seen and examined, denies any recent events, currently is tolerating PO route, denies nausea or vomiting, is passing flatus and having daily bowel movements of normal consistency, denies abdominal pain, no recent weight loss      REVIEW OF SYSTEMS:    CONSTITUTIONAL: Denies any fever, chills, rigors, and weight loss  HEENT: No earache or tinnitus  Denies hearing loss or visual disturbances  CARDIOVASCULAR: No chest pain or palpitations  RESPIRATORY: Denies any cough, hemoptysis, shortness of breath or dyspnea on exertion  GASTROINTESTINAL: As noted in the History of Present Illness  GENITOURINARY: No problems with urination  Denies any hematuria or dysuria  NEUROLOGIC: No dizziness or vertigo, denies headaches  MUSCULOSKELETAL: Denies any muscle or joint pain  SKIN: Denies skin rashes or itching  ENDOCRINE: Denies excessive thirst  Denies intolerance to heat or cold  PSYCHOSOCIAL: Denies depression or anxiety  Denies any recent memory loss  Historical Information   Past Medical History:   Diagnosis Date    Asthma     COPD (chronic obstructive pulmonary disease) ([address] )     Hyperlipidemia     Hypokalemia     last assessed [id number]    Spontaneous       Past Surgical History:   Procedure Laterality Date    NOSE SURGERY      TONSILLECTOMY AND ADENOIDECTOMY      TUBAL LIGATION       Social History   Social History     Substance and Sexual Activity   Alcohol Use No     Social History     Substance and Sexual Activity   Drug Use No     Social History     Tobacco Use   Smoking Status Current Every Day Smoker    Packs/day: 0 50   Smokeless Tobacco Current User   Tobacco Comment    1/2 ppd, started about age 29 - denied history of current smoker noted in "allscripts"      Family History   Problem Relation Age of Onset    Asthma Family     Bipolar disorder Family     Drug abuse Family     Mental illness Family     Heart failure Mother         congestive     Diabetes Father         mellitus     Hypertension Father     Lung cancer Father     Bipolar disorder Brother        Meds/Allergies       Current Outpatient Medications:     albuterol (PROVENTIL HFA,VENTOLIN HFA) 90 mcg/act inhaler    aspirin 81 mg chewable tablet    Cholecalciferol (VITAMIN D3) 50 MCG ( UT) capsule    cyclobenzaprine (FLEXERIL) 5 mg tablet    fluticasone (FLONASE) 50 mcg/act nasal spray    mometasone-formoterol (DULERA) 100-5 MCG/ACT inhaler    montelukast (SINGULAIR) 10 mg tablet    ranitidine (ZANTAC) 150 mg tablet    triamcinolone (KENALOG) 0 1 % ointment    atorvastatin (LIPITOR) 20 mg tablet    Allergies   Allergen Reactions    Seasonal Ic  [Cholestatin]            Objective     Blood pressure 132/75, pulse 83, temperature 97 5 °F (36 4 °C), temperature source Tympanic, height 5' 1" (1 549 m), weight 59 4 kg (131 lb)  Body mass index is 24 75 kg/m²          PHYSICAL EXAM:      General Appearance:   Alert, cooperative, no distress   HEENT:   Normocephalic, atraumatic, anicteric      Neck:  Supple, symmetrical, trachea midline   Lungs:   Clear to auscultation bilaterally; no rales, rhonchi or wheezing; respirations unlabored    Heart[de-identified]   Regular rate and rhythm; no murmur, rub, or gallop  Abdomen:   Soft, non-tender, non-distended; normal bowel sounds; no masses, no organomegaly    Genitalia:   Deferred    Rectal:   Deferred    Extremities:  No cyanosis, clubbing or edema    Pulses:  2+ and symmetric    Skin:  No jaundice, rashes, or lesions    Lymph nodes:  No palpable cervical lymphadenopathy        Lab Results:   No visits with results within 1 Day(s) from this visit     Latest known visit with results is:   Appointment on 11/15/2019   Component Date Value    WBC 11/15/2019 6 76     RBC 11/15/2019 4 44     Hemoglobin 11/15/2019 14 0     Hematocrit 11/15/2019 43 2     MCV 11/15/2019 97     MCH 11/15/2019 31 5     MCHC 11/15/2019 32 4     RDW 11/15/2019 13 5     MPV 11/15/2019 10 7     Platelets 96/97/3585 289     nRBC 11/15/2019 0     Neutrophils Relative 11/15/2019 62     Immat GRANS % 11/15/2019 0     Lymphocytes Relative 11/15/2019 26     Monocytes Relative 11/15/2019 11     Eosinophils Relative 11/15/2019 1     Basophils Relative 11/15/2019 0     Neutrophils Absolute 11/15/2019 4 13     Immature Grans Absolute 11/15/2019 0 02     Lymphocytes Absolute 11/15/2019 1 75     Monocytes Absolute 11/15/2019 0 75     Eosinophils Absolute 11/15/2019 0 08     Basophils Absolute 11/15/2019 0 03     Sodium 11/15/2019 141     Potassium 11/15/2019 3 8     Chloride 11/15/2019 104     CO2 11/15/2019 27     ANION GAP 11/15/2019 10     BUN 11/15/2019 13     Creatinine 11/15/2019 0 70     Glucose, Fasting 11/15/2019 74     Calcium 11/15/2019 9 2     AST 11/15/2019 15     ALT 11/15/2019 18     Alkaline Phosphatase 11/15/2019 104     Total Protein 11/15/2019 7 7     Albumin 11/15/2019 4 4     Total Bilirubin 11/15/2019 0 50     eGFR 11/15/2019 95     Cholesterol 11/15/2019 251*    Triglycerides 11/15/2019 101     HDL, Direct 11/15/2019 41     LDL Calculated 11/15/2019 190*    Non-HDL-Chol (CHOL-HDL) 11/15/2019 210     Vit D, 25-Hydroxy 11/15/2019 19 3*    Hepatitis C Ab 11/15/2019 Non-reactive     HIV-1/HIV-2 Ab 11/15/2019 Non-Reactive          Radiology Results:   No results found

## 2019-11-19 NOTE — TELEPHONE ENCOUNTER
Mariah sent in for patient   Please notify pt of need for change and to discuss with pharmacist ho to use new inhaler as it is a little different than the dulera

## 2019-11-20 ENCOUNTER — TELEPHONE (OUTPATIENT)
Dept: INTERNAL MEDICINE CLINIC | Facility: CLINIC | Age: 59
End: 2019-11-20

## 2019-11-20 ENCOUNTER — TRANSCRIBE ORDERS (OUTPATIENT)
Dept: GASTROENTEROLOGY | Facility: CLINIC | Age: 59
End: 2019-11-20

## 2019-11-20 DIAGNOSIS — J43.9 PULMONARY EMPHYSEMA, UNSPECIFIED EMPHYSEMA TYPE (HCC): Primary | ICD-10-CM

## 2019-11-20 RX ORDER — FLUTICASONE PROPIONATE AND SALMETEROL 113; 14 UG/1; UG/1
1 POWDER, METERED RESPIRATORY (INHALATION) 2 TIMES DAILY
Qty: 1 INHALER | Refills: 5 | Status: SHIPPED | OUTPATIENT
Start: 2019-11-20 | End: 2019-11-20

## 2019-11-20 NOTE — TELEPHONE ENCOUNTER
Called pharmacy to make sure this one was covered, and it is  Pharmacist Yoana Mike is going to get it ready now so patient does not have to wait

## 2019-11-20 NOTE — TELEPHONE ENCOUNTER
I DONT KNOW IF LOWEST DOSE WILL BE EFFECTIVE AND I DONT WANT HER ON HIGHEST DOSE EITHER  SENT IN [phone]on (Eduardo )  HOPEFULLY THIS WILL BE THE FINAL CHOICE AND WILL BE COVERED  PLEASE NOTIFY PATIENT 1 INHALE TWICE A DAY AND ONCE AGAIN TO ASK PHARMACIST HOW TO USE INHALER WHEN SHE P/U FROM PHARMACY  TY  SORRY FOR CONFUSION

## 2019-11-20 NOTE — TELEPHONE ENCOUNTER
Please call pt - this issue was already addressed yesterday and according to telephone note, Neida Christine left her a VM yesterday around 350PM notifying her of the change

## 2019-11-20 NOTE — TELEPHONE ENCOUNTER
Patient called in stating she spoke with the pharmacist and was informed that her insurance does not cover DULERA 100-5 MCG/ACT inhaler , and was advised to request [facility] inhaler   This is covered by insurance

## 2019-11-20 NOTE — TELEPHONE ENCOUNTER
Vini Goyal, because telephone note from staff yesterday stated Francisca Rodrigues was covered and I would rather that than diskus  Please tell pt we sent in airduo respiclick which was confirmed by her insurance with Itsa one of our staff that should be covered  Please tell pt to ask pharmacist to explain how to use the inhaler when she goes to pick it up, since it is different

## 2019-12-20 DIAGNOSIS — R10.13 DYSPEPSIA: ICD-10-CM

## 2019-12-20 RX ORDER — RANITIDINE 150 MG/1
TABLET ORAL
Qty: 60 TABLET | Refills: 2 | Status: SHIPPED | OUTPATIENT
Start: 2019-12-20 | End: 2020-03-14

## 2019-12-30 ENCOUNTER — TELEPHONE (OUTPATIENT)
Dept: GASTROENTEROLOGY | Facility: CLINIC | Age: 59
End: 2019-12-30

## 2020-01-03 ENCOUNTER — OFFICE VISIT (OUTPATIENT)
Dept: INTERNAL MEDICINE CLINIC | Facility: CLINIC | Age: 60
End: 2020-01-03

## 2020-01-03 VITALS
HEART RATE: 87 BPM | BODY MASS INDEX: 22.72 KG/M2 | TEMPERATURE: 97.9 F | HEIGHT: 62 IN | SYSTOLIC BLOOD PRESSURE: 124 MMHG | OXYGEN SATURATION: 98 % | DIASTOLIC BLOOD PRESSURE: 80 MMHG | WEIGHT: 123.46 LBS

## 2020-01-03 DIAGNOSIS — J41.1 MUCOPURULENT CHRONIC BRONCHITIS (HCC): ICD-10-CM

## 2020-01-03 DIAGNOSIS — H69.83 EUSTACHIAN TUBE DYSFUNCTION, BILATERAL: Chronic | ICD-10-CM

## 2020-01-03 DIAGNOSIS — R09.82 POST-NASAL DRIP: Primary | ICD-10-CM

## 2020-01-03 PROBLEM — H69.93 EUSTACHIAN TUBE DYSFUNCTION, BILATERAL: Chronic | Status: ACTIVE | Noted: 2020-01-03

## 2020-01-03 PROCEDURE — 3008F BODY MASS INDEX DOCD: CPT | Performed by: PHYSICIAN ASSISTANT

## 2020-01-03 PROCEDURE — 99213 OFFICE O/P EST LOW 20 MIN: CPT | Performed by: PHYSICIAN ASSISTANT

## 2020-01-03 RX ORDER — GUAIFENESIN, PSEUDOEPHEDRINE HYDROCHLORIDE 600; 60 MG/1; MG/1
1 TABLET, EXTENDED RELEASE ORAL EVERY 12 HOURS
Qty: 10 TABLET | Refills: 0 | Status: SHIPPED | OUTPATIENT
Start: 2020-01-03 | End: 2020-01-08

## 2020-01-03 NOTE — PROGRESS NOTES
Assessment/Plan:  Please continue your daily inhaler twice daily  We did review however that you may increase the use of her rescue inhaler to 3 or 4 times a day as needed for cough and chest congestion  We reviewed that the change in hearing and pressure in her ears is due to the congestion  I have sent a prescription for the Mucinex tablets to your pharmacy  Please take 1 tablet twice daily for the next 3-5 days  This will reduce the congestion and relieve the pressure in your ears  If however in 1 week you have had no significant improvement or any worsening of your symptoms please call our office for a re-evaluation with your primary care  No problem-specific Assessment & Plan notes found for this encounter  Diagnoses and all orders for this visit:    Post-nasal drip  -     pseudoephedrine-guaifenesin (MUCINEX D)  MG per tablet; Take 1 tablet by mouth every 12 (twelve) hours for 5 days    Eustachian tube dysfunction, bilateral    Mucopurulent chronic bronchitis (HCC)  -     pseudoephedrine-guaifenesin (MUCINEX D)  MG per tablet; Take 1 tablet by mouth every 12 (twelve) hours for 5 days          Subjective:      Patient ID: Bertha Masters is a 61 y o  female  Patient here for [facility] with c/o bilateral pressure and decreased hearing in ears  States has had ringing in  [place] for many years but states past week much worse and pain in R ear and decreased hearing  Also has had cough and congestion but started before ear pain and is improving    Patient known COPD states had to use the rescue inhaler once at work for cough  States had been working until today, states was fired today but not related to 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  States got job through [employer] and has appt with them next Friday to discuss getting back on their program    Patient admits she still smoking    She states prior to today she had been able to decrease to approximately 5 or 6 per day but admits she did smoke more today secondary to the stress of being fired  The following portions of the patient's history were reviewed and updated as appropriate: allergies, current medications, past family history, past medical history, past social history, past surgical history and problem list     Review of Systems   Constitutional: Negative for chills and fever  HENT: Positive for congestion, ear pain, hearing loss and tinnitus  Negative for facial swelling  Eyes: Negative for visual disturbance  Respiratory: Positive for cough  Negative for shortness of breath  Cardiovascular: Negative for chest pain and palpitations  Gastrointestinal: Negative  Endocrine: Negative for cold intolerance and heat intolerance  Musculoskeletal: Negative for myalgias  Neurological: Negative for dizziness, light-headedness and headaches  Psychiatric/Behavioral: Negative  Objective:      /80 (BP Location: Right arm, Patient Position: Sitting, Cuff Size: Standard)   Pulse 87   Temp 97 9 °F (36 6 °C) (Oral)   Ht 5' 2" (1 575 m)   Wt 56 kg (123 lb 7 3 oz)   SpO2 98%   BMI 22 58 kg/m²          Physical Exam   Constitutional: She appears well-developed and well-nourished  Pleasant female no apparent distress sitting comfortably does not appear unwell  HENT:   Head: Normocephalic  Mouth/Throat: No oropharyngeal exudate  TM cloudy bilaterally L>R but no erythema, no bulge slight retraction on R    Swollen turbinates and moderated post nasal drip     edentulous    Eyes: Conjunctivae are normal    Neck: Neck supple  No tracheal deviation present  Cardiovascular: Normal rate and regular rhythm  No murmur heard  Pulmonary/Chest: Effort normal and breath sounds normal    Slight cough with deep inspiration but to wheeze, no rhonchi   Lymphadenopathy:     She has no cervical adenopathy  Neurological: She is alert  Skin: Skin is warm and dry  No rash noted     Psychiatric: She has a normal mood and affect  Vitals reviewed

## 2020-01-03 NOTE — PATIENT INSTRUCTIONS
Please continue your daily inhaler twice daily  We did review however that you may increase the use of her rescue inhaler to 3 or 4 times a day as needed for cough and chest congestion  We reviewed that the change in hearing and pressure in her ears is due to the congestion  I have sent a prescription for the Mucinex tablets to your pharmacy  Please take 1 tablet twice daily for the next 3-5 days  This will reduce the congestion and relieve the pressure in your ears  If however in 1 week you have had no significant improvement or any worsening of your symptoms please call our office for a re-evaluation with your primary care  Eustachian Tube Dysfunction   WHAT YOU NEED TO KNOW:   What is eustachian tube dysfunction? Eustachian tube dysfunction (ETD) is a condition that prevents your eustachian tubes from opening properly  It can also cause them to become blocked  Eustachian tubes connect your middle ear to the back of your nose and throat  These tubes open and allow air to flow in and out when you sneeze, swallow, or yawn  What causes or increases my risk of ETD? ETD may be caused by swelling or buildup of mucus in your eustachian tubes  Allergies, a cold, or sinus infection can increase your risk for ETD  Smoking also increases your risk for ETD  What are the signs and symptoms of ETD? · Fullness or pressure in your ears    · Muffled hearing    · Pain in one or both ears    · Ringing in your ears    · Popping or clicking feeling in your ears    · Trouble keeping your balance  How is ETD diagnosed? Your healthcare provider will ask about your symptoms  He will examine your ears, your nose, and the back of your throat  He may also do a hearing test    How is ETD treated? Your ETD may get better on its own without any treatment  You may need any of the following:  · Exercises  such as swallowing, yawning, or chewing gum may help to open your eustachian tubes   Your healthcare provider may also recommend that you take a deep breath and then blow with your mouth shut and your nostrils pinched closed  · Air pressure devices  push air into your nose and eustachian tubes to help relieve air pressure in your ear  · Treatment for allergies  such as decongestants, antihistamines, and nasal steroids may improve ETD  They may help decrease swelling of the eustachian tubes  · Ear tubes  may help to keep your middle ear open  During this procedure, your healthcare provider will cut a small hole in your eardrum  · A myringotomy  is procedure to make a small cut in your eardrum and suction out fluid from your middle ear  · Tuboplasty  is a procedure to widen your eustachian tubes  When should I contact my healthcare provider? · Your symptoms do not improve or get worse  · You have a fever  · You have any hearing loss  · You have questions or concerns about your condition or care  CARE AGREEMENT:   You have the right to help plan your care  Learn about your health condition and how it may be treated  Discuss treatment options with your caregivers to decide what care you want to receive  You always have the right to refuse treatment  The above information is an  only  It is not intended as medical advice for individual conditions or treatments  Talk to your doctor, nurse or pharmacist before following any medical regimen to see if it is safe and effective for you  © 2017 [employer] Information is for End User's use only and may not be sold, redistributed or otherwise used for commercial purposes  All illustrations and images included in CareNotes® are the copyrighted property of A [employer] M [employer]  or Akil Dawson

## 2020-01-06 ENCOUNTER — HOSPITAL ENCOUNTER (OUTPATIENT)
Dept: RADIOLOGY | Facility: HOSPITAL | Age: 60
Discharge: HOME | End: 2020-01-06
Payer: COMMERCIAL

## 2020-01-06 VITALS — WEIGHT: 123 LBS | BODY MASS INDEX: 22.63 KG/M2 | HEIGHT: 62 IN

## 2020-01-06 DIAGNOSIS — Z12.39 SCREENING FOR MALIGNANT NEOPLASM OF BREAST: ICD-10-CM

## 2020-01-06 PROCEDURE — 77067 SCR MAMMO BI INCL CAD: CPT

## 2020-02-05 ENCOUNTER — APPOINTMENT (EMERGENCY)
Dept: RADIOLOGY | Facility: HOSPITAL | Age: 60
End: 2020-02-05
Payer: COMMERCIAL

## 2020-02-05 ENCOUNTER — HOSPITAL ENCOUNTER (EMERGENCY)
Facility: HOSPITAL | Age: 60
Discharge: HOME | End: 2020-02-05
Attending: EMERGENCY MEDICINE | Admitting: EMERGENCY MEDICINE
Payer: COMMERCIAL

## 2020-02-05 VITALS
WEIGHT: 123 LBS | OXYGEN SATURATION: 99 % | SYSTOLIC BLOOD PRESSURE: 119 MMHG | BODY MASS INDEX: 22.5 KG/M2 | TEMPERATURE: 97 F | HEART RATE: 72 BPM | RESPIRATION RATE: 14 BRPM | DIASTOLIC BLOOD PRESSURE: 58 MMHG

## 2020-02-05 DIAGNOSIS — S80.212A ABRASION, LEFT KNEE, INITIAL ENCOUNTER: ICD-10-CM

## 2020-02-05 DIAGNOSIS — S20.212A CHEST WALL CONTUSION, LEFT, INITIAL ENCOUNTER: ICD-10-CM

## 2020-02-05 DIAGNOSIS — S09.90XA CHI (CLOSED HEAD INJURY): Primary | ICD-10-CM

## 2020-02-05 PROCEDURE — 99284 EMERGENCY DEPT VISIT MOD MDM: CPT | Performed by: EMERGENCY MEDICINE

## 2020-02-05 PROCEDURE — 99284 EMERGENCY DEPT VISIT MOD MDM: CPT

## 2020-02-05 PROCEDURE — 70450 CT HEAD/BRAIN W/O DYE: CPT

## 2020-02-05 PROCEDURE — 71101 X-RAY EXAM UNILAT RIBS/CHEST: CPT

## 2020-02-05 PROCEDURE — 96372 THER/PROPH/DIAG INJ SC/IM: CPT

## 2020-02-05 RX ORDER — KETOROLAC TROMETHAMINE 30 MG/ML
15 INJECTION, SOLUTION INTRAMUSCULAR; INTRAVENOUS ONCE
Status: COMPLETED | OUTPATIENT
Start: 2020-02-05 | End: 2020-02-05

## 2020-02-05 RX ORDER — NAPROXEN 500 MG/1
500 TABLET ORAL 2 TIMES DAILY WITH MEALS
Qty: 20 TABLET | Refills: 0 | Status: SHIPPED | OUTPATIENT
Start: 2020-02-05 | End: 2020-04-27 | Stop reason: SDUPTHER

## 2020-02-05 RX ORDER — LIDOCAINE 50 MG/G
1 PATCH TOPICAL ONCE
Status: DISCONTINUED | OUTPATIENT
Start: 2020-02-05 | End: 2020-02-05 | Stop reason: HOSPADM

## 2020-02-05 RX ADMIN — LIDOCAINE 1 PATCH: 50 PATCH TOPICAL at 17:02

## 2020-02-05 RX ADMIN — KETOROLAC TROMETHAMINE 15 MG: 30 INJECTION, SOLUTION INTRAMUSCULAR at 15:29

## 2020-02-05 NOTE — ED PROVIDER NOTES
History  Chief Complaint   Patient presents with    Fall     Patient states she slipped and fell down 3 concrete steps this morning  States she hit on her left side and has pain  States she did hit her head and she does take 81mg aspirin - last dose was last night  60 yo female presents for evaluation s/p trip/fall while at home  Fazal Dolan down 3 steps, landed on the L side of her body  C/o L sided chest wall pain, L occipital HA  Denies LOC, neck pain/stiffness, visual changes, speech changes, focal weakness/numbness/tingling, SOB, abd pain, f/c/n/v  Symptoms constant since this AM  Chest wall pain is worse with palpation  No other a/e factors present  No other c/o at this time  Pt takes ASA 81mg daily  Pt ambulated to [facility] bed from triage unassisted  Imp: closed head injury, chest wall pain s/p fall plan: CT head, L rib series  Analgesia, reassess  Prior to Admission Medications   Prescriptions Last Dose Informant Patient Reported? Taking?    Cholecalciferol (VITAMIN D3) 50 MCG (2000 UT) capsule  Self No No   Sig: Take 1 capsule (2,000 Units total) by mouth daily   albuterol (PROVENTIL HFA,VENTOLIN HFA) 90 mcg/act inhaler  Self No No   Sig: Inhale 2 puffs every 6 (six) hours as needed for wheezing   aspirin 81 mg chewable tablet  Self No No   Sig: Chew 1 tablet (81 mg total) daily   atorvastatin (LIPITOR) 20 mg tablet   No No   Sig: Take 1 tablet (20 mg total) by mouth daily   bisacodyl (DULCOLAX) 5 mg EC tablet   No No   Sig: Take 4 tablets (20 mg total) by mouth once for 1 dose   cyclobenzaprine (FLEXERIL) 5 mg tablet  Self No No   Sig: Take 1 tablet (5 mg total) by mouth daily at bedtime   fluticasone (FLONASE) 50 mcg/act nasal spray  Self No No   Si sprays into each nostril daily   fluticasone-salmeterol (ADVAIR, WIXELA) 100-50 mcg/dose inhaler   No No   Sig: Inhale 1 puff 2 (two) times a day Rinse mouth after use    montelukast (SINGULAIR) 10 mg tablet  Self No No   Sig: Take 1 tablet (10 mg total) by mouth daily   polyethylene glycol (GOLYTELY) 4000 mL solution   No No   Sig: Take 4,000 mL by mouth once for 1 dose   ranitidine (ZANTAC) 150 mg tablet   No No   Sig: TAKE 1 TABLET BY MOUTH TWICE A DAY   triamcinolone (KENALOG) 0 1 % ointment  Self No No   Sig: Apply topically daily at bedtime      Facility-Administered Medications: None       Past Medical History:   Diagnosis Date    Asthma     COPD (chronic obstructive pulmonary disease) (HCC)     Hyperlipidemia     Hypokalemia     last assessed [id number]    Spontaneous         Past Surgical History:   Procedure Laterality Date    NOSE SURGERY      TONSILLECTOMY AND ADENOIDECTOMY      TUBAL LIGATION         Family History   Problem Relation Age of Onset    Asthma Family     Bipolar disorder Family     Drug abuse Family     Mental illness Family     Heart failure Mother         congestive     Diabetes Father         mellitus     Hypertension Father     Lung cancer Father     Bipolar disorder Brother     No Known Problems Maternal Grandmother     No Known Problems Maternal Grandfather     No Known Problems Paternal Grandmother     No Known Problems Paternal Grandfather     No Known Problems Daughter     No Known Problems Son     No Known Problems Daughter     No Known Problems Brother     No Known Problems Maternal Aunt     No Known Problems Maternal Aunt     No Known Problems Maternal Aunt     No Known Problems Paternal Aunt      I have reviewed and agree with the history as documented  Social History     Tobacco Use    Smoking status: Current Every Day Smoker     Packs/day: 0 50    Smokeless tobacco: Current User    Tobacco comment: 1/2 ppd, started about age 29 - denied history of current smoker noted in "allscripts"    Substance Use Topics    Alcohol use: No    Drug use: No        Review of Systems   Constitutional: Negative for chills, fatigue and fever     HENT: Negative for ear pain, nosebleeds, sinus pain and sore throat  Eyes: Negative for pain and visual disturbance  Respiratory: Negative for cough, chest tightness, shortness of breath and wheezing  Cardiovascular: Positive for chest pain  Negative for palpitations and leg swelling  Gastrointestinal: Negative for abdominal pain, nausea and vomiting  Genitourinary: Negative for dysuria, frequency and urgency  Musculoskeletal: Negative for arthralgias, back pain, gait problem, myalgias, neck pain and neck stiffness  Skin: Negative for wound  Neurological: Positive for headaches  Negative for dizziness, syncope, speech difficulty, weakness, light-headedness and numbness  All other systems reviewed and are negative  Physical Exam  Physical Exam   Constitutional: She is oriented to person, place, and time  She appears well-developed and well-nourished  No distress  HENT:   Head: Normocephalic  Nose: Nose normal    Mouth/Throat: Oropharynx is clear and moist    Small soft tissue swelling L occiput   Eyes: Pupils are equal, round, and reactive to light  Conjunctivae and EOM are normal  Right eye exhibits no discharge  Left eye exhibits no discharge  Neck: Normal range of motion  No midline Cspine TTP   Cardiovascular: Normal rate, regular rhythm, normal heart sounds and intact distal pulses  Exam reveals no gallop and no friction rub  No murmur heard  Pulmonary/Chest: Effort normal and breath sounds normal  No stridor  No respiratory distress  She has no wheezes  She has no rales  She exhibits tenderness  L inferolateral chest wall TTP  Abdominal: Soft  Bowel sounds are normal  She exhibits no distension  There is no tenderness  There is no guarding  Musculoskeletal: Normal range of motion  She exhibits no edema, tenderness or deformity  Neurological: She is alert and oriented to person, place, and time  She exhibits normal muscle tone  Skin: Skin is warm and dry  Capillary refill takes less than 2 seconds  No rash noted  She is not diaphoretic  Psychiatric: She has a normal mood and affect  Nursing note and vitals reviewed  Vital Signs  ED Triage Vitals   Temperature Pulse Respirations Blood Pressure SpO2   02/05/20 1416 02/05/20 1416 02/05/20 1416 02/05/20 1416 02/05/20 1416   (!) 97 °F (36 1 °C) 88 19 142/89 98 %      Temp Source Heart Rate Source Patient Position - Orthostatic VS BP Location FiO2 (%)   02/05/20 1416 02/05/20 1416 02/05/20 1416 02/05/20 1416 --   Tympanic Monitor Sitting Right arm       Pain Score       02/05/20 1527       Worst Possible Pain           Vitals:    02/05/20 1416 02/05/20 1527   BP: 142/89 160/74   Pulse: 88 80   Patient Position - Orthostatic VS: Sitting          Visual Acuity      ED Medications  Medications   ketorolac (TORADOL) injection 15 mg (15 mg Intramuscular Given 2/5/20 1529)       Diagnostic Studies  Results Reviewed     None                 XR ribs with pa chest min 3 views LEFT   ED Interpretation by Barber Moon DO (02/05 1529)   No acute abnormality      CT head without contrast    (Results Pending)              Procedures  Procedures         ED Course                               MDM      Disposition  Final diagnoses:   None     ED Disposition     None      Follow-up Information    None         Patient's Medications   Discharge Prescriptions    No medications on file     No discharge procedures on file      ED Provider  Electronically Signed by           Sirisha Sheikh DO  02/05/20 4100

## 2020-02-10 ENCOUNTER — OFFICE VISIT (OUTPATIENT)
Dept: URGENT CARE | Facility: CLINIC | Age: 60
End: 2020-02-10
Payer: COMMERCIAL

## 2020-02-10 VITALS
OXYGEN SATURATION: 99 % | DIASTOLIC BLOOD PRESSURE: 64 MMHG | TEMPERATURE: 98 F | RESPIRATION RATE: 18 BRPM | HEART RATE: 88 BPM | SYSTOLIC BLOOD PRESSURE: 122 MMHG

## 2020-02-10 DIAGNOSIS — M54.50 ACUTE MIDLINE LOW BACK PAIN WITHOUT SCIATICA: Primary | ICD-10-CM

## 2020-02-10 PROCEDURE — 99284 EMERGENCY DEPT VISIT MOD MDM: CPT | Performed by: NURSE PRACTITIONER

## 2020-02-10 PROCEDURE — G0383 LEV 4 HOSP TYPE B ED VISIT: HCPCS | Performed by: NURSE PRACTITIONER

## 2020-02-10 PROCEDURE — 99204 OFFICE O/P NEW MOD 45 MIN: CPT | Performed by: NURSE PRACTITIONER

## 2020-02-10 RX ORDER — PREDNISONE 10 MG/1
TABLET ORAL
Qty: 21 TABLET | Refills: 0 | Status: SHIPPED | OUTPATIENT
Start: 2020-02-10 | End: 2020-04-27 | Stop reason: ALTCHOICE

## 2020-02-10 NOTE — PATIENT INSTRUCTIONS
Back Pain   AMBULATORY CARE:   Back pain  is common  You may feel sore or stiff on one or both sides of your back  The pain may spread to your buttocks or thighs  Back pain may be caused by an injury, lack of exercise, or obesity  Repeated bending, lifting, twisting, or lifting heavy items can also cause back pain  Seek immediate care for the following symptoms:   · Pain, numbness, or weakness in one or both legs    · Pain that is so severe, you cannot walk    · Unable to control your urine or bowel movements    · Severe back pain with chest pain    · Severe back pain, nausea, and vomiting    · Severe back pain that spreads to your side or genital area  Contact your healthcare provider if:   · You have back pain that does not get better with rest and pain medicine  · You have a fever  · You have pain that worsens when you are on your back or when you rest     · You have pain that worsens when you cough or sneeze  · You lose weight without trying  · You have questions or concerns about your condition or care  Treatment for back pain  may include any of the following:  · NSAIDs , such as ibuprofen, help decrease swelling, pain, and fever  This medicine is available with or without a doctor's order  NSAIDs can cause stomach bleeding or kidney problems in certain people  If you take blood thinner medicine, always ask your healthcare provider if NSAIDs are safe for you  Always read the medicine label and follow directions  · Acetaminophen  decreases pain  It is available without a doctor's order  Ask how much to take and how often to take it  Follow directions  Acetaminophen can cause liver damage if not taken correctly  · Prescription pain medicine  may be given  Ask your healthcare provider how to take this medicine safely  Manage your back pain:   · Apply ice  on your back for 15 to 20 minutes every hour or as directed  Use an ice pack, or put crushed ice in a plastic bag  Cover it with a towel  Ice helps decrease swelling and pain  · Apply heat  on your back for 20 to 30 minutes every 2 hours for as many days as directed  Heat helps decrease pain and muscle spasms  You can alternate ice and heat  · Stay active  as much as you can without causing more pain  Bed rest could make your back pain worse  Avoid heavy lifting until your pain is gone  Follow up with your healthcare provider as directed:  Write down your questions so you remember to ask them during your visits  © 2017 [facility] Information is for End User's use only and may not be sold, redistributed or otherwise used for commercial purposes  All illustrations and images included in CareNotes® are the copyrighted property of A D A M , Inc  or Akil Dawson  The above information is an  only  It is not intended as medical advice for individual conditions or treatments  Talk to your doctor, nurse or pharmacist before following any medical regimen to see if it is safe and effective for you

## 2020-02-10 NOTE — LETTER
February 10, 2020     Patient: Rosalee Tomas   YOB: 1960   Date of Visit: 2/10/2020       To Whom it May Concern:    Rosalee Tomas was seen in my clinic on 2/10/2020  She may return to work on 2/11/2020  If you have any questions or concerns, please don't hesitate to call           Sincerely,          MARVA Perkins        CC: No Recipients

## 2020-02-10 NOTE — PROGRESS NOTES
[facility]        NAME: Aidan Welch is a 61 y o  female  : 1960    MRN: [id number]  DATE: February 10, 2020  TIME: 12:14 PM    Assessment and Plan   Acute midline low back pain without sciatica [M54 5]  1  Acute midline low back pain without sciatica  XR spine lumbar minimum 4 views non injury    predniSONE 10 mg tablet   xray of lumbar spine done in office - no evidence of fracture  Start course of prednisone   Continue naprosyn from ER   Rec f/u with pcp for further management as this is a f/u to ER visit last week   Pt verbalized understanding        Patient Instructions     Follow up with PCP in 3-5 days  Proceed to  ER if symptoms worsen  Chief Complaint     Chief Complaint   Patient presents with    Generalized pain     Patient states that she fell 5 days ago and was seen in the ER  SHe states that she was given Naproxen by ER and states that the pain in worse in her back  SHe is seeking pain relief    Nasal Congestion     Patient started last night with nasal congestion         History of Present Illness   Aidan Welch presents to the clinic c/o    See ER note - pt fell down 3 concrete steps on  and was evaluated there  Since then has increase in pain throughout body and more specifically the back   naproxyn not working for pain relief   Not using heating pad or ice or any additional otc treatments  Here for f/u to er and pain management  Review of Systems   Review of Systems   All other systems reviewed and are negative          Current Medications     Long-Term Medications   Medication Sig Dispense Refill    aspirin 81 mg chewable tablet Chew 1 tablet (81 mg total) daily 90 tablet 1    atorvastatin (LIPITOR) 20 mg tablet Take 1 tablet (20 mg total) by mouth daily 90 tablet 1    Cholecalciferol (VITAMIN D3) 50 MCG ( UT) capsule Take 1 capsule (2,000 Units total) by mouth daily 30 capsule 5    cyclobenzaprine (FLEXERIL) 5 mg tablet Take 1 tablet (5 mg total) by mouth daily at bedtime 30 tablet 2    fluticasone (FLONASE) 50 mcg/act nasal spray 2 sprays into each nostril daily 1 Bottle 3    fluticasone-salmeterol (ADVAIR, WIXELA) 100-50 mcg/dose inhaler Inhale 1 puff 2 (two) times a day Rinse mouth after use  1 Inhaler 5    montelukast (SINGULAIR) 10 mg tablet Take 1 tablet (10 mg total) by mouth daily 90 tablet 1    naproxen (NAPROSYN) 500 mg tablet Take 1 tablet (500 mg total) by mouth 2 (two) times a day with meals 20 tablet 0    ranitidine (ZANTAC) 150 mg tablet TAKE 1 TABLET BY MOUTH TWICE A DAY 60 tablet 2    triamcinolone (KENALOG) 0 1 % ointment Apply topically daily at bedtime 30 g 1    bisacodyl (DULCOLAX) 5 mg EC tablet Take 4 tablets (20 mg total) by mouth once for 1 dose 4 tablet 0    polyethylene glycol (GOLYTELY) 4000 mL solution Take 4,000 mL by mouth once for 1 dose 4000 mL 0       Current Allergies     Allergies as of 02/10/2020 - Reviewed 02/05/2020   Allergen Reaction Noted    Seasonal ic  [cholestatin]  05/04/2017            The following portions of the patient's history were reviewed and updated as appropriate: allergies, current medications, past family history, past medical history, past social history, past surgical history and problem list     Objective   /64   Pulse 88   Temp 98 °F (36 7 °C) (Tympanic)   Resp 18   SpO2 99%        Physical Exam     Physical Exam   Constitutional: She is oriented to person, place, and time  Vital signs are normal  She appears well-developed and well-nourished  She is active and cooperative  HENT:   Head: Normocephalic and atraumatic  Eyes: Conjunctivae and lids are normal    Cardiovascular: Normal rate, regular rhythm, S1 normal, S2 normal and normal heart sounds  Pulmonary/Chest: Effort normal and breath sounds normal    Musculoskeletal:        Back:    Location of pain noted - midline      Neurological: She is alert and oriented to person, place, and time     Skin: Skin is warm and dry  Psychiatric: She has a normal mood and affect  Her speech is normal and behavior is normal  Judgment and thought content normal  Cognition and memory are normal    Nursing note and vitals reviewed

## 2020-02-11 ENCOUNTER — OFFICE VISIT (OUTPATIENT)
Dept: INTERNAL MEDICINE CLINIC | Facility: CLINIC | Age: 60
End: 2020-02-11

## 2020-02-11 VITALS
HEIGHT: 62 IN | WEIGHT: 122.14 LBS | DIASTOLIC BLOOD PRESSURE: 62 MMHG | BODY MASS INDEX: 22.48 KG/M2 | HEART RATE: 92 BPM | OXYGEN SATURATION: 99 % | TEMPERATURE: 98.1 F | SYSTOLIC BLOOD PRESSURE: 118 MMHG

## 2020-02-11 DIAGNOSIS — S29.012A UPPER BACK STRAIN, INITIAL ENCOUNTER: ICD-10-CM

## 2020-02-11 DIAGNOSIS — M79.601 PAIN IN BOTH UPPER EXTREMITIES: ICD-10-CM

## 2020-02-11 DIAGNOSIS — M54.2 ACUTE NECK PAIN: ICD-10-CM

## 2020-02-11 DIAGNOSIS — M79.602 PAIN IN BOTH UPPER EXTREMITIES: ICD-10-CM

## 2020-02-11 DIAGNOSIS — M54.50 ACUTE BILATERAL LOW BACK PAIN WITHOUT SCIATICA: Primary | ICD-10-CM

## 2020-02-11 DIAGNOSIS — R07.81 RIB PAIN ON LEFT SIDE: ICD-10-CM

## 2020-02-11 DIAGNOSIS — J41.1 MUCOPURULENT CHRONIC BRONCHITIS (HCC): ICD-10-CM
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
## 2020-02-11 DIAGNOSIS — G44.319 ACUTE POST-TRAUMATIC HEADACHE, NOT INTRACTABLE: ICD-10-CM

## 2020-02-11 PROBLEM — R09.82 POST-NASAL DRIP: Status: RESOLVED | Noted: 2020-01-03 | Resolved: 2020-02-11

## 2020-02-11 PROBLEM — H69.83 EUSTACHIAN TUBE DYSFUNCTION, BILATERAL: Chronic | Status: RESOLVED | Noted: 2020-01-03 | Resolved: 2020-02-11

## 2020-02-11 PROBLEM — H69.93 EUSTACHIAN TUBE DYSFUNCTION, BILATERAL: Chronic | Status: RESOLVED | Noted: 2020-01-03 | Resolved: 2020-02-11

## 2020-02-11 PROCEDURE — T1015 CLINIC SERVICE: HCPCS | Performed by: PHYSICIAN ASSISTANT

## 2020-02-11 PROCEDURE — 99214 OFFICE O/P EST MOD 30 MIN: CPT | Performed by: PHYSICIAN ASSISTANT

## 2020-02-11 PROCEDURE — 3008F BODY MASS INDEX DOCD: CPT | Performed by: PHYSICIAN ASSISTANT

## 2020-02-11 RX ORDER — CYCLOBENZAPRINE HCL 5 MG
5 TABLET ORAL 3 TIMES DAILY PRN
Qty: 30 TABLET | Refills: 1 | Status: SHIPPED | OUTPATIENT
Start: 2020-02-11 | End: 2020-05-07 | Stop reason: SDUPTHER

## 2020-02-11 NOTE — PROGRESS NOTES
Assessment/Plan:      Diagnoses and all orders for this visit:    Acute bilateral low back pain without sciatica    Acute post-traumatic headache, not intractable    Acute neck pain    Rib pain on left side    Pain in both upper extremities    Fall, subsequent encounter    Mucopurulent chronic bronchitis ([address] )    Upper back strain, initial encounter  -     cyclobenzaprine (FLEXERIL) 5 mg tablet; Take 1 tablet (5 mg total) by mouth 3 (three) times a day as needed (neck, back, arm pain)      patient is a 55-year-old female presenting today for follow-up to fall with multiple concerns and complaints regarding pain today  Details as in HPI, evaluated both in the emergency department same day as fall as well as yesterday in urgent care  Patient has been treated with anti-inflammatories, started on prednisone taper yesterday and has also been taking cyclobenzaprine q h s     Patient reports pain is the same quality  She notes pain continuing in her low back bilaterally, dull headache, left-sided lower rib pain as well as new onset neck discomfort on the left side and upper arm pain  She did have thorough evaluation both in ED and the urgent care with negative head CT, negative rib x-ray and yesterday negative lumbar x-ray  She does have some obvious soreness in her lower neck muscles more so on the left side in addition to bilateral low back and her upper arm muscles  She does have some tenderness to palpation in the left lateral lower rib area as well  However there appear to be no concerning abnormalities on exam and nothing worse than at onset  She also hit her head without loss of consciousness and denies any other neurological symptoms, neurologically she is intact today  She does note a mild headache on the top of her head  CT of the head was negative in the ED last week and there has been no worsening of headache to note      At this time I do not feel any further treatment is necessary aside from continuing current treatment and giving symptoms more time to improve  Stressed importance of continuing the prednisone taper with naproxen p r n  with caution of GI side effects and patient was advised to take with food  I will increase cyclobenzaprine to t i d  p r n  which may help her muscle pain but I did advise caution as it may cause drowsiness and advised avoiding medication with driving or working  Patient expresses understanding  I did offer patient consultation and treatment with physical therapy however she declines  Therefore I did advise rest but also gentle stretches and movements at home as demonstrated in the office to her neck, upper arms, low back to prevent muscle stiffness and soreness  I also advised alternating ice and heat to affected areas to reduce inflammation and swelling and relax muscles patient expresses understanding of treatment  We will give treatment and symptoms about 1-2 weeks to see how she responds  If no better she is to call and I will consider PT and further evaluation depending  On examination today patient had decreased breath sounds with some rhonchi in her lungs  Patient admits that she has missed her Advair inhaler because she ran out the past few days and had used her ProAir instead  She has more than enough inhaler refills to last her at least another 3-4 months and I emphasized the importance to call the pharmacy had a time when she is about to run out to make sure she can get another refill  Patient states she will call the pharmacy to request refill  She is to follow-up with any pulmonary issues  Work excuse provided today to return Monday 2/17 pending symptoms      Chief Complaint   Patient presents with    Follow-up     Patient went to the urgent care for pain ,    Knee Pain    Back Pain     neck pain ,shoulder pain        Subjective:     Patient ID: Nando Gama is a 61 y o  female     56y/ female here today for f/u to ED and urgent care visits s/p fall  She fell accidentally on concrete steps on 2/5/20, slipped down steps in front of her house  She reports hitting left side of her body, hit occipital head  Denies LOC, confusion, open scalp wounds/blood  Was evaluated in ED same day, and had neg head CT and neg rib xrays  ED note reviewed by me  No c spine TTP, TTP chest wall  Given toradol IM, naprosyn oral, discharged  Then was seen in  yesterday in [facility] for low back pain s/p fall  Had lumbar xray which did not show any fracture  Was advised to continue naprosyn and started on prednisone 6 day taper  States she used heat twice since fall and taking medications  Being in cold weather makes it worse  States she continues with left lateral rib pain and B/L low back pain  Pain she states is about the same as at onset  Also states 2 days ago started with pain in her arms, day before she went to urgent care, feels like squeezing in upper arm muscles  Also has a mild headache on top of head  Also has some neck stiffness, more so on left side  She states she has been compliant on prednisone since being prescribed yesterday, as well as naprosyn, and she also has been taking the cyclobenzaprine at night time that had been previously prescribed for upper back muscle strain in november  She denies any nausea or vomiting, denies change in mental status or confusion  Denies dizziness or change in vision with headache  Review of Systems   Constitutional: Negative  HENT: Negative  Eyes: Negative  Respiratory: Negative  Cardiovascular: Negative  Gastrointestinal: Negative  Genitourinary: Negative  Musculoskeletal:        As in HPI   Skin:        Small scrape left knee, healing  Neurological:        As in HPI   Psychiatric/Behavioral: Negative            The following portions of the patient's history were reviewed and updated as appropriate: allergies, current medications, past family history, past medical history, past social history, past surgical history and problem list       Objective:     Physical Exam   Constitutional: She is oriented to person, place, and time  She appears well-developed and well-nourished  No distress  HENT:   Head: Normocephalic  Head is without abrasion, without contusion and without laceration  No obvious visible abnormality or swelling to occiput  No TTP of scalp in area of reported  injury   Eyes: Pupils are equal, round, and reactive to light  Conjunctivae, EOM and lids are normal    Neck: Neck supple  Muscular tenderness (B/L, worse on left side) present  No spinous process tenderness present  No neck rigidity  Decreased range of motion present  No edema and no erythema present  Cardiovascular: Normal rate, regular rhythm, normal heart sounds and normal pulses  Pulmonary/Chest: Effort normal  She has decreased breath sounds  She has rhonchi  Musculoskeletal:   Neck exam as above  No lumbar spine visible abnormalities  TTP B/L lumbar paraspinals  Decreased AROM mainly with froward flexion  Pt also has mild upper ar muscle TTP  Strength in UE's and LE's intact and symmetric  Lymphadenopathy:        Head (right side): No submandibular and no tonsillar adenopathy present  Head (left side): No submandibular and no tonsillar adenopathy present  Neurological: She is alert and oriented to person, place, and time  She is not disoriented  She displays no tremor  No cranial nerve deficit or sensory deficit  She exhibits normal muscle tone  Coordination and gait normal    Skin:        Psychiatric: She has a normal mood and affect  Her speech is normal and behavior is normal  Cognition and memory are normal    Vitals reviewed        Vitals:    02/11/20 0949   BP: 118/62   BP Location: Left arm   Patient Position: Sitting   Cuff Size: Adult   Pulse: 92   Temp: 98 1 °F (36 7 °C)   TempSrc: Oral   SpO2: 99%   Weight: 55 4 kg (122 lb 2 2 oz)   Height: 5' 2" (1 575 m)

## 2020-02-11 NOTE — LETTER
February 11, 2020     Patient: Mana Lantigua   YOB: 1960   Date of Visit: 2/11/2020       To Whom it May Concern:    Mana Lantigua is under my professional care  She was seen in my office on 2/11/2020  She may return to work on 2/17/20  Please excuse patient from 2/11-2/16  If you have any questions or concerns, please don't hesitate to call           Sincerely,          Soham Marie PA-C        CC: No Recipients

## 2020-02-11 NOTE — PATIENT INSTRUCTIONS
please continue prednisone and naprosyn for inflammation and pain    You can increase cyclobenzaprine muscle relaxer to 1 pill three times a day AS NEEDED for the back, arm, neck pain  BE CAREFUL AS IT CAUSES DROWSINESS AND YOU SHOULD NOT DRIVE OR WORK ON MEDICATION! YOU DECLINE PHYSICAL THERAPY TODAY  PLEASE MAKE SURE YOU ARE STRETCHING THE MUSCLES AND MOVING TO AVOID STIFFNESS IN YOUR MUSCLES  ALSO ALTERNATE ICE AND HEAT TO BONES AND MUSCLES TO RELAX MUSCLES AND REDUCE INFLAMMATION! PLEASE CALL IN 2 WEEKS IF SYMPTOMS NO BETTER!

## 2020-03-13 DIAGNOSIS — R10.13 DYSPEPSIA: ICD-10-CM

## 2020-03-14 RX ORDER — RANITIDINE 150 MG/1
TABLET ORAL
Qty: 60 TABLET | Refills: 2 | Status: SHIPPED | OUTPATIENT
Start: 2020-03-14 | End: 2020-04-24

## 2020-04-21 DIAGNOSIS — J30.9 ALLERGIC SINUSITIS: ICD-10-CM

## 2020-04-21 RX ORDER — FLUTICASONE PROPIONATE 50 MCG
SPRAY, SUSPENSION (ML) NASAL
Qty: 16 ML | Refills: 3 | Status: SHIPPED | OUTPATIENT
Start: 2020-04-21 | End: 2020-08-09

## 2020-04-23 ENCOUNTER — TELEPHONE (OUTPATIENT)
Dept: INTERNAL MEDICINE CLINIC | Facility: CLINIC | Age: 60
End: 2020-04-23

## 2020-04-24 DIAGNOSIS — R10.13 DYSPEPSIA: Primary | ICD-10-CM

## 2020-04-24 RX ORDER — FAMOTIDINE 20 MG/1
20 TABLET, FILM COATED ORAL 2 TIMES DAILY
Qty: 90 TABLET | Refills: 0 | Status: SHIPPED | OUTPATIENT
Start: 2020-04-24 | End: 2020-05-18 | Stop reason: SDUPTHER

## 2020-04-27 ENCOUNTER — TELEMEDICINE (OUTPATIENT)
Dept: INTERNAL MEDICINE CLINIC | Facility: CLINIC | Age: 60
End: 2020-04-27

## 2020-04-27 DIAGNOSIS — M25.551 ACUTE RIGHT HIP PAIN: Primary | ICD-10-CM

## 2020-04-27 DIAGNOSIS — S20.212A CHEST WALL CONTUSION, LEFT, INITIAL ENCOUNTER: ICD-10-CM

## 2020-04-27 DIAGNOSIS — G89.29 CHRONIC LEFT-SIDED LOW BACK PAIN WITHOUT SCIATICA: ICD-10-CM

## 2020-04-27 DIAGNOSIS — M54.50 CHRONIC LEFT-SIDED LOW BACK PAIN WITHOUT SCIATICA: ICD-10-CM

## 2020-04-27 PROCEDURE — T1015 CLINIC SERVICE: HCPCS | Performed by: INTERNAL MEDICINE

## 2020-04-27 PROCEDURE — G2012 BRIEF CHECK IN BY MD/QHP: HCPCS | Performed by: INTERNAL MEDICINE

## 2020-04-27 RX ORDER — NAPROXEN 500 MG/1
500 TABLET ORAL 2 TIMES DAILY WITH MEALS
Qty: 20 TABLET | Refills: 0 | Status: SHIPPED | OUTPATIENT
Start: 2020-04-27 | End: 2020-05-07 | Stop reason: SDUPTHER

## 2020-05-01 DIAGNOSIS — E55.9 VITAMIN D DEFICIENCY: ICD-10-CM

## 2020-05-01 DIAGNOSIS — J43.9 PULMONARY EMPHYSEMA, UNSPECIFIED EMPHYSEMA TYPE (HCC): ICD-10-CM

## 2020-05-01 RX ORDER — CALCIUM CARBONATE/VITAMIN D3 600 MG-20
TABLET ORAL
Qty: 30 CAPSULE | Refills: 5 | Status: SHIPPED | OUTPATIENT
Start: 2020-05-01 | End: 2021-04-21

## 2020-05-01 RX ORDER — MONTELUKAST SODIUM 10 MG/1
TABLET ORAL
Qty: 30 TABLET | Refills: 5 | Status: SHIPPED | OUTPATIENT
Start: 2020-05-01 | End: 2021-04-25

## 2020-05-04 ENCOUNTER — TELEPHONE (OUTPATIENT)
Dept: INTERNAL MEDICINE CLINIC | Facility: CLINIC | Age: 60
End: 2020-05-04

## 2020-05-07 ENCOUNTER — OFFICE VISIT (OUTPATIENT)
Dept: INTERNAL MEDICINE CLINIC | Facility: CLINIC | Age: 60
End: 2020-05-07

## 2020-05-07 VITALS
HEIGHT: 62 IN | BODY MASS INDEX: 23.37 KG/M2 | TEMPERATURE: 98.5 F | HEART RATE: 80 BPM | OXYGEN SATURATION: 97 % | WEIGHT: 126.98 LBS | DIASTOLIC BLOOD PRESSURE: 72 MMHG | SYSTOLIC BLOOD PRESSURE: 124 MMHG

## 2020-05-07 DIAGNOSIS — S29.012A UPPER BACK STRAIN, INITIAL ENCOUNTER: ICD-10-CM

## 2020-05-07 DIAGNOSIS — J42 CHRONIC BRONCHITIS, UNSPECIFIED CHRONIC BRONCHITIS TYPE (HCC): Primary | ICD-10-CM

## 2020-05-07 DIAGNOSIS — S20.212A CHEST WALL CONTUSION, LEFT, INITIAL ENCOUNTER: ICD-10-CM

## 2020-05-07 PROCEDURE — 99203 OFFICE O/P NEW LOW 30 MIN: CPT | Performed by: HOSPITALIST

## 2020-05-07 PROCEDURE — T1015 CLINIC SERVICE: HCPCS | Performed by: HOSPITALIST

## 2020-05-07 PROCEDURE — 3008F BODY MASS INDEX DOCD: CPT | Performed by: HOSPITALIST

## 2020-05-07 RX ORDER — NAPROXEN 500 MG/1
500 TABLET ORAL 2 TIMES DAILY WITH MEALS
Qty: 20 TABLET | Refills: 0 | Status: SHIPPED | OUTPATIENT
Start: 2020-05-07 | End: 2020-05-22 | Stop reason: SDUPTHER

## 2020-05-07 RX ORDER — CYCLOBENZAPRINE HCL 5 MG
5 TABLET ORAL 3 TIMES DAILY PRN
Qty: 30 TABLET | Refills: 1 | Status: SHIPPED | OUTPATIENT
Start: 2020-05-07 | End: 2020-06-02 | Stop reason: SDUPTHER

## 2020-05-08 DIAGNOSIS — E78.5 DYSLIPIDEMIA: ICD-10-CM

## 2020-05-08 DIAGNOSIS — J43.9 PULMONARY EMPHYSEMA, UNSPECIFIED EMPHYSEMA TYPE (HCC): ICD-10-CM

## 2020-05-08 RX ORDER — ATORVASTATIN CALCIUM 20 MG/1
TABLET, FILM COATED ORAL
Qty: 90 TABLET | Refills: 1 | Status: SHIPPED | OUTPATIENT
Start: 2020-05-08 | End: 2020-11-02

## 2020-05-12 ENCOUNTER — EVALUATION (OUTPATIENT)
Dept: PHYSICAL THERAPY | Facility: CLINIC | Age: 60
End: 2020-05-12
Payer: COMMERCIAL

## 2020-05-12 DIAGNOSIS — M54.50 ACUTE RIGHT-SIDED LOW BACK PAIN WITHOUT SCIATICA: Primary | ICD-10-CM

## 2020-05-12 PROCEDURE — 97112 NEUROMUSCULAR REEDUCATION: CPT | Performed by: PHYSICAL THERAPIST

## 2020-05-12 PROCEDURE — 97162 PT EVAL MOD COMPLEX 30 MIN: CPT | Performed by: PHYSICAL THERAPIST

## 2020-05-14 ENCOUNTER — OFFICE VISIT (OUTPATIENT)
Dept: PHYSICAL THERAPY | Facility: CLINIC | Age: 60
End: 2020-05-14
Payer: COMMERCIAL

## 2020-05-14 DIAGNOSIS — M54.50 ACUTE RIGHT-SIDED LOW BACK PAIN WITHOUT SCIATICA: Primary | ICD-10-CM

## 2020-05-14 PROCEDURE — 97112 NEUROMUSCULAR REEDUCATION: CPT | Performed by: PHYSICAL THERAPIST

## 2020-05-14 PROCEDURE — 97140 MANUAL THERAPY 1/> REGIONS: CPT | Performed by: PHYSICAL THERAPIST

## 2020-05-15 DIAGNOSIS — R10.13 DYSPEPSIA: ICD-10-CM

## 2020-05-18 DIAGNOSIS — R10.13 DYSPEPSIA: ICD-10-CM

## 2020-05-18 DIAGNOSIS — S20.212A CHEST WALL CONTUSION, LEFT, INITIAL ENCOUNTER: ICD-10-CM

## 2020-05-18 RX ORDER — FAMOTIDINE 20 MG/1
TABLET, FILM COATED ORAL
Qty: 60 TABLET | Refills: 1 | OUTPATIENT
Start: 2020-05-18

## 2020-05-18 RX ORDER — FAMOTIDINE 20 MG/1
20 TABLET, FILM COATED ORAL 2 TIMES DAILY
Qty: 180 TABLET | Refills: 1 | Status: SHIPPED | OUTPATIENT
Start: 2020-05-18 | End: 2020-07-09

## 2020-05-19 ENCOUNTER — OFFICE VISIT (OUTPATIENT)
Dept: PHYSICAL THERAPY | Facility: CLINIC | Age: 60
End: 2020-05-19
Payer: COMMERCIAL

## 2020-05-19 DIAGNOSIS — M54.50 ACUTE RIGHT-SIDED LOW BACK PAIN WITHOUT SCIATICA: Primary | ICD-10-CM

## 2020-05-19 PROCEDURE — 97112 NEUROMUSCULAR REEDUCATION: CPT | Performed by: PHYSICAL THERAPIST

## 2020-05-21 ENCOUNTER — OFFICE VISIT (OUTPATIENT)
Dept: PHYSICAL THERAPY | Facility: CLINIC | Age: 60
End: 2020-05-21
Payer: COMMERCIAL

## 2020-05-21 DIAGNOSIS — M54.50 ACUTE RIGHT-SIDED LOW BACK PAIN WITHOUT SCIATICA: Primary | ICD-10-CM

## 2020-05-21 PROCEDURE — 97110 THERAPEUTIC EXERCISES: CPT | Performed by: PHYSICAL THERAPIST

## 2020-05-21 PROCEDURE — 97112 NEUROMUSCULAR REEDUCATION: CPT | Performed by: PHYSICAL THERAPIST

## 2020-05-22 ENCOUNTER — HOSPITAL ENCOUNTER (OUTPATIENT)
Dept: PULMONOLOGY | Facility: HOSPITAL | Age: 60
Discharge: HOME | End: 2020-05-22
Payer: COMMERCIAL

## 2020-05-22 DIAGNOSIS — J42 CHRONIC BRONCHITIS, UNSPECIFIED CHRONIC BRONCHITIS TYPE (HCC): ICD-10-CM

## 2020-05-22 DIAGNOSIS — S20.212A CHEST WALL CONTUSION, LEFT, INITIAL ENCOUNTER: ICD-10-CM

## 2020-05-22 PROCEDURE — 94010 BREATHING CAPACITY TEST: CPT

## 2020-05-22 PROCEDURE — 94726 PLETHYSMOGRAPHY LUNG VOLUMES: CPT | Performed by: INTERNAL MEDICINE

## 2020-05-22 PROCEDURE — 94760 N-INVAS EAR/PLS OXIMETRY 1: CPT

## 2020-05-22 PROCEDURE — 94010 BREATHING CAPACITY TEST: CPT | Performed by: INTERNAL MEDICINE

## 2020-05-22 PROCEDURE — 94729 DIFFUSING CAPACITY: CPT | Performed by: INTERNAL MEDICINE

## 2020-05-22 PROCEDURE — 94729 DIFFUSING CAPACITY: CPT

## 2020-05-22 PROCEDURE — 94726 PLETHYSMOGRAPHY LUNG VOLUMES: CPT

## 2020-05-22 RX ORDER — NAPROXEN 500 MG/1
TABLET ORAL
Qty: 20 TABLET | Refills: 0 | OUTPATIENT
Start: 2020-05-22

## 2020-05-22 RX ORDER — NAPROXEN 500 MG/1
500 TABLET ORAL 2 TIMES DAILY WITH MEALS
Qty: 20 TABLET | Refills: 0 | Status: SHIPPED | OUTPATIENT
Start: 2020-05-22 | End: 2020-06-02 | Stop reason: SDUPTHER

## 2020-05-27 ENCOUNTER — OFFICE VISIT (OUTPATIENT)
Dept: PHYSICAL THERAPY | Facility: CLINIC | Age: 60
End: 2020-05-27
Payer: COMMERCIAL

## 2020-05-27 DIAGNOSIS — M54.50 ACUTE RIGHT-SIDED LOW BACK PAIN WITHOUT SCIATICA: Primary | ICD-10-CM

## 2020-05-27 PROCEDURE — 97110 THERAPEUTIC EXERCISES: CPT

## 2020-05-27 PROCEDURE — 97140 MANUAL THERAPY 1/> REGIONS: CPT

## 2020-05-27 PROCEDURE — 97112 NEUROMUSCULAR REEDUCATION: CPT

## 2020-05-29 ENCOUNTER — OFFICE VISIT (OUTPATIENT)
Dept: PHYSICAL THERAPY | Facility: CLINIC | Age: 60
End: 2020-05-29
Payer: COMMERCIAL

## 2020-05-29 DIAGNOSIS — M54.50 ACUTE RIGHT-SIDED LOW BACK PAIN WITHOUT SCIATICA: Primary | ICD-10-CM

## 2020-05-29 PROCEDURE — 97112 NEUROMUSCULAR REEDUCATION: CPT | Performed by: PHYSICAL THERAPIST

## 2020-06-02 ENCOUNTER — OFFICE VISIT (OUTPATIENT)
Dept: PHYSICAL THERAPY | Facility: CLINIC | Age: 60
End: 2020-06-02
Payer: COMMERCIAL

## 2020-06-02 DIAGNOSIS — M54.50 ACUTE RIGHT-SIDED LOW BACK PAIN WITHOUT SCIATICA: Primary | ICD-10-CM

## 2020-06-02 DIAGNOSIS — S20.212A CHEST WALL CONTUSION, LEFT, INITIAL ENCOUNTER: ICD-10-CM

## 2020-06-02 DIAGNOSIS — S29.012A UPPER BACK STRAIN, INITIAL ENCOUNTER: ICD-10-CM

## 2020-06-02 PROCEDURE — 97112 NEUROMUSCULAR REEDUCATION: CPT

## 2020-06-02 PROCEDURE — 97140 MANUAL THERAPY 1/> REGIONS: CPT

## 2020-06-02 PROCEDURE — 97110 THERAPEUTIC EXERCISES: CPT

## 2020-06-02 RX ORDER — CYCLOBENZAPRINE HCL 5 MG
5 TABLET ORAL 3 TIMES DAILY PRN
Qty: 30 TABLET | Refills: 1 | Status: SHIPPED | OUTPATIENT
Start: 2020-06-02 | End: 2020-07-09 | Stop reason: SDUPTHER

## 2020-06-02 RX ORDER — NAPROXEN 500 MG/1
500 TABLET ORAL 2 TIMES DAILY WITH MEALS
Qty: 20 TABLET | Refills: 0 | Status: SHIPPED | OUTPATIENT
Start: 2020-06-02 | End: 2020-06-15 | Stop reason: SDUPTHER

## 2020-06-05 ENCOUNTER — OFFICE VISIT (OUTPATIENT)
Dept: PHYSICAL THERAPY | Facility: CLINIC | Age: 60
End: 2020-06-05
Payer: COMMERCIAL

## 2020-06-05 DIAGNOSIS — M54.50 ACUTE RIGHT-SIDED LOW BACK PAIN WITHOUT SCIATICA: Primary | ICD-10-CM

## 2020-06-05 PROCEDURE — 97140 MANUAL THERAPY 1/> REGIONS: CPT

## 2020-06-05 PROCEDURE — 97112 NEUROMUSCULAR REEDUCATION: CPT

## 2020-06-05 PROCEDURE — 97110 THERAPEUTIC EXERCISES: CPT

## 2020-06-10 ENCOUNTER — OFFICE VISIT (OUTPATIENT)
Dept: PHYSICAL THERAPY | Facility: CLINIC | Age: 60
End: 2020-06-10
Payer: COMMERCIAL

## 2020-06-10 DIAGNOSIS — M54.50 ACUTE RIGHT-SIDED LOW BACK PAIN WITHOUT SCIATICA: Primary | ICD-10-CM

## 2020-06-10 PROCEDURE — 97110 THERAPEUTIC EXERCISES: CPT

## 2020-06-10 PROCEDURE — 97112 NEUROMUSCULAR REEDUCATION: CPT

## 2020-06-10 PROCEDURE — 97140 MANUAL THERAPY 1/> REGIONS: CPT

## 2020-06-12 ENCOUNTER — OFFICE VISIT (OUTPATIENT)
Dept: PHYSICAL THERAPY | Facility: CLINIC | Age: 60
End: 2020-06-12
Payer: COMMERCIAL

## 2020-06-12 DIAGNOSIS — M54.50 ACUTE RIGHT-SIDED LOW BACK PAIN WITHOUT SCIATICA: Primary | ICD-10-CM

## 2020-06-12 PROCEDURE — 97112 NEUROMUSCULAR REEDUCATION: CPT

## 2020-06-12 PROCEDURE — 97140 MANUAL THERAPY 1/> REGIONS: CPT

## 2020-06-12 PROCEDURE — 97110 THERAPEUTIC EXERCISES: CPT

## 2020-06-15 DIAGNOSIS — S20.212A CHEST WALL CONTUSION, LEFT, INITIAL ENCOUNTER: ICD-10-CM

## 2020-06-15 RX ORDER — NAPROXEN 500 MG/1
TABLET ORAL
Qty: 60 TABLET | Refills: 0 | Status: SHIPPED | OUTPATIENT
Start: 2020-06-15 | End: 2020-07-09

## 2020-06-17 ENCOUNTER — OFFICE VISIT (OUTPATIENT)
Dept: PHYSICAL THERAPY | Facility: CLINIC | Age: 60
End: 2020-06-17
Payer: COMMERCIAL

## 2020-06-17 DIAGNOSIS — M54.50 ACUTE RIGHT-SIDED LOW BACK PAIN WITHOUT SCIATICA: Primary | ICD-10-CM

## 2020-06-17 PROCEDURE — 97110 THERAPEUTIC EXERCISES: CPT

## 2020-06-17 PROCEDURE — 97112 NEUROMUSCULAR REEDUCATION: CPT

## 2020-06-17 PROCEDURE — 97140 MANUAL THERAPY 1/> REGIONS: CPT

## 2020-06-19 ENCOUNTER — OFFICE VISIT (OUTPATIENT)
Dept: PHYSICAL THERAPY | Facility: CLINIC | Age: 60
End: 2020-06-19
Payer: COMMERCIAL

## 2020-06-19 DIAGNOSIS — M54.50 ACUTE RIGHT-SIDED LOW BACK PAIN WITHOUT SCIATICA: Primary | ICD-10-CM

## 2020-06-19 PROCEDURE — 97140 MANUAL THERAPY 1/> REGIONS: CPT

## 2020-06-19 PROCEDURE — 97110 THERAPEUTIC EXERCISES: CPT

## 2020-06-19 PROCEDURE — 97112 NEUROMUSCULAR REEDUCATION: CPT

## 2020-06-23 ENCOUNTER — OFFICE VISIT (OUTPATIENT)
Dept: PHYSICAL THERAPY | Facility: CLINIC | Age: 60
End: 2020-06-23
Payer: COMMERCIAL

## 2020-06-23 DIAGNOSIS — M54.50 ACUTE RIGHT-SIDED LOW BACK PAIN WITHOUT SCIATICA: Primary | ICD-10-CM

## 2020-06-23 PROCEDURE — 97140 MANUAL THERAPY 1/> REGIONS: CPT | Performed by: PHYSICAL THERAPIST

## 2020-06-23 PROCEDURE — 97110 THERAPEUTIC EXERCISES: CPT | Performed by: PHYSICAL THERAPIST

## 2020-06-23 PROCEDURE — 97112 NEUROMUSCULAR REEDUCATION: CPT | Performed by: PHYSICAL THERAPIST

## 2020-06-25 ENCOUNTER — OFFICE VISIT (OUTPATIENT)
Dept: PHYSICAL THERAPY | Facility: CLINIC | Age: 60
End: 2020-06-25
Payer: COMMERCIAL

## 2020-06-25 DIAGNOSIS — M54.50 ACUTE RIGHT-SIDED LOW BACK PAIN WITHOUT SCIATICA: Primary | ICD-10-CM

## 2020-06-25 PROCEDURE — 97140 MANUAL THERAPY 1/> REGIONS: CPT

## 2020-06-25 PROCEDURE — 97110 THERAPEUTIC EXERCISES: CPT

## 2020-07-01 ENCOUNTER — OFFICE VISIT (OUTPATIENT)
Dept: PHYSICAL THERAPY | Facility: CLINIC | Age: 60
End: 2020-07-01
Payer: COMMERCIAL

## 2020-07-01 DIAGNOSIS — M54.50 ACUTE RIGHT-SIDED LOW BACK PAIN WITHOUT SCIATICA: Primary | ICD-10-CM

## 2020-07-01 PROCEDURE — 97110 THERAPEUTIC EXERCISES: CPT

## 2020-07-01 PROCEDURE — 97112 NEUROMUSCULAR REEDUCATION: CPT

## 2020-07-01 NOTE — PROGRESS NOTES
Daily Note     Today's date: 2020  Patient name: Mel Abdi  : 1960  MRN: [id number]  Referring provider: Chayo Lagunas DO  Dx:   Encounter Diagnosis     ICD-10-CM    1  Acute right-sided low back pain without sciatica M54 5                   Subjective: Pt reports minimal pain in LB today  Objective: See treatment diary below    Assessment: Pt reported muscle burning and fatigue near the  end of the second set of the bridges  Cues required to maintain core stability /ab brace with marching today  Good relief noted post MT  Plan: Assess response NV  Precautions:  PMHx: COPD, asthma  Dx: acute LBP with a stability preference    Manuals    Gr[place]ony R lumbar paraspinals in prone   5 min 6 min 6 min        Laser L1-S1 5400J 5400J np       5400J   Gr IV R lower lumbar SL opening mobs   2x40 2x40  WS PT 2x40 WS PT                     Neuro Re-Ed             Abdominal bracing 5"x20 5"x20 HEP       5"x20   DLS supine marches 2 8#  3x15 ea 1 5#  3x18 ea 1 5#  3x20 ea 1 5#  3x20 ea 1 5# 3x20 ea     1 5#  3x15   bridges 3x15 3x15 3x15 3x15 3x15     3x15   Quad alt UE 15"x3 15"x3 np       15"x3   Quad alt LE   np          Prone swimmers 3x10 ea 3x10 ea 4x10 4x10 4x10     2x12 ea                Ther Ex             clamshells R/  3x12 R/  3x12 R/  3x15 R/  3x15 R/ 3x15     R/  3x10   Supine R piriformis stretch 10"x3 10"x3 np np np     10"x3   R SKC   10"x3 10"x3 10"x3        L LTR   10"x3 10"x3 10"x3                                                            Ther Activity             Box lifts          5#  1x8                Gait Training                                       Modalities

## 2020-07-03 ENCOUNTER — OFFICE VISIT (OUTPATIENT)
Dept: PHYSICAL THERAPY | Facility: CLINIC | Age: 60
End: 2020-07-03
Payer: COMMERCIAL

## 2020-07-03 DIAGNOSIS — M54.50 ACUTE RIGHT-SIDED LOW BACK PAIN WITHOUT SCIATICA: Primary | ICD-10-CM

## 2020-07-03 PROCEDURE — 97112 NEUROMUSCULAR REEDUCATION: CPT

## 2020-07-03 PROCEDURE — 97140 MANUAL THERAPY 1/> REGIONS: CPT

## 2020-07-03 PROCEDURE — 97110 THERAPEUTIC EXERCISES: CPT

## 2020-07-03 NOTE — PROGRESS NOTES
Daily Note     Today's date: 7/3/2020  Patient name: Cynthia Aparicio  : 1960  MRN: [id number]  Referring provider: Chidi Rodriguez DO  Dx:   Encounter Diagnosis     ICD-10-CM    1  Acute right-sided low back pain without sciatica M54 5                   Subjective: Pt reports decreased duration of episodes of low back pain  Objective: See treatment diary below    Assessment: Pt demonstrated limited ROM with bridging, good tolerance to strength progressions  Plan: Assess response NV  Precautions:  PMHx: COPD, asthma  Dx: acute LBP with a stability preference    Manuals 6/17 6/19 6/23 6/25 7/1 7/3    6/12   Graston R lumbar paraspinals in prone   5 min 6 min 6 min 8 min       Laser L1-S1 5400J 5400J np       5400J   Gr IV R lower lumbar SL opening mobs   2x40 2x40  WS PT 2x40 WS PT np                    Neuro Re-Ed             Abdominal bracing 5"x20 5"x20 HEP       5"x20   DLS supine marches 8 3#  3x15 ea 1 5#  3x18 ea 1 5#  3x20 ea 1 5#  3x20 ea 1 5# 3x20 ea 2#  3x15 ea    1 5#  3x15   bridges 3x15 3x15 3x15 3x15 3x15 3x18    3x15   Quad alt UE 15"x3 15"x3 np       15"x3   Quad alt LE   np          Prone swimmers 3x10 ea 3x10 ea 4x10 4x10 4x10 4x10    2x12 ea                Ther Ex             clamshells R/  3x12 R/  3x12 R/  3x15 R/  3x15 R/ 3x15 R/  3x18    R/  3x10   Supine R piriformis stretch 10"x3 10"x3 np np np     10"x3   R SKC   10"x3 10"x3 10"x3 10"x3       L LTR   10"x3 10"x3 10"x3 10"x3                                                           Ther Activity             Box lifts          5#  1x8                Gait Training                                       Modalities

## 2020-07-07 ENCOUNTER — OFFICE VISIT (OUTPATIENT)
Dept: PHYSICAL THERAPY | Facility: CLINIC | Age: 60
End: 2020-07-07
Payer: COMMERCIAL

## 2020-07-07 DIAGNOSIS — M54.50 ACUTE RIGHT-SIDED LOW BACK PAIN WITHOUT SCIATICA: Primary | ICD-10-CM

## 2020-07-07 PROCEDURE — 97110 THERAPEUTIC EXERCISES: CPT

## 2020-07-07 PROCEDURE — 97140 MANUAL THERAPY 1/> REGIONS: CPT

## 2020-07-07 PROCEDURE — 97112 NEUROMUSCULAR REEDUCATION: CPT

## 2020-07-07 NOTE — PROGRESS NOTES
Daily Note     Today's date: 2020  Patient name: Stephen Tucker  : 1960  MRN: [id number]  Referring provider: Susan Estrella DO  Dx:   Encounter Diagnosis     ICD-10-CM    1  Acute right-sided low back pain without sciatica M54 5                   Subjective: Pt reports 1/10 R > L-sided low back pain pre tx  Pt reports intermittent low pain levels over the weekend  Objective: See treatment diary below    Assessment: Pt demonstrated improved stability with bridging but limited ROM  Pt demonstrated good tolerance to stability poc progressions  Plan: Assess response NV       1:1 8:45-9:15, unbilled 9:16-9:22    Precautions:  PMHx: COPD, asthma  Dx: acute LBP with a stability preference    Manuals 6/17 6/19 6/23 6/25 7/1 7/3 7/7      Graston R lumbar paraspinals in prone   5 min 6 min 6 min 8 min 8 min      Laser L1-S1 5400J 5400J np          Gr IV R lower lumbar SL opening mobs   2x40 2x40  WS PT 2x40 WS PT np np                   Neuro Re-Ed             Abdominal bracing 5"x20 5"x20 HEP          DLS supine marches 9 9#  3x15 ea 1 5#  3x18 ea 1 5#  3x20 ea 1 5#  3x20 ea 1 5# 3x20 ea 2#  3x15 ea 2#  3x18 ea      bridges 3x15 3x15 3x15 3x15 3x15 3x18 3x18      Quad alt UE 15"x3 15"x3 np          Quad alt LE   np          Prone swimmers 3x10 ea 3x10 ea 4x10 4x10 4x10 4x10 3x15                   Ther Ex             clamshells R/  3x12 R/  3x12 R/  3x15 R/  3x15 R/ 3x15 R/  3x18 R/  3x18      Supine R piriformis stretch 10"x3 10"x3 np np np        R SKC   10"x3 10"x3 10"x3 10"x3 10"x3      L LTR   10"x3 10"x3 10"x3 10"x3 10"x3                                                          Ther Activity             Box lifts                          Gait Training                                       Modalities

## 2020-07-08 DIAGNOSIS — S20.212A CHEST WALL CONTUSION, LEFT, INITIAL ENCOUNTER: ICD-10-CM

## 2020-07-08 RX ORDER — NAPROXEN 500 MG/1
TABLET ORAL
Qty: 60 TABLET | Refills: 0 | OUTPATIENT
Start: 2020-07-08

## 2020-07-08 NOTE — TELEPHONE ENCOUNTER
Please call patient to see if she needs a refill of her naproxen  Once again patient was already notified via telephone encounter a few weeks ago that I was concerned because this medicine was prescribed a few months ago for chest wall contusion by resident and she should not continue to still need the medication, but I continue getting refill requests  She is getting 60 pills every few weeks and if she is continuing to need this medication, once again,  she needs to set up an appointment at the office to discuss her ongoing pain  The concern with overuse of this medicine is problems with the esophagus and the stomach  I will not refill this medication if patient continues needing it until she is seen in the office

## 2020-07-08 NOTE — TELEPHONE ENCOUNTER
Patient stated she still needs it so I advised she needs an appt   I transferred her to the  to schedule

## 2020-07-09 ENCOUNTER — OFFICE VISIT (OUTPATIENT)
Dept: INTERNAL MEDICINE CLINIC | Facility: CLINIC | Age: 60
End: 2020-07-09

## 2020-07-09 VITALS
DIASTOLIC BLOOD PRESSURE: 70 MMHG | HEART RATE: 83 BPM | TEMPERATURE: 98.2 F | WEIGHT: 127.43 LBS | SYSTOLIC BLOOD PRESSURE: 138 MMHG | BODY MASS INDEX: 23.31 KG/M2

## 2020-07-09 DIAGNOSIS — E55.9 VITAMIN D DEFICIENCY: ICD-10-CM

## 2020-07-09 DIAGNOSIS — E78.5 DYSLIPIDEMIA: ICD-10-CM

## 2020-07-09 DIAGNOSIS — M79.631 RIGHT FOREARM PAIN: Primary | ICD-10-CM

## 2020-07-09 DIAGNOSIS — M25.531 RIGHT WRIST PAIN: ICD-10-CM

## 2020-07-09 DIAGNOSIS — M54.50 CHRONIC BILATERAL LOW BACK PAIN WITHOUT SCIATICA: ICD-10-CM

## 2020-07-09 DIAGNOSIS — G89.29 CHRONIC BILATERAL LOW BACK PAIN WITHOUT SCIATICA: ICD-10-CM

## 2020-07-09 PROCEDURE — 99214 OFFICE O/P EST MOD 30 MIN: CPT | Performed by: PHYSICIAN ASSISTANT

## 2020-07-09 RX ORDER — CELECOXIB 200 MG/1
200 CAPSULE ORAL DAILY
Qty: 30 CAPSULE | Refills: 2 | Status: SHIPPED | OUTPATIENT
Start: 2020-07-09 | End: 2020-10-05

## 2020-07-09 RX ORDER — CYCLOBENZAPRINE HCL 5 MG
5 TABLET ORAL 3 TIMES DAILY PRN
Qty: 30 TABLET | Refills: 2 | Status: SHIPPED | OUTPATIENT
Start: 2020-07-09 | End: 2020-07-09 | Stop reason: SDUPTHER

## 2020-07-09 RX ORDER — GABAPENTIN 100 MG/1
100 CAPSULE ORAL 2 TIMES DAILY
Qty: 60 CAPSULE | Refills: 2 | Status: SHIPPED | OUTPATIENT
Start: 2020-07-09 | End: 2020-10-08

## 2020-07-09 RX ORDER — CYCLOBENZAPRINE HCL 5 MG
5 TABLET ORAL 3 TIMES DAILY PRN
Qty: 30 TABLET | Refills: 2
Start: 2020-07-09 | End: 2020-10-05 | Stop reason: SDUPTHER

## 2020-07-09 NOTE — PATIENT INSTRUCTIONS
Please stop naproxen for pain    Please start Celebrex once a day for inflammation of your arm and back  Please start gabapentin 1 pill twice a day for pain in back and arm  Continue cyclobenzaprine muscle relaxer as directed    Please set up appointment with hand doctor - referral  Also start PT for arm as well- referral

## 2020-07-09 NOTE — PROGRESS NOTES
Assessment/Plan:      Diagnoses and all orders for this visit:    Right forearm pain  -     Ambulatory referral to Orthopedic Surgery; Future  -     Ambulatory referral to Physical Therapy; Future  -     celecoxib (CeleBREX) 200 mg capsule; Take 1 capsule (200 mg total) by mouth daily Take with food  -     gabapentin (NEURONTIN) 100 mg capsule; Take 1 capsule (100 mg total) by mouth 2 (two) times a day    Right wrist pain  -     Ambulatory referral to Orthopedic Surgery; Future  -     Ambulatory referral to Physical Therapy; Future  -     celecoxib (CeleBREX) 200 mg capsule; Take 1 capsule (200 mg total) by mouth daily Take with food  -     gabapentin (NEURONTIN) 100 mg capsule; Take 1 capsule (100 mg total) by mouth 2 (two) times a day    Chronic bilateral low back pain without sciatica  -     celecoxib (CeleBREX) 200 mg capsule; Take 1 capsule (200 mg total) by mouth daily Take with food  -     gabapentin (NEURONTIN) 100 mg capsule; Take 1 capsule (100 mg total) by mouth 2 (two) times a day  -     cyclobenzaprine (FLEXERIL) 5 mg tablet; Take 1 tablet (5 mg total) by mouth 3 (three) times a day as needed (neck, back, arm pain)    Dyslipidemia  -     CBC and differential; Future  -     Comprehensive metabolic panel; Future  -     Lipid panel; Future    Vitamin D deficiency  -     Vitamin D 1,25 dihydroxy; Future    Other orders  -     Discontinue: cyclobenzaprine (FLEXERIL) 5 mg tablet; Take 1 tablet (5 mg total) by mouth 3 (three) times a day as needed (neck, back, arm pain)      patient is a very pleasant 17-year-old female presenting today at my request to review medications and determine why she continues needing naproxen on a regular basis    She does have multiple different pains, primarily low back pain and right forearm and wrist pain for which she takes the naproxen and I am concerned as she states that sometimes she takes even more than what is prescribed, 3 per day and this is been for the past several months  I am concerned regarding potential for GI side effects and would like to discuss other options for treatment  Patient is currently in physical therapy for right low back pain and states that she is seeing some improvement but continues needing something for pain  She does not note any specific radicular symptoms but does get lower extremity cramping from time to time  She also notices chronic pain in her right forearm but is worsening now with wrist pain in the radial aspect and a burning sensation in her upper forearm  She is right-handed and does a lot with her hands with gripping for work though she has been out of work now secondary to [place] for the past few months  I will refer patient to orthopedic hand specialist for further evaluation  She states that physical therapy would help her with this with her referral which was provided as well  Regarding management of her chronic pain I will discontinue naproxen  I will switch to Celebrex 200 mg capsule once daily as needed with food  She will continue on Flexeril 5 mg t i d  p r n  for her back  I will trial gabapentin 100 mg b i d  with increasing is needed and see if this helps at all  I would like to try to avoid excessive use of NSAIDs if possible but also do not feel opioids are appropriate either  She will continue following with PT for her back as well  May need to consider pain management if symptoms do not improve  I reviewed all of patient's medications with her today  She will continue with Excela and albuterol for her asthma and COPD, aspirin daily, atorvastatin 20 mg daily for cholesterol  She has a history of vitamin-D deficiency and should continue 2000 units of vitamin-D a day  She will continue using Singulair and Flonase for allergies  She can continue topical triamcinolone cream as needed for hands which mainly rash occurs in the winter    I advised patient to keep the after visit summary which has her updated medication list for her  Patient is up-to-date with gyn exam and Pap testing from 2018 as well as mammogram from January 2020  We will have her return in about 3-4 months for recheck for compliance and will administer flu vaccine at that time if she would like  I also ordered routine blood work to be done ASAP to include CBC, CMP, lipid and vitamin-D  We will call her with those results  Chief Complaint   Patient presents with    Medication Refill       Subjective:     Patient ID: Waldon Rinne is a 61 y o  female  55y/o female here today for discussion of chronic NSAID use and pains  Patient continues working with therapist for her pains in her low back which she states is slowly getting better, but states she has ongoing issues with pain in her right arm  She states she has had problems with right forearm pain because of an old injury  Now she has pain in her wrist and forearm muscles  She sued to have numbness in last 3 fingers on right but that has resolved  She reports pain in forearm as stinging  Denies pain in elbow  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, 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  She is right handed  She has been taking naproxen every day twice a day, sometimes 3 x a day even though its written for BID dosing  She mainly takes the naproxen for her low back pain  She denies radiation of pain into legs but does get cramps in legs at times  She did run out for cyclobenzaprine and the misplaced the bottle  She also takes that regularly  She does have nausea from time to time but lately has not been an issue  Denies abdominal pain, N/V/D  Denies blood in stool or melena  Pt brings in all medication bottles, cream and inhalers to visit to review what she should be taking  Review of Systems   Constitutional: Negative  Respiratory: Negative  Cardiovascular: Negative  Gastrointestinal: Negative    Negative for abdominal pain, blood in stool, constipation, diarrhea and vomiting  Genitourinary: Negative  Musculoskeletal:        As in HPI   Neurological:        As in HPI         The following portions of the patient's history were reviewed and updated as appropriate: allergies, current medications, past family history, past medical history, past social history, past surgical history and problem list       Objective:     Physical Exam   Constitutional: She is oriented to person, place, and time  She appears well-developed and well-nourished  No distress  Neck: Normal range of motion  Neck supple  Normal carotid pulses present  Carotid bruit is not present  Cardiovascular: Normal rate, regular rhythm, normal heart sounds and normal pulses  Pulmonary/Chest: Effort normal and breath sounds normal    Abdominal: Soft  Normal appearance and bowel sounds are normal  There is no tenderness  Musculoskeletal:   Right proximal dorsal forearm TTP with guarding, as well as radial wrist  No obvious swelling or redness or discoloration  Gripping strength intact B/L without obvious weakness though does cause slightl discomfort in right forearm and wrist  No finger abnormality  RLB TTP, poor flexibility with forward flexion, discomfort in RLB throughout ROM testing  Lymphadenopathy:        Head (right side): No submandibular and no tonsillar adenopathy present  Head (left side): No submandibular and no tonsillar adenopathy present  Neurological: She is alert and oriented to person, place, and time  Psychiatric: She has a normal mood and affect  Vitals reviewed          Vitals:    07/09/20 1112   BP: 138/70   BP Location: Left arm   Patient Position: Sitting   Cuff Size: Standard   Pulse: 83   Temp: 98 2 °F (36 8 °C)   TempSrc: Temporal   Weight: 57 8 kg (127 lb 6 8 oz)

## 2020-07-10 ENCOUNTER — OFFICE VISIT (OUTPATIENT)
Dept: PHYSICAL THERAPY | Facility: CLINIC | Age: 60
End: 2020-07-10
Payer: COMMERCIAL

## 2020-07-10 ENCOUNTER — APPOINTMENT (OUTPATIENT)
Dept: LAB | Facility: HOSPITAL | Age: 60
End: 2020-07-10
Payer: COMMERCIAL

## 2020-07-10 ENCOUNTER — TRANSCRIBE ORDERS (OUTPATIENT)
Dept: LAB | Facility: HOSPITAL | Age: 60
End: 2020-07-10

## 2020-07-10 DIAGNOSIS — E78.5 DYSLIPIDEMIA: ICD-10-CM

## 2020-07-10 DIAGNOSIS — E55.9 VITAMIN D DEFICIENCY: ICD-10-CM

## 2020-07-10 DIAGNOSIS — M54.50 ACUTE RIGHT-SIDED LOW BACK PAIN WITHOUT SCIATICA: Primary | ICD-10-CM

## 2020-07-10 LAB
ALBUMIN SERPL BCP-MCNC: 4.2 G/DL (ref 3.5–5)
ALP SERPL-CCNC: 81 U/L (ref 46–116)
ALT SERPL W P-5'-P-CCNC: 22 U/L (ref 12–78)
ANION GAP SERPL CALCULATED.3IONS-SCNC: 3 MMOL/L (ref 4–13)
AST SERPL W P-5'-P-CCNC: 14 U/L (ref 5–45)
BASOPHILS # BLD AUTO: 0.03 THOUSANDS/ΜL (ref 0–0.1)
BASOPHILS NFR BLD AUTO: 0 % (ref 0–1)
BILIRUB SERPL-MCNC: 0.53 MG/DL (ref 0.2–1)
BUN SERPL-MCNC: 12 MG/DL (ref 5–25)
CALCIUM SERPL-MCNC: 9.3 MG/DL (ref 8.3–10.1)
CHLORIDE SERPL-SCNC: 106 MMOL/L (ref 100–108)
CHOLEST SERPL-MCNC: 160 MG/DL (ref 50–200)
CO2 SERPL-SCNC: 30 MMOL/L (ref 21–32)
CREAT SERPL-MCNC: 0.65 MG/DL (ref 0.6–1.3)
EOSINOPHIL # BLD AUTO: 0.1 THOUSAND/ΜL (ref 0–0.61)
EOSINOPHIL NFR BLD AUTO: 1 % (ref 0–6)
ERYTHROCYTE [DISTWIDTH] IN BLOOD BY AUTOMATED COUNT: 13.8 % (ref 11.6–15.1)
GFR SERPL CREATININE-BSD FRML MDRD: 97 ML/MIN/1.73SQ M
GLUCOSE P FAST SERPL-MCNC: 93 MG/DL (ref 65–99)
HCT VFR BLD AUTO: 43.5 % (ref 34.8–46.1)
HDLC SERPL-MCNC: 43 MG/DL
HGB BLD-MCNC: 14.1 G/DL (ref 11.5–15.4)
IMM GRANULOCYTES # BLD AUTO: 0.02 THOUSAND/UL (ref 0–0.2)
IMM GRANULOCYTES NFR BLD AUTO: 0 % (ref 0–2)
LDLC SERPL CALC-MCNC: 100 MG/DL (ref 0–100)
LYMPHOCYTES # BLD AUTO: 2.8 THOUSANDS/ΜL (ref 0.6–4.47)
LYMPHOCYTES NFR BLD AUTO: 33 % (ref 14–44)
MCH RBC QN AUTO: 32 PG (ref 26.8–34.3)
MCHC RBC AUTO-ENTMCNC: 32.4 G/DL (ref 31.4–37.4)
MCV RBC AUTO: 99 FL (ref 82–98)
MONOCYTES # BLD AUTO: 0.7 THOUSAND/ΜL (ref 0.17–1.22)
MONOCYTES NFR BLD AUTO: 8 % (ref 4–12)
NEUTROPHILS # BLD AUTO: 4.82 THOUSANDS/ΜL (ref 1.85–7.62)
NEUTS SEG NFR BLD AUTO: 58 % (ref 43–75)
NONHDLC SERPL-MCNC: 117 MG/DL
NRBC BLD AUTO-RTO: 0 /100 WBCS
PLATELET # BLD AUTO: 301 THOUSANDS/UL (ref 149–390)
PMV BLD AUTO: 10.8 FL (ref 8.9–12.7)
POTASSIUM SERPL-SCNC: 3.7 MMOL/L (ref 3.5–5.3)
PROT SERPL-MCNC: 7.7 G/DL (ref 6.4–8.2)
RBC # BLD AUTO: 4.41 MILLION/UL (ref 3.81–5.12)
SODIUM SERPL-SCNC: 139 MMOL/L (ref 136–145)
TRIGL SERPL-MCNC: 83 MG/DL
WBC # BLD AUTO: 8.47 THOUSAND/UL (ref 4.31–10.16)

## 2020-07-10 PROCEDURE — 80053 COMPREHEN METABOLIC PANEL: CPT

## 2020-07-10 PROCEDURE — 36415 COLL VENOUS BLD VENIPUNCTURE: CPT

## 2020-07-10 PROCEDURE — 85025 COMPLETE CBC W/AUTO DIFF WBC: CPT

## 2020-07-10 PROCEDURE — 97140 MANUAL THERAPY 1/> REGIONS: CPT | Performed by: PHYSICAL THERAPIST

## 2020-07-10 PROCEDURE — 82652 VIT D 1 25-DIHYDROXY: CPT

## 2020-07-10 PROCEDURE — 80061 LIPID PANEL: CPT

## 2020-07-10 PROCEDURE — 97110 THERAPEUTIC EXERCISES: CPT | Performed by: PHYSICAL THERAPIST

## 2020-07-10 PROCEDURE — 97112 NEUROMUSCULAR REEDUCATION: CPT | Performed by: PHYSICAL THERAPIST

## 2020-07-10 NOTE — PROGRESS NOTES
Daily Note     Today's date: 7/10/2020  Patient name: Samara Dakin  : 1960  MRN: [id number]  Referring provider: Jak Viveros DO  Dx:   Encounter Diagnosis     ICD-10-CM    1  Acute right-sided low back pain without sciatica M54 5        Start Time: 930  Stop Time: 1030  Total time in clinic (min): 60 minutes    Subjective: Pt reports having mild muscle soreness into right lower back yesterday after driving and "moving around a lot "    Objective: See treatment diary below    Assessment: Pt demonstrated limited stability and ROM with bridges during session; she was provided with cues for technique and to maintain abdominal brace with exercise, which decreased noted lumbar discomfort  Control improved with repetition  Plan: Cont per POC  1:1 8:45-9:15, unbilled 9:16-9:22    Precautions:  PMHx: COPD, asthma  Dx: acute LBP with a stability preference    Manuals 6/17 6/19 6/23 6/25 7/1 7/3 7/7 7/10     Graston R lumbar paraspinals in prone   5 min 6 min 6 min 8 min 8 min 8 min     Laser L1-S1 5400J 5400J np          Gr IV R lower lumbar SL opening mobs   2x40 2x40  WS PT 2x40 WS PT np np 2x40                  Neuro Re-Ed             Abdominal bracing 5"x20 5"x20 HEP     5"x10     DLS supine marches 9 9#  3x15 ea 1 5#  3x18 ea 1 5#  3x20 ea 1 5#  3x20 ea 1 5# 3x20 ea 2#  3x15 ea 2#  3x18 ea 2#/ 3x18 ea     bridges 3x15 3x15 3x15 3x15 3x15 3x18 3x18 3x20     Quad alt UE 15"x3 15"x3 np          Quad alt LE   np          Prone swimmers 3x10 ea 3x10 ea 4x10 4x10 4x10 4x10 3x15 3x15                  Ther Ex             clamshells R/  3x12 R/  3x12 R/  3x15 R/  3x15 R/ 3x15 R/  3x18 R/  3x18 R/ 3x18     Supine R piriformis stretch 10"x3 10"x3 np np np        R SKC   10"x3 10"x3 10"x3 10"x3 10"x3 10"x3     L LTR   10"x3 10"x3 10"x3 10"x3 10"x3 10"x3                                                         Ther Activity             Box lifts                          Gait Training Modalities

## 2020-07-13 LAB — 1,25(OH)2D3 SERPL-MCNC: 80.4 PG/ML (ref 19.9–79.3)

## 2020-07-14 ENCOUNTER — OFFICE VISIT (OUTPATIENT)
Dept: PHYSICAL THERAPY | Facility: CLINIC | Age: 60
End: 2020-07-14
Payer: COMMERCIAL

## 2020-07-14 DIAGNOSIS — M54.50 ACUTE RIGHT-SIDED LOW BACK PAIN WITHOUT SCIATICA: Primary | ICD-10-CM

## 2020-07-14 PROCEDURE — 97112 NEUROMUSCULAR REEDUCATION: CPT | Performed by: PHYSICAL THERAPIST

## 2020-07-14 PROCEDURE — 97140 MANUAL THERAPY 1/> REGIONS: CPT | Performed by: PHYSICAL THERAPIST

## 2020-07-14 PROCEDURE — 97110 THERAPEUTIC EXERCISES: CPT | Performed by: PHYSICAL THERAPIST

## 2020-07-14 NOTE — PROGRESS NOTES
Daily Note     Today's date: 2020  Patient name: Lorrie Mathews  : 1960  MRN: [id number]  Referring provider: López He DO  Dx:   Encounter Diagnosis     ICD-10-CM    1  Acute right-sided low back pain without sciatica M54 5                   Subjective: Pt reports 1/10 R LBP  Objective: See treatment diary below    Assessment: Pt demonstrated improved ability to maintain neutral spine during bridges  Plan: Cont per POC  Precautions:  PMHx: COPD, asthma  Dx: acute LBP with a stability preference    Manuals 6/17 6/19 6/23 6/25 7/1 7/3 7/7 7/10 7/14    Graston R lumbar paraspinals in prone   5 min 6 min 6 min 8 min 8 min 8 min 7 min    Laser L1-S1 5400J 5400J np          Gr IV R lower lumbar SL opening mobs   2x40 2x40  WS PT 2x40 WS PT np np 2x40 2x40                 Neuro Re-Ed             Abdominal bracing 5"x20 5"x20 HEP     5"x10 5"x20    DLS supine marches 9 8#  3x15 ea 1 5#  3x18 ea 1 5#  3x20 ea 1 5#  3x20 ea 1 5# 3x20 ea 2#  3x15 ea 2#  3x18 ea 2#/ 3x18 ea 2#  3x20    bridges 3x15 3x15 3x15 3x15 3x15 3x18 3x18 3x20 3x15    Quad alt UE 15"x3 15"x3 np          Quad alt LE   np          Prone swimmers 3x10 ea 3x10 ea 4x10 4x10 4x10 4x10 3x15 3x15 3x15                 Ther Ex             clamshells R/  3x12 R/  3x12 R/  3x15 R/  3x15 R/ 3x15 R/  3x18 R/  3x18 R/ 3x18 R/  3x20    Supine R piriformis stretch 10"x3 10"x3 np np np        R SKC   10"x3 10"x3 10"x3 10"x3 10"x3 10"x3 10"X3    L LTR   10"x3 10"x3 10"x3 10"x3 10"x3 10"x3 10"x3                                                        Ther Activity                                       Gait Training                                       Modalities

## 2020-07-16 ENCOUNTER — EVALUATION (OUTPATIENT)
Dept: PHYSICAL THERAPY | Facility: CLINIC | Age: 60
End: 2020-07-16
Payer: COMMERCIAL

## 2020-07-16 DIAGNOSIS — M25.531 RIGHT WRIST PAIN: ICD-10-CM

## 2020-07-16 DIAGNOSIS — M54.50 ACUTE RIGHT-SIDED LOW BACK PAIN WITHOUT SCIATICA: Primary | ICD-10-CM

## 2020-07-16 DIAGNOSIS — M79.631 RIGHT FOREARM PAIN: ICD-10-CM

## 2020-07-16 PROCEDURE — 97112 NEUROMUSCULAR REEDUCATION: CPT | Performed by: PHYSICAL THERAPIST

## 2020-07-16 PROCEDURE — 97164 PT RE-EVAL EST PLAN CARE: CPT | Performed by: PHYSICAL THERAPIST

## 2020-07-16 PROCEDURE — 97110 THERAPEUTIC EXERCISES: CPT | Performed by: PHYSICAL THERAPIST

## 2020-07-16 NOTE — PROGRESS NOTES
PT Evaluation     Today's date: 2020  Patient name: Cynthia Aparicio  : 1960  MRN: [id number]  Referring provider: Chidi Rodriguez DO  Dx:   Encounter Diagnosis     ICD-10-CM    1  Acute right-sided low back pain without sciatica M54 5    2  Right forearm pain M79 631    3  Right wrist pain M25 531                   Assessment  Assessment details: Pt presents with s/s consistent with R radial and ulnar neural tension secondary to postural abnormalities  She demonstrates full R elbow, forearm, and wrist ROM however significant weakness despite no Hx of trauma, overuse, or myotomal distribution  She will benefit from skilled PT services to address her impairments in order to decrease pain and restore function    Impairments: abnormal coordination, abnormal muscle firing, abnormal or restricted ROM, abnormal movement, activity intolerance, impaired physical strength, lacks appropriate home exercise program, pain with function, poor posture  and poor body mechanics  Understanding of Dx/Px/POC: good   Prognosis: fair    Goals  STG - 2-4 wks  1) pt will be I with HEP  2) pt will demonstrate full lumbar AROM in all planes  3) pt will improve pain levels > 50%    LTG - 6-12 wks  1) pt will demonstrate iso bridge > 45 sec  2) pt will demonstrate normalized lifting mechanics  3) pt will improve pain levels > 75%    STG - 4 wks  1) pt will demonstrate > 4-/5 R UE strength  2) pt will improve pain levels > 50%    LTG - 8 wks  1) pt will demonstrate > 4/5 R UE strength  2) pt will improve pain levels > 90%    Plan  Other planned modality interventions: high-level laser  Planned therapy interventions: joint mobilization, manual therapy, abdominal trunk stabilization, body mechanics training, neuromuscular re-education, patient education, postural training, strengthening, stretching, therapeutic activities, therapeutic exercise, flexibility, functional ROM exercises, gait training and home exercise program  Frequency: 2x week  Duration in weeks: 8  Treatment plan discussed with: patient        Subjective Evaluation    History of Present Illness  Mechanism of injury: Pt is a 61 y o  female with PMHx significant for COPD, asthma  She presents with a new Rx for R hand and forearm pain  She reports insidious onset of R dorsal > radial forearm, wrist, and hand pain and burning 2 wks ago  She reports onset was shortly after making postural corrections to avoid leaning to the R on the center console while driving  Pain  Current pain ratin  At best pain ratin  At worst pain rating: 3  Progression: improved      Diagnostic Tests  No diagnostic tests performed  Treatments  No previous or current treatments  Patient Goals  Patient goals for therapy: decreased pain, increased motion, increased strength and independence with ADLs/IADLs          Objective     Postural Observations  Seated posture: poor  Standing posture: poor  Correction of posture: has no consistent effect        Palpation   Left   No palpable tenderness to the erector spinae and lumbar paraspinals  Right   No palpable tenderness to the erector spinae and lumbar paraspinals  Hypertonic in the scalenes and upper trapezius  Tenderness of the piriformis, scalenes and upper trapezius  Trigger point to scalenes  Tenderness     Lumbar Spine  Tenderness in the spinous process (L3-5)  Left Hip   No tenderness in the PSIS  Right Hip   Tenderness in the greater trochanter  No tenderness in the PSIS       Additional Tenderness Details  Pt demonstrated diffuse ttp t/o dorsal > radial > volar forearm and wrist    Active Range of Motion   Cervical/Thoracic Spine       Cervical  Subcranial protraction:  WFL   Subcranial retraction:   Restriction level: maximal  Flexion:  WFL  Extension:  WFL  Left lateral flexion:  Restriction level: minimal  Right lateral flexion:  Restriction level minimal  Left rotation:  WFL  Right rotation: Neck active rotation right: reproduced R dorsal hand burning  with pain Restriction level: minimal    Right Elbow   Flexion: 150 degrees   Extension: 0 degrees   Forearm supination: 90 degrees   Forearm pronation: 80 degrees     Right Wrist   Wrist flexion: 70 degrees   Wrist extension: 70 degrees   Radial deviation: 10 degrees   Ulnar deviation: 30 degrees     Lumbar   Flexion:  with pain Restriction level: minimal  Extension:  with pain Restriction level: moderate  Left lateral flexion:  Restriction level: minimal  Right lateral flexion:  Restriction level: minimal  Left rotation:  Restriction level: minimal  Right rotation:  Restriction level: minimal    Passive Range of Motion     Right Wrist   Wrist flexion: 90 degrees   Wrist extension: 80 degrees   Radial deviation: 20 degrees   Ulnar deviation: 35 degrees   Left Hip   Flexion: 120 degrees   External rotation (prone): 55 degrees   Internal rotation (prone): 50 degrees     Right Hip   Flexion: 120 degrees   External rotation (prone): 50 degrees   Internal rotation (prone): 35 degrees     Joint Play   Joints within functional limits: T12, L1, L2, L3 and S1     Hypermobile: L4 and L5     Pain: L4 and L5   Mechanical Assessment    Cervical      Thoracic      Lumbar    Standing flexion: repeated movements   Pain location:no change  Pain level: produced  Standing extension: repeated movements  Pain location: no change  Pain level: produced    Strength/Myotome Testing     Right Elbow   Flexion: 3+  Extension: 3+  Forearm supination: 3+  Forearm pronation: 3+    Right Wrist/Hand   Wrist extension: 3+  Wrist flexion: 3-  Radial deviation: 3+  Ulnar deviation: 3+    Muscle Activation     Additional Muscle Activation Details  Pt unable to actively contract abdominals    Tests   Cervical   Negative vertical compression and lumbar distraction  Left   Negative Spurling's Test A and Spurling's Test B  Right   Negative Spurling's Test A and Spurling's Test B       Right Shoulder   Positive ULTT3 and ULTT4  Negative ULTT2  Lumbar   Negative vertical compression  Left   Negative passive SLR (60 deg)  Right   Negative passive SLR (50 deg)  Precautions:  PMHx: COPD, asthma  Dx: acute LBP with a stability preference, R UE neural tension secondary to postural abnormalities    Manuals 7/16            SL R lower lumbar opening mobs 1x100            R post scalene STM             R ulnar nerve glides 1x10            R radial nerve glides 1x10            Neuro Re-Ed             bridges 3x15            Prone swimmers 3x15            Seated cv retraction 2"x10            4-finger cv B rot AROM 1x10            R UT stretch 10"x3            iso scap squeeze             R ulnar nerve glides 1x10            Ther Ex             clamshells R/  3x20            R SKC 5"x5            Resisted R wrist flexion             Resisted R wrist extension                                                                 Ther Activity                                       Gait Training                                       Modalities

## 2020-07-22 ENCOUNTER — EVALUATION (OUTPATIENT)
Dept: PHYSICAL THERAPY | Facility: CLINIC | Age: 60
End: 2020-07-22
Payer: COMMERCIAL

## 2020-07-22 DIAGNOSIS — M54.50 ACUTE RIGHT-SIDED LOW BACK PAIN WITHOUT SCIATICA: Primary | ICD-10-CM

## 2020-07-22 DIAGNOSIS — M25.531 RIGHT WRIST PAIN: ICD-10-CM

## 2020-07-22 DIAGNOSIS — M79.631 RIGHT FOREARM PAIN: ICD-10-CM

## 2020-07-22 PROCEDURE — 97140 MANUAL THERAPY 1/> REGIONS: CPT

## 2020-07-22 PROCEDURE — 97110 THERAPEUTIC EXERCISES: CPT

## 2020-07-22 PROCEDURE — 97112 NEUROMUSCULAR REEDUCATION: CPT

## 2020-07-22 NOTE — PROGRESS NOTES
Daily Note     Today's date: 2020  Patient name: Saranya Rueda  : 1960  MRN: [id number]  Referring provider: Kaia Simpson DO  Dx:   Encounter Diagnosis     ICD-10-CM    1  Acute right-sided low back pain without sciatica M54 5    2  Right forearm pain M79 631    3  Right wrist pain M25 531                   Subjective: Pt reports 1/10 right wrist pain and no low back pain for 2 days  Objective: See treatment diary below; Added wrist strengthening      Assessment: Pt demonstrated moderate ulnar and radial neural tension, high posterior scalene tone  Plan: Assess response to tx nv  Precautions:  PMHx: COPD, asthma  Dx: acute LBP with a stability preference, R UE neural tension secondary to postural abnormalities    Manuals            SL R lower lumbar opening mobs 1x100 np           R post scalene STM  5 min           R ulnar nerve glides 1x10 1x10            R radial nerve glides 1x10 1x10           Neuro Re-Ed             bridges 3x15 3x15           Prone swimmers 3x15 3x15           Seated cv retraction 2"x10 2"x10           4-finger cv B rot AROM 1x10 1x10            R UT stretch 10"x3 10"x3           iso scap squeeze             R ulnar nerve glides 1x10 1x10           Ther Ex             clamshells R/  3x20 R/  3x20           R SKC 5"x5 5"x5           Resisted R wrist flexion  1#  2x10           Resisted R wrist extension  1#  2x10                                                               Ther Activity                                       Gait Training                                       Modalities

## 2020-07-23 ENCOUNTER — APPOINTMENT (OUTPATIENT)
Dept: RADIOLOGY | Facility: AMBULARY SURGERY CENTER | Age: 60
End: 2020-07-23
Attending: SURGERY
Payer: COMMERCIAL

## 2020-07-23 ENCOUNTER — OFFICE VISIT (OUTPATIENT)
Dept: OBGYN CLINIC | Facility: CLINIC | Age: 60
End: 2020-07-23
Payer: COMMERCIAL

## 2020-07-23 VITALS
DIASTOLIC BLOOD PRESSURE: 69 MMHG | HEART RATE: 86 BPM | HEIGHT: 62 IN | SYSTOLIC BLOOD PRESSURE: 107 MMHG | BODY MASS INDEX: 23.34 KG/M2 | WEIGHT: 126.8 LBS

## 2020-07-23 DIAGNOSIS — M25.531 RIGHT WRIST PAIN: ICD-10-CM

## 2020-07-23 DIAGNOSIS — M79.631 RIGHT FOREARM PAIN: Primary | ICD-10-CM

## 2020-07-23 DIAGNOSIS — M79.631 RIGHT FOREARM PAIN: ICD-10-CM

## 2020-07-23 DIAGNOSIS — M77.8 FOREARM TENDONITIS: ICD-10-CM

## 2020-07-23 DIAGNOSIS — G56.21 ULNAR NEURITIS, RIGHT: ICD-10-CM

## 2020-07-23 PROCEDURE — 73110 X-RAY EXAM OF WRIST: CPT

## 2020-07-23 PROCEDURE — 73090 X-RAY EXAM OF FOREARM: CPT

## 2020-07-23 PROCEDURE — 3008F BODY MASS INDEX DOCD: CPT | Performed by: SURGERY

## 2020-07-23 PROCEDURE — 99204 OFFICE O/P NEW MOD 45 MIN: CPT | Performed by: SURGERY

## 2020-07-23 PROCEDURE — 3008F BODY MASS INDEX DOCD: CPT | Performed by: PHYSICIAN ASSISTANT

## 2020-07-23 RX ORDER — METHYLPREDNISOLONE 4 MG/1
TABLET ORAL
Qty: 21 TABLET | Refills: 0 | Status: SHIPPED | OUTPATIENT
Start: 2020-07-23 | End: 2020-11-09

## 2020-07-23 NOTE — PROGRESS NOTES
ASSESSMENT/PLAN:      Right ulnar neuritis   Right forearm tendinosis of extensor muscles , possible radial tunnel syndrome  All relatively new time wise, within the last 3 weeks  Appears that direct compression on the elbow while driving seems to exacerbate her symptoms might just be enteritis let rather than a compression    Start in OT for forearm tendinitis, tendinosis of extensor muscles of forearm, also treat for ulnar neuritis, nerve glides, modalities  Also give right cock up wrist splint  Also order EMG to further evaluate for ulnar neuritis, cubital tunnel on right side  Discussed therapy is main treatment for extensor tendonitis and neuritis, if compression may require surgical  Medrol dose kar sent to [facility], then she can transition to  tylenol   Activity modification, ice, heat are also beneficial    See back in 6 weeks  The patient verbalized understanding of exam findings and treatment plan  We engaged in the shared decision-making process and treatment options were discussed at length with the patient  Surgical and conservative management discussed today along with risks and benefits  To Do Next Visit:  Re-evaluation of current issue    ____________________________________________________________________________________________________________________________________________      CHIEF COMPLAINT:  Chief Complaint   Patient presents with    Right Wrist - Pain       SUBJECTIVE:  Sharath Orozco is a 61y o  year old RHD female who presents for evaluation of right wrist, forearm pain  Referred by Dr Beata Morfin Pain started about 3 weeks ago with no injury or trauma  She has dorsal lateral proximal forearm pain when lifting, gripping, grasping items, also note burning sensation  She also has numbness and tingling sensation down ulnar aspect of forearm dorsum of long, ring, index fingers when she leaves elbow leaning on car console, repositions and symptoms are alleviated   Denies any weakness, loss of motion  No treatment  No current numbness or tingling in right hand  I have personally reviewed all the relevant PMH, PSH, SH, FH, Medications and allergies       PAST MEDICAL HISTORY:  Past Medical History:   Diagnosis Date    Asthma     COPD (chronic obstructive pulmonary disease) (HCC)     Decreased hearing of left ear     Hyperlipidemia     Hypokalemia     last assessed [id number]    Spontaneous         PAST SURGICAL HISTORY:  Past Surgical History:   Procedure Laterality Date    NOSE SURGERY      TONSILLECTOMY AND ADENOIDECTOMY      TUBAL LIGATION         FAMILY HISTORY:  Family History   Problem Relation Age of Onset    Asthma Family     Bipolar disorder Family     Drug abuse Family     Mental illness Family     Heart failure Mother         congestive     Diabetes Father         mellitus     Hypertension Father     Lung cancer Father     Bipolar disorder Brother     No Known Problems Maternal Grandmother     No Known Problems Maternal Grandfather     No Known Problems Paternal Grandmother     No Known Problems Paternal Grandfather     No Known Problems Daughter     No Known Problems Son     No Known Problems Daughter     No Known Problems Brother     No Known Problems Maternal Aunt     No Known Problems Maternal Aunt     No Known Problems Maternal Aunt     No Known Problems Paternal Aunt        SOCIAL HISTORY:  Social History     Tobacco Use    Smoking status: Current Every Day Smoker     Packs/day: 0 50    Smokeless tobacco: Current User    Tobacco comment: 1/ ppd, started about age 29 - denied history of current smoker noted in "allscripts"    Substance Use Topics    Alcohol use: No    Drug use: No       MEDICATIONS:    Current Outpatient Medications:     albuterol (PROVENTIL HFA,VENTOLIN HFA) 90 mcg/act inhaler, Inhale 2 puffs every 6 (six) hours as needed for wheezing, Disp: 1 Inhaler, Rfl: 3    aspirin 81 mg chewable tablet, Chew 1 tablet (81 mg total) daily, Disp: 90 tablet, Rfl: 1    atorvastatin (LIPITOR) 20 mg tablet, TAKE 1 TABLET BY MOUTH EVERY DAY, Disp: 90 tablet, Rfl: 1    celecoxib (CeleBREX) 200 mg capsule, Take 1 capsule (200 mg total) by mouth daily Take with food, Disp: 30 capsule, Rfl: 2    CVS D3 50 MCG (2000 UT) CAPS, TAKE 1 CAPSULE BY MOUTH EVERY DAY, Disp: 30 capsule, Rfl: 5    cyclobenzaprine (FLEXERIL) 5 mg tablet, Take 1 tablet (5 mg total) by mouth 3 (three) times a day as needed (neck, back, arm pain), Disp: 30 tablet, Rfl: 2    fluticasone (FLONASE) 50 mcg/act nasal spray, SPRAY 2 SPRAYS INTO EACH NOSTRIL EVERY DAY, Disp: 16 mL, Rfl: 3    gabapentin (NEURONTIN) 100 mg capsule, Take 1 capsule (100 mg total) by mouth 2 (two) times a day, Disp: 60 capsule, Rfl: 2    montelukast (SINGULAIR) 10 mg tablet, TAKE 1 TABLET BY MOUTH EVERY DAY, Disp: 30 tablet, Rfl: 5    triamcinolone (KENALOG) 0 1 % ointment, Apply topically daily at bedtime, Disp: 30 g, Rfl: 1    WIXELA INHUB 100-50 MCG/DOSE inhaler, INHALE 1 PUFF 2 (TWO) TIMES A DAY RINSE MOUTH AFTER USE , Disp: 3 Inhaler, Rfl: 1    ALLERGIES:  Allergies   Allergen Reactions    Seasonal Ic  [Cholestatin]        REVIEW OF SYSTEMS:  Review of Systems   Constitutional: Negative for chills and fever  HENT: Negative for drooling and sneezing  Eyes: Negative for redness  Respiratory: Negative for cough and wheezing  Cardiovascular: Negative for chest pain  Gastrointestinal: Negative for nausea and vomiting  Musculoskeletal:        As reviewed in HPI   Skin: Negative for rash  Psychiatric/Behavioral: The patient is not nervous/anxious          VITALS:  Vitals:    07/23/20 0942   BP: 107/69   Pulse: 86       LABS:  HgA1c: No results found for: HGBA1C  BMP:   Lab Results   Component Value Date    CALCIUM 9 3 07/10/2020    K 3 7 07/10/2020    CO2 30 07/10/2020     07/10/2020    BUN 12 07/10/2020    CREATININE 0 65 07/10/2020 _____________________________________________________  PHYSICAL EXAMINATION:  General: well developed and well nourished, alert, oriented times 3 and appears comfortable  Psychiatric: Normal  HEENT: Normocephalic, Atraumatic Trachea Midline, No torticollis  Pulmonary: No audible wheezing or respiratory distress   Cardiovascular: No pitting edema, 2+ radial pulse   Skin: No masses, erythema, lacerations, fluctation, ulcerations  Neurovascular: Sensation Intact to the Median, Ulnar, Radial Nerve, Motor Intact to the Median, Ulnar, Radial Nerve and Pulses Intact  Musculoskeletal: Normal, except as noted in detailed exam and in HPI  MUSCULOSKELETAL EXAMINATION:    Right wrist and forearm:  No skin deformity  TTP dorsal lateral proximal forearm, not tender over lateral epicondyle directly, no wrist or hand tenderness  Full range of motion of elbow, wrist, hand  Neg long finger test, no pain against wrist extension  Compression of ulnar nerve at elbow in flexion does reproduce tingling, pain into dorsum of hand into long, ring, index fingers  Negative tinels at elbow  5/5 strength elbow, wrist  5/5 hand histrionics,  strength     ___________________________________________________  STUDIES REVIEWED:  I have personally reviewed AP lateral and oblique radiographs of right wrist and forearm  which demonstrate no significant degenerative changes, no osseus abnormalities, no fracture             PROCEDURES PERFORMED:  Procedures  No Procedures performed today    _____________________________________________________      [facility]    I,:    am acting as a scribe while in the presence of the attending physician :        I,:    personally performed the services described in this documentation    as scribed in my presence :

## 2020-07-23 NOTE — LETTER
July 23, 2020     [address] [place] [address] [facility] [address] [facility]    Patient: Saranya Rueda   YOB: 1960   Date of Visit: 7/23/2020       Dear Dr Limon Fearing:    Thank you for referring Saranya Rueda to me for evaluation  Below are my notes for this consultation  If you have questions, please do not hesitate to call me  I look forward to following your patient along with you           Sincerely,        Rajan Redd MD        CC: No Recipients

## 2020-07-24 ENCOUNTER — EVALUATION (OUTPATIENT)
Dept: PHYSICAL THERAPY | Facility: CLINIC | Age: 60
End: 2020-07-24
Payer: COMMERCIAL

## 2020-07-24 DIAGNOSIS — M54.50 ACUTE RIGHT-SIDED LOW BACK PAIN WITHOUT SCIATICA: Primary | ICD-10-CM

## 2020-07-24 DIAGNOSIS — M79.631 RIGHT FOREARM PAIN: ICD-10-CM

## 2020-07-24 DIAGNOSIS — M25.531 RIGHT WRIST PAIN: ICD-10-CM

## 2020-07-24 PROCEDURE — 97110 THERAPEUTIC EXERCISES: CPT

## 2020-07-24 PROCEDURE — 97112 NEUROMUSCULAR REEDUCATION: CPT

## 2020-07-24 PROCEDURE — 97140 MANUAL THERAPY 1/> REGIONS: CPT

## 2020-07-24 NOTE — PROGRESS NOTES
Daily Note     Today's date: 2020  Patient name: Kimberly Silverio  : 1960  MRN: [id number]  Referring provider: Jose Antony DO  Dx:   Encounter Diagnosis     ICD-10-CM    1  Acute right-sided low back pain without sciatica M54 5    2  Right forearm pain M79 631    3  Right wrist pain M25 531                   Subjective: Pt reports decreased intensity of low back pain with driving, mild lateral wrist pain pre tx  Objective: See treatment diary below; Added wrist strengthening      Assessment: Pt demonstrated increased bridging tolerance, continued moderate radial and ulnar neural tension  Plan: Assess response to tx nv  Precautions:  PMHx: COPD, asthma  Dx: acute LBP with a stability preference, R UE neural tension secondary to postural abnormalities    Manuals           SL R lower lumbar opening mobs 1x100 np np          R post scalene STM  5 min 5 min          R ulnar nerve glides 1x10 1x10  1x10          Graston R paraspinals in prone   5 min          R radial nerve glides 1x10 1x10 1x10          Neuro Re-Ed             bridges 3x15 3x15 3x20          Prone swimmers 3x15 3x15 3x15          Seated cv retraction 2"x10 2"x10 2"x10          4-finger cv B rot AROM 1x10 1x10  1x10          R UT stretch 10"x3 10"x3 10"x3          iso scap squeeze             R ulnar nerve glides 1x10 1x10 1x10          Ther Ex             clamshells R/  3x20 R/  3x20 R/  3x20          R SKC 5"x5 5"x5 5"x5          Resisted R wrist flexion  1#  2x10 1#  2x10          Resisted R wrist extension  1#  2x10 1#  3x10                                                              Ther Activity                                       Gait Training                                       Modalities

## 2020-07-28 ENCOUNTER — EVALUATION (OUTPATIENT)
Dept: PHYSICAL THERAPY | Facility: CLINIC | Age: 60
End: 2020-07-28
Payer: COMMERCIAL

## 2020-07-28 DIAGNOSIS — M25.531 RIGHT WRIST PAIN: ICD-10-CM

## 2020-07-28 DIAGNOSIS — M54.50 ACUTE RIGHT-SIDED LOW BACK PAIN WITHOUT SCIATICA: Primary | ICD-10-CM

## 2020-07-28 DIAGNOSIS — M79.631 RIGHT FOREARM PAIN: ICD-10-CM

## 2020-07-28 PROCEDURE — 97110 THERAPEUTIC EXERCISES: CPT

## 2020-07-28 PROCEDURE — 97112 NEUROMUSCULAR REEDUCATION: CPT

## 2020-07-28 PROCEDURE — 97140 MANUAL THERAPY 1/> REGIONS: CPT

## 2020-07-28 NOTE — PROGRESS NOTES
Daily Note     Today's date: 2020  Patient name: Isaac Menjivar  : 1960  MRN: [id number]  Referring provider: Jaylon Lund DO  Dx:   Encounter Diagnosis     ICD-10-CM    1  Acute right-sided low back pain without sciatica M54 5    2  Right forearm pain M79 631    3  Right wrist pain M25 531                   Subjective: Pt reports decreased no low back pain, mild anterior wrist pain which has decreased in intensity since last tx session  Objective: See treatment diary below      Assessment: Pt demonstrated slowly decreasing radial neural tension, fair postural awareness  Plan: D/c low back to HEP nv  Precautions:  PMHx: COPD, asthma  Dx: acute LBP with a stability preference, R UE neural tension secondary to postural abnormalities    Manuals          SL R lower lumbar opening mobs 1x100 np np np         R post scalene STM  5 min 5 min 5 min         R ulnar nerve glides 1x10 1x10  1x10 1x10         Graston R paraspinals in prone   5 min 5 min         R radial nerve glides 1x10 1x10 1x10 1x10         Neuro Re-Ed             bridges 3x15 3x15 3x20 3x20         Prone swimmers 3x15 3x15 3x15 3x15         Seated cv retraction 2"x10 2"x10 2"x10 2"x10         4-finger cv B rot AROM 1x10 1x10  1x10 1x10         R UT stretch 10"x3 10"x3 10"x3 10"x3         iso scap squeeze             R ulnar nerve glides 1x10 1x10 1x10 1x10         Ther Ex             clamshells R/  3x20 R/  3x20 R/  3x20 G/  3x15         R SKC 5"x5 5"x5 5"x5 5"x5         Resisted R wrist flexion  1#  2x10 1#  2x10 1#  2x12         Resisted R wrist extension  1#  2x10 1#  3x10 1#  2x12                                                             Ther Activity                                       Gait Training                                       Modalities

## 2020-07-30 ENCOUNTER — EVALUATION (OUTPATIENT)
Dept: PHYSICAL THERAPY | Facility: CLINIC | Age: 60
End: 2020-07-30
Payer: COMMERCIAL

## 2020-07-30 DIAGNOSIS — M54.50 ACUTE RIGHT-SIDED LOW BACK PAIN WITHOUT SCIATICA: Primary | ICD-10-CM

## 2020-07-30 DIAGNOSIS — M79.631 RIGHT FOREARM PAIN: ICD-10-CM

## 2020-07-30 DIAGNOSIS — M25.531 RIGHT WRIST PAIN: ICD-10-CM

## 2020-07-30 PROCEDURE — 97112 NEUROMUSCULAR REEDUCATION: CPT | Performed by: PHYSICAL THERAPIST

## 2020-07-30 PROCEDURE — 97110 THERAPEUTIC EXERCISES: CPT | Performed by: PHYSICAL THERAPIST

## 2020-07-30 PROCEDURE — 97140 MANUAL THERAPY 1/> REGIONS: CPT | Performed by: PHYSICAL THERAPIST

## 2020-07-30 NOTE — PROGRESS NOTES
Daily Note     Today's date: 2020  Patient name: Gerald Calzada  : 1960  MRN: [id number]  Referring provider: Kathleen Orourke DO  Dx:   Encounter Diagnosis     ICD-10-CM    1  Acute right-sided low back pain without sciatica M54 5    2  Right forearm pain M79 631    3  Right wrist pain M25 531                   Subjective: Pt reports no LBP, intermittent R dorsal forearm and hand and radial wrist "stinging"  Objective: See treatment diary below  Updated HEP    Assessment: Pt demonstrated moderate irritability with nerve glides despite good ROM  Pt demonstrated limited tolerance to wrist strengthening secondary to pain  Plan: Discharge lumbar POC to maintenance HEP  Precautions:  PMHx: COPD, asthma  Dx: acute LBP with a stability preference, R UE radial and ulnar nerve tension secondary to postural abnormalities    Manuals         SL R lower lumbar opening mobs 1x100 np np np 1x100 D/c       R post scalene STM  5 min 5 min 5 min 5 min        R ulnar nerve glides 1x10 1x10  1x10 1x10 1x10        Graston R paraspinals in prone   5 min 5 min np D/c       R radial nerve glides 1x10 1x10 1x10 1x10 1x10        Neuro Re-Ed             bridges 3x15 3x15 3x20 3x20 3x20 D/c       Prone swimmers 3x15 3x15 3x15 3x15 3x15 D/c       cv retraction against wall modified 2"x10 2"x10 2"x10 2"x10 2"x15        4-finger cv B rot AROM 1x10 1x10  1x10 1x10 1x15        R UT stretch 10"x3 10"x3 10"x3 10"x3 10"x3        iso scap squeeze     5"x10        R ulnar nerve glides 1x10 1x10 1x10 1x10 1x10        Ther Ex             clamshells R/  3x20 R/  3x20 R/  3x20 G/  3x15 G/  3x15 D/c       R SKC 5"x5 5"x5 5"x5 5"x5 5"x5 D/c       Resisted R wrist flexion  1#  2x10 1#  2x10 1#  2x12 1#  2x12 1#  3x10       Resisted R wrist extension  1#  2x10 1#  3x10 1#  2x12 1#  2x12 1#  3x10       R UT stretch 10"x3 10"x3 10"x3 10"x3 10"x3 10"x3                                              Ther Activity Gait Training                                       Modalities

## 2020-08-04 ENCOUNTER — EVALUATION (OUTPATIENT)
Dept: OCCUPATIONAL THERAPY | Age: 60
End: 2020-08-04
Payer: COMMERCIAL

## 2020-08-04 DIAGNOSIS — G56.21 ULNAR NEURITIS, RIGHT: Primary | ICD-10-CM

## 2020-08-04 DIAGNOSIS — M77.8 FOREARM TENDONITIS: ICD-10-CM

## 2020-08-04 PROCEDURE — 97165 OT EVAL LOW COMPLEX 30 MIN: CPT

## 2020-08-04 PROCEDURE — 97110 THERAPEUTIC EXERCISES: CPT

## 2020-08-04 NOTE — PROGRESS NOTES
OT Evaluation     Today's date: 2020  Patient name: Antonia Jung  : 1960  MRN: [id number]  Referring provider: Nuris Flynn MD  Dx:   Encounter Diagnosis     ICD-10-CM    1  Ulnar neuritis, right  G56 21    2  Forearm tendonitis  M77 9                   Assessment  Assessment details: Pt is a 60 y/o female, who was dx with  Right ulnar neuritis, Right forearm tendinosis of extensor muscles, and possible radial tunnel syndrome by Dr Nelly Nichols  Pt awaiting for EMG, but not scheduled until 2020  Pt presents with 2/10 pain of wrist and forearm today  Pt reported numbness/tingling of 3rd-5th digits have improved, but can get exacerbated with heavy lifting, or long drives  Pt does not present with tenderness at extensors or 1st DC where she reports most pain  Pt only c/o stretch feeling during physical examination  Pt educated on extensor stretches as well as UNGs and RNGs  Pt demo F carryover  Pt to continue to wear wrist brace at night  Therapist to focus treatment on anti-inflammatory techniques, stretching, and strengthening as pt noted with mild/mod weakness of RUE, which she wants to be able to perf her [employer] work tasks without pain  Impairments: abnormal coordination, activity intolerance, impaired physical strength, lacks appropriate home exercise program and pain with function  Other impairment: dec RUE strength, dec  strength, occ burning  Functional limitations: pain with function and work tasks  Symptom irritability: lowUnderstanding of Dx/Px/POC: good   Prognosis: good    Goals  STGs:  1  Pt will be independent with HEP  2  Pt will inc R  strength + 5 pounds  3  Pt will report only 2/10 pain during physical tasks and 0 at rest   LTGs:  1  Pt will report no pain with work  2  Pt will report no numbness/tingling down R arm  3  Pt will inc RUE strength to 4+/5 and  strength to norm value for age group       Plan  Patient would benefit from: skilled physical therapy  Planned modality interventions: cryotherapy, thermotherapy: hydrocollator packs and ultrasound  Other planned modality interventions: IASTM  Planned therapy interventions: manual therapy, massage, activity modification, neuromuscular re-education, patient education, strengthening, stretching, therapeutic activities, therapeutic exercise, functional ROM exercises and home exercise program  Frequency: 2x week (1-2x per week)  Duration in weeks: 6  Plan of Care beginning date: 2020  Plan of Care expiration date: 9/15/2020  Treatment plan discussed with: patient        Subjective Evaluation    History of Present Illness  Date of onset: 2020  Mechanism of injury: trauma  Mechanism of injury: Hussein Krishnan is a 61y o  year old RHD female who presented for evaluation, to Dr Jaylon Cho, of right wrist, forearm pain  Referred by Dr Magalie Guzman  Pain started about 3 weeks ago with no injury or trauma  She has dorsal lateral proximal forearm pain when lifting, gripping, grasping items, also note burning sensation  She also has numbness and tingling sensation down ulnar aspect of forearm dorsum of long, ring, index fingers when she leaves elbow leaning on car console, repositions and symptoms are alleviated  Pt provided with wrist brace during hours of sleep, which has helped with the numbness/burning                   Not a recurrent problem   Quality of life: good    Pain  Current pain ratin  At best pain ratin  At worst pain ratin  Location: radial side of wrist and forearm  Quality: burning and dull ache  Relieving factors: rest, support and change in position  Aggravating factors: lifting  Progression: improved    Hand dominance: right      Diagnostic Tests  No diagnostic tests performed    FCE comments: EMG scheduled but not until 2020  Treatments  Previous treatment: immobilization  Current treatment: occupational therapy  Patient Goals  Patient goals for therapy: decreased pain, increased strength and return to work          Objective     Tenderness     Right Wrist/Hand   No tenderness in the first dorsal compartment and common extensor tendon  Additional Tenderness Details  Pt did not fele or demo tenderness, but reports pain at 1st DC and common extensor    Neurological Testing     Sensation     Wrist/Hand     Right   Intact: light touch and pin prick    Active Range of Motion     Left Wrist   Normal active range of motion    Right Wrist   Wrist flexion: 55 degrees   Wrist extension: 50 degrees   Radial deviation: 20 degrees   Ulnar deviation: 30 degrees     Strength/Myotome Testing     Left Wrist/Hand   Wrist extension: 4+  Wrist flexion: 4+  Radial deviation: 4+  Ulnar deviation: 4+     (2nd hand position)     Trial 1: 35    Trial 2: 35    Trial 3: 35    Thumb Strength  Key/Lateral Pinch     Trial 1: 10  Tip/Two-Point Pinch     Trial 1: 6  Palmar/Three-Point Pinch     Trial 1: 12    Right Wrist/Hand   Wrist extension: 4  Wrist flexion: 4  Radial deviation: 4  Ulnar deviation: 4     (2nd hand position)     Trial 1: 25    Trial 2: 25    Trial 3: 25    Thumb Strength   Key/Lateral Pinch     Trial 1: 10  Tip/Two-Point Pinch     Trial 1: 4  Palmar/Three-Point Pinch     Trial 1: 6    Additional Strength Details  Pt norm value for age group should be  strength at least 45 pounds; pt demo moderate weakness  Tests     Right Elbow   Negative Cozen's and Tinel's sign (cubital tunnel)  Right Wrist/Hand   Negative Finkelstein's, Phalen's sign, Tinel's sign (medial nerve) and Tinel's sign (radial tunnel)  Additional Tests Details  Pt reported her numbness/tingling has gotten better; and only feels it occasionally  Flowsheet Rows      Most Recent Value   PT/OT G-Codes   Current Score  50   Projected Score  64             Precautions: Universal; Eval/tx R extensor tendinosis forearm, ulnar nerve glides        Manuals 8/4            IASTM wrist/forearm 8'            TERRELL Wrist MOBs                          Neuro Re-Ed                                                                                                        Ther Ex             TERRELL/RNG HEP            Extensor stretch HEP            Extensor strengthening                                                                              Ther Activity                                       Gait Training                                       Modalities             MP/CP             U/S

## 2020-08-05 ENCOUNTER — OFFICE VISIT (OUTPATIENT)
Dept: PHYSICAL THERAPY | Facility: CLINIC | Age: 60
End: 2020-08-05
Payer: COMMERCIAL

## 2020-08-05 DIAGNOSIS — M25.531 RIGHT WRIST PAIN: ICD-10-CM

## 2020-08-05 DIAGNOSIS — M54.50 ACUTE RIGHT-SIDED LOW BACK PAIN WITHOUT SCIATICA: Primary | ICD-10-CM

## 2020-08-05 DIAGNOSIS — M79.631 RIGHT FOREARM PAIN: ICD-10-CM

## 2020-08-05 PROCEDURE — 97112 NEUROMUSCULAR REEDUCATION: CPT

## 2020-08-05 PROCEDURE — 97140 MANUAL THERAPY 1/> REGIONS: CPT

## 2020-08-05 PROCEDURE — 97110 THERAPEUTIC EXERCISES: CPT

## 2020-08-05 NOTE — PROGRESS NOTES
Daily Note     Today's date: 2020  Patient name: Thomas Reid  : 1960  MRN: [id number]  Referring provider: Nathalia Corado DO  Dx:   Encounter Diagnosis     ICD-10-CM    1  Acute right-sided low back pain without sciatica  M54 5    2  Right forearm pain  M79 631    3  Right wrist pain  M25 531                   Subjective: Pt reports intermittent right forearm and hand and radial wrist pain  Objective: See treatment diary below    Assessment: Pt demonstrated good tolerance to wrist strengthening progressions, moderate R neural tension radial > ulnar  Plan: Continue poc as per PT  Precautions:  PMHx: COPD, asthma  Dx: acute LBP with a stability preference, R UE radial and ulnar nerve tension secondary to postural abnormalities    Manuals        SL R lower lumbar opening mobs 1x100 np np np 1x100 D/c       R post scalene STM  5 min 5 min 5 min 5 min 5 min       R ulnar nerve glides 1x10 1x10  1x10 1x10 1x10 1x10        Graston R paraspinals in prone   5 min 5 min np D/c       R radial nerve glides 1x10 1x10 1x10 1x10 1x10 1x10       Neuro Re-Ed             bridges 3x15 3x15 3x20 3x20 3x20 D/c       Prone swimmers 3x15 3x15 3x15 3x15 3x15 D/c       cv retraction against wall modified 2"x10 2"x10 2"x10 2"x10 2"x15 2"x15       4-finger cv B rot AROM 1x10 1x10  1x10 1x10 1x15 1x15       R UT stretch 10"x3 10"x3 10"x3 10"x3 10"x3 10"x3       iso scap squeeze     5"x10 5"x10       R ulnar nerve glides 1x10 1x10 1x10 1x10 1x10 1x10       Ther Ex             clamshells R/  3x20 R/  3x20 R/  3x20 G/  3x15 G/  3x15 D/c       R SKC 5"x5 5"x5 5"x5 5"x5 5"x5 D/c       Resisted R wrist flexion  1#  2x10 1#  2x10 1#  2x12 1#  2x12 1#  3x10       Resisted R wrist extension  1#  2x10 1#  3x10 1#  2x12 1#  2x12 1#  3x10       R UT stretch 10"x3 10"x3 10"x3 10"x3 10"x3 10"x3                                              Ther Activity                                       Gait Training                                       Modalities

## 2020-08-06 ENCOUNTER — OFFICE VISIT (OUTPATIENT)
Dept: OCCUPATIONAL THERAPY | Age: 60
End: 2020-08-06
Payer: COMMERCIAL

## 2020-08-06 DIAGNOSIS — M77.8 FOREARM TENDONITIS: ICD-10-CM

## 2020-08-06 DIAGNOSIS — G56.21 ULNAR NEURITIS, RIGHT: Primary | ICD-10-CM

## 2020-08-06 PROCEDURE — 97110 THERAPEUTIC EXERCISES: CPT

## 2020-08-06 PROCEDURE — 97140 MANUAL THERAPY 1/> REGIONS: CPT

## 2020-08-06 NOTE — PROGRESS NOTES
Daily Note     Today's date: 2020  Patient name: Gloria Castellanos  : 1960  MRN: [id number]  Referring provider: Isidro Clarke MD  Dx:   Encounter Diagnosis     ICD-10-CM    1  Ulnar neuritis, right  G56 21    2  Forearm tendonitis  M77 9                   Subjective: "I feel some pain on top of my wrist today "  Pt denied any pain at forearm or numbness/tingling  Pt reported feeling occ sharp pain at wrist that can go up to a 3/10, but not constant  Objective: See treatment diary below  Pt requires more training and education of nerve glides  Assessment: Tolerated treatment well  Pt reporting pain and inc stretch at wrist while therapist performing manual UNGs  Pt reported relief from pain after use of graston massage and MOBs to wrist  Patient would benefit from continued OT      Plan: Continue per plan of care  Precautions:  PMHx: COPD, asthma  Dx: acute LBP with a stability preference, R UE radial and ulnar nerve tension secondary to postural abnormalities    Manuals                    IASTM wrist/forearm 8'  5'                   TERRELL    2'                   Wrist MOBs    3'                    KT    applied                   Neuro Re-Ed                                                                                                                                                                                               Ther Ex                     TERRELL/RNG HEP  5x                   Extensor stretch HEP  2 sets 10                   Extensor strengthening    2x10 1#; RB 1 set                                                                                                                                           Ther Activity                                                                       Gait Training                                                                       Modalities                       MP/CP                       U/S

## 2020-08-09 DIAGNOSIS — J30.9 ALLERGIC SINUSITIS: ICD-10-CM

## 2020-08-09 RX ORDER — FLUTICASONE PROPIONATE 50 MCG
SPRAY, SUSPENSION (ML) NASAL
Qty: 16 ML | Refills: 3 | Status: SHIPPED | OUTPATIENT
Start: 2020-08-09 | End: 2020-12-18

## 2020-08-11 ENCOUNTER — OFFICE VISIT (OUTPATIENT)
Dept: OCCUPATIONAL THERAPY | Age: 60
End: 2020-08-11
Payer: COMMERCIAL

## 2020-08-11 DIAGNOSIS — M77.8 FOREARM TENDONITIS: ICD-10-CM

## 2020-08-11 DIAGNOSIS — G56.21 ULNAR NEURITIS, RIGHT: Primary | ICD-10-CM

## 2020-08-11 PROCEDURE — 97140 MANUAL THERAPY 1/> REGIONS: CPT

## 2020-08-11 PROCEDURE — 97110 THERAPEUTIC EXERCISES: CPT

## 2020-08-11 NOTE — PROGRESS NOTES
Daily Note     Today's date: 2020  Patient name: Rosa Ramírez  : 1960  MRN: [id number]  Referring provider: Choco Duarte MD  Dx:   Encounter Diagnosis     ICD-10-CM    1  Ulnar neuritis, right  G56 21    2  Forearm tendonitis  M77 9                   Subjective: "I felt some pain when I was driving and resting my hand on the wheel; but that tape helped a lot and made me feel better "      Objective: See treatment diary below  Tx focused on manual stretching of R wrist as pt c/o most pain at dorsum of wrist, as well as  strengthening as pt reports occ pain with gripping items at home  Assessment: Tolerated treatment well  Pt reported numbness/tingling has gone away; Pt demp F carryover of UNGs  Pt able to utilize green power web and 2#s for strengthening without c/o pain  Patient exhibited good technique with therapeutic exercises and would benefit from continued OT      Plan: Progress treatment as tolerated  Precautions:  PMHx: COPD, asthma  Dx: acute LBP with a stability preference, R UE radial and ulnar nerve tension secondary to postural abnormalities    Manuals                  IASTM wrist/forearm 8'  5'  5'                 TERRELL    2'                   Wrist MOBs    3'  3'                  KT    applied  applied                 Neuro Re-Ed                                                                                                                                                                                               Ther Ex                   TERRELL/RNG HEP  5x                   Extensor stretch HEP  2 sets 10                   Extensor strengthening    2x10 1#; RB 1 set  R flex bar twist of wrist strengthening; GPW, 2# 2x10 each                                                                                                                                         Ther Activity                                                                     Gait Training                                                                       Modalities                       MP/CP                       U/S

## 2020-08-12 ENCOUNTER — OFFICE VISIT (OUTPATIENT)
Dept: PHYSICAL THERAPY | Facility: CLINIC | Age: 60
End: 2020-08-12
Payer: COMMERCIAL

## 2020-08-12 DIAGNOSIS — M79.631 RIGHT FOREARM PAIN: ICD-10-CM

## 2020-08-12 DIAGNOSIS — M25.531 RIGHT WRIST PAIN: ICD-10-CM

## 2020-08-12 DIAGNOSIS — M54.50 ACUTE RIGHT-SIDED LOW BACK PAIN WITHOUT SCIATICA: Primary | ICD-10-CM

## 2020-08-12 PROCEDURE — 97140 MANUAL THERAPY 1/> REGIONS: CPT

## 2020-08-12 PROCEDURE — 97110 THERAPEUTIC EXERCISES: CPT

## 2020-08-12 PROCEDURE — 97112 NEUROMUSCULAR REEDUCATION: CPT

## 2020-08-12 NOTE — PROGRESS NOTES
Daily Note     Today's date: 2020  Patient name: Ricardo Littlejohn  : 1960  MRN: [id number]  Referring provider: Tejas Reagan DO  Dx:   Encounter Diagnosis     ICD-10-CM    1  Acute right-sided low back pain without sciatica  M54 5    2  Right forearm pain  M79 631    3  Right wrist pain  M25 531                   Subjective: Pt reports 1/10 lateral wrist and hand pain, decreased irritability with daily activities  Objective: See treatment diary below    Assessment: Pt demonstrated decreased radial > ulnar neural tension  Pt demonstrated min pain with wrist extension strengthening progressions  Plan: Continue poc as per PT  Precautions:  PMHx: COPD, asthma  Dx: acute LBP with a stability preference, R UE radial and ulnar nerve tension secondary to postural abnormalities    Manuals       SL R lower lumbar opening mobs 1x100 np np np 1x100 D/c       R post scalene STM  5 min 5 min 5 min 5 min 5 min 5 min      R ulnar nerve glides 1x10 1x10  1x10 1x10 1x10 1x10  1x10      Graston R paraspinals in prone   5 min 5 min np D/c       R radial nerve glides 1x10 1x10 1x10 1x10 1x10 1x10 1x10      Neuro Re-Ed             bridges 3x15 3x15 3x20 3x20 3x20 D/c       Prone swimmers 3x15 3x15 3x15 3x15 3x15 D/c       cv retraction against wall modified 2"x10 2"x10 2"x10 2"x10 2"x15 2"x15 2"x15      4-finger cv B rot AROM 1x10 1x10  1x10 1x10 1x15 1x15 1x15      R UT stretch 10"x3 10"x3 10"x3 10"x3 10"x3 10"x3 10"x3      iso scap squeeze     5"x10 5"x10 5"x15      R ulnar nerve glides 1x10 1x10 1x10 1x10 1x10 1x10 1x10      Ther Ex             clamshells R/  3x20 R/  3x20 R/  3x20 G/  3x15 G/  3x15 D/c       R SKC 5"x5 5"x5 5"x5 5"x5 5"x5 D/c       Resisted R wrist flexion  1#  2x10 1#  2x10 1#  2x12 1#  2x12 1#  3x10 1#  3x12      Resisted R wrist extension  1#  2x10 1#  3x10 1#  2x12 1#  2x12 1#  3x10 1#  3x12      R UT stretch 10"x3 10"x3 10"x3 10"x3 10"x3 10"x3 Ther Activity                                       Gait Training                                       Modalities

## 2020-08-14 ENCOUNTER — OFFICE VISIT (OUTPATIENT)
Dept: PHYSICAL THERAPY | Facility: CLINIC | Age: 60
End: 2020-08-14
Payer: COMMERCIAL

## 2020-08-14 DIAGNOSIS — M25.531 RIGHT WRIST PAIN: ICD-10-CM

## 2020-08-14 DIAGNOSIS — M79.631 RIGHT FOREARM PAIN: ICD-10-CM

## 2020-08-14 DIAGNOSIS — M54.50 ACUTE RIGHT-SIDED LOW BACK PAIN WITHOUT SCIATICA: Primary | ICD-10-CM

## 2020-08-14 PROCEDURE — 97112 NEUROMUSCULAR REEDUCATION: CPT

## 2020-08-14 PROCEDURE — 97110 THERAPEUTIC EXERCISES: CPT

## 2020-08-14 PROCEDURE — 97140 MANUAL THERAPY 1/> REGIONS: CPT

## 2020-08-14 NOTE — PROGRESS NOTES
Daily Note     Today's date: 2020  Patient name: Joanne Krishna  : 1960  MRN: [id number]  Referring provider: Debi Olguin DO  Dx:   Encounter Diagnosis     ICD-10-CM    1  Acute right-sided low back pain without sciatica  M54 5    2  Right forearm pain  M79 631    3  Right wrist pain  M25 531                   Subjective: Pt reports continued 1/10 lateral wrist and hand pain  Objective: See treatment diary below    Assessment: Pt demonstrated difficulty with progressing wrist strengthening, continued moderate ulnar and radial neural tension  Plan: Continue poc as per PT  Unbilled 9:15-9:25, 1:1 9:26-9:50     Precautions:  PMHx: COPD, asthma  Dx: acute LBP with a stability preference, R UE radial and ulnar nerve tension secondary to postural abnormalities    Manuals      SL R lower lumbar opening mobs 1x100 np np np 1x100 D/c       R post scalene STM  5 min 5 min 5 min 5 min 5 min 5 min 5 min     R ulnar nerve glides 1x10 1x10  1x10 1x10 1x10 1x10  1x10 1x10     Graston R paraspinals in prone   5 min 5 min np D/c       R radial nerve glides 1x10 1x10 1x10 1x10 1x10 1x10 1x10 1x10     Neuro Re-Ed             bridges 3x15 3x15 3x20 3x20 3x20 D/c       Prone swimmers 3x15 3x15 3x15 3x15 3x15 D/c       cv retraction against wall modified 2"x10 2"x10 2"x10 2"x10 2"x15 2"x15 2"x15 2"x15     4-finger cv B rot AROM 1x10 1x10  1x10 1x10 1x15 1x15 1x15 1x15     R UT stretch 10"x3 10"x3 10"x3 10"x3 10"x3 10"x3 10"x3 10"x3     iso scap squeeze     5"x10 5"x10 5"x15 5"x15     Supine chin tucks        2"x15     R ulnar nerve glides 1x10 1x10 1x10 1x10 1x10 1x10 1x10 1x10     Ther Ex             clamshells R/  3x20 R/  3x20 R/  3x20 G/  3x15 G/  3x15 D/c       R SKC 5"x5 5"x5 5"x5 5"x5 5"x5 D/c       Resisted R wrist flexion  1#  2x10 1#  2x10 1#  2x12 1#  2x12 1#  3x10 1#  3x12 1#  3x15     Resisted R wrist extension  1#  2x10 1#  3x10 1#  2x12 1#  2x12 1#  3x10 1#  3x12 1#  3x15     R UT stretch 10"x3 10"x3 10"x3 10"x3 10"x3 10"x3  10"x3                                            Ther Activity                                       Gait Training                                       Modalities

## 2020-08-18 ENCOUNTER — OFFICE VISIT (OUTPATIENT)
Dept: OCCUPATIONAL THERAPY | Age: 60
End: 2020-08-18
Payer: COMMERCIAL

## 2020-08-18 DIAGNOSIS — M77.8 FOREARM TENDONITIS: ICD-10-CM

## 2020-08-18 DIAGNOSIS — G56.21 ULNAR NEURITIS, RIGHT: Primary | ICD-10-CM

## 2020-08-18 PROCEDURE — 97140 MANUAL THERAPY 1/> REGIONS: CPT

## 2020-08-18 PROCEDURE — 97110 THERAPEUTIC EXERCISES: CPT

## 2020-08-18 PROCEDURE — 97035 APP MDLTY 1+ULTRASOUND EA 15: CPT

## 2020-08-18 NOTE — PROGRESS NOTES
Daily Note     Today's date: 2020  Patient name: Joanne Krishna  : 1960  MRN: [id number]  Referring provider: Elen Francisco MD  Dx:   Encounter Diagnosis     ICD-10-CM    1  Ulnar neuritis, right  G56 21    2  Forearm tendonitis  M77 9                   Subjective: "I feel a lot better, I only feel slight burning at my wrist if I am in a position for too long, but then I move and it stops "      Objective: See treatment diary below  Pt denied pain  Pt denied any numbness/tingling  Pt demo TERRELL F carryover, but noted to fatigue quickly after stretch  Pt educated to space out stretches during the day  Assessment: Tolerated treatment well  Patient would benefit from continued OT to dec pain, dec inflammation, and inc strength  Plan: Progress treatment as tolerated  Precautions:  PMHx: COPD, asthma  Dx: acute LBP with a stability preference, R UE radial and ulnar nerve tension secondary to postural abnormalities     Manuals                IASTM wrist/forearm 8'  5'  5'  5'               TERRELL    2'                   Wrist MOBs    3'  3'  3'                KT    applied  applied  applied               Neuro Re-Ed                                                                                                                                                                                               Ther Ex                 TERRELL/RNG HEP  5x                   Extensor stretch HEP  2 sets 10    prayer stretch 3 sets 20 sec               Extensor strengthening    2x10 1#; RB 1 set  R flex bar twist of wrist strengthening; GPW, 2# 2x10 each  2# 2x10; RFB 2x10                                                                                                                                       Ther Activity                                                                       Gait Training                                                                     Modalities  8/18                     MP/CP                       U/S  8'

## 2020-08-19 ENCOUNTER — OFFICE VISIT (OUTPATIENT)
Dept: PHYSICAL THERAPY | Facility: CLINIC | Age: 60
End: 2020-08-19
Payer: COMMERCIAL

## 2020-08-19 DIAGNOSIS — M25.531 RIGHT WRIST PAIN: ICD-10-CM

## 2020-08-19 DIAGNOSIS — M79.631 RIGHT FOREARM PAIN: Primary | ICD-10-CM

## 2020-08-19 PROCEDURE — 97110 THERAPEUTIC EXERCISES: CPT

## 2020-08-19 PROCEDURE — 97112 NEUROMUSCULAR REEDUCATION: CPT

## 2020-08-19 PROCEDURE — 97140 MANUAL THERAPY 1/> REGIONS: CPT

## 2020-08-19 NOTE — PROGRESS NOTES
Daily Note     Today's date: 2020  Patient name: Joanne Krishna  : 1960  MRN: [id number]  Referring provider: Debi Olguin DO  Dx:   Encounter Diagnosis     ICD-10-CM    1  Right forearm pain  M79 631    2  Right wrist pain  M25 531                   Subjective: Pt reports decreased frequency of wrist soreness since last tx session  Objective: See treatment diary below    Assessment: Pt demonstrated decreased radial nerve tension, moderate ulnar nerve tension  Plan: Continue poc as per PT  Precautions:  PMHx: COPD, asthma  Dx: acute LBP with a stability preference, R UE radial and ulnar nerve tension secondary to postural abnormalities    Manuals     SL R lower lumbar opening mobs 1x100 np np np 1x100 D/c       R post scalene STM  5 min 5 min 5 min 5 min 5 min 5 min 5 min 5 min    R ulnar nerve glides 1x10 1x10  1x10 1x10 1x10 1x10  1x10 1x10 1x10    Graston R paraspinals in prone   5 min 5 min np D/c       R radial nerve glides 1x10 1x10 1x10 1x10 1x10 1x10 1x10 1x10 1x10     Neuro Re-Ed             bridges 3x15 3x15 3x20 3x20 3x20 D/c       Prone swimmers 3x15 3x15 3x15 3x15 3x15 D/c       cv retraction against wall modified 2"x10 2"x10 2"x10 2"x10 2"x15 2"x15 2"x15 2"x15 2"x15    4-finger cv B rot AROM 1x10 1x10  1x10 1x10 1x15 1x15 1x15 1x15 1x15    R UT stretch 10"x3 10"x3 10"x3 10"x3 10"x3 10"x3 10"x3 10"x3 10"x3    iso scap squeeze     5"x10 5"x10 5"x15 5"x15 5"x15    Supine chin tucks        2"x15 2"x15    R ulnar nerve glides 1x10 1x10 1x10 1x10 1x10 1x10 1x10 1x10 1x10    Ther Ex             clamshells R/  3x20 R/  3x20 R/  3x20 G/  3x15 G/  3x15 D/c       R SKC 5"x5 5"x5 5"x5 5"x5 5"x5 D/c       Resisted R wrist flexion  1#  2x10 1#  2x10 1#  2x12 1#  2x12 1#  3x10 1#  3x12 1#  3x15 1#  3x15    Resisted R wrist extension  1#  2x10 1#  3x10 1#  2x12 1#  2x12 1#  3x10 1#  3x12 1#  3x15 1#  3x12    R UT stretch 10"x3 10"x3 10"x3 10"x3 10"x3 10"x3  10"x3 10"x3                                           Ther Activity                                       Gait Training                                       Modalities

## 2020-08-20 ENCOUNTER — OFFICE VISIT (OUTPATIENT)
Dept: OCCUPATIONAL THERAPY | Age: 60
End: 2020-08-20
Payer: COMMERCIAL

## 2020-08-20 DIAGNOSIS — G56.21 ULNAR NEURITIS, RIGHT: Primary | ICD-10-CM

## 2020-08-20 DIAGNOSIS — M77.8 FOREARM TENDONITIS: ICD-10-CM

## 2020-08-20 PROCEDURE — 97140 MANUAL THERAPY 1/> REGIONS: CPT

## 2020-08-20 PROCEDURE — 97110 THERAPEUTIC EXERCISES: CPT

## 2020-08-20 NOTE — PROGRESS NOTES
Daily Note     Today's date: 2020  Patient name: Gerald Calzada  : 1960  MRN: [id number]  Referring provider: Becky Sebastian MD  Dx:   Encounter Diagnosis     ICD-10-CM    1  Ulnar neuritis, right  G56 21    2  Forearm tendonitis  M77 9                   Subjective: "I feel better; I just feel a little mild burning at my wrist and up on my forearm, but it's pretty good "  Pt denied pain at start of skilled OT tx  Objective: See treatment diary below  Pt did not tolerate TERRELL well today 2* c/o pain in shoulder and burning at wrist  Therapist discontinued the TERRELL as to not inc any pain  Pt reports relief and G support with KT, and denies numbness/tingling  Assessment: Tolerated treatment fair  Therapist had pt perf RNGs, and to continue to perf at home as pt reported some burning and tight feeling at proximal forearm  Patient would benefit from continued OT      Plan: Progress treatment as tolerated  Precautions:  PMHx: COPD, asthma  Dx: acute LBP with a stability preference, R UE radial and ulnar nerve tension secondary to postural abnormalities    Manuals              IASTM wrist/forearm 8'  5'  5'  5' 8'             TERRELL    2'     2'             Wrist MOBs    3'  3'  3'  3'              KT    applied  applied  applied  applied             Neuro Re-Ed                                                                                                                                                                                               Ther Ex               TERRELL/RNG HEP  5x      RNG 5x             Extensor stretch HEP  2 sets 10    prayer stretch 3 sets 20 sec  2x hold 30 sec             Extensor strengthening    2x10 1#; RB 1 set  R flex bar twist of wrist strengthening; GPW, 2# 2x10 each  2# 2x10; RFB 2x10  1# 2x10; RFB 2x10                                                                                                                                   Ther Activity                                                                       Gait Training                                                                       Modalities  8/18                     MP/CP                       U/S  8'

## 2020-08-21 ENCOUNTER — OFFICE VISIT (OUTPATIENT)
Dept: PHYSICAL THERAPY | Facility: CLINIC | Age: 60
End: 2020-08-21
Payer: COMMERCIAL

## 2020-08-21 DIAGNOSIS — M25.531 RIGHT WRIST PAIN: ICD-10-CM

## 2020-08-21 DIAGNOSIS — M79.631 RIGHT FOREARM PAIN: Primary | ICD-10-CM

## 2020-08-21 DIAGNOSIS — M54.50 ACUTE RIGHT-SIDED LOW BACK PAIN WITHOUT SCIATICA: ICD-10-CM

## 2020-08-21 PROCEDURE — 97112 NEUROMUSCULAR REEDUCATION: CPT

## 2020-08-21 PROCEDURE — 97140 MANUAL THERAPY 1/> REGIONS: CPT

## 2020-08-21 PROCEDURE — 97110 THERAPEUTIC EXERCISES: CPT

## 2020-08-21 NOTE — PROGRESS NOTES
Daily Note     Today's date: 2020  Patient name: Cynthia Aparicio  : 1960  MRN: [id number]  Referring provider: Chidi Rodriguez DO  Dx:   Encounter Diagnosis     ICD-10-CM    1  Right forearm pain  M79 631    2  Right wrist pain  M25 531    3  Acute right-sided low back pain without sciatica  M54 5                   Subjective: Pt reports mild lateral wrist soreness  Objective: See treatment diary below    Assessment: Pt demonstrated decreased radial and ulnar neural tension, good tolerance to TE program     Plan: Continue poc as per PT  Precautions:  PMHx: COPD, asthma  Dx: acute LBP with a stability preference, R UE radial and ulnar nerve tension secondary to postural abnormalities    Manuals    SL R lower lumbar opening mobs 1x100 np np np 1x100 D/c       R post scalene STM  5 min 5 min 5 min 5 min 5 min 5 min 5 min 5 min 5 min   R ulnar nerve glides 1x10 1x10  1x10 1x10 1x10 1x10  1x10 1x10 1x10 1x10   Graston R paraspinals in prone   5 min 5 min np D/c       R radial nerve glides 1x10 1x10 1x10 1x10 1x10 1x10 1x10 1x10 1x10  1x10   Neuro Re-Ed             bridges 3x15 3x15 3x20 3x20 3x20 D/c       Prone swimmers 3x15 3x15 3x15 3x15 3x15 D/c       cv retraction against wall modified 2"x10 2"x10 2"x10 2"x10 2"x15 2"x15 2"x15 2"x15 2"x15 2"x15   4-finger cv B rot AROM 1x10 1x10  1x10 1x10 1x15 1x15 1x15 1x15 1x15 1x15   R UT stretch 10"x3 10"x3 10"x3 10"x3 10"x3 10"x3 10"x3 10"x3 10"x3 10"x3   iso scap squeeze     5"x10 5"x10 5"x15 5"x15 5"x15 5"x15   Supine chin tucks        2"x15 2"x15 2"x15   R ulnar nerve glides 1x10 1x10 1x10 1x10 1x10 1x10 1x10 1x10 1x10 1x10   Ther Ex             clamshells R/  3x20 R/  3x20 R/  3x20 G/  3x15 G/  3x15 D/c       R SK 5"x5 5"x5 5"x5 5"x5 5"x5 D/c       Resisted R wrist flexion  1#  2x10 1#  2x10 1#  2x12 1#  2x12 1#  3x10 1#  3x12 1#  3x15 1#  3x15 1#  3x15   Resisted R wrist extension  1#  2x10 1#  3x10 1#  2x12 1#  2x12 1#  3x10 1#  3x12 1#  3x15 1#  3x12 1#  3x12   R UT stretch 10"x3 10"x3 10"x3 10"x3 10"x3 10"x3  10"x3 10"x3 10"x3                                          Ther Activity                                       Gait Training                                       Modalities

## 2020-08-25 ENCOUNTER — OFFICE VISIT (OUTPATIENT)
Dept: OCCUPATIONAL THERAPY | Age: 60
End: 2020-08-25
Payer: COMMERCIAL

## 2020-08-25 DIAGNOSIS — M77.8 FOREARM TENDONITIS: ICD-10-CM

## 2020-08-25 DIAGNOSIS — G56.21 ULNAR NEURITIS, RIGHT: Primary | ICD-10-CM

## 2020-08-25 PROCEDURE — 97140 MANUAL THERAPY 1/> REGIONS: CPT

## 2020-08-25 PROCEDURE — 97110 THERAPEUTIC EXERCISES: CPT

## 2020-08-26 ENCOUNTER — OFFICE VISIT (OUTPATIENT)
Dept: PHYSICAL THERAPY | Facility: CLINIC | Age: 60
End: 2020-08-26
Payer: COMMERCIAL

## 2020-08-26 DIAGNOSIS — M25.531 RIGHT WRIST PAIN: ICD-10-CM

## 2020-08-26 DIAGNOSIS — M79.631 RIGHT FOREARM PAIN: Primary | ICD-10-CM

## 2020-08-26 PROCEDURE — 97112 NEUROMUSCULAR REEDUCATION: CPT | Performed by: PHYSICAL THERAPIST

## 2020-08-26 PROCEDURE — 97110 THERAPEUTIC EXERCISES: CPT | Performed by: PHYSICAL THERAPIST

## 2020-08-26 PROCEDURE — 97140 MANUAL THERAPY 1/> REGIONS: CPT | Performed by: PHYSICAL THERAPIST

## 2020-08-26 NOTE — PROGRESS NOTES
Daily Note     Today's date: 2020  Patient name: Tamica Jordan  : 1960  MRN: [id number]  Referring provider: Josefine Burkitt, DO  Dx:   Encounter Diagnosis     ICD-10-CM    1  Right forearm pain  M79 631    2  Right wrist pain  M25 531                   Subjective: Pt reports now intermittent vs constant R radial wrist pain  Objective: See treatment diary below  Ttp: (-) ECUBr, (-) ECUL, (+) EPL (mild)  MMT: wrist: 4-/5 ext, RD; thumb: 4-/5 ext    Assessment: Pt demonstrated R wrist and thumb weakness, minimal R ulnar > radial neural tension, and limited tolerance to wrist strengthening  Plan: Cont  POC  Precautions:  PMHx: COPD, asthma  Dx: acute LBP with a stability preference, R UE radial and ulnar nerve tension secondary to postural abnormalities    Manuals    R post scalene [place] 4 min         5 min   R ulnar nerve glides 1x10         1x10   R radial nerve glides 1x10         1x10   Graston R EPL 3 min                                      Neuro Re-Ed             cv retraction against wall modified 2"x10         2"x15   4-finger cv B rot AROM 1x10         1x15   iso scap squeeze 5"x15         5"x15   Supine chin tucks 3"x15         2"x15   R ulnar nerve glides 1x10         1x10   Ther Ex             Resisted R wrist flexion 2#  2x10         1#  3x15   Resisted R wrist extension 2#  2x10         1#  3x12   R UT stretch 10"x3         10"x3                                          Ther Activity                                       Gait Training                                       Modalities

## 2020-08-26 NOTE — PROGRESS NOTES
Daily Note     Today's date: 2020  Patient name: Maria Goldsmith  : 1960  MRN: [id number]  Referring provider: Bertha Bansal MD  Dx:   Encounter Diagnosis     ICD-10-CM    1  Ulnar neuritis, right  G56 21    2  Forearm tendonitis  M77 9                   Subjective: "I had pain this weekend after moving a table "  Pt reports usually feeling better, but with soreness from weekend of moving a table causing 4/10 pain  Mild edema noted at distal radius/1st DC  Objective: See treatment diary below  Therapist perf  MWM of wrist for inc stretching and use of graston for soreness and pain  Pt able to tolerate strengthening with only 1lbs (attempted 2) dumbbell and red flexbar; pt required inc rest breaks  Assessment: Tolerated treatment well  Pt did require more rest breaks due to soreness but pt noted with F+ strength and motivation to complete  Pt reported feeling less sore after manual exercises and KT applied to dec edema, dec pain, and inc support of wrist  Patient would benefit from continued OT      Plan: Progress treatment as tolerated  Precautions:  PMHx: COPD, asthma  Dx: acute LBP with a stability preference, R UE radial and ulnar nerve tension secondary to postural abnormalities  Manuals            IASTM wrist/forearm 8'  5'  5'  5' 8'  12           TERRELL    2'     2'             Wrist MOBs    3'  3'  3'  3'  8            KT    applied  applied  applied  applied  applied 3'           Neuro Re-Ed                                                                                                                                                                                               Ther Ex             TERRELL/RNG HEP  5x      RNG 5x             Extensor stretch HEP  2 sets 10    prayer stretch 3 sets 20 sec  2x hold 30 sec  2x hold 30 sec           Extensor strengthening    2x10 1#; RB 1 set  R flex bar twist of wrist strengthening; GPW, 2# 2x10 each  2# 2x10; RFB 2x10  1# 2x10; RFB 2x10  1# 2x10; RFB 2x10                                                                                                                                   Ther Activity                                                                       Gait Training                                                                       Modalities  8/18                     MP/CP                       U/S  8'

## 2020-08-27 ENCOUNTER — OFFICE VISIT (OUTPATIENT)
Dept: OCCUPATIONAL THERAPY | Age: 60
End: 2020-08-27
Payer: COMMERCIAL

## 2020-08-27 DIAGNOSIS — M77.8 FOREARM TENDONITIS: ICD-10-CM

## 2020-08-27 DIAGNOSIS — G56.21 ULNAR NEURITIS, RIGHT: Primary | ICD-10-CM

## 2020-08-27 PROCEDURE — 97110 THERAPEUTIC EXERCISES: CPT

## 2020-08-27 PROCEDURE — 97140 MANUAL THERAPY 1/> REGIONS: CPT

## 2020-08-27 NOTE — PROGRESS NOTES
Daily Note     Today's date: 2020  Patient name: Lorrie Mathews  : 1960  MRN: [id number]  Referring provider: Galileo Kyle MD  Dx:   Encounter Diagnosis     ICD-10-CM    1  Ulnar neuritis, right  G56 21    2  Forearm tendonitis  M77 9                   Subjective: "My wrist is a little sore from driving here, but once I repositioned it and stopped driving it feels better "  Therapist asked pt to try driving at position 9 and 3 to dec inc wrist ext when driving at 10 and 2  Pt reported she would try and see if that helps  Objective: See treatment diary below  Pt able to tolerate without c/o pain of 1# strengthening exercises of wrist     Assessment: Tolerated treatment well  Pt reported an episode of proximal forearm discomfort  Therapist had pt perf RNGs and pt reported feeling a G stretch in her forearm  Patient would benefit from continued OT      Plan: Continue per plan of care  Precautions:  PMHx: COPD, asthma  Dx: acute LBP with a stability preference, R UE radial and ulnar nerve tension secondary to postural abnormalities      Manuals            IASTM wrist/forearm 8'  5'  5'  5' 8'  12           TERRELL    2'     2'             Wrist MOBs  4''  3'  3'  3'  3'  8            KT    applied  applied  applied  applied  applied 3'           Neuro Re-Ed                                                                                                                                                                                               Ther Ex             TERRELL/RNG RNG 5x  5x      RNG 5x             Extensor stretch   2 sets 10    prayer stretch 3 sets 20 sec  2x hold 30 sec  2x hold 30 sec           Extensor strengthening  1# 2x10 flex/ext, radial/ulnar deviation  2x10 1#; RB 1 set  R flex bar twist of wrist strengthening; GPW, 2# 2x10 each  2# 2x10; RFB 2x10  1# 2x10; RFB 2x10  1# 2x10; RFB 2x10                                                                                                                                   Ther Activity                                                                       Gait Training                                                                       Modalities  8/27                     MP/CP                       U/S

## 2020-08-28 ENCOUNTER — OFFICE VISIT (OUTPATIENT)
Dept: PHYSICAL THERAPY | Facility: CLINIC | Age: 60
End: 2020-08-28
Payer: COMMERCIAL

## 2020-08-28 DIAGNOSIS — M79.631 RIGHT FOREARM PAIN: Primary | ICD-10-CM

## 2020-08-28 DIAGNOSIS — M25.531 RIGHT WRIST PAIN: ICD-10-CM

## 2020-08-28 PROCEDURE — 97110 THERAPEUTIC EXERCISES: CPT | Performed by: PHYSICAL THERAPIST

## 2020-08-28 PROCEDURE — 97112 NEUROMUSCULAR REEDUCATION: CPT | Performed by: PHYSICAL THERAPIST

## 2020-08-28 PROCEDURE — 97140 MANUAL THERAPY 1/> REGIONS: CPT | Performed by: PHYSICAL THERAPIST

## 2020-08-28 NOTE — PROGRESS NOTES
Daily Note     Today's date: 2020  Patient name: Grady Lee  : 1960  MRN: [id number]  Referring provider: Analilia George DO  Dx:   Encounter Diagnosis     ICD-10-CM    1  Right forearm pain  M79 631    2  Right wrist pain  M25 531        Start Time: 932  Stop Time: 1002  Total time in clinic (min): 30 minutes    Subjective: Pt reports that she still has a slight burning which is better than it used to be  Objective: See treatment diary below      Assessment: Pt continued to have minimal neural tension in both distributions  Her soft tissue extensibility in the post scalene and trap area improved post graston and pt demonstrated verbal reports of improvement post tx session  Plan: Cont  POC  Precautions:  PMHx: COPD, asthma  Dx: acute LBP with a stability preference, R UE radial and ulnar nerve tension secondary to postural abnormalities    Manuals    R post scalene Gr[place] 4 min 4 min        5 min   R ulnar nerve glides 1x10 1x10        1x10   R radial nerve glides 1x10 1x10        1x10   Graston R EPL 3 min 3 min                                     Neuro Re-Ed             cv retraction against wall modified 2"x10 2"x10        2"x15   4-finger cv B rot AROM 1x10 1x10 ea        1x15   iso scap squeeze 5"x15 5"x15        5"x15   Supine chin tucks 3"x15 3"x15        2"x15   R ulnar nerve glides 1x10 1x10        1x10   Ther Ex             Resisted R wrist flexion 2#  2x10 2#  2x10        1#  3x15   Resisted R wrist extension 2#  2x10 2#  2x10        1#  3x12   R UT stretch 10"x3 10"x3        10"x3                                          Ther Activity                                       Gait Training                                       Modalities

## 2020-09-01 ENCOUNTER — OFFICE VISIT (OUTPATIENT)
Dept: OCCUPATIONAL THERAPY | Age: 60
End: 2020-09-01
Payer: COMMERCIAL

## 2020-09-01 DIAGNOSIS — G56.21 ULNAR NEURITIS, RIGHT: Primary | ICD-10-CM

## 2020-09-01 DIAGNOSIS — M77.8 FOREARM TENDONITIS: ICD-10-CM

## 2020-09-01 PROCEDURE — 97110 THERAPEUTIC EXERCISES: CPT

## 2020-09-01 PROCEDURE — 97035 APP MDLTY 1+ULTRASOUND EA 15: CPT

## 2020-09-01 PROCEDURE — 97140 MANUAL THERAPY 1/> REGIONS: CPT

## 2020-09-01 NOTE — PROGRESS NOTES
Daily Note     Today's date: 2020  Patient name: Lopez Duong  : 1960  MRN: [id number]  Referring provider: Reta Leone MD  Dx:   Encounter Diagnosis     ICD-10-CM    1  Ulnar neuritis, right  G56 21    2  Forearm tendonitis  M77 9                   Subjective: "I feel just a little twinge of pain in the middle of my forearm but other than that I feel fine "  Pt denied only 2/10 soreness at forearm  Pt reported driving at 9 and 3 has helped a little with decreasing pain/soreness as it avoids excessive extension  Objective: See treatment diary below  Tx focusing on strengthening exercises 2* weakness  Assessment: Tolerated treatment well  Pt able to utilized 1# dumbbell, YPW and red flex bar without c/o pain  Patient would benefit from continued OT      Plan: Progress treatment as tolerated  Precautions:  PMHx: COPD, asthma  Dx: acute LBP with a stability preference, R UE radial and ulnar nerve tension secondary to postural abnormalities    Manuals          IASTM wrist/forearm 8'  5'  5'  5' 8'  12  5'         TERRELL    2'     2'             Wrist MOBs  4''  3'  3'  3'  3'  8  3'          KT    applied  applied  applied  applied  applied 3'           Neuro Re-Ed                                                                                                                                                                                               Ther Ex           TERRELL/RNG RNG 5x  5x      RNG 5x             Extensor stretch    2 sets 10    prayer stretch 3 sets 20 sec  2x hold 30 sec  2x hold 30 sec  2x hold 30 sec         Extensor strengthening  1# 2x10 flex/ext, radial/ulnar deviation  2x10 1#; RB 1 set  R flex bar twist of wrist strengthening; GPW, 2# 2x10 each  2# 2x10; RFB 2x10  1# 2x10; RFB 2x10  1# 2x10; RFB 2x10  1# 2x10 flex/ext, RD/UD; RFB 2x10          wrist stability              handheld disc                                                                                                          Ther Activity                                                                       Gait Training                                                                       Modalities  8/27 9/1                   MP/CP                       U/S    8'

## 2020-09-02 ENCOUNTER — OFFICE VISIT (OUTPATIENT)
Dept: PHYSICAL THERAPY | Facility: CLINIC | Age: 60
End: 2020-09-02
Payer: COMMERCIAL

## 2020-09-02 DIAGNOSIS — M79.631 RIGHT FOREARM PAIN: Primary | ICD-10-CM

## 2020-09-02 DIAGNOSIS — M25.531 RIGHT WRIST PAIN: ICD-10-CM

## 2020-09-02 PROCEDURE — 97112 NEUROMUSCULAR REEDUCATION: CPT | Performed by: PHYSICAL THERAPIST

## 2020-09-02 PROCEDURE — 97110 THERAPEUTIC EXERCISES: CPT | Performed by: PHYSICAL THERAPIST

## 2020-09-02 PROCEDURE — 97140 MANUAL THERAPY 1/> REGIONS: CPT | Performed by: PHYSICAL THERAPIST

## 2020-09-02 NOTE — PROGRESS NOTES
Daily Note     Today's date: 2020  Patient name: Brian Jacinto  : 1960  MRN: [id number]  Referring provider: Rissa Garcia DO  Dx:   Encounter Diagnosis     ICD-10-CM    1  Right forearm pain  M79 631    2  Right wrist pain  M25 531                   Subjective: Pt reports improving R radial wrist and forearm pain  Objective: See treatment diary below  ULTT: (-) ulnar, (+) radial    Assessment: Pt demonstrated improved neural tension and tolerance to strengthening  Plan: Cont  POC  Precautions:  PMHx: COPD, asthma  Dx: acute LBP with a stability preference, R UE radial and ulnar nerve tension secondary to postural abnormalities    Manuals    R post scalene Graston 4 min 4 min 4 min       5 min   R ulnar nerve glides 1x10 1x10 1x10 D/c      1x10   R radial nerve glides 1x10 1x10 1x10       1x10   Graston R EPL 3 min 3 min 4 min                                    Neuro Re-Ed             cv retraction against wall modified 2"x10 2"x10 2"x10       2"x15   4-finger cv B rot AROM 1x10 1x10 ea 1x15       1x15   iso scap squeeze 5"x15 5"x15 5"x15       5"x15   Supine chin tucks 3"x15 3"x15 3"x15       2"x15   R ulnar nerve glides 1x10 1x10 1x10 D/c      1x10   Ther Ex             Resisted R wrist flexion 2#  2x10 2#  2x10 2#  3x10       1#  3x15   Resisted R wrist extension 2#  2x10 2#  2x10 2#  3x10       1#  3x12   R UT stretch 10"x3 10"x3 10"x3       10"x3   digiflex   R/  3"x20                                    Ther Activity                                       Gait Training                                       Modalities

## 2020-09-03 ENCOUNTER — OFFICE VISIT (OUTPATIENT)
Dept: OCCUPATIONAL THERAPY | Age: 60
End: 2020-09-03
Payer: COMMERCIAL

## 2020-09-03 DIAGNOSIS — G56.21 ULNAR NEURITIS, RIGHT: Primary | ICD-10-CM

## 2020-09-03 DIAGNOSIS — M77.8 FOREARM TENDONITIS: ICD-10-CM

## 2020-09-03 PROCEDURE — 97168 OT RE-EVAL EST PLAN CARE: CPT

## 2020-09-03 PROCEDURE — 97140 MANUAL THERAPY 1/> REGIONS: CPT

## 2020-09-03 PROCEDURE — 97110 THERAPEUTIC EXERCISES: CPT

## 2020-09-03 NOTE — PROGRESS NOTES
OT Re-Evaluation     Today's date: 9/3/2020  Patient name: Samara Dakin  : 1960  MRN: [id number]  Referring provider: Jennie Forrester MD  Dx:   Encounter Diagnosis     ICD-10-CM    1  Ulnar neuritis, right  G56 21    2  Forearm tendonitis  M77 9                   Assessment  Assessment details: 9/3/2020- Pt has demo G progress toward skilled OT goals and tx  Pt has reported dec pain and decreased numbness//tingling in her arm/hands  Pt reports the only time she occ feels burning or numbness is after driving for a long time  Therapist educated pt on activity modification, changing hand placement, and supporting elbow during driving  Pt also reported G relief with KT and compression sleeve  Pt has demo dec pain which has allowed for inc ROM of wrist and inc strength  Pt would benefit from another 1-2 weeks of therapy for carryover of HEP  Pt is a 60 y/o female, who was dx with  Right ulnar neuritis, Right forearm tendinosis of extensor muscles, and possible radial tunnel syndrome by Dr Danna Guzman  Pt awaiting for EMG, but not scheduled until 2020  Pt presents with 2/10 pain of wrist and forearm today  Pt reported numbness/tingling of 3rd-5th digits have improved, but can get exacerbated with heavy lifting, or long drives  Pt does not present with tenderness at extensors or 1st DC where she reports most pain  Pt only c/o stretch feeling during physical examination  Pt educated on extensor stretches as well as UNGs and RNGs  Pt demo F carryover  Pt to continue to wear wrist brace at night  Therapist to focus treatment on anti-inflammatory techniques, stretching, and strengthening as pt noted with mild/mod weakness of RUE, which she wants to be able to perf her long-term work tasks without pain         Impairments: abnormal coordination, activity intolerance, impaired physical strength, lacks appropriate home exercise program and pain with function  Other impairment: Occ burning feeling at proximal forearm  Functional limitations: pain with function and work tasks  Symptom irritability: lowUnderstanding of Dx/Px/POC: good   Prognosis: good    Goals  STGs:  1  Pt will be independent with HEP (progressing)  2  Pt will inc R  strength + 5 pounds *MET)  3  Pt will report only 2/10 pain during physical tasks and 0 at rest  (MET)  LTGs:  1  Pt will report no pain with work (Progressing)  2  Pt will report no numbness/tingling down R arm (MET)  3  Pt will inc RUE strength to 4+/5 and  strength to norm value for age group  (Progressing)    Plan  Patient would benefit from: skilled physical therapy  Planned modality interventions: cryotherapy, thermotherapy: hydrocollator packs and ultrasound  Other planned modality interventions: IASTM  Planned therapy interventions: manual therapy, massage, activity modification, neuromuscular re-education, patient education, strengthening, stretching, therapeutic activities, therapeutic exercise, functional ROM exercises and home exercise program  Frequency: 2x week (1-2x per week)  Duration in weeks: 3  Plan of Care beginning date: 9/3/2020  Plan of Care expiration date: 9/24/2020  Treatment plan discussed with: patient        Subjective Evaluation    History of Present Illness  Date of onset: 7/23/2020  Mechanism of injury: trauma  Mechanism of injury: Cynthia Aparicio is a 61y o  year old RHD female who presented for evaluation, to Dr Elmer Gil, of right wrist, forearm pain  Referred by Dr Kelsie Nichole  Pain started about 3 weeks ago with no injury or trauma  She has dorsal lateral proximal forearm pain when lifting, gripping, grasping items, also note burning sensation  She also has numbness and tingling sensation down ulnar aspect of forearm dorsum of long, ring, index fingers when she leaves elbow leaning on car console, repositions and symptoms are alleviated   Pt provided with wrist brace during hours of sleep, which has helped with the numbness/burning          Not a recurrent problem   Quality of life: good    Pain  Current pain ratin  At best pain ratin  At worst pain ratin  Location: radial side of wrist and forearm  Quality: burning and dull ache  Relieving factors: rest, support and change in position  Aggravating factors: lifting  Progression: improved    Hand dominance: right      Diagnostic Tests  No diagnostic tests performed    FCE comments: EMG scheduled but not until 2020  Treatments  Previous treatment: immobilization  Current treatment: occupational therapy  Patient Goals  Patient goals for therapy: decreased pain, increased strength and return to work          Objective     Tenderness     Right Wrist/Hand   No tenderness in the first dorsal compartment and common extensor tendon  Additional Tenderness Details  Pt did not fele or demo tenderness, but reports pain at 1st DC and common extensor    Neurological Testing     Sensation     Wrist/Hand     Right   Intact: light touch and pin prick    Active Range of Motion     Left Wrist   Normal active range of motion    Right Wrist   Wrist flexion: 65 degrees   Wrist extension: 60 degrees   Radial deviation: 30 degrees   Ulnar deviation: 30 degrees     Additional Active Range of Motion Details  9/3/2020- Inc AROM fo wrist flex/ext +5 to 10 degrees, now WNLs      Strength/Myotome Testing     Left Wrist/Hand   Wrist extension: 4+  Wrist flexion: 4+  Radial deviation: 4+  Ulnar deviation: 4+     (2nd hand position)     Trial 1: 35    Trial 2: 35    Trial 3: 35    Thumb Strength  Key/Lateral Pinch     Trial 1: 10  Tip/Two-Point Pinch     Trial 1: 6  Palmar/Three-Point Pinch     Trial 1: 12    Right Wrist/Hand   Wrist extension: 4  Wrist flexion: 4  Radial deviation: 4  Ulnar deviation: 4     (2nd hand position)     Trial 1: 40    Trial 2: 30    Trial 3: 35    Thumb Strength   Key/Lateral Pinch     Trial 1: 16  Tip/Two-Point Pinch     Trial 1: 6  Palmar/Three-Point Pinch     Trial 1: 8    Additional Strength Details  9/3/2020- Pt noted with +10 pound inc of  strength and inc pinch strength for each type of pinch  Pt norm value for age group should be  strength at least 45 pounds; pt demo moderate weakness  Tests     Right Elbow   Negative Cozen's and Tinel's sign (cubital tunnel)  Right Wrist/Hand   Negative Finkelstein's, Phalen's sign, Tinel's sign (medial nerve) and Tinel's sign (radial tunnel)  Additional Tests Details  Pt reported her numbness/tingling has gotten better; and only feels it occasionally                Precautions: Universal      Manuals 9/3            IASTM forearm 5'            MWM wrist 3'            U/MNGs                          Neuro Re-Ed                                                                                                        Ther Ex 9/3            R/U Nerve glides             Wrist strengthening 1# 2x10            Wrist stretching extension 2 sets 20 sec            gripping YPW, RB                                                                Ther Activity                                       Gait Training                                       Modalities             Mp/CP             U/S

## 2020-09-04 ENCOUNTER — OFFICE VISIT (OUTPATIENT)
Dept: PHYSICAL THERAPY | Facility: CLINIC | Age: 60
End: 2020-09-04
Payer: COMMERCIAL

## 2020-09-04 DIAGNOSIS — M54.50 ACUTE RIGHT-SIDED LOW BACK PAIN WITHOUT SCIATICA: ICD-10-CM

## 2020-09-04 DIAGNOSIS — M25.531 RIGHT WRIST PAIN: ICD-10-CM

## 2020-09-04 DIAGNOSIS — M79.631 RIGHT FOREARM PAIN: Primary | ICD-10-CM

## 2020-09-04 PROCEDURE — 97112 NEUROMUSCULAR REEDUCATION: CPT

## 2020-09-04 PROCEDURE — 97110 THERAPEUTIC EXERCISES: CPT

## 2020-09-04 PROCEDURE — 97140 MANUAL THERAPY 1/> REGIONS: CPT

## 2020-09-04 NOTE — PROGRESS NOTES
Daily Note     Today's date: 2020  Patient name: Brian Jacinto  : 1960  MRN: [id number]  Referring provider: Rissa Garcia DO  Dx:   Encounter Diagnosis     ICD-10-CM    1  Right forearm pain  M79 631    2  Right wrist pain  M25 531    3  Acute right-sided low back pain without sciatica  M54 5                   Subjective: Pt reports mild burning pain at the outside of her thumb  Objective: See treatment diary below    Assessment: Pt demonstrated increased  strength, moderate radial neural tension  Plan: Cont  POC  Precautions:  PMHx: COPD, asthma  Dx: acute LBP with a stability preference, R UE radial and ulnar nerve tension secondary to postural abnormalities    Manuals          R post scalene Graston 4 min 4 min 4 min 4 min         R ulnar nerve glides 1x10 1x10 1x10 D/c         R radial nerve glides 1x10 1x10 1x10 1x10         Graston R EPL 3 min 3 min 4 min  4 min                                   Neuro Re-Ed             cv retraction against wall modified 2"x10 2"x10 2"x10 2"x15         4-finger cv B rot AROM 1x10 1x10 ea 1x15 1x15         iso scap squeeze 5"x15 5"x15 5"x15 5"x15         Supine chin tucks 3"x15 3"x15 3"x15 3"x15         R ulnar nerve glides 1x10 1x10 1x10 D/c         Ther Ex             Resisted R wrist flexion 2#  2x10 2#  2x10 2#  3x10 2#  3x10         Resisted R wrist extension 2#  2x10 2#  2x10 2#  3x10 2#  3x10         R UT stretch 10"x3 10"x3 10"x3 10"x3         digiflex   R/  3"x20 R/  3"x25                                   Ther Activity                                       Gait Training                                       Modalities

## 2020-09-08 ENCOUNTER — OFFICE VISIT (OUTPATIENT)
Dept: OCCUPATIONAL THERAPY | Age: 60
End: 2020-09-08
Payer: COMMERCIAL

## 2020-09-08 DIAGNOSIS — M77.8 FOREARM TENDONITIS: ICD-10-CM

## 2020-09-08 DIAGNOSIS — G56.21 ULNAR NEURITIS, RIGHT: Primary | ICD-10-CM

## 2020-09-08 PROCEDURE — 97035 APP MDLTY 1+ULTRASOUND EA 15: CPT

## 2020-09-08 PROCEDURE — 97110 THERAPEUTIC EXERCISES: CPT

## 2020-09-08 PROCEDURE — 97140 MANUAL THERAPY 1/> REGIONS: CPT

## 2020-09-08 NOTE — PROGRESS NOTES
Daily Note     Today's date: 2020  Patient name: Tamica Jordan  : 1960  MRN: [id number]  Referring provider: Isablela Olivia MD  Dx:   Encounter Diagnosis     ICD-10-CM    1  Ulnar neuritis, right  G56 21    2  Forearm tendonitis  M77 9                   Subjective: "I am getting better, I just felt a little burning pain at the top of my forearm "  Pt reports occ pain but only lasts for a few seconds; better after stretch/change of position  Objective: See treatment diary below  Tx focused on HEP of stretching and strengthening and education for self use of KT for home  Assessment: Tolerated treatment well  Patient exhibited good technique with therapeutic exercises and would benefit from continued OT      Plan: Potential discharge next visit  Precautions:  PMHx: COPD, asthma  Dx: acute LBP with a stability preference, R UE radial and ulnar nerve tension secondary to postural abnormalities    Manuals 9/3  9/8                   IASTM forearm 5'  8'                   MWM wrist 3'  2'                   U/MNGs                                               Neuro Re-Ed                                                                                                                                                                                               Ther Ex 9/3  9/8                   R/U Nerve glides                       Wrist strengthening 1# 2x10  1# 2x10                   Wrist stretching extension 2 sets 20 sec  twisting bar flex/ext 2 sets 10 hold 2-3 ; 4x wrist maze                   gripping YPW, RB                                                                                                                     Ther Activity                                                                       Gait Training                                                                       Modalities                       Mp/CP                       U/S    8

## 2020-09-09 ENCOUNTER — OFFICE VISIT (OUTPATIENT)
Dept: PHYSICAL THERAPY | Facility: CLINIC | Age: 60
End: 2020-09-09
Payer: COMMERCIAL

## 2020-09-09 DIAGNOSIS — M25.531 RIGHT WRIST PAIN: ICD-10-CM

## 2020-09-09 DIAGNOSIS — M79.631 RIGHT FOREARM PAIN: Primary | ICD-10-CM

## 2020-09-09 PROCEDURE — 97140 MANUAL THERAPY 1/> REGIONS: CPT

## 2020-09-09 PROCEDURE — 97112 NEUROMUSCULAR REEDUCATION: CPT

## 2020-09-09 PROCEDURE — 97110 THERAPEUTIC EXERCISES: CPT

## 2020-09-09 NOTE — PROGRESS NOTES
Daily Note     Today's date: 2020  Patient name: Terry Katelynn  : 1960  MRN: [id number]  Referring provider: Hilary Lopez DO  Dx:   Encounter Diagnosis     ICD-10-CM    1  Right forearm pain  M79 631    2  Right wrist pain  M25 531    3  Acute right-sided low back pain without sciatica  M54 5                   Subjective: Pt reports pain in wrist today  Objective: See treatment diary below    Assessment: mod restriction present w/ IASTM today as well as mod radial neural tension  No complaints of tingling post MT  Plan: Cont  POC  Precautions:  PMHx: COPD, asthma  Dx: acute LBP with a stability preference, R UE radial and ulnar nerve tension secondary to postural abnormalities    Manuals         R post scalene Graston 4 min 4 min 4 min 4 min 4 min        R ulnar nerve glides 1x10 1x10 1x10 D/c D/c        R radial nerve glides 1x10 1x10 1x10 1x10 1x10        Graston R EPL 3 min 3 min 4 min  4 min 4 min                                  Neuro Re-Ed             cv retraction against wall modified 2"x10 2"x10 2"x10 2"x15 2"x15          4-finger cv B rot AROM 1x10 1x10 ea 1x15 1x15 1x15        iso scap squeeze 5"x15 5"x15 5"x15 5"x15 5"x15        Supine chin tucks 3"x15 3"x15 3"x15 3"x15 3"x15        R ulnar nerve glides 1x10 1x10 1x10 D/c         Ther Ex             Resisted R wrist flexion 2#  2x10 2#  2x10 2#  3x10 2#  3x10 2# 3x10        Resisted R wrist extension 2#  2x10 2#  2x10 2#  3x10 2#  3x10 2# 3x10        R UT stretch 10"x3 10"x3 10"x3 10"x3 10"x3        digiflex   R/  3"x20 R/  3"x25   R/3"x25                                    Ther Activity                                       Gait Training                                       Modalities

## 2020-09-10 ENCOUNTER — OFFICE VISIT (OUTPATIENT)
Dept: OCCUPATIONAL THERAPY | Age: 60
End: 2020-09-10
Payer: COMMERCIAL

## 2020-09-10 DIAGNOSIS — M77.8 FOREARM TENDONITIS: ICD-10-CM

## 2020-09-10 DIAGNOSIS — G56.21 ULNAR NEURITIS, RIGHT: Primary | ICD-10-CM

## 2020-09-10 PROCEDURE — 97110 THERAPEUTIC EXERCISES: CPT

## 2020-09-10 PROCEDURE — 97140 MANUAL THERAPY 1/> REGIONS: CPT

## 2020-09-10 NOTE — PROGRESS NOTES
Daily Note     Today's date: 9/10/2020  Patient name: Samara Dakin  : 1960  MRN: [id number]  Referring provider: Jennie Forrester MD  Dx:   Encounter Diagnosis     ICD-10-CM    1  Ulnar neuritis, right  G56 21    2  Forearm tendonitis  M77 9                   Subjective: 'I feel good, occ little pain at my forearm but it goes away quickly "      Objective: See treatment diary below  Today pt is D/C 2* pt has achieve max independence of goals  Pt noted with WFLs ROM, inc foto score from 50 to 72, inc strength of hand and , and denies any numbness/tingling  Assessment: Tolerated treatment well  Patient exhibited good technique with therapeutic exercises      Plan: D/C today     Precautions:  PMHx: COPD, asthma  Dx: acute LBP with a stability preference, R UE radial and ulnar nerve tension secondary to postural abnormalities        Manuals 9/3  9/8  9/10                 IASTM forearm 5'  8'  5'                 MWM wrist 3'  2'  2'                 U/MNGs      5'                                         Neuro Re-Ed                                                                                                                                                                                               Ther Ex 9/3  9/8  9/10                 R/U Nerve glides                       Wrist strengthening 1# 2x10  1# 2x10  1# 2x10; arm bike 10'                 Wrist stretching extension 2 sets 20 sec  twisting bar flex/ext 2 sets 10 hold 2-3 ; 4x wrist maze  3 sets 20 sec stretches                 gripping YPW, RB                                                                                                                     Ther Activity                                                                       Gait Training                                                                       Modalities                       Mp/CP                       U/S    8

## 2020-09-11 ENCOUNTER — OFFICE VISIT (OUTPATIENT)
Dept: PHYSICAL THERAPY | Facility: CLINIC | Age: 60
End: 2020-09-11
Payer: COMMERCIAL

## 2020-09-11 DIAGNOSIS — M79.631 RIGHT FOREARM PAIN: Primary | ICD-10-CM

## 2020-09-11 DIAGNOSIS — M54.50 ACUTE RIGHT-SIDED LOW BACK PAIN WITHOUT SCIATICA: ICD-10-CM

## 2020-09-11 DIAGNOSIS — M25.531 RIGHT WRIST PAIN: ICD-10-CM

## 2020-09-11 PROCEDURE — 97110 THERAPEUTIC EXERCISES: CPT

## 2020-09-11 PROCEDURE — 97140 MANUAL THERAPY 1/> REGIONS: CPT

## 2020-09-11 PROCEDURE — 97112 NEUROMUSCULAR REEDUCATION: CPT

## 2020-09-11 NOTE — PROGRESS NOTES
Daily Note     Today's date: 2020  Patient name: Kendra Blizzard  : 1960  MRN: [id number]  Referring provider: Author Catrina DO  Dx:   Encounter Diagnosis     ICD-10-CM    1  Right forearm pain  M79 631    2  Right wrist pain  M25 531    3  Acute right-sided low back pain without sciatica  M54 5                   Subjective: Pt reports decreased frequency of lateral wrist pain  Objective: See treatment diary below    Assessment: Pt demonstrated fair tolerance to wrist extension and  strengthening 2* pain  Pt demonstrated no change in moderate radial neural tension  Plan: Cont  POC as per PT  Precautions:  PMHx: COPD, asthma  Dx: acute LBP with a stability preference, R UE radial and ulnar nerve tension secondary to postural abnormalities    Manuals        R post scalene Graston 4 min 4 min 4 min 4 min 4 min 4 min       R ulnar nerve glides 1x10 1x10 1x10 D/c D/c        R radial nerve glides 1x10 1x10 1x10 1x10 1x10 1x10       Graston R EPL 3 min 3 min 4 min  4 min 4 min 4 min                                 Neuro Re-Ed             cv retraction against wall modified 2"x10 2"x10 2"x10 2"x15 2"x15   2"x15       4-finger cv B rot AROM 1x10 1x10 ea 1x15 1x15 1x15 1x15       iso scap squeeze 5"x15 5"x15 5"x15 5"x15 5"x15 5"x15       Supine chin tucks 3"x15 3"x15 3"x15 3"x15 3"x15 3"x15       R ulnar nerve glides 1x10 1x10 1x10 D/c         Ther Ex             Resisted R wrist flexion 2#  2x10 2#  2x10 2#  3x10 2#  3x10 2# 3x10 2#  3x10       Resisted R wrist extension 2#  2x10 2#  2x10 2#  3x10 2#  3x10 2# 3x10 2#  3x10       R UT stretch 10"x3 10"x3 10"x3 10"x3 10"x3 10"x3       digiflex   R/  3"x20 R/  3"x25   R/3"x25   R/  3"x20                                 Ther Activity                                       Gait Training                                       Modalities

## 2020-09-15 ENCOUNTER — OFFICE VISIT (OUTPATIENT)
Dept: PHYSICAL THERAPY | Facility: CLINIC | Age: 60
End: 2020-09-15
Payer: COMMERCIAL

## 2020-09-15 DIAGNOSIS — J43.9 PULMONARY EMPHYSEMA, UNSPECIFIED EMPHYSEMA TYPE (HCC): ICD-10-CM

## 2020-09-15 DIAGNOSIS — M25.531 RIGHT WRIST PAIN: ICD-10-CM

## 2020-09-15 DIAGNOSIS — M79.631 RIGHT FOREARM PAIN: Primary | ICD-10-CM

## 2020-09-15 DIAGNOSIS — M54.50 ACUTE RIGHT-SIDED LOW BACK PAIN WITHOUT SCIATICA: ICD-10-CM

## 2020-09-15 PROCEDURE — 97110 THERAPEUTIC EXERCISES: CPT

## 2020-09-15 PROCEDURE — 97140 MANUAL THERAPY 1/> REGIONS: CPT

## 2020-09-15 PROCEDURE — 97112 NEUROMUSCULAR REEDUCATION: CPT

## 2020-09-15 RX ORDER — MONTELUKAST SODIUM 10 MG/1
10 TABLET ORAL DAILY
Qty: 30 TABLET | Refills: 5 | Status: CANCELLED | OUTPATIENT
Start: 2020-09-15

## 2020-09-15 NOTE — PROGRESS NOTES
Daily Note     Today's date: 9/15/2020  Patient name: Ricardo Littlejohn  : 1960  MRN: [id number]  Referring provider: Tejas Reagan DO  Dx:   Encounter Diagnosis     ICD-10-CM    1  Right forearm pain  M79 631    2  Right wrist pain  M25 531    3  Acute right-sided low back pain without sciatica  M54 5                   Subjective: Pt reports overall improvement in pain in wrist  No complaints of pain in neck  Objective: See treatment diary below    Assessment: Mod redness and restriction noted in wrist and R post scalenes  Minimal neural tension noted today  Plan: Cont  POC as per PT  Precautions:  PMHx: COPD, asthma  Dx: acute LBP with a stability preference, R UE radial and ulnar nerve tension secondary to postural abnormalities    Manuals 8/26 8/28 9/2 9/4 9/9 9/11 9/15      R post scalene Graston 4 min 4 min 4 min 4 min 4 min 4 min 4 min      R ulnar nerve glides 1x10 1x10 1x10 D/c D/c        R radial nerve glides 1x10 1x10 1x10 1x10 1x10 1x10 1x10      Graston R EPL 3 min 3 min 4 min  4 min 4 min 4 min 4 min                                Neuro Re-Ed             cv retraction against wall modified 2"x10 2"x10 2"x10 2"x15 2"x15   2"x15 2"x15      4-finger cv B rot AROM 1x10 1x10 ea 1x15 1x15 1x15 1x15 1x15      iso scap squeeze 5"x15 5"x15 5"x15 5"x15 5"x15 5"x15 5"x15`      Supine chin tucks 3"x15 3"x15 3"x15 3"x15 3"x15 3"x15 3"x15      R ulnar nerve glides 1x10 1x10 1x10 D/c         Ther Ex             Resisted R wrist flexion 2#  2x10 2#  2x10 2#  3x10 2#  3x10 2# 3x10 2#  3x10 2# 3x10      Resisted R wrist extension 2#  2x10 2#  2x10 2#  3x10 2#  3x10 2# 3x10 2#  3x10 2# 3x10      R UT stretch 10"x3 10"x3 10"x3 10"x3 10"x3 10"x3 10"x3      digiflex   R/  3"x20 R/  3"x25   R/3"x25   R/  3"x20 R/  3"x20                                Ther Activity                                       Gait Training                                       Modalities

## 2020-09-15 NOTE — TELEPHONE ENCOUNTER
Name of medication, dose, quantity and frequency   Requested Prescriptions     Pending Prescriptions Disp Refills    montelukast (SINGULAIR) 10 mg tablet 30 tablet 5     Sig: Take 1 tablet (10 mg total) by mouth daily       Number of refills left: 0    Amount of medication left: 1 TABLET     Pharmacy verified and updated YES     Additional information:

## 2020-09-22 ENCOUNTER — OFFICE VISIT (OUTPATIENT)
Dept: PHYSICAL THERAPY | Facility: CLINIC | Age: 60
End: 2020-09-22
Payer: COMMERCIAL

## 2020-09-22 DIAGNOSIS — M79.631 RIGHT FOREARM PAIN: Primary | ICD-10-CM

## 2020-09-22 DIAGNOSIS — M25.531 RIGHT WRIST PAIN: ICD-10-CM

## 2020-09-22 PROCEDURE — 97140 MANUAL THERAPY 1/> REGIONS: CPT

## 2020-09-22 PROCEDURE — 97112 NEUROMUSCULAR REEDUCATION: CPT

## 2020-09-22 PROCEDURE — 97110 THERAPEUTIC EXERCISES: CPT

## 2020-09-22 NOTE — PROGRESS NOTES
Daily Note     Today's date: 2020  Patient name: Norm Monet  : 1960  MRN: [id number]  Referring provider: Judy Perkins DO  Dx:   Encounter Diagnosis     ICD-10-CM    1  Right forearm pain  M79 631    2  Right wrist pain  M25 531                   Subjective: Pt reports a significant decrease in frequency of right wrist pain  Objective: See treatment diary below    Assessment: Pt demonstrated decreased sensitivity to R scalene Graston and increased tolerance to wrist and  strengthening  Pt demonstrated mild radial wrist pain with gripping exercise  Plan: Cont  POC as per PT  Precautions:  PMHx: COPD, asthma  Dx: acute LBP with a stability preference, R UE radial and ulnar nerve tension secondary to postural abnormalities    Manuals 8/26 8/28 9/2 9/4 9/9 9/11 9/15 9/22     R post scalene Graston 4 min 4 min 4 min 4 min 4 min 4 min 4 min 4 min     R ulnar nerve glides 1x10 1x10 1x10 D/c D/c        R radial nerve glides 1x10 1x10 1x10 1x10 1x10 1x10 1x10 1x10     Graston R EPL 3 min 3 min 4 min  4 min 4 min 4 min 4 min 4 min                               Neuro Re-Ed             cv retraction against wall modified 2"x10 2"x10 2"x10 2"x15 2"x15   2"x15 2"x15 2"x15     4-finger cv B rot AROM 1x10 1x10 ea 1x15 1x15 1x15 1x15 1x15 1x15     iso scap squeeze 5"x15 5"x15 5"x15 5"x15 5"x15 5"x15 5"x15` 5"x15     Supine chin tucks 3"x15 3"x15 3"x15 3"x15 3"x15 3"x15 3"x15 5"x15     R ulnar nerve glides 1x10 1x10 1x10 D/c         Ther Ex             Resisted R wrist flexion 2#  2x10 2#  2x10 2#  3x10 2#  3x10 2# 3x10 2#  3x10 2# 3x10 2#  3x12     Resisted R wrist extension 2#  2x10 2#  2x10 2#  3x10 2#  3x10 2# 3x10 2#  3x10 2# 3x10 2#  3x10     R UT stretch 10"x3 10"x3 10"x3 10"x3 10"x3 10"x3 10"x3 10"x3     digiflex   R/  3"x20 R/  3"x25   R/3"x25   R/  3"x20 R/  3"x20 R/  3"x25                               Ther Activity                                       Gait Training Modalities

## 2020-09-25 ENCOUNTER — OFFICE VISIT (OUTPATIENT)
Dept: PHYSICAL THERAPY | Facility: CLINIC | Age: 60
End: 2020-09-25
Payer: COMMERCIAL

## 2020-09-25 DIAGNOSIS — M79.631 RIGHT FOREARM PAIN: Primary | ICD-10-CM

## 2020-09-25 DIAGNOSIS — M25.531 RIGHT WRIST PAIN: ICD-10-CM

## 2020-09-25 PROCEDURE — 97112 NEUROMUSCULAR REEDUCATION: CPT | Performed by: PHYSICAL THERAPIST

## 2020-09-25 PROCEDURE — 97110 THERAPEUTIC EXERCISES: CPT | Performed by: PHYSICAL THERAPIST

## 2020-09-25 PROCEDURE — 97140 MANUAL THERAPY 1/> REGIONS: CPT | Performed by: PHYSICAL THERAPIST

## 2020-09-25 NOTE — PROGRESS NOTES
Daily Note     Today's date: 2020  Patient name: Gerald Calzada  : 1960  MRN: [id number]  Referring provider: Kathleen Orourke DO  Dx:   Encounter Diagnosis     ICD-10-CM    1  Right forearm pain  M79 631    2  Right wrist pain  M25 531                   Subjective: Pt reports now going back to work starting next week and improved pain and functional strength  Objective: See treatment diary below    Assessment: Pt demonstrated improved tolerance to UE strengthening  Plan: Cont  POC  Discharge in 2 visits  Precautions:  PMHx: COPD, asthma  Dx: acute LBP with a stability preference, R UE radial and ulnar nerve tension secondary to postural abnormalities    Manuals 8/26 8/28 9/2 9/4 9/9 9/11 9/15 9/22 9/25    R post scalene Graston 4 min 4 min 4 min 4 min 4 min 4 min 4 min 4 min np    R ulnar nerve glides 1x10 1x10 1x10 D/c D/c        R radial nerve glides 1x10 1x10 1x10 1x10 1x10 1x10 1x10 1x10 np    Graston R EPL 3 min 3 min 4 min  4 min 4 min 4 min 4 min 4 min 8 min                              Neuro Re-Ed             cv retraction against wall modified 2"x10 2"x10 2"x10 2"x15 2"x15   2"x15 2"x15 2"x15 2"x15    4-finger cv B rot AROM 1x10 1x10 ea 1x15 1x15 1x15 1x15 1x15 1x15 1x15    iso scap squeeze 5"x15 5"x15 5"x15 5"x15 5"x15 5"x15 5"x15` 5"x15     Supine chin tucks 3"x15 3"x15 3"x15 3"x15 3"x15 3"x15 3"x15 5"x15     R ulnar nerve glides 1x10 1x10 1x10 D/c     1x10    Ther Ex             Resisted R wrist flexion 2#  2x10 2#  2x10 2#  3x10 2#  3x10 2# 3x10 2#  3x10 2# 3x10 2#  3x12 2#  3x12    Resisted R wrist extension 2#  2x10 2#  2x10 2#  3x10 2#  3x10 2# 3x10 2#  3x10 2# 3x10 2#  3x10 2#  3x12    R UT stretch 10"x3 10"x3 10"x3 10"x3 10"x3 10"x3 10"x3 10"x3 10"x3    digiflex   R/  3"x20 R/  3"x25   R/3"x25   R/  3"x20 R/  3"x20 R/  3"x25 G/  3"/  3x20    CC rows         2x5#  2x10    Resisted elbow flexion         2#  3x10    Ther Activity             Box lifts from table          5# Box lifts OH to shelf          2#   Gait Training                                       Modalities

## 2020-09-29 ENCOUNTER — APPOINTMENT (OUTPATIENT)
Dept: PHYSICAL THERAPY | Facility: CLINIC | Age: 60
End: 2020-09-29
Payer: COMMERCIAL

## 2020-09-30 ENCOUNTER — OFFICE VISIT (OUTPATIENT)
Dept: PHYSICAL THERAPY | Facility: CLINIC | Age: 60
End: 2020-09-30
Payer: COMMERCIAL

## 2020-09-30 DIAGNOSIS — M25.531 RIGHT WRIST PAIN: ICD-10-CM

## 2020-09-30 DIAGNOSIS — M54.50 ACUTE RIGHT-SIDED LOW BACK PAIN WITHOUT SCIATICA: ICD-10-CM

## 2020-09-30 DIAGNOSIS — M79.631 RIGHT FOREARM PAIN: Primary | ICD-10-CM

## 2020-09-30 PROCEDURE — 97112 NEUROMUSCULAR REEDUCATION: CPT

## 2020-09-30 PROCEDURE — 97530 THERAPEUTIC ACTIVITIES: CPT

## 2020-09-30 PROCEDURE — 97140 MANUAL THERAPY 1/> REGIONS: CPT

## 2020-09-30 NOTE — PROGRESS NOTES
Daily Note     Today's date: 2020  Patient name: Kendra Blizzard  : 1960  MRN: [id number]  Referring provider: Author Catrina DO  Dx:   Encounter Diagnosis     ICD-10-CM    1  Right forearm pain  M79 631    2  Right wrist pain  M25 531    3  Acute right-sided low back pain without sciatica  M54 5                   Subjective: Pt offered no new comments or complaints  She reports minimal tingling in R hand recently and denies neck pain  Objective: See treatment diary below    Assessment: Progressed pt today w/ functional UE strengthening w/ good tolerance  Cues req to avoid UT compensation which she was able to correct but more independent awareness would be beneficial      Plan: Cont  POC  Discharge in 1 visit       Precautions:  PMHx: COPD, asthma  Dx: acute LBP with a stability preference, R UE radial and ulnar nerve tension secondary to postural abnormalities    Manuals 8/26 8/28 9/2 9/4 9/9 9/11 9/15 9/22 9/25 9/30   R post scalene Graston 4 min 4 min 4 min 4 min 4 min 4 min 4 min 4 min np np   R ulnar nerve glides 1x10 1x10 1x10 D/c D/c        R radial nerve glides 1x10 1x10 1x10 1x10 1x10 1x10 1x10 1x10 np np   Graston R EPL 3 min 3 min 4 min  4 min 4 min 4 min 4 min 4 min 8 min 8 min                             Neuro Re-Ed             cv retraction against wall modified 2"x10 2"x10 2"x10 2"x15 2"x15   2"x15 2"x15 2"x15 2"x15 2"x15   4-finger cv B rot AROM 1x10 1x10 ea 1x15 1x15 1x15 1x15 1x15 1x15 1x15 1x15   iso scap squeeze 5"x15 5"x15 5"x15 5"x15 5"x15 5"x15 5"x15` 5"x15  5"x20   Supine chin tucks 3"x15 3"x15 3"x15 3"x15 3"x15 3"x15 3"x15 5"x15     R ulnar nerve glides 1x10 1x10 1x10 D/c     1x10 1x10   Ther Ex             Resisted R wrist flexion 2#  2x10 2#  2x10 2#  3x10 2#  3x10 2# 3x10 2#  3x10 2# 3x10 2#  3x12 2#  3x12 2#  3x12   Resisted R wrist extension 2#  2x10 2#  2x10 2#  3x10 2#  3x10 2# 3x10 2#  3x10 2# 3x10 2#  3x10 2#  3x12 2# 3x12   R UT stretch 10"x3 10"x3 10"x3 10"x3 10"x3 10"x3 10"x3 10"x3 10"x3 10"x3   digiflex   R/  3"x20 R/  3"x25   R/3"x25   R/  3"x20 R/  3"x20 R/  3"x25 G/  3"/  3x20 G/3"/3x20   CC rows         2x5#  2x10 1x5# 2x10   Resisted elbow flexion         2#  3x10 2# 3x10   Ther Activity             Box lifts from table          5# 10x   Box lifts OH to shelf          2#  x10    Gait Training                                       Modalities

## 2020-10-02 ENCOUNTER — APPOINTMENT (OUTPATIENT)
Dept: PHYSICAL THERAPY | Facility: CLINIC | Age: 60
End: 2020-10-02
Payer: COMMERCIAL

## 2020-10-04 DIAGNOSIS — G89.29 CHRONIC BILATERAL LOW BACK PAIN WITHOUT SCIATICA: ICD-10-CM

## 2020-10-04 DIAGNOSIS — M79.631 RIGHT FOREARM PAIN: ICD-10-CM

## 2020-10-04 DIAGNOSIS — M54.50 CHRONIC BILATERAL LOW BACK PAIN WITHOUT SCIATICA: ICD-10-CM

## 2020-10-04 DIAGNOSIS — M25.531 RIGHT WRIST PAIN: ICD-10-CM

## 2020-10-05 DIAGNOSIS — G89.29 CHRONIC BILATERAL LOW BACK PAIN WITHOUT SCIATICA: ICD-10-CM

## 2020-10-05 DIAGNOSIS — M54.50 CHRONIC BILATERAL LOW BACK PAIN WITHOUT SCIATICA: ICD-10-CM

## 2020-10-05 RX ORDER — CYCLOBENZAPRINE HCL 5 MG
5 TABLET ORAL 3 TIMES DAILY PRN
Qty: 30 TABLET | Refills: 2 | Status: SHIPPED | OUTPATIENT
Start: 2020-10-05 | End: 2020-11-09 | Stop reason: SDUPTHER

## 2020-10-05 RX ORDER — CELECOXIB 200 MG/1
200 CAPSULE ORAL DAILY
Qty: 30 CAPSULE | Refills: 2 | Status: SHIPPED | OUTPATIENT
Start: 2020-10-05 | End: 2020-12-17

## 2020-10-07 ENCOUNTER — OFFICE VISIT (OUTPATIENT)
Dept: PHYSICAL THERAPY | Facility: CLINIC | Age: 60
End: 2020-10-07
Payer: COMMERCIAL

## 2020-10-07 DIAGNOSIS — M79.631 RIGHT FOREARM PAIN: ICD-10-CM

## 2020-10-07 DIAGNOSIS — M25.531 RIGHT WRIST PAIN: ICD-10-CM

## 2020-10-07 DIAGNOSIS — M79.631 RIGHT FOREARM PAIN: Primary | ICD-10-CM

## 2020-10-07 DIAGNOSIS — G89.29 CHRONIC BILATERAL LOW BACK PAIN WITHOUT SCIATICA: ICD-10-CM

## 2020-10-07 DIAGNOSIS — M54.50 CHRONIC BILATERAL LOW BACK PAIN WITHOUT SCIATICA: ICD-10-CM

## 2020-10-07 PROCEDURE — 97530 THERAPEUTIC ACTIVITIES: CPT

## 2020-10-07 PROCEDURE — 97140 MANUAL THERAPY 1/> REGIONS: CPT

## 2020-10-07 PROCEDURE — 97112 NEUROMUSCULAR REEDUCATION: CPT

## 2020-10-07 PROCEDURE — 97110 THERAPEUTIC EXERCISES: CPT

## 2020-10-08 RX ORDER — GABAPENTIN 100 MG/1
CAPSULE ORAL
Qty: 60 CAPSULE | Refills: 2 | Status: SHIPPED | OUTPATIENT
Start: 2020-10-08 | End: 2021-01-05

## 2020-11-02 DIAGNOSIS — E78.5 DYSLIPIDEMIA: ICD-10-CM

## 2020-11-02 RX ORDER — ATORVASTATIN CALCIUM 20 MG/1
TABLET, FILM COATED ORAL
Qty: 90 TABLET | Refills: 1 | Status: SHIPPED | OUTPATIENT
Start: 2020-11-02 | End: 2021-02-24

## 2020-11-08 DIAGNOSIS — J43.9 PULMONARY EMPHYSEMA, UNSPECIFIED EMPHYSEMA TYPE (HCC): ICD-10-CM

## 2020-11-09 ENCOUNTER — OFFICE VISIT (OUTPATIENT)
Dept: INTERNAL MEDICINE CLINIC | Facility: CLINIC | Age: 60
End: 2020-11-09

## 2020-11-09 VITALS
OXYGEN SATURATION: 97 % | TEMPERATURE: 97.6 F | HEART RATE: 93 BPM | BODY MASS INDEX: 24.03 KG/M2 | DIASTOLIC BLOOD PRESSURE: 80 MMHG | SYSTOLIC BLOOD PRESSURE: 140 MMHG | WEIGHT: 131.4 LBS

## 2020-11-09 DIAGNOSIS — M54.50 CHRONIC BILATERAL LOW BACK PAIN WITHOUT SCIATICA: ICD-10-CM

## 2020-11-09 DIAGNOSIS — M79.631 RIGHT FOREARM PAIN: ICD-10-CM

## 2020-11-09 DIAGNOSIS — J45.20 MILD INTERMITTENT ASTHMA WITHOUT COMPLICATION: ICD-10-CM

## 2020-11-09 DIAGNOSIS — Z12.31 ENCOUNTER FOR SCREENING MAMMOGRAM FOR MALIGNANT NEOPLASM OF BREAST: Primary | ICD-10-CM

## 2020-11-09 DIAGNOSIS — J42 CHRONIC BRONCHITIS, UNSPECIFIED CHRONIC BRONCHITIS TYPE (HCC): ICD-10-CM

## 2020-11-09 DIAGNOSIS — G89.29 CHRONIC BILATERAL LOW BACK PAIN WITHOUT SCIATICA: ICD-10-CM

## 2020-11-09 DIAGNOSIS — E78.5 DYSLIPIDEMIA: ICD-10-CM

## 2020-11-09 PROCEDURE — 99214 OFFICE O/P EST MOD 30 MIN: CPT | Performed by: PHYSICIAN ASSISTANT

## 2020-11-09 PROCEDURE — 3725F SCREEN DEPRESSION PERFORMED: CPT | Performed by: PHYSICIAN ASSISTANT

## 2020-11-09 RX ORDER — CYCLOBENZAPRINE HCL 5 MG
5 TABLET ORAL 3 TIMES DAILY PRN
Qty: 60 TABLET | Refills: 3 | Status: SHIPPED | OUTPATIENT
Start: 2020-11-09 | End: 2021-06-01

## 2020-11-12 ENCOUNTER — CLINICAL SUPPORT (OUTPATIENT)
Dept: INTERNAL MEDICINE CLINIC | Facility: CLINIC | Age: 60
End: 2020-11-12

## 2020-11-12 DIAGNOSIS — Z23 NEED FOR INFLUENZA VACCINATION: Primary | ICD-10-CM

## 2020-11-12 PROCEDURE — 90471 IMMUNIZATION ADMIN: CPT | Performed by: HOSPITALIST

## 2020-11-12 PROCEDURE — 90682 RIV4 VACC RECOMBINANT DNA IM: CPT | Performed by: HOSPITALIST

## 2020-12-04 ENCOUNTER — HOSPITAL ENCOUNTER (OUTPATIENT)
Dept: NEUROLOGY | Facility: CLINIC | Age: 60
Discharge: HOME | End: 2020-12-04
Payer: COMMERCIAL

## 2020-12-04 DIAGNOSIS — G56.21 ULNAR NEURITIS, RIGHT: ICD-10-CM

## 2020-12-04 PROCEDURE — 95886 MUSC TEST DONE W/N TEST COMP: CPT | Performed by: PSYCHIATRY & NEUROLOGY

## 2020-12-04 PROCEDURE — 95911 NRV CNDJ TEST 9-10 STUDIES: CPT | Performed by: PSYCHIATRY & NEUROLOGY

## 2020-12-17 DIAGNOSIS — M54.50 CHRONIC BILATERAL LOW BACK PAIN WITHOUT SCIATICA: ICD-10-CM

## 2020-12-17 DIAGNOSIS — G89.29 CHRONIC BILATERAL LOW BACK PAIN WITHOUT SCIATICA: ICD-10-CM

## 2020-12-17 DIAGNOSIS — M25.531 RIGHT WRIST PAIN: ICD-10-CM

## 2020-12-17 DIAGNOSIS — M79.631 RIGHT FOREARM PAIN: ICD-10-CM

## 2020-12-17 RX ORDER — CELECOXIB 200 MG/1
200 CAPSULE ORAL DAILY
Qty: 30 CAPSULE | Refills: 2 | Status: SHIPPED | OUTPATIENT
Start: 2020-12-17 | End: 2021-04-05

## 2020-12-18 DIAGNOSIS — J30.9 ALLERGIC SINUSITIS: ICD-10-CM

## 2020-12-18 RX ORDER — FLUTICASONE PROPIONATE 50 MCG
SPRAY, SUSPENSION (ML) NASAL
Qty: 16 ML | Refills: 3 | Status: SHIPPED | OUTPATIENT
Start: 2020-12-18 | End: 2022-05-09

## 2021-01-05 DIAGNOSIS — M25.531 RIGHT WRIST PAIN: ICD-10-CM

## 2021-01-05 DIAGNOSIS — G89.29 CHRONIC BILATERAL LOW BACK PAIN WITHOUT SCIATICA: ICD-10-CM

## 2021-01-05 DIAGNOSIS — M79.631 RIGHT FOREARM PAIN: ICD-10-CM

## 2021-01-05 DIAGNOSIS — M54.50 CHRONIC BILATERAL LOW BACK PAIN WITHOUT SCIATICA: ICD-10-CM

## 2021-01-05 RX ORDER — GABAPENTIN 100 MG/1
CAPSULE ORAL
Qty: 60 CAPSULE | Refills: 2 | Status: SHIPPED | OUTPATIENT
Start: 2021-01-05 | End: 2021-04-25

## 2021-02-24 DIAGNOSIS — E78.5 DYSLIPIDEMIA: ICD-10-CM

## 2021-02-24 RX ORDER — ATORVASTATIN CALCIUM 20 MG/1
TABLET, FILM COATED ORAL
Qty: 90 TABLET | Refills: 1 | Status: SHIPPED | OUTPATIENT
Start: 2021-02-24 | End: 2021-10-26 | Stop reason: SDUPTHER

## 2021-03-27 ENCOUNTER — APPOINTMENT (OUTPATIENT)
Dept: LAB | Age: 61
End: 2021-03-27
Payer: COMMERCIAL

## 2021-03-27 ENCOUNTER — HOSPITAL ENCOUNTER (OUTPATIENT)
Dept: RADIOLOGY | Age: 61
Discharge: HOME | End: 2021-03-27
Payer: COMMERCIAL

## 2021-03-27 VITALS — BODY MASS INDEX: 23.92 KG/M2 | HEIGHT: 62 IN | WEIGHT: 130 LBS

## 2021-03-27 DIAGNOSIS — E78.5 DYSLIPIDEMIA: ICD-10-CM

## 2021-03-27 DIAGNOSIS — Z12.31 ENCOUNTER FOR SCREENING MAMMOGRAM FOR MALIGNANT NEOPLASM OF BREAST: ICD-10-CM

## 2021-03-27 LAB
ALBUMIN SERPL BCP-MCNC: 4.1 G/DL (ref 3.5–5)
ALP SERPL-CCNC: 102 U/L (ref 46–116)
ALT SERPL W P-5'-P-CCNC: 21 U/L (ref 12–78)
ANION GAP SERPL CALCULATED.3IONS-SCNC: 3 MMOL/L (ref 4–13)
AST SERPL W P-5'-P-CCNC: 12 U/L (ref 5–45)
BASOPHILS # BLD AUTO: 0.03 THOUSANDS/ΜL (ref 0–0.1)
BASOPHILS NFR BLD AUTO: 0 % (ref 0–1)
BILIRUB SERPL-MCNC: 0.41 MG/DL (ref 0.2–1)
BUN SERPL-MCNC: 6 MG/DL (ref 5–25)
CALCIUM SERPL-MCNC: 9.5 MG/DL (ref 8.3–10.1)
CHLORIDE SERPL-SCNC: 109 MMOL/L (ref 100–108)
CHOLEST SERPL-MCNC: 189 MG/DL (ref 50–200)
CO2 SERPL-SCNC: 28 MMOL/L (ref 21–32)
CREAT SERPL-MCNC: 0.76 MG/DL (ref 0.6–1.3)
EOSINOPHIL # BLD AUTO: 0.05 THOUSAND/ΜL (ref 0–0.61)
EOSINOPHIL NFR BLD AUTO: 1 % (ref 0–6)
ERYTHROCYTE [DISTWIDTH] IN BLOOD BY AUTOMATED COUNT: 13.4 % (ref 11.6–15.1)
GFR SERPL CREATININE-BSD FRML MDRD: 86 ML/MIN/1.73SQ M
GLUCOSE P FAST SERPL-MCNC: 99 MG/DL (ref 65–99)
HCT VFR BLD AUTO: 43.2 % (ref 34.8–46.1)
HDLC SERPL-MCNC: 43 MG/DL
HGB BLD-MCNC: 14.3 G/DL (ref 11.5–15.4)
IMM GRANULOCYTES # BLD AUTO: 0.01 THOUSAND/UL (ref 0–0.2)
IMM GRANULOCYTES NFR BLD AUTO: 0 % (ref 0–2)
LDLC SERPL CALC-MCNC: 123 MG/DL (ref 0–100)
LYMPHOCYTES # BLD AUTO: 1.57 THOUSANDS/ΜL (ref 0.6–4.47)
LYMPHOCYTES NFR BLD AUTO: 23 % (ref 14–44)
MCH RBC QN AUTO: 32.1 PG (ref 26.8–34.3)
MCHC RBC AUTO-ENTMCNC: 33.1 G/DL (ref 31.4–37.4)
MCV RBC AUTO: 97 FL (ref 82–98)
MONOCYTES # BLD AUTO: 0.67 THOUSAND/ΜL (ref 0.17–1.22)
MONOCYTES NFR BLD AUTO: 10 % (ref 4–12)
NEUTROPHILS # BLD AUTO: 4.47 THOUSANDS/ΜL (ref 1.85–7.62)
NEUTS SEG NFR BLD AUTO: 66 % (ref 43–75)
NONHDLC SERPL-MCNC: 146 MG/DL
NRBC BLD AUTO-RTO: 0 /100 WBCS
PLATELET # BLD AUTO: 327 THOUSANDS/UL (ref 149–390)
PMV BLD AUTO: 10.4 FL (ref 8.9–12.7)
POTASSIUM SERPL-SCNC: 4.4 MMOL/L (ref 3.5–5.3)
PROT SERPL-MCNC: 7.9 G/DL (ref 6.4–8.2)
RBC # BLD AUTO: 4.46 MILLION/UL (ref 3.81–5.12)
SODIUM SERPL-SCNC: 140 MMOL/L (ref 136–145)
TRIGL SERPL-MCNC: 117 MG/DL
WBC # BLD AUTO: 6.8 THOUSAND/UL (ref 4.31–10.16)

## 2021-03-27 PROCEDURE — 80053 COMPREHEN METABOLIC PANEL: CPT

## 2021-03-27 PROCEDURE — 36415 COLL VENOUS BLD VENIPUNCTURE: CPT

## 2021-03-27 PROCEDURE — 80061 LIPID PANEL: CPT

## 2021-03-27 PROCEDURE — 85025 COMPLETE CBC W/AUTO DIFF WBC: CPT

## 2021-03-27 PROCEDURE — 77063 BREAST TOMOSYNTHESIS BI: CPT

## 2021-03-27 PROCEDURE — 77067 SCR MAMMO BI INCL CAD: CPT

## 2021-04-04 DIAGNOSIS — G89.29 CHRONIC BILATERAL LOW BACK PAIN WITHOUT SCIATICA: ICD-10-CM

## 2021-04-04 DIAGNOSIS — M54.50 CHRONIC BILATERAL LOW BACK PAIN WITHOUT SCIATICA: ICD-10-CM

## 2021-04-04 DIAGNOSIS — M25.531 RIGHT WRIST PAIN: ICD-10-CM

## 2021-04-04 DIAGNOSIS — M79.631 RIGHT FOREARM PAIN: ICD-10-CM

## 2021-04-05 RX ORDER — CELECOXIB 200 MG/1
200 CAPSULE ORAL DAILY
Qty: 30 CAPSULE | Refills: 2 | Status: SHIPPED | OUTPATIENT
Start: 2021-04-05 | End: 2021-06-28

## 2021-04-06 DIAGNOSIS — R21 RASH OF HANDS: ICD-10-CM

## 2021-04-20 DIAGNOSIS — E55.9 VITAMIN D DEFICIENCY: ICD-10-CM

## 2021-04-21 RX ORDER — CALCIUM CARBONATE/VITAMIN D3 600 MG-20
TABLET ORAL
Qty: 30 CAPSULE | Refills: 5 | Status: SHIPPED | OUTPATIENT
Start: 2021-04-21 | End: 2021-10-21

## 2021-04-24 DIAGNOSIS — G89.29 CHRONIC BILATERAL LOW BACK PAIN WITHOUT SCIATICA: ICD-10-CM

## 2021-04-24 DIAGNOSIS — M79.631 RIGHT FOREARM PAIN: ICD-10-CM

## 2021-04-24 DIAGNOSIS — J43.9 PULMONARY EMPHYSEMA, UNSPECIFIED EMPHYSEMA TYPE (HCC): ICD-10-CM

## 2021-04-24 DIAGNOSIS — M54.50 CHRONIC BILATERAL LOW BACK PAIN WITHOUT SCIATICA: ICD-10-CM

## 2021-04-24 DIAGNOSIS — M25.531 RIGHT WRIST PAIN: ICD-10-CM

## 2021-04-25 RX ORDER — GABAPENTIN 100 MG/1
CAPSULE ORAL
Qty: 60 CAPSULE | Refills: 2 | Status: SHIPPED | OUTPATIENT
Start: 2021-04-25 | End: 2021-05-07

## 2021-04-25 RX ORDER — MONTELUKAST SODIUM 10 MG/1
TABLET ORAL
Qty: 30 TABLET | Refills: 5 | Status: SHIPPED | OUTPATIENT
Start: 2021-04-25 | End: 2021-10-19

## 2021-04-29 ENCOUNTER — APPOINTMENT (OUTPATIENT)
Dept: URGENT CARE | Age: 61
End: 2021-04-29

## 2021-04-29 PROCEDURE — 99283 EMERGENCY DEPT VISIT LOW MDM: CPT | Performed by: PHYSICIAN ASSISTANT

## 2021-04-29 PROCEDURE — G0382 LEV 3 HOSP TYPE B ED VISIT: HCPCS | Performed by: PHYSICIAN ASSISTANT

## 2021-05-06 DIAGNOSIS — J43.9 PULMONARY EMPHYSEMA, UNSPECIFIED EMPHYSEMA TYPE (HCC): ICD-10-CM

## 2021-05-07 ENCOUNTER — OFFICE VISIT (OUTPATIENT)
Dept: INTERNAL MEDICINE CLINIC | Facility: CLINIC | Age: 61
End: 2021-05-07

## 2021-05-07 VITALS
WEIGHT: 133 LBS | DIASTOLIC BLOOD PRESSURE: 79 MMHG | BODY MASS INDEX: 24.48 KG/M2 | HEART RATE: 93 BPM | HEIGHT: 62 IN | SYSTOLIC BLOOD PRESSURE: 134 MMHG

## 2021-05-07 DIAGNOSIS — G89.29 CHRONIC RIGHT-SIDED LOW BACK PAIN WITH BILATERAL SCIATICA: ICD-10-CM

## 2021-05-07 DIAGNOSIS — M25.50 PAIN IN JOINT, MULTIPLE SITES: ICD-10-CM

## 2021-05-07 DIAGNOSIS — I73.9 CLAUDICATION OF BOTH LOWER EXTREMITIES (HCC): ICD-10-CM

## 2021-05-07 DIAGNOSIS — R20.0 NUMBNESS AND TINGLING OF BOTH FEET: ICD-10-CM

## 2021-05-07 DIAGNOSIS — R53.83 FATIGUE, UNSPECIFIED TYPE: ICD-10-CM

## 2021-05-07 DIAGNOSIS — M54.41 CHRONIC RIGHT-SIDED LOW BACK PAIN WITH BILATERAL SCIATICA: ICD-10-CM

## 2021-05-07 DIAGNOSIS — R26.9 GAIT DISTURBANCE: ICD-10-CM

## 2021-05-07 DIAGNOSIS — R20.2 NUMBNESS AND TINGLING OF BOTH FEET: ICD-10-CM

## 2021-05-07 DIAGNOSIS — M54.42 CHRONIC RIGHT-SIDED LOW BACK PAIN WITH BILATERAL SCIATICA: ICD-10-CM

## 2021-05-07 DIAGNOSIS — R29.898 COMPLAINTS OF WEAKNESS OF LOWER EXTREMITY: Primary | ICD-10-CM

## 2021-05-07 PROCEDURE — 99214 OFFICE O/P EST MOD 30 MIN: CPT | Performed by: PHYSICIAN ASSISTANT

## 2021-05-07 RX ORDER — PREDNISONE 20 MG/1
40 TABLET ORAL DAILY
Qty: 10 TABLET | Refills: 0 | Status: SHIPPED | OUTPATIENT
Start: 2021-05-07 | End: 2021-05-12

## 2021-05-07 RX ORDER — GABAPENTIN 300 MG/1
300 CAPSULE ORAL 2 TIMES DAILY
Qty: 60 CAPSULE | Refills: 2 | Status: SHIPPED | OUTPATIENT
Start: 2021-05-07 | End: 2021-10-26 | Stop reason: SDUPTHER

## 2021-05-07 NOTE — PATIENT INSTRUCTIONS
Please try to get the blood work done as soon as possible  When you are done with the blood work you can start prednisone taking 2 pills together once a day for 5 days to see if this helps calmed down pain and inflammation  PLEASE STOP YOUR CELEBREX WHILE YOU ARE ON THE PREDNISONE - TOO MUCH OF THESE MEDICATIONS CAN MAKE YOUR STOMACH UPSET  We will increase your gabapentin the 300 mg twice a day and see if this improves any of the pain in your legs, numbness and tingling and the back pain  Please make sure that you schedule your MRI of your back as well as the ultrasound of the arteries in your legs to check for any underlying causes that could be causing the pain your legs and  in your back  while we are waiting for you to get your test done, if there are any new symptoms that arise you need to call us and let us know

## 2021-05-07 NOTE — PROGRESS NOTES
Assessment/Plan:      Diagnoses and all orders for this visit:    Complaints of weakness of lower extremity  -     VAS lower limb arterial duplex, complete bilateral; Future  -     MRI lumbar spine wo contrast; Future  -     Vitamin B12; Future  -     Lyme Antibody Profile with reflex to WB; Future  -     PEYTON Screen w/ Reflex to Titer/Pattern; Future  -     RF Screen w/ Reflex to Titer; Future  -     predniSONE 20 mg tablet; Take 2 tablets (40 mg total) by mouth daily for 5 days    Gait disturbance  -     VAS lower limb arterial duplex, complete bilateral; Future  -     MRI lumbar spine wo contrast; Future    Chronic right-sided low back pain with bilateral sciatica  -     VAS lower limb arterial duplex, complete bilateral; Future  -     MRI lumbar spine wo contrast; Future  -     gabapentin (NEURONTIN) 300 mg capsule; Take 1 capsule (300 mg total) by mouth 2 (two) times a day  -     predniSONE 20 mg tablet; Take 2 tablets (40 mg total) by mouth daily for 5 days    Numbness and tingling of both feet  -     VAS lower limb arterial duplex, complete bilateral; Future  -     MRI lumbar spine wo contrast; Future  -     Vitamin B12; Future  -     Lyme Antibody Profile with reflex to WB; Future  -     PEYTON Screen w/ Reflex to Titer/Pattern; Future  -     RF Screen w/ Reflex to Titer; Future  -     gabapentin (NEURONTIN) 300 mg capsule; Take 1 capsule (300 mg total) by mouth 2 (two) times a day  -     predniSONE 20 mg tablet; Take 2 tablets (40 mg total) by mouth daily for 5 days    Claudication of both lower extremities (HCC)  -     VAS lower limb arterial duplex, complete bilateral; Future    Pain in joint, multiple sites  -     CBC and differential; Future  -     Vitamin B12; Future  -     Lyme Antibody Profile with reflex to WB; Future  -     PEYTON Screen w/ Reflex to Titer/Pattern; Future  -     RF Screen w/ Reflex to Titer; Future  -     predniSONE 20 mg tablet;  Take 2 tablets (40 mg total) by mouth daily for 5 days    Fatigue, unspecified type  -     CBC and differential; Future  -     Vitamin B12; Future  -     Lyme Antibody Profile with reflex to WB; Future  -     PEYTON Screen w/ Reflex to Titer/Pattern; Future  -     RF Screen w/ Reflex to Titer; Future       Patient is a 66-year-old female presenting for same-day acute appointment for multiple pain complaints with back pain exacerbation as well as multiple pain complaints in her legs and hips  Again, patient seems to have a very difficult time explaining her pains and is very inconsistent with her symptoms varying throughout the visit making it very difficult to assess potential causes to her symptoms and affecting my ability to make an appropriate treatment plan  Patient does note following 4 times last month and I asked if it was because she lost her balance from dizziness or seeming unsteady on her feet or loss of consciousness or if it was accidental from tripping and it is still unclear however patient was stating uneven sidewalk, 1 time walking between sidewalk and a car and there is no reports of distinct dizziness  However, patient mentions "off balance" multiple times and after repetitive clarification again this seems more because of leg weakness and pain, not because of neurological issue/ dizziness  Also I asked patient if any of these pains have been persisting since falling last month however she insists that most of the pains over the past 3-4 days  She denies anything new that she has done to possibly induce a lot of the pains  Unfortunately her story changes throughout the encounter as I initially asked her if a lot of the leg pain is worse with prolonged standing and walking and better when sitting and she stated yes,   Thinking claudication especially with her tobacco use  Then on the way out when I was walking her the  she mention that she is now feeling better when she stands up and gets going from sitting too long  no significant abnormalities on the exam except for some tenderness in bilateral lumbar paraspinals, hips and multiple other parts of her lower legs as described below  She also demonstrates some generalized weakness in her lower legs against resistance but no evidence of footdrop or positive straight leg raise test     She did have an x-ray of her lumbar spine in February 2020 showing some levoscoliosis but no significant degeneration and she did have some physical therapy but states that it ran out and she has tried doing some stretches at home which had helped until now  At this point again very difficult to assess patient during this visit and the exact etiology of her symptoms remain unclear  I will obtain an MRI of the lumbar spine to further evaluate for any  Radiculopathy  I also will obtain arterial duplex of bilateral lower extremities to rule out claudication since  It was difficult to palpate pedal pulses and she is a long-time tobacco user  with the reported sudden onset of multiple pains within the past 3-4 days I will check CBC as well as Lyme testing, vitamin B12 with reported fatigue and numbness and tingling in her feet as well as some autoimmune PEYTON and rheumatoid factor  Regarding her treatment for now,   I will try increasing her gabapentin to 300 mg  B i d  I will also have her temporally discontinue the Celebrex and will trial her on a prednisone course taking 40 mg daily for 5 days and see if this helps  She can continue her muscle relaxer  I advised some   Gentle spine and lower extremity stretches at home as tolerated, incorporating some of the stretches that were taught at physical therapy last year  Unfortunately with all of the lower extremity concerns and her spine concerns I forgot to address her neck and shoulder pain however that treatment could potentially help this        With patient's widespread pain I do more so question disease process verses a simple musculoskeletal condition  I did ask her if she felt sick, flu like, and she reported feeling some nausea and heartburn before I came into the room but nothing consistent  No fever or chills, no coughing or chest tightness, no diarrhea, no loss of taste or smell or headaches  She did mention that she gets some upper abdominal/ rib pain when she bends over and she has always been constipated  I encouraged her to reschedule her colonoscopy as she states that she got the medicine to take before the colonoscopy however never got it done  For now will follow-up with patient is needed, she is encouraged to schedule her MRI and arterial ultrasounds of her legs  She was advised to get blood work done ASAP and should not start prednisone until after blood work is completed  We will call her as results come in and follow-up with her at that time  Patient advised to contact office or schedule sooner appointment with any new or worsening symptoms  Chief Complaint   Patient presents with    Leg Pain     3 days ago    Back Pain       Subjective:     Patient ID: Sharath Orozco is a 61 y o  female  55y/o female here today for multiple pain complaints she reports for most part started 3-4 days ago  States started with multiple new pains x 3 days  States she also continues with muscle spasms in the back but thinks today it is worse  States the right low back pain same as last year  Had PT and was doing well, but then after falls last month states back pain getting worse  Denies radiation of pain  New onset N/T in B/L feet  New for 3 days  States she gets off balance, like legs are weak, no dizziness  Also neck pain 3 days ago  States starts from the back of the head into both shoulder  Denies radiation of pain into the arms  Pain worse after sitting too long  Also states pains  In feet and ankles x 3-4 days  States all different places  Also multiple pains in B/l legs x 3 days   States pains in thighs, as well as hips B/L  Also B/l knee pains  States right ankle also with pain past 2-3 days, lateral  Pain happens in random spots  She thinks her leg pains are worse after standing long periods  She is trying tylenol and like taking water, she states  She is also continuing celebrex 200mg daily, flexeril BID and gabapentin 100mg BID and not helping  She states she fell four times, all she states were accidental, no LOC, denies dizziness or balance issues  She is a smoker and currently smoking 3-4 cigs/day  She has pre-existing varicose veins  Review of Systems   Constitutional: Positive for fatigue  Negative for activity change, appetite change, chills, fever and unexpected weight change  HENT: Negative  Respiratory: Negative  Cardiovascular: Negative  Gastrointestinal: Positive for constipation (chronic - did not follow through with referral, states got medication for procedure but did not follow through)  Heartburn with some nausea reportedly before I walked into exam room, she states   Genitourinary: Negative  Musculoskeletal:        As in HPI   Skin: Negative  Neurological:        As in HPI   Psychiatric/Behavioral: Negative  The following portions of the patient's history were reviewed and updated as appropriate: allergies, current medications, past family history, past medical history, past social history, past surgical history and problem list       Objective:     Physical Exam  Vitals signs reviewed  Constitutional:       General: She is not in acute distress  Appearance: Normal appearance  She is not ill-appearing, toxic-appearing or diaphoretic  HENT:      Head: Normocephalic and atraumatic  Neck:      Musculoskeletal: Neck supple  Cardiovascular:      Rate and Rhythm: Normal rate and regular rhythm  Heart sounds: Normal heart sounds        Comments: Unable to easily palpate pedal pulses  Pulmonary:      Effort: Pulmonary effort is normal       Breath sounds: Normal breath sounds  Abdominal:      General: Abdomen is flat  Bowel sounds are normal       Tenderness: There is no abdominal tenderness  Musculoskeletal:      Right lower leg: No edema  Left lower leg: No edema  Comments:   Patient has multiple pain complaints today but unfortunately her story of her pains is inconsistent and changes locations of pain and I attempt to redirect her but it is very difficult  Patient also has very difficult time describing her pain  Appearance of patient's low back and extremities appearing normal without any obvious significant deformity, redness, swelling, ecchymosis or rashes  Patient is tenderness to the low back, primarily lumbar paraspinals, worse on right than the left as well as bilateral lower lateral hip regions as well as in her thigh and hamstring muscles  She has no tenderness to palpation in the knee joints but does have some tenderness in bilateral calves and slightly in the top of the right foot  No palpable abnormalities otherwise  Patient seems to have some difficulty standing, reporting "off balance" however she denies herself moving or the room spinning but seems more related to leg weakness and pain  Difficult to assess range of motion of her spine  She has full range of motion of bilateral knees as well as passive range of motion of bilateral ankles  She does have some difficulty with some demonstrated mild weakness in lower legs  With flexion and extension at knee joint against resistance as well as some slight decreased strength with dorsiflexion bilaterally  She reports this is because of pain  Strength is  Also slightly diminished symmetrical to other strength testing of the thigh muscles with flexion of bilateral hips against resistance  There is no obvious muscle atrophy in the legs  No obvious visible deformities again     Lymphadenopathy:      Head:      Right side of head: No submandibular or tonsillar adenopathy  Left side of head: No submandibular or tonsillar adenopathy  Neurological:      Mental Status: She is alert and oriented to person, place, and time  Motor: Weakness (  As above) present  No tremor, atrophy or abnormal muscle tone  Coordination: Coordination is intact  Gait: Gait abnormal ( patient seems to be guarding with her gait secondary to leg and back pain but she does not seem off balance from dizziness or unsteadiness  )  Psychiatric:      Comments:   Bizarre affect  She does not seem confused however it is very difficult to get an accurate HPI regarding her symptoms,   And seems to be tangential with her thoughts             Vitals:    05/07/21 1546   BP: 134/79   BP Location: Right arm   Patient Position: Sitting   Cuff Size: Adult   Pulse: 93   Weight: 60 3 kg (133 lb)   Height: 5' 2" (1 575 m)

## 2021-05-08 ENCOUNTER — APPOINTMENT (OUTPATIENT)
Dept: LAB | Facility: HOSPITAL | Age: 61
End: 2021-05-08
Payer: COMMERCIAL

## 2021-05-08 DIAGNOSIS — R20.2 NUMBNESS AND TINGLING OF BOTH FEET: ICD-10-CM

## 2021-05-08 DIAGNOSIS — R20.0 NUMBNESS AND TINGLING OF BOTH FEET: ICD-10-CM

## 2021-05-08 DIAGNOSIS — R53.83 FATIGUE, UNSPECIFIED TYPE: ICD-10-CM

## 2021-05-08 DIAGNOSIS — R29.898 COMPLAINTS OF WEAKNESS OF LOWER EXTREMITY: ICD-10-CM

## 2021-05-08 DIAGNOSIS — M25.50 PAIN IN JOINT, MULTIPLE SITES: ICD-10-CM

## 2021-05-08 LAB
BASOPHILS # BLD AUTO: 0.02 THOUSANDS/ΜL (ref 0–0.1)
BASOPHILS NFR BLD AUTO: 0 % (ref 0–1)
EOSINOPHIL # BLD AUTO: 0.07 THOUSAND/ΜL (ref 0–0.61)
EOSINOPHIL NFR BLD AUTO: 1 % (ref 0–6)
ERYTHROCYTE [DISTWIDTH] IN BLOOD BY AUTOMATED COUNT: 13.5 % (ref 11.6–15.1)
HCT VFR BLD AUTO: 44.1 % (ref 34.8–46.1)
HGB BLD-MCNC: 14.7 G/DL (ref 11.5–15.4)
IMM GRANULOCYTES # BLD AUTO: 0.02 THOUSAND/UL (ref 0–0.2)
IMM GRANULOCYTES NFR BLD AUTO: 0 % (ref 0–2)
LYMPHOCYTES # BLD AUTO: 2.04 THOUSANDS/ΜL (ref 0.6–4.47)
LYMPHOCYTES NFR BLD AUTO: 31 % (ref 14–44)
MCH RBC QN AUTO: 32.5 PG (ref 26.8–34.3)
MCHC RBC AUTO-ENTMCNC: 33.3 G/DL (ref 31.4–37.4)
MCV RBC AUTO: 97 FL (ref 82–98)
MONOCYTES # BLD AUTO: 0.83 THOUSAND/ΜL (ref 0.17–1.22)
MONOCYTES NFR BLD AUTO: 13 % (ref 4–12)
NEUTROPHILS # BLD AUTO: 3.63 THOUSANDS/ΜL (ref 1.85–7.62)
NEUTS SEG NFR BLD AUTO: 55 % (ref 43–75)
NRBC BLD AUTO-RTO: 0 /100 WBCS
PLATELET # BLD AUTO: 297 THOUSANDS/UL (ref 149–390)
PMV BLD AUTO: 9.8 FL (ref 8.9–12.7)
RBC # BLD AUTO: 4.53 MILLION/UL (ref 3.81–5.12)
VIT B12 SERPL-MCNC: 765 PG/ML (ref 100–900)
WBC # BLD AUTO: 6.61 THOUSAND/UL (ref 4.31–10.16)

## 2021-05-08 PROCEDURE — 86618 LYME DISEASE ANTIBODY: CPT

## 2021-05-08 PROCEDURE — 86431 RHEUMATOID FACTOR QUANT: CPT

## 2021-05-08 PROCEDURE — 82607 VITAMIN B-12: CPT

## 2021-05-08 PROCEDURE — 36415 COLL VENOUS BLD VENIPUNCTURE: CPT

## 2021-05-08 PROCEDURE — 86430 RHEUMATOID FACTOR TEST QUAL: CPT

## 2021-05-08 PROCEDURE — 85025 COMPLETE CBC W/AUTO DIFF WBC: CPT

## 2021-05-08 PROCEDURE — 86038 ANTINUCLEAR ANTIBODIES: CPT

## 2021-05-10 LAB
CRYOGLOB RF SER-ACNC: ABNORMAL [IU]/ML
RHEUMATOID FACT SER QL LA: POSITIVE

## 2021-05-11 LAB
B BURGDOR IGG+IGM SER-ACNC: -3
RYE IGE QN: NEGATIVE

## 2021-05-12 ENCOUNTER — TELEPHONE (OUTPATIENT)
Dept: INTERNAL MEDICINE CLINIC | Facility: CLINIC | Age: 61
End: 2021-05-12

## 2021-05-12 NOTE — TELEPHONE ENCOUNTER
This patient was scheduled for MRI LUMBAR SPINE  The information I submitted was not enough to get it approved, so [facility] is requesting a [id number]  If you are able to do so, the number is below  If you wish to withdraw this request let us know so we can call central scheduling to cancel their upcoming appointment  BONNIE:  [phone] Option #3  Tracking [id number]  Insurance: [address]  ID#: [id number]          Attending NPI [id number] Ramón Cuellar case is time sensitive please respond within 24hrs of this message  Thank you

## 2021-05-13 NOTE — TELEPHONE ENCOUNTER
Rachel, please see below,   Not sure if there is anything you need to do on your and to close out the  [facility]

## 2021-05-13 NOTE — TELEPHONE ENCOUNTER
I did appear to peer and unfortunately they denied patient's MRI as she did not complete 6 weeks of physical therapy more recently for her back  Last documented physical therapy was in September 2020  Please call patient and let her know that her insurance denied MRI hand that if she proceed with MRI testing it will be all out-of-pocket because of above  Please see were patient's current pains stand and how she is feeling  Please at least make sure she has her appointment for her arterial duplex of her lower legs scheduled so we can at least rule out an arterial disease

## 2021-05-14 ENCOUNTER — OFFICE VISIT (OUTPATIENT)
Dept: INTERNAL MEDICINE CLINIC | Facility: CLINIC | Age: 61
End: 2021-05-14

## 2021-05-14 VITALS
BODY MASS INDEX: 25.21 KG/M2 | WEIGHT: 137 LBS | HEART RATE: 112 BPM | SYSTOLIC BLOOD PRESSURE: 185 MMHG | TEMPERATURE: 98.6 F | DIASTOLIC BLOOD PRESSURE: 105 MMHG | HEIGHT: 62 IN

## 2021-05-14 DIAGNOSIS — R20.2 NUMBNESS AND TINGLING OF BOTH FEET: ICD-10-CM

## 2021-05-14 DIAGNOSIS — M79.604 BILATERAL LEG AND FOOT PAIN: ICD-10-CM

## 2021-05-14 DIAGNOSIS — M79.672 BILATERAL LEG AND FOOT PAIN: ICD-10-CM

## 2021-05-14 DIAGNOSIS — R01.1 HEART MURMUR: ICD-10-CM

## 2021-05-14 DIAGNOSIS — R29.898 COMPLAINTS OF WEAKNESS OF LOWER EXTREMITY: ICD-10-CM

## 2021-05-14 DIAGNOSIS — G89.29 CHRONIC PAIN OF MULTIPLE JOINTS: ICD-10-CM

## 2021-05-14 DIAGNOSIS — M25.50 PAIN IN JOINT, MULTIPLE SITES: ICD-10-CM

## 2021-05-14 DIAGNOSIS — G89.29 CHRONIC BILATERAL LOW BACK PAIN WITH BILATERAL SCIATICA: Primary | ICD-10-CM

## 2021-05-14 DIAGNOSIS — M54.42 CHRONIC BILATERAL LOW BACK PAIN WITH BILATERAL SCIATICA: Primary | ICD-10-CM

## 2021-05-14 DIAGNOSIS — M25.50 CHRONIC PAIN OF MULTIPLE JOINTS: ICD-10-CM

## 2021-05-14 DIAGNOSIS — R42 DIZZINESS: ICD-10-CM

## 2021-05-14 DIAGNOSIS — M54.42 CHRONIC RIGHT-SIDED LOW BACK PAIN WITH BILATERAL SCIATICA: ICD-10-CM

## 2021-05-14 DIAGNOSIS — M54.41 CHRONIC RIGHT-SIDED LOW BACK PAIN WITH BILATERAL SCIATICA: ICD-10-CM

## 2021-05-14 DIAGNOSIS — R29.898 LEG WEAKNESS, BILATERAL: ICD-10-CM

## 2021-05-14 DIAGNOSIS — R76.8 RHEUMATOID FACTOR POSITIVE: ICD-10-CM

## 2021-05-14 DIAGNOSIS — M79.671 BILATERAL LEG AND FOOT PAIN: ICD-10-CM

## 2021-05-14 DIAGNOSIS — M54.41 CHRONIC BILATERAL LOW BACK PAIN WITH BILATERAL SCIATICA: Primary | ICD-10-CM

## 2021-05-14 DIAGNOSIS — R25.1 SHAKINESS: ICD-10-CM

## 2021-05-14 DIAGNOSIS — R20.0 NUMBNESS AND TINGLING OF BOTH FEET: ICD-10-CM

## 2021-05-14 DIAGNOSIS — G89.29 CHRONIC RIGHT-SIDED LOW BACK PAIN WITH BILATERAL SCIATICA: ICD-10-CM

## 2021-05-14 DIAGNOSIS — M79.605 BILATERAL LEG AND FOOT PAIN: ICD-10-CM

## 2021-05-14 LAB — SL AMB POCT GLUCOSE BLD: 139

## 2021-05-14 PROCEDURE — 82948 REAGENT STRIP/BLOOD GLUCOSE: CPT | Performed by: PHYSICIAN ASSISTANT

## 2021-05-14 PROCEDURE — 99214 OFFICE O/P EST MOD 30 MIN: CPT | Performed by: PHYSICIAN ASSISTANT

## 2021-05-14 PROCEDURE — 93000 ELECTROCARDIOGRAM COMPLETE: CPT | Performed by: PHYSICIAN ASSISTANT

## 2021-05-14 RX ORDER — PREDNISONE 20 MG/1
TABLET ORAL
Qty: 10 TABLET | Refills: 0 | OUTPATIENT
Start: 2021-05-14

## 2021-05-14 NOTE — PROGRESS NOTES
Assessment/Plan:      Diagnoses and all orders for this visit:    Chronic bilateral low back pain with bilateral sciatica  -     Ambulatory referral to Physical Therapy; Future  -     Ambulatory referral to Rheumatology; Future    Bilateral leg and foot pain  -     Ambulatory referral to Physical Therapy; Future  -     Ambulatory referral to Rheumatology; Future    Leg weakness, bilateral  -     Ambulatory referral to Physical Therapy; Future  -     POCT ECG  -     Ambulatory referral to Rheumatology; Future    Shakiness  -     POCT blood glucose    Rheumatoid factor positive  -     Ambulatory referral to Rheumatology; Future    Chronic pain of multiple joints  -     Ambulatory referral to Rheumatology; Future    Heart murmur  -     Echo complete with contrast if indicated; Future    Dizziness  -     Echo complete with contrast if indicated; Future      70-year-old female presenting today for routine follow-up of her chronic conditions however visit transition to close follow-up after seeing me last week for multiple random pain complaints in her legs and back as well as other symptoms of unknown etiology  Patient has medical problems including intermittent asthma, COPD, varicose veins, chronic right forearm pain, chronic back pain, dyslipidemia and arthritis  Again, visit is very complicated as patient has a very difficult time explaining her symptoms even after discussion of what each term means  She also often needs redirection to get a more specific answer regarding a medical question that I ask her  Therefore the exact etiology of her symptoms continue to remain unclear  Last visit I trialed her on a 5 day prednisone course at 40 mg a day and increased her gabapentin to 300 mg b i d  However patient is uncertain if she completed the prednisone or increased her gabapentin      I also had patient obtain blood work to rule out secondary causes for which CBC was unremarkable, B12 normal, Lyme testing negative, EPYTON normal, rheumatoid factor positive with reflex to 10  I did explain to patient that overall blood work was unremarkable and patients can have a false positive rheumatoid factor even of healthy  Patient did not eat yet today so a POC glucose was checked and was fine at 1:39 a m       For her dizziness and unsteadiness orthostatics were checked and did not show any evidence of orthostatic hypotension  She regionally was tachycardic and had significant elevated BP with heart rate 112 and /105 and patient has no prior history of tachycardia or hypertension however she states that she was extremely anxious and worried about getting here on time and still feel shaky and anxious  EKG checked showing heart rate of 86 and no obvious significant arrhythmias or ST-T wave elevation or changes  On examination today again she does appear anxious  She does demonstrate some inconsistent weakness in her legs, primarily with dorsiflexion but no obvious footdrop or significant gait abnormality  MA said that she did seem a little unsteady when she was getting up from the chair to the exam table for her EKG/orthostatic testing  Again differential is extremely broad, will need to rule out cardiac since patient had what sounds to be a slight murmur on exam today  However, she has no pre-existing cardiac condition and she has been having no significant shortness of breath or chest pain  She did note some heartburn intermittently but this is pre-existing  Also however need to consider ASCVD risk score is 15 3% and her isolated elevated BP today  Patient also needs to make sure that she completes the lower extremity arterial duplex to rule out PAD as a cause to her widespread bilateral leg pain and weakness  Could consider neurological however again no consistent unilateral weakness is demonstrated, no issues with headaches    She has been having dizziness which patient states has been for a while, could consider vertigo  Patient states her pain overall in her legs is a little better and weakness is medium  At this time I encouraged patient to make sure that she completed the 5 days of the prednisone and she is taking 300 mg of the gabapentin twice a day  For positive rheumatoid factor and her multiple pain complaints and weakness I have advised evaluation by rheumatologist   I would like to evaluate cardiac function further with her dizziness and possible murmur with an echocardiogram   Last, I would like assessment with physical therapy for her multiple leg pain and weakness complaints as well as a balance assessment  I discussed with patient my concern regarding uncertainty of her symptoms and difficulty understanding work and a symptoms she is experiencing, also considering inconsistencies with her exams  I did explain to patient the importance of going to the ED immediately with any new or worsening neurological symptoms especially with sudden onset severe weakness or paralysis or worsening symptoms that may need more urgent evaluation  She expresses understanding of this  We will contact patient with test results when available but we will schedule her for a follow-up in 4-6 weeks to reassess her symptoms  Again ED precautions discussed but she should also contact the office and schedule appointment sooner with any non urgent concerns or new symptoms  Chief Complaint   Patient presents with    Follow-up     blood work           Subjective:     Patient ID: Cleo Spence is a 61 y o  female  57y/o female here today for routine f/u but last week saw her for multiple pain complaints and leg weakness  Will need to review BW ordered last visit, MRI spine denied by insurance, has vascular LE arterial duplex scheduled  Nurse told me before coming in to the room that she said she felt shaky and some chest tightness coming into the office today   States she did feel very anxious that she would be late and when she was told she was on time sxs resolved but still with some shakiness  She does get heartburn about 3 x a week, which is different than chest tightness she felt today  States today weakness in her legs "is sim ok"  States "medium"  She states the pains today are in her knees B/L, "somewhat pain"  States pain comes and goes and with steps and walking  Also pain in feet and ankles but better  She cannot confirm that she completed prednisone 40mg x 5 days prescribed last appt for pain excarebation, states has 1 pill left, which she should be taking 2 tabs once a day x 5 days  She doesn't know if she increased gabapentin to BID that I recommended at last f/u  Patient is having a very hard time explaining sxs, she is mentioning different terms like lightheaded, dizzy, shaky but unable to confirm that is how she feels with definitions explained of each  She thinks she is dizzy, which I explained is when you feel yourself moving or rom is moving/spinning/off balance  dizziness "has been going on"  States happens in the AM when changing position, as well as in evening "when rushing"  Pt noted a few falls in march and April, but discussed last visit and she denied dizziness at that time  No dizziness sitting still  She initially described it as shakiness, but states she feels shaky too  She said lightheaded, but after describing she denied it  She does have COPD, using wixella daily and albuterol prn  She feels her breathing has been stable  She does note allergies but takes Flonase and singulair ands stable  *Pt did not yet eat today  Orthostatics checked:  Laying 148/82  93  Sitting 157/89  92  Standing 154/82  96    POC Sugar 139          Review of Systems   Constitutional: Positive for fatigue  Negative for activity change, appetite change, chills and fever  HENT: Negative  Eyes: Negative  Respiratory: Negative  Cardiovascular: Negative  Gastrointestinal: Negative  Endocrine: Negative  Genitourinary: Negative  Musculoskeletal:        As in HPI   Neurological: Positive for dizziness, weakness and numbness  Negative for tremors, syncope, facial asymmetry, speech difficulty and headaches  Psychiatric/Behavioral: The patient is nervous/anxious  The following portions of the patient's history were reviewed and updated as appropriate: allergies, current medications, past family history, past medical history, past social history, past surgical history and problem list       Objective:     Physical Exam  Vitals signs reviewed  Constitutional:       General: She is not in acute distress  Appearance: She is not ill-appearing or toxic-appearing  HENT:      Head: Normocephalic and atraumatic  Eyes:      General: No visual field deficit  Extraocular Movements: Extraocular movements intact  Conjunctiva/sclera: Conjunctivae normal       Pupils: Pupils are equal, round, and reactive to light  Neck:      Musculoskeletal: Neck supple  Normal range of motion  No pain with movement  Vascular: Normal carotid pulses  No carotid bruit  Cardiovascular:      Rate and Rhythm: Regular rhythm  Tachycardia present  Pulses: Normal pulses  Heart sounds: S1 normal and S2 normal  Murmur present  Systolic murmur present with a grade of 1/6  Pulmonary:      Effort: Pulmonary effort is normal       Breath sounds: Normal breath sounds  Abdominal:      General: Abdomen is flat  Bowel sounds are normal       Palpations: Abdomen is soft  Tenderness: There is no abdominal tenderness  Musculoskeletal:      Right lower leg: No edema  Left lower leg: No edema  Comments: Pt able to move arms and legs without difficulty  Pt walking with some general guarding but does not seem unsteady  MA when checking EKG/orthostatics did note some unsteadiness going from chair to exam table       Generalized decreased strength in B/L LE's with flexion and extension at knee against resistance and flexion at hip against resistance  She does have some difficulty with strength dorsiflexion against resistance B/L, but this is inconsistent as I retest then she is better  No obvious foot drop with walking  Lymphadenopathy:      Head:      Right side of head: No submandibular or tonsillar adenopathy  Left side of head: No submandibular or tonsillar adenopathy  Skin:     Findings: No ecchymosis, erythema, rash or wound  Neurological:      Mental Status: She is alert and oriented to person, place, and time  Cranial Nerves: No facial asymmetry  Sensory: Sensation is intact  Motor: Weakness (as above) present  No tremor  Coordination: Coordination is intact  Gait: Gait is intact  Deep Tendon Reflexes:      Reflex Scores:       Brachioradialis reflexes are 2+ on the right side and 2+ on the left side  Patellar reflexes are 2+ on the right side and 2+ on the left side  Psychiatric:         Mood and Affect: Mood is anxious  Mood is not depressed  Behavior: Behavior normal  Behavior is cooperative           Vitals:    05/14/21 0947   BP: (!) 185/105   BP Location: Left arm   Patient Position: Sitting   Cuff Size: Adult   Pulse: (!) 112   Temp: 98 6 °F (37 °C)   TempSrc: Temporal   Weight: 62 1 kg (137 lb)   Height: 5' 2" (1 575 m)

## 2021-05-14 NOTE — TELEPHONE ENCOUNTER
Please call pharm - just received auto refill request for prednisone, which I already prescribed as a 1 time script for her pain exacerbation  I just saw her today and asked her if she took it and she doesn't know  Can we see why pharm sending refill? Did they fill initial script sent last week? If they did, I am refusing this new order

## 2021-05-18 NOTE — TELEPHONE ENCOUNTER
Spoke to Jerardo at [facility] and he stated the original script was picked up on 5/7/2021   I advised him to discontinue this script

## 2021-05-27 ENCOUNTER — HOSPITAL ENCOUNTER (OUTPATIENT)
Dept: NON INVASIVE DIAGNOSTICS | Facility: HOSPITAL | Age: 61
Discharge: HOME | End: 2021-05-27
Payer: COMMERCIAL

## 2021-05-27 DIAGNOSIS — I73.9 CLAUDICATION OF BOTH LOWER EXTREMITIES (HCC): ICD-10-CM

## 2021-05-27 DIAGNOSIS — G89.29 CHRONIC RIGHT-SIDED LOW BACK PAIN WITH BILATERAL SCIATICA: ICD-10-CM

## 2021-05-27 DIAGNOSIS — R26.9 GAIT DISTURBANCE: ICD-10-CM

## 2021-05-27 DIAGNOSIS — M54.42 CHRONIC RIGHT-SIDED LOW BACK PAIN WITH BILATERAL SCIATICA: ICD-10-CM

## 2021-05-27 DIAGNOSIS — R29.898 COMPLAINTS OF WEAKNESS OF LOWER EXTREMITY: ICD-10-CM

## 2021-05-27 DIAGNOSIS — R20.0 NUMBNESS AND TINGLING OF BOTH FEET: ICD-10-CM

## 2021-05-27 DIAGNOSIS — R20.2 NUMBNESS AND TINGLING OF BOTH FEET: ICD-10-CM

## 2021-05-27 DIAGNOSIS — M54.41 CHRONIC RIGHT-SIDED LOW BACK PAIN WITH BILATERAL SCIATICA: ICD-10-CM

## 2021-05-27 PROCEDURE — 93923 UPR/LXTR ART STDY 3+ LVLS: CPT

## 2021-05-27 PROCEDURE — 93925 LOWER EXTREMITY STUDY: CPT | Performed by: SURGERY

## 2021-05-27 PROCEDURE — 93922 UPR/L XTREMITY ART 2 LEVELS: CPT | Performed by: SURGERY

## 2021-05-27 PROCEDURE — 93925 LOWER EXTREMITY STUDY: CPT

## 2021-05-28 DIAGNOSIS — G89.29 CHRONIC BILATERAL LOW BACK PAIN WITHOUT SCIATICA: ICD-10-CM

## 2021-05-28 DIAGNOSIS — M54.50 CHRONIC BILATERAL LOW BACK PAIN WITHOUT SCIATICA: ICD-10-CM

## 2021-06-01 RX ORDER — CYCLOBENZAPRINE HCL 5 MG
5 TABLET ORAL 3 TIMES DAILY PRN
Qty: 60 TABLET | Refills: 3 | Status: SHIPPED | OUTPATIENT
Start: 2021-06-01

## 2021-06-03 ENCOUNTER — TRANSCRIBE ORDERS (OUTPATIENT)
Dept: LAB | Facility: CLINIC | Age: 61
End: 2021-06-03

## 2021-06-04 ENCOUNTER — EVALUATION (OUTPATIENT)
Dept: PHYSICAL THERAPY | Facility: CLINIC | Age: 61
End: 2021-06-04
Payer: COMMERCIAL

## 2021-06-04 DIAGNOSIS — M54.42 CHRONIC BILATERAL LOW BACK PAIN WITH BILATERAL SCIATICA: Primary | ICD-10-CM

## 2021-06-04 DIAGNOSIS — M79.604 BILATERAL LEG AND FOOT PAIN: ICD-10-CM

## 2021-06-04 DIAGNOSIS — M79.605 BILATERAL LEG AND FOOT PAIN: ICD-10-CM

## 2021-06-04 DIAGNOSIS — M54.41 CHRONIC BILATERAL LOW BACK PAIN WITH BILATERAL SCIATICA: Primary | ICD-10-CM

## 2021-06-04 DIAGNOSIS — M79.672 BILATERAL LEG AND FOOT PAIN: ICD-10-CM

## 2021-06-04 DIAGNOSIS — G89.29 CHRONIC BILATERAL LOW BACK PAIN WITH BILATERAL SCIATICA: Primary | ICD-10-CM

## 2021-06-04 DIAGNOSIS — M79.671 BILATERAL LEG AND FOOT PAIN: ICD-10-CM

## 2021-06-04 DIAGNOSIS — R29.898 LEG WEAKNESS, BILATERAL: ICD-10-CM

## 2021-06-04 PROCEDURE — 97112 NEUROMUSCULAR REEDUCATION: CPT | Performed by: PHYSICAL THERAPIST

## 2021-06-04 PROCEDURE — 97140 MANUAL THERAPY 1/> REGIONS: CPT | Performed by: PHYSICAL THERAPIST

## 2021-06-04 PROCEDURE — 97162 PT EVAL MOD COMPLEX 30 MIN: CPT | Performed by: PHYSICAL THERAPIST

## 2021-06-04 NOTE — PROGRESS NOTES
PT Evaluation     Today's date: 2021  Patient name: Namrata Hunter  : 1960  MRN: [id number]  Referring provider: Ilene Caro  Dx:   Encounter Diagnosis     ICD-10-CM    1  Chronic bilateral low back pain with bilateral sciatica  M54 42 Ambulatory referral to Physical Therapy    M54 41     G89 29    2  Bilateral leg and foot pain  M79 604 Ambulatory referral to Physical Therapy    M79 605     M79 671     M79 672    3  Leg weakness, bilateral  R29 898 Ambulatory referral to Physical Therapy                  Assessment  Assessment details: Pt is presenting with bilateral low back pain, hypomobility, postural dysfunction, and dynamic lumbar instability without radicular symptoms  Pt is also presenting with significant lower extremity weakness and impaired balance leading to poor safety awareness  Pt requires skilled physical therapy in order to improve in low back pain, ROM, strength, balance, functional mobility, and quality of life  Impairments: abnormal gait, abnormal or restricted ROM, abnormal movement, activity intolerance, impaired balance, impaired physical strength, lacks appropriate home exercise program, pain with function, safety issue, poor posture  and poor body mechanics  Understanding of Dx/Px/POC: good   Prognosis: good    Goals  ST-6 weeks  Pt will be independent with HEP in order to continue to progress outside of physical therapy  Pt will demonstrate minimal lumbar flexion and extension ROM deficits    LTG: 10-12 weeks  Pt will report 2/10 pain or less with ADLs in order to improve in functional mobiltiy  Pt will improve in static balance as demonstrated by ability to perform SLS for 30s bilaterally without any losses of balance in order to decrease fall risk and improve safety with ADLs  Pt will improve in functional mobility as demonstrated by a 5TSTS time of 20s or less without arm rest assistance      Plan  Patient would benefit from: skilled physical therapy  Planned modality interventions: cryotherapy, electrical stimulation/Russian stimulation, low level laser therapy, TENS, thermotherapy: hydrocollator packs and traction  Planned therapy interventions: abdominal trunk stabilization, activity modification, ADL retraining, balance, balance/weight bearing training, body mechanics training, flexibility, functional ROM exercises, gait training, home exercise program, joint mobilization, manual therapy, massage, neuromuscular re-education, patient education, postural training, strengthening, stretching, therapeutic activities, therapeutic exercise, therapeutic training and transfer training  Frequency: 2x week  Duration in weeks: 8  Plan of Care beginning date: 2021        Subjective Evaluation    History of Present Illness  Mechanism of injury: Pt reports chronic bilateral low back pain with B radicular symptoms  Pt reports that the pain worsens with rest and improves with activity  Pt also reports neck pain today, but does not have a script for neck pain  Pt also reports feeling off balance and that she has had 4 falls in April resulting in minor scrapes        Pain  Current pain ratin  At best pain ratin  At worst pain ratin  Quality: burning  Relieving factors: medications and ice  Aggravating factors: standing    Treatments  Current treatment: medication  Patient Goals  Patient goals for therapy: decreased pain, return to work, improved balance, increased motion and increased strength          Objective     Postural Observations  Seated posture: fair  Standing posture: fair        Neurological Testing     Sensation     Lumbar   Left   Intact: light touch    Right   Intact: light touch    Reflexes   Left   Patellar (L4): absent (0)  Achilles (S1): absent (0)    Right   Patellar (L4): absent (0)  Achilles (S1): absent (0)    Active Range of Motion     Lumbar   Flexion:  Restriction level: moderate  Extension:  Restriction level: moderate  Mechanical Assessment    Cervical      Thoracic      Lumbar    Standing flexion: repeated movements   Pain location:no change  Standing extension: repeated movements  Pain location: no change    Strength/Myotome Testing     Left Hip   Planes of Motion   Flexion: 3-  Abduction: 3    Right Hip   Planes of Motion   Flexion: 3-  Abduction: 3    Left Knee   Flexion: 3-  Extension: 3    Right Knee   Flexion: 3-  Extension: 3    Left Ankle/Foot   Dorsiflexion: 3  Plantar flexion: 3+    Right Ankle/Foot   Dorsiflexion: 3  Plantar flexion: 3+    Tests     Additional Tests Details  TTP L4-S1 with PAIVM    Functional Assessment      Squat    Unable to perform   Comments  Rhomberg: FT eo 30s, FT e/c 11s, Tandem L forward: 7s, R forward: 30s,  SLS L: 15s, R: 30s  5TSTS with arm rest: 33s    General Comments:    Lower quarter screen   Hips: unremarkable  Knees: unremarkable  Foot/ankle: unremarkable             Precautions: INCREASED FALL RISK UTILIZE GAIT BELT  Dx: CLBP mobility/stability preference        Manuals 6/4            Laser 5000J            L3-S1 Grade II-IV mobilizations                                       Neuro Re-Ed             Abdominal bracing 1x10            DLS marches 4E39            Standing Lumbar extension 1x10            bridges 1x3* pain hold           Standing DLS hip ABD             Standing DLS hip ext             Standing DLS hip flex             Tandem stance balance 1x30" ea            SLS             Ther Ex                                                                                                                     Ther Activity             stairs  4"/            STS  Foam/           Gait Training                                       Modalities

## 2021-06-08 ENCOUNTER — HOSPITAL ENCOUNTER (OUTPATIENT)
Dept: NON INVASIVE DIAGNOSTICS | Facility: CLINIC | Age: 61
Discharge: HOME | End: 2021-06-08
Payer: COMMERCIAL

## 2021-06-08 DIAGNOSIS — R42 DIZZINESS: ICD-10-CM

## 2021-06-08 DIAGNOSIS — R01.1 HEART MURMUR: ICD-10-CM

## 2021-06-08 PROCEDURE — 93306 TTE W/DOPPLER COMPLETE: CPT | Performed by: INTERNAL MEDICINE

## 2021-06-08 PROCEDURE — 93306 TTE W/DOPPLER COMPLETE: CPT

## 2021-06-09 ENCOUNTER — OFFICE VISIT (OUTPATIENT)
Dept: PHYSICAL THERAPY | Facility: CLINIC | Age: 61
End: 2021-06-09
Payer: COMMERCIAL

## 2021-06-09 DIAGNOSIS — M79.605 BILATERAL LEG AND FOOT PAIN: ICD-10-CM

## 2021-06-09 DIAGNOSIS — R29.898 LEG WEAKNESS, BILATERAL: ICD-10-CM

## 2021-06-09 DIAGNOSIS — M54.41 CHRONIC BILATERAL LOW BACK PAIN WITH BILATERAL SCIATICA: Primary | ICD-10-CM

## 2021-06-09 DIAGNOSIS — G89.29 CHRONIC BILATERAL LOW BACK PAIN WITH BILATERAL SCIATICA: Primary | ICD-10-CM

## 2021-06-09 DIAGNOSIS — M79.604 BILATERAL LEG AND FOOT PAIN: ICD-10-CM

## 2021-06-09 DIAGNOSIS — M79.671 BILATERAL LEG AND FOOT PAIN: ICD-10-CM

## 2021-06-09 DIAGNOSIS — M54.42 CHRONIC BILATERAL LOW BACK PAIN WITH BILATERAL SCIATICA: Primary | ICD-10-CM

## 2021-06-09 DIAGNOSIS — M79.672 BILATERAL LEG AND FOOT PAIN: ICD-10-CM

## 2021-06-09 PROCEDURE — 97112 NEUROMUSCULAR REEDUCATION: CPT | Performed by: PHYSICAL THERAPIST

## 2021-06-09 PROCEDURE — 97140 MANUAL THERAPY 1/> REGIONS: CPT | Performed by: PHYSICAL THERAPIST

## 2021-06-09 NOTE — PROGRESS NOTES
Daily Note     Today's date: 2021  Patient name: Gela Almazan  : 1960  MRN: [id number]  Referring provider: Kriss Palomino  Dx:   Encounter Diagnosis     ICD-10-CM    1  Chronic bilateral low back pain with bilateral sciatica  M54 42     M54 41     G89 29    2  Bilateral leg and foot pain  M79 604     M79 605     M79 671     M79 672    3  Leg weakness, bilateral  R29 898        Start Time:   Stop Time:   Total time in clinic (min): 35 minutes    Subjective: Pt reports 4/10 left sided low back pain without radicular symptoms  Pt reports that standing and walking a lot increases her pain  Pt reports that her HEP has been helping her pain  Objective: See treatment diary below      Assessment: Pt reported L LBP during standing hip extension exercise secondary to motor control deficits and demonstrates motor control and lumbar stabilization deficits in general in standing  Plan: Continue per plan of care  Precautions: INCREASED FALL RISK UTILIZE GAIT BELT  Dx: CLBP mobility/stability preference        Manuals            Laser 5000J 5000 J           L3-S1 Grade II-IV mobilizations  1x25 ea                                     Neuro Re-Ed             Abdominal bracing 1x10 2x10           DLS marches 5D62 2C35           Standing Lumbar extension 1x10 1x10           bridges 1x3* pain 2x10           Standing DLS hip ABD  5F24           Standing DLS hip ext  5U33           Standing DLS hip flex  2N23           Tandem stance balance 1x30" ea 1x30" ea           SLS  1x30" ea           Ther Ex                                                                                                                     Ther Activity             stairs   4"/          STS   Foam/          Gait Training                                       Modalities

## 2021-06-14 ENCOUNTER — OFFICE VISIT (OUTPATIENT)
Dept: PHYSICAL THERAPY | Facility: CLINIC | Age: 61
End: 2021-06-14
Payer: COMMERCIAL

## 2021-06-14 DIAGNOSIS — M79.671 BILATERAL LEG AND FOOT PAIN: ICD-10-CM

## 2021-06-14 DIAGNOSIS — R29.898 LEG WEAKNESS, BILATERAL: ICD-10-CM

## 2021-06-14 DIAGNOSIS — G89.29 CHRONIC BILATERAL LOW BACK PAIN WITH BILATERAL SCIATICA: Primary | ICD-10-CM

## 2021-06-14 DIAGNOSIS — M54.42 CHRONIC BILATERAL LOW BACK PAIN WITH BILATERAL SCIATICA: Primary | ICD-10-CM

## 2021-06-14 DIAGNOSIS — M54.41 CHRONIC BILATERAL LOW BACK PAIN WITH BILATERAL SCIATICA: Primary | ICD-10-CM

## 2021-06-14 DIAGNOSIS — M79.605 BILATERAL LEG AND FOOT PAIN: ICD-10-CM

## 2021-06-14 DIAGNOSIS — M79.604 BILATERAL LEG AND FOOT PAIN: ICD-10-CM

## 2021-06-14 DIAGNOSIS — M79.672 BILATERAL LEG AND FOOT PAIN: ICD-10-CM

## 2021-06-14 PROCEDURE — 97112 NEUROMUSCULAR REEDUCATION: CPT

## 2021-06-14 PROCEDURE — 97530 THERAPEUTIC ACTIVITIES: CPT

## 2021-06-14 PROCEDURE — 97140 MANUAL THERAPY 1/> REGIONS: CPT

## 2021-06-14 NOTE — PROGRESS NOTES
Daily Note     Today's date: 2021  Patient name: Missy Gifford  : 1960  MRN: [id number]  Referring provider: Kar Alcantara  Dx:   Encounter Diagnosis     ICD-10-CM    1  Chronic bilateral low back pain with bilateral sciatica  M54 42     M54 41     G89 29    2  Bilateral leg and foot pain  M79 604     M79 605     M79 671     M79 672    3  Leg weakness, bilateral  R29 898                   Subjective: Pt reports 6/10 low back pain with standing in one place for approximately 30 minutes-1 hour today  Pt reports 2/10 low back pain currently  Objective: See treatment diary below      Assessment: Pt demonstrated difficulty with L step ups 2* LBP, fair core stability  Plan: Continue per plan of care  Precautions: INCREASED FALL RISK UTILIZE GAIT BELT  Dx: CLBP mobility/stability preference        Manuals           Laser 5000J 5000 J 6000J          L3-S1 Grade II-IV mobilizations  1x25 ea np                                    Neuro Re-Ed             Abdominal bracing 1x10 2x10 5"x20          DLS marches 5J56 4H26 2x10          Standing Lumbar extension 1x10 1x10 1x10          bridges 1x3* pain 2x10 2x10          Standing DLS hip ABD  6X62 9A88          Standing DLS hip ext  6L99 8Z03          Standing DLS hip flex  1I32 7X75          Tandem stance balance 1x30" ea 1x30" ea 1x40" ea          SLS  1x30" ea 1x30" ea          Ther Ex                                                                                                                     Ther Activity             stairs   4"/   R/  1x10  L/  1x5*          STS   1x5 Foam/         Gait Training                                       Modalities

## 2021-06-17 ENCOUNTER — OFFICE VISIT (OUTPATIENT)
Dept: PHYSICAL THERAPY | Facility: CLINIC | Age: 61
End: 2021-06-17
Payer: COMMERCIAL

## 2021-06-17 DIAGNOSIS — R29.898 LEG WEAKNESS, BILATERAL: ICD-10-CM

## 2021-06-17 DIAGNOSIS — G89.29 CHRONIC BILATERAL LOW BACK PAIN WITH BILATERAL SCIATICA: Primary | ICD-10-CM

## 2021-06-17 DIAGNOSIS — M79.604 BILATERAL LEG AND FOOT PAIN: ICD-10-CM

## 2021-06-17 DIAGNOSIS — M54.42 CHRONIC BILATERAL LOW BACK PAIN WITH BILATERAL SCIATICA: Primary | ICD-10-CM

## 2021-06-17 DIAGNOSIS — M54.41 CHRONIC BILATERAL LOW BACK PAIN WITH BILATERAL SCIATICA: Primary | ICD-10-CM

## 2021-06-17 DIAGNOSIS — M79.671 BILATERAL LEG AND FOOT PAIN: ICD-10-CM

## 2021-06-17 DIAGNOSIS — M79.672 BILATERAL LEG AND FOOT PAIN: ICD-10-CM

## 2021-06-17 DIAGNOSIS — M79.605 BILATERAL LEG AND FOOT PAIN: ICD-10-CM

## 2021-06-17 PROCEDURE — 97140 MANUAL THERAPY 1/> REGIONS: CPT

## 2021-06-17 PROCEDURE — 97112 NEUROMUSCULAR REEDUCATION: CPT

## 2021-06-17 PROCEDURE — 97530 THERAPEUTIC ACTIVITIES: CPT

## 2021-06-17 NOTE — PROGRESS NOTES
Daily Note     Today's date: 2021  Patient name: Katelynn Sharma  : 1960  MRN: [id number]  Referring provider: Harvey Don  Dx:   Encounter Diagnosis     ICD-10-CM    1  Chronic bilateral low back pain with bilateral sciatica  M54 42     M54 41     G89 29    2  Bilateral leg and foot pain  M79 604     M79 605     M79 671     M79 672    3  Leg weakness, bilateral  R29 898                   Subjective: Pt reports no low back pain, moderate calf soreness after walking a lot yesterday  Objective: See treatment diary below      Assessment: Pt demonstrated increased tolerance to steps, LE fatigue with STS  Plan: Continue per plan of care  Precautions: INCREASED FALL RISK UTILIZE GAIT BELT  Dx: CLBP mobility/stability preference        Manuals          Laser 5000J 5000 J 6000J 6000J         L3-S1 Grade II-IV mobilizations  1x25 ea np np                                   Neuro Re-Ed             Abdominal bracing 1x10 2x10 5"x20 5"x20         DLS marches 4R52 6N11 2x10 3x10         Standing Lumbar extension 1x10 1x10 1x10 1x10         bridges 1x3* pain 2x10 2x10 2x10         Standing DLS hip ABD  7K37 7U08 3x10          Standing DLS hip ext  5T94 7E98 2x10         Standing DLS hip flex  5J64 1N48 3x10         Tandem stance balance 1x30" ea 1x30" ea 1x40" ea 1x60" ea         SLS  1x30" ea 1x30" ea 2x30" ea         Ther Ex                                                                                                                     Ther Activity             stairs   4"/   R/  1x10  L/  1x5* 4"/   1x10 ea         STS   1x5 Foam/  1x10         Gait Training                                       Modalities

## 2021-06-22 ENCOUNTER — OFFICE VISIT (OUTPATIENT)
Dept: PHYSICAL THERAPY | Facility: CLINIC | Age: 61
End: 2021-06-22
Payer: COMMERCIAL

## 2021-06-22 DIAGNOSIS — M54.41 CHRONIC BILATERAL LOW BACK PAIN WITH BILATERAL SCIATICA: Primary | ICD-10-CM

## 2021-06-22 DIAGNOSIS — G89.29 CHRONIC BILATERAL LOW BACK PAIN WITH BILATERAL SCIATICA: Primary | ICD-10-CM

## 2021-06-22 DIAGNOSIS — M54.42 CHRONIC BILATERAL LOW BACK PAIN WITH BILATERAL SCIATICA: Primary | ICD-10-CM

## 2021-06-22 PROCEDURE — 97140 MANUAL THERAPY 1/> REGIONS: CPT | Performed by: PHYSICAL THERAPIST

## 2021-06-22 PROCEDURE — 97112 NEUROMUSCULAR REEDUCATION: CPT | Performed by: PHYSICAL THERAPIST

## 2021-06-22 PROCEDURE — 97530 THERAPEUTIC ACTIVITIES: CPT | Performed by: PHYSICAL THERAPIST

## 2021-06-22 NOTE — PROGRESS NOTES
Daily Note     Today's date: 2021  Patient name: Kiana Gardiner  : 1960  MRN: [id number]  Referring provider: King Hawthorne  Dx:   Encounter Diagnosis     ICD-10-CM    1  Chronic bilateral low back pain with bilateral sciatica  M54 42     M54 41     G89 29                   Subjective: Pt reports 2/10 pain today with a minor stinging pain in her posterior R knee  Objective: See treatment diary below      Assessment: Pt demonstrated improved ROM for bridging exercise today  Pt demonstrated difficulty with STS without upper extremity support  Plan: Continue per plan of care  Precautions: INCREASED FALL RISK UTILIZE GAIT BELT  Dx: CLBP mobility/stability preference        Manuals         Laser 5000J 5000 J 6000J 6000J 5000 J        L3-S1 Grade II-IV mobilizations  1x25 ea np np np        Graston R Lumbar Paraspinals     4 min                     Neuro Re-Ed             Abdominal bracing 1x10 2x10 5"x20 5"x20 5"x20        DLS marches 4P67 5X33 2x10 3x10 3x10        Standing Lumbar extension 1x10 1x10 1x10 1x10 1x10        bridges 1x3* pain 2x10 2x10 2x10 3x10        Standing DLS hip ABD  5V54 0V98 3x10  3x12        Standing DLS hip ext  2F02 3J11 2x10 3x10        Standing DLS hip flex  1G01 9B89 3x10 3x10        Tandem stance balance 1x30" ea 1x30" ea 1x40" ea 1x60" ea 1x60" ea        SLS  1x30" ea 1x30" ea 2x30" ea 2x30" ea        Ther Ex                                                                                                                     Ther Activity             stairs   4"/   R/  1x10  L/  1x5* 4"/   1x10 ea 6"/ 1x10 ea        STS   1x5 Foam/  1x10 Foam/ 1x10 no hands        Gait Training             Ambulation Uneven surfaces                          Modalities

## 2021-06-25 ENCOUNTER — OFFICE VISIT (OUTPATIENT)
Dept: INTERNAL MEDICINE CLINIC | Facility: CLINIC | Age: 61
End: 2021-06-25

## 2021-06-25 VITALS
SYSTOLIC BLOOD PRESSURE: 120 MMHG | TEMPERATURE: 97.1 F | BODY MASS INDEX: 22.53 KG/M2 | HEART RATE: 96 BPM | WEIGHT: 132 LBS | HEIGHT: 64 IN | DIASTOLIC BLOOD PRESSURE: 76 MMHG

## 2021-06-25 DIAGNOSIS — G89.29 CHRONIC BILATERAL LOW BACK PAIN WITH BILATERAL SCIATICA: ICD-10-CM

## 2021-06-25 DIAGNOSIS — J44.1 ACUTE EXACERBATION OF CHRONIC OBSTRUCTIVE PULMONARY DISEASE (COPD) (HCC): Primary | ICD-10-CM

## 2021-06-25 DIAGNOSIS — J42 CHRONIC BRONCHITIS, UNSPECIFIED CHRONIC BRONCHITIS TYPE (HCC): ICD-10-CM

## 2021-06-25 DIAGNOSIS — Z12.11 SCREENING FOR MALIGNANT NEOPLASM OF COLON: ICD-10-CM

## 2021-06-25 DIAGNOSIS — J45.20 MILD INTERMITTENT ASTHMA WITHOUT COMPLICATION: ICD-10-CM

## 2021-06-25 DIAGNOSIS — M54.42 CHRONIC BILATERAL LOW BACK PAIN WITH BILATERAL SCIATICA: ICD-10-CM

## 2021-06-25 DIAGNOSIS — R26.89 BALANCE PROBLEM: ICD-10-CM

## 2021-06-25 DIAGNOSIS — M54.41 CHRONIC BILATERAL LOW BACK PAIN WITH BILATERAL SCIATICA: ICD-10-CM

## 2021-06-25 PROCEDURE — T1015 CLINIC SERVICE: HCPCS | Performed by: PHYSICIAN ASSISTANT

## 2021-06-25 PROCEDURE — 99214 OFFICE O/P EST MOD 30 MIN: CPT | Performed by: PHYSICIAN ASSISTANT

## 2021-06-25 RX ORDER — AMOXICILLIN AND CLAVULANATE POTASSIUM 875; 125 MG/1; MG/1
1 TABLET, FILM COATED ORAL EVERY 12 HOURS SCHEDULED
Qty: 14 TABLET | Refills: 0 | Status: SHIPPED | OUTPATIENT
Start: 2021-06-25 | End: 2021-07-02

## 2021-06-25 RX ORDER — PREDNISONE 20 MG/1
40 TABLET ORAL DAILY
Qty: 10 TABLET | Refills: 0 | Status: SHIPPED | OUTPATIENT
Start: 2021-06-25 | End: 2021-06-30

## 2021-06-25 RX ORDER — ALBUTEROL SULFATE 90 UG/1
2 AEROSOL, METERED RESPIRATORY (INHALATION) EVERY 6 HOURS PRN
Qty: 18 G | Refills: 2 | Status: SHIPPED | OUTPATIENT
Start: 2021-06-25

## 2021-06-25 NOTE — PROGRESS NOTES
Assessment/Plan:      Diagnoses and all orders for this visit:    Acute exacerbation of chronic obstructive pulmonary disease (COPD) (HCC)  -     predniSONE 20 mg tablet; Take 2 tablets (40 mg total) by mouth daily for 5 days  -     amoxicillin-clavulanate (Augmentin) 875-125 mg per tablet; Take 1 tablet by mouth every 12 (twelve) hours for 7 days    Chronic bilateral low back pain with bilateral sciatica    Balance problem    Chronic bronchitis, unspecified chronic bronchitis type (HCC)    Mild intermittent asthma without complication  -     albuterol (PROVENTIL HFA,VENTOLIN HFA) 90 mcg/act inhaler; Inhale 2 puffs every 6 (six) hours as needed for wheezing or shortness of breath    Screening for malignant neoplasm of colon  -     Ambulatory referral to Gastroenterology; Future      80-year-old female presenting today for close follow-up for her chronic back pain but also significant symptoms of balance issue/dizziness and reported leg weakness and significant pain that I have evaluated for the past few visits  Patient reports since starting physical therapy all symptoms have improved and she has no concerns with these today  There is no obvious balance disturbance or significant leg weakness/pain or back pain today while observing patient in the exam room which is reassuring  Because of the reported dizziness, echo was done to evaluate heart function but this was unremarkable and it was reviewed again with patient today  At this time she will continue physical therapy we will continue to monitor patient through this course of treatment  Patient's main concern today is worsening cough with sputum production and other symptoms as described in HPI  Most likely COPD exacerbation  Patient did not yet get COVID vaccine but does report being around a little one in the home who was sick with sneezing and coughing    I doubt COVID at this time since patient has no other significant COVID symptoms including fever/chills, generalized weakness, body aches, loss of taste or smell or GI symptoms  For now will treat for possible COPD exacerbation with a 7 day course of Augmentin twice a day as well as prednisone 40 mg daily for 5 days  Albuterol rescue inhaler maybe  so I will refill this for her today to use 1-2 inhales every 4-6 hours as needed  She can use Delsym or Mucinex DM OTC for cough and sputum control  She is to contact the office with any worsening symptoms despite treatment  Patient will follow-up in 4 months for routine follow-up of her chronic conditions, however she was advised to contact the office sooner with any concerns or worsening symptoms despite treatment  She again declines covid vaccine today despite recommendations  Chief Complaint   Patient presents with    Follow-up     shakiness, cough, diarrhea       Subjective:     Patient ID: Snehal Cat is a 64 y o  female  63y/o female here today for f/u to leg pain/shakiness and dizziness  However, pt concerns about 3 days of coughing states started as a sore throat, and now with heavy coughing, sometimes productive  States it is a deep cough  States laying down makes cough worse  States also with some wheezing, tightness and some chest congestion  No fever, no chills  She does have some nasal congestion, but she is using flonase which helps  She did have ear pain yesterday  She is using wixella daily BID and her singulair daily  She used her albuterol inhaler which didn't help  She states today she feels a lot better in regard to her low back pain, leg shakiness/weakness  states her balance is still an issue, like when they have her walk with feet together at PT, but doesn't feel dizzy  She is happy with outcome and plans on continuing with PT for her treatment  For weakness and dizziness, echo was checked and was unremarkable - pt already contacted and aware         Review of Systems   Constitutional: Negative  HENT:        As in HPI   Respiratory:        As in HPI   Cardiovascular: Negative  Gastrointestinal: Negative  Genitourinary: Negative  Musculoskeletal:        As in HPI, low back/leg pain improving   Neurological:        As in HPI - leg weakness/pain and dizziness improving  Psychiatric/Behavioral: Negative  The following portions of the patient's history were reviewed and updated as appropriate: allergies, current medications, past family history, past medical history, past social history, past surgical history and problem list       Objective:     Physical Exam  Vitals reviewed  Constitutional:       General: She is not in acute distress  Appearance: Normal appearance  She is not ill-appearing or toxic-appearing  HENT:      Right Ear: Tympanic membrane, ear canal and external ear normal       Left Ear: Tympanic membrane, ear canal and external ear normal       Nose: Nose normal       Mouth/Throat:      Pharynx: Oropharynx is clear  Eyes:      General:         Right eye: No discharge  Left eye: No discharge  Conjunctiva/sclera: Conjunctivae normal    Cardiovascular:      Rate and Rhythm: Normal rate and regular rhythm  Pulses: Normal pulses  Heart sounds: Normal heart sounds  Pulmonary:      Effort: Pulmonary effort is normal       Breath sounds: Decreased breath sounds, wheezing and rhonchi present  Musculoskeletal:      Cervical back: Neck supple  Comments: Patient witness walking from exam room to the front office as well as observed getting on and off the chair and the exam table without any obvious difficulty, significant balance issues or leg weakness  She is not exhibiting any obvious significant back pain today or limitation  Lymphadenopathy:      Head:      Right side of head: No submandibular or tonsillar adenopathy  Left side of head: No submandibular or tonsillar adenopathy     Neurological:      Mental Status: She is alert and oriented to person, place, and time  Psychiatric:         Mood and Affect: Mood normal          Speech: Speech normal          Behavior: Behavior normal  Behavior is cooperative           Vitals:    06/25/21 0905   BP: 120/76   BP Location: Left arm   Patient Position: Sitting   Cuff Size: Standard   Pulse: 96   Temp: (!) 97 1 °F (36 2 °C)   TempSrc: Temporal   Weight: 59 9 kg (132 lb)   Height: 5' 3 5" (1 613 m)

## 2021-06-27 DIAGNOSIS — M79.631 RIGHT FOREARM PAIN: ICD-10-CM

## 2021-06-27 DIAGNOSIS — M25.531 RIGHT WRIST PAIN: ICD-10-CM

## 2021-06-27 DIAGNOSIS — M54.50 CHRONIC BILATERAL LOW BACK PAIN WITHOUT SCIATICA: ICD-10-CM

## 2021-06-27 DIAGNOSIS — G89.29 CHRONIC BILATERAL LOW BACK PAIN WITHOUT SCIATICA: ICD-10-CM

## 2021-06-28 ENCOUNTER — OFFICE VISIT (OUTPATIENT)
Dept: PHYSICAL THERAPY | Facility: CLINIC | Age: 61
End: 2021-06-28
Payer: COMMERCIAL

## 2021-06-28 DIAGNOSIS — G89.29 CHRONIC BILATERAL LOW BACK PAIN WITH BILATERAL SCIATICA: Primary | ICD-10-CM

## 2021-06-28 DIAGNOSIS — M79.604 BILATERAL LEG AND FOOT PAIN: ICD-10-CM

## 2021-06-28 DIAGNOSIS — M79.672 BILATERAL LEG AND FOOT PAIN: ICD-10-CM

## 2021-06-28 DIAGNOSIS — M79.671 BILATERAL LEG AND FOOT PAIN: ICD-10-CM

## 2021-06-28 DIAGNOSIS — M54.41 CHRONIC BILATERAL LOW BACK PAIN WITH BILATERAL SCIATICA: Primary | ICD-10-CM

## 2021-06-28 DIAGNOSIS — M54.42 CHRONIC BILATERAL LOW BACK PAIN WITH BILATERAL SCIATICA: Primary | ICD-10-CM

## 2021-06-28 DIAGNOSIS — R29.898 LEG WEAKNESS, BILATERAL: ICD-10-CM

## 2021-06-28 DIAGNOSIS — M79.605 BILATERAL LEG AND FOOT PAIN: ICD-10-CM

## 2021-06-28 PROCEDURE — 97530 THERAPEUTIC ACTIVITIES: CPT

## 2021-06-28 PROCEDURE — 97140 MANUAL THERAPY 1/> REGIONS: CPT

## 2021-06-28 PROCEDURE — 97112 NEUROMUSCULAR REEDUCATION: CPT

## 2021-06-28 RX ORDER — CELECOXIB 200 MG/1
200 CAPSULE ORAL DAILY
Qty: 30 CAPSULE | Refills: 2 | Status: SHIPPED | OUTPATIENT
Start: 2021-06-28 | End: 2021-09-21

## 2021-06-28 NOTE — PROGRESS NOTES
Daily Note     Today's date: 2021  Patient name: Sam Plata  : 1960  MRN: [id number]  Referring provider: Carliss Closs  Dx:   Encounter Diagnosis     ICD-10-CM    1  Chronic bilateral low back pain with bilateral sciatica  M54 42     M54 41     G89 29    2  Bilateral leg and foot pain  M79 604     M79 605     M79 671     M79 672    3  Leg weakness, bilateral  R29 898                   Subjective: Pt reports mild left-sided low back pain pre tx  Objective: See treatment diary below      Assessment: Pt demonstrated continued difficulty with STS transfers, TTP with L lumbar paraspinal Graston  Pt demonstrated good tolerance to TE progressions  Plan: Continue per plan of care  Precautions: INCREASED FALL RISK UTILIZE GAIT BELT  Dx: CLBP mobility/stability preference        Manuals        Laser 5000J 5000 J 6000J 6000J 5000 J 5000J       L3-S1 Grade II-IV mobilizations  1x25 ea np np np np       Graston R Lumbar Paraspinals     4 min 4 min                    Neuro Re-Ed             Abdominal bracing 1x10 2x10 5"x20 5"x20 5"x20 5"x20       DLS marches 6G71 1I90 2x10 3x10 3x10 3x12       Standing Lumbar extension 1x10 1x10 1x10 1x10 1x10 1x10       bridges 1x3* pain 2x10 2x10 2x10 3x10 3x10       Standing DLS hip ABD  2I85 0Q38 3x10  3x12 3x12       Standing DLS hip ext  9K74 9E65 2x10 3x10 3x10       Standing DLS hip flex  2D38 3F73 3x10 3x10 3x10       Tandem stance balance 1x30" ea 1x30" ea 1x40" ea 1x60" ea 1x60" ea 1x60" ea       SLS  1x30" ea 1x30" ea 2x30" ea 2x30" ea 2x30" ea       Ther Ex                                                                                                                     Ther Activity             stairs   4"/   R/  1x10  L/  1x5* 4"/   1x10 ea 6"/ 1x10 ea 6"/ 1x12 ea       STS   1x5 Foam/  1x10 Foam/ 1x10 no hands Foam/ 1x10 no hands       Gait Training             Ambulation Uneven surfaces Modalities

## 2021-06-30 ENCOUNTER — OFFICE VISIT (OUTPATIENT)
Dept: PHYSICAL THERAPY | Facility: CLINIC | Age: 61
End: 2021-06-30
Payer: COMMERCIAL

## 2021-06-30 DIAGNOSIS — M79.604 BILATERAL LEG AND FOOT PAIN: ICD-10-CM

## 2021-06-30 DIAGNOSIS — R29.898 LEG WEAKNESS, BILATERAL: ICD-10-CM

## 2021-06-30 DIAGNOSIS — M54.41 CHRONIC BILATERAL LOW BACK PAIN WITH BILATERAL SCIATICA: Primary | ICD-10-CM

## 2021-06-30 DIAGNOSIS — G89.29 CHRONIC BILATERAL LOW BACK PAIN WITH BILATERAL SCIATICA: Primary | ICD-10-CM

## 2021-06-30 DIAGNOSIS — M79.671 BILATERAL LEG AND FOOT PAIN: ICD-10-CM

## 2021-06-30 DIAGNOSIS — M54.42 CHRONIC BILATERAL LOW BACK PAIN WITH BILATERAL SCIATICA: Primary | ICD-10-CM

## 2021-06-30 DIAGNOSIS — M79.672 BILATERAL LEG AND FOOT PAIN: ICD-10-CM

## 2021-06-30 DIAGNOSIS — M79.605 BILATERAL LEG AND FOOT PAIN: ICD-10-CM

## 2021-06-30 PROCEDURE — 97112 NEUROMUSCULAR REEDUCATION: CPT

## 2021-06-30 PROCEDURE — 97530 THERAPEUTIC ACTIVITIES: CPT

## 2021-06-30 PROCEDURE — 97140 MANUAL THERAPY 1/> REGIONS: CPT

## 2021-06-30 NOTE — PROGRESS NOTES
Daily Note     Today's date: 2021  Patient name: Nia Galvez  : 1960  MRN: [id number]  Referring provider: Shad Koehler  Dx:   Encounter Diagnosis     ICD-10-CM    1  Chronic bilateral low back pain with bilateral sciatica  M54 42     M54 41     G89 29    2  Bilateral leg and foot pain  M79 604     M79 605     M79 671     M79 672    3  Leg weakness, bilateral  R29 898                   Subjective: Pt reports 1/10 left-sided low back pain pre tx  Objective: See treatment diary below      Assessment: Pt demonstrated difficulty maintaining neutral lumbopelvic position with bridging and standing hip extension  Pt demonstrated continued LE weakness with STS transfers  Plan: Continue per plan of care  Precautions: INCREASED FALL RISK UTILIZE GAIT BELT  Dx: CLBP mobility/stability preference        Manuals       Laser 5000J 5000 J 6000J 6000J 5000 J 5000J 5000J      L3-S1 Grade II-IV mobilizations  1x25 ea np np np np np      Graston R Lumbar Paraspinals     4 min 4 min L/  4 min                   Neuro Re-Ed             Abdominal bracing 1x10 2x10 5"x20 5"x20 5"x20 5"x20 5"x20      DLS marches 5T48 9Q42 2x10 3x10 3x10 3x12 3x12      Standing Lumbar extension 1x10 1x10 1x10 1x10 1x10 1x10 1x10      bridges 1x3* pain 2x10 2x10 2x10 3x10 3x10 3x12      Standing DLS hip ABD  6L08 9Y84 3x10  3x12 3x12 3x15      Standing DLS hip ext  8N63 6H07 2x10 3x10 3x10 3x10      Standing DLS hip flex  5X99 2B81 3x10 3x10 3x10 3x12      Tandem stance balance 1x30" ea 1x30" ea 1x40" ea 1x60" ea 1x60" ea 1x60" ea 1x60" ea      SLS  1x30" ea 1x30" ea 2x30" ea 2x30" ea 2x30" ea 2x30" ea      Ther Ex                                                                                                                     Ther Activity             stairs   4"/   R/  1x10  L/  1x5* 4"/   1x10 ea 6"/ 1x10 ea 6"/ 1x12 ea 6"/ 1x12 ea      STS   1x5 Foam/  1x10 Foam/ 1x10 no hands Foam/ 1x10 no hands Foam/ 1x10 no hands      Gait Training             Ambulation Uneven surfaces                          Modalities

## 2021-07-01 ENCOUNTER — OFFICE VISIT (OUTPATIENT)
Dept: GASTROENTEROLOGY | Facility: CLINIC | Age: 61
End: 2021-07-01
Payer: COMMERCIAL

## 2021-07-01 VITALS
HEART RATE: 89 BPM | DIASTOLIC BLOOD PRESSURE: 70 MMHG | HEIGHT: 64 IN | SYSTOLIC BLOOD PRESSURE: 120 MMHG | TEMPERATURE: 98.5 F | WEIGHT: 133.2 LBS | BODY MASS INDEX: 22.74 KG/M2

## 2021-07-01 DIAGNOSIS — Z12.11 SCREENING FOR MALIGNANT NEOPLASM OF COLON: ICD-10-CM

## 2021-07-01 DIAGNOSIS — Z12.11 COLON CANCER SCREENING: Primary | ICD-10-CM

## 2021-07-01 DIAGNOSIS — Z12.11 SCREENING FOR MALIGNANT NEOPLASM OF COLON: Primary | ICD-10-CM

## 2021-07-01 PROCEDURE — 99213 OFFICE O/P EST LOW 20 MIN: CPT | Performed by: INTERNAL MEDICINE

## 2021-07-01 PROCEDURE — 3008F BODY MASS INDEX DOCD: CPT | Performed by: INTERNAL MEDICINE

## 2021-07-01 RX ORDER — SOD SULF/POT CHLORIDE/MAG SULF 1.479 G
TABLET ORAL
Qty: 24 TABLET | Refills: 0 | Status: SHIPPED | COMMUNITY
Start: 2021-07-01

## 2021-07-01 NOTE — PROGRESS NOTES
[facility] - Outpatient Follow-up Note  Thomas De La Torre 64 y o  female MRN: [id number]  Encounter: [id number]          ASSESSMENT AND PLAN:      Screening for malignant neoplasm of colon   she had fair prep in 2013  Will schedule her for screening colonoscopy  We reviewed all screening options including colonoscopy  Risks and benefit discussed  Risks include but not limited to infection, bleeding, perforation, missed lesion  Bowel prep instruction given       ______________________________________________________________________    SUBJECTIVE:   27-year-old female here for colon cancer screening  She had colonoscopy in 2013 by Dr Rambo Olsen which was reported normal but the prep was fair  She was supposed to have  colonoscopy 2 years ago but she has to reschedule because  She could not get a ride to her procedure  She was experiencing diarrhea for the last few days otherwise denies abdominal pain, melena or hematochezia  No known family history of GI malignancy      REVIEW OF SYSTEMS IS OTHERWISE NEGATIVE        Historical Information   Past Medical History:   Diagnosis Date    Asthma     COPD (chronic obstructive pulmonary disease) ([address] )     Decreased hearing of left ear     Hyperlipidemia     Hypokalemia     last assessed     Spontaneous       Past Surgical History:   Procedure Laterality Date    COLONOSCOPY      NOSE SURGERY      TONSILLECTOMY AND ADENOIDECTOMY      TUBAL LIGATION       Social History   Social History     Substance and Sexual Activity   Alcohol Use No     Social History     Substance and Sexual Activity   Drug Use No     Social History     Tobacco Use   Smoking Status Current Every Day Smoker    Packs/day: 0 50    Types: Cigarettes, Cigars   Smokeless Tobacco Current User   Tobacco Comment     ppd, started about age 29 - denied history of current smoker noted in "allscripts"      Family History   Problem Relation Age of Onset    Asthma Family     Bipolar disorder Family     Drug abuse Family     Mental illness Family     Heart failure Mother         congestive     Diabetes Father         mellitus     Hypertension Father     Lung cancer Father     Bipolar disorder Brother     No Known Problems Maternal Grandmother     No Known Problems Maternal Grandfather     No Known Problems Paternal Grandmother     No Known Problems Paternal Grandfather     No Known Problems Daughter     No Known Problems Son     No Known Problems Daughter     No Known Problems Brother     No Known Problems Maternal Aunt     No Known Problems Maternal Aunt     No Known Problems Maternal Aunt     No Known Problems Paternal Aunt        Meds/Allergies       Current Outpatient Medications:     albuterol (PROVENTIL HFA,VENTOLIN HFA) 90 mcg/act inhaler    amoxicillin-clavulanate (Augmentin) 875-125 mg per tablet    aspirin 81 mg chewable tablet    atorvastatin (LIPITOR) 20 mg tablet    celecoxib (CeleBREX) 200 mg capsule    [employer] D3 50 MCG (2000 UT) CAPS    cyclobenzaprine (FLEXERIL) 5 mg tablet    fluticasone (FLONASE) 50 mcg/act nasal spray    gabapentin (NEURONTIN) 300 mg capsule    montelukast (SINGULAIR) 10 mg tablet    triamcinolone (KENALOG) 0 1 % ointment    Wixela Inhub 100-50 MCG/DOSE inhaler    Allergies   Allergen Reactions    Seasonal Ic  [Cholestatin]            Objective     Blood pressure 120/70, pulse 89, temperature 98 5 °F (36 9 °C), temperature source Tympanic, height 5' 3 5" (1 613 m), weight 60 4 kg (133 lb 3 2 oz)  Body mass index is 23 23 kg/m²  PHYSICAL EXAM:      General Appearance:   Alert, cooperative, no distress   HEENT:   Normocephalic, atraumatic, anicteric      Neck:  Supple, symmetrical, trachea midline   Lungs:   Clear to auscultation bilaterally; no rales, rhonchi or wheezing; respirations unlabored    Heart[de-identified]   Regular rate and rhythm; no murmur, rub, or gallop     Abdomen:   Soft, non-tender, non-distended; normal bowel sounds; no masses, no organomegaly    Genitalia:   Deferred    Rectal:   Deferred    Extremities:  No cyanosis, clubbing or edema    Pulses:  2+ and symmetric    Skin:  No jaundice, rashes, or lesions    Lymph nodes:  No palpable cervical lymphadenopathy        Lab Results:   No visits with results within 1 Day(s) from this visit  Latest known visit with results is:   Office Visit on 2021   Component Date Value    GLUCOSE BLD 2021 139          Radiology Results:   Echo complete with contrast if indicated    Result Date: 2021  Narrative: [id number] [facility], [address] [phone] Transthoracic Echocardiogram 2D, M-mode, Doppler, and Color Doppler Study date:  2021 Patient: Leeann Guerrero MR number: [id number] Account number: [de-identified] : 1960 Age: 61 years Gender: Female Status: Outpatient Location: [address] [place] [address] and [place] [place] Height: 62 in Weight: 137 1 lb BP: 185/ 105 mmHg Indications: Murmur  Diagnoses: R01 1 - Cardiac murmur, unspecified Sonographer:  JUAN MANUEL Chicas Primary Physician:  Amada Michelle PA-C Referring Physician:  Amada Michelle PA-C Group:  [facility] Cardiology Fellow: Payton Montejo MD Interpreting Physician:  Krzysztof Damian MD SUMMARY LEFT VENTRICLE: Systolic function was normal  Ejection fraction was estimated to be 65 %  There were no regional wall motion abnormalities  MITRAL VALVE: There was trace regurgitation  AORTIC VALVE: There was mild regurgitation  TRICUSPID VALVE: There was trace regurgitation  HISTORY: PRIOR HISTORY: COPD;Asthma;Dyslipidemia;Smoker  PROCEDURE: The study was performed in the [address] [place] [place]  This was a routine study  The transthoracic approach was used  The study included complete 2D imaging, M-mode, complete spectral Doppler, and color Doppler  The heart rate was 90 bpm, at the start of the study  Images were obtained from the parasternal, apical, subcostal, and suprasternal notch acoustic windows  Image quality was adequate  LEFT VENTRICLE: Size was normal  Systolic function was normal  Ejection fraction was estimated to be 65 %  There were no regional wall motion abnormalities  Wall thickness was normal  DOPPLER: Left ventricular diastolic function parameters were normal  RIGHT VENTRICLE: The size was normal  Systolic function was normal  Wall thickness was normal  LEFT ATRIUM: Size was normal  RIGHT ATRIUM: Size was normal  MITRAL VALVE: Valve structure was normal  There was normal leaflet separation  DOPPLER: The transmitral velocity was within the normal range  There was no evidence for stenosis  There was trace regurgitation  AORTIC VALVE: The valve was trileaflet  Leaflets exhibited normal thickness and normal cuspal separation  DOPPLER: Transaortic velocity was within the normal range  There was no evidence for stenosis  There was mild regurgitation  TRICUSPID VALVE: The valve structure was normal  There was normal leaflet separation  DOPPLER: The transtricuspid velocity was within the normal range  There was no evidence for stenosis  There was trace regurgitation  Pulmonary artery systolic pressure was within the normal range  Estimated peak PA pressure was 30 mmHg  PULMONIC VALVE: Leaflets exhibited normal thickness, no calcification, and normal cuspal separation  DOPPLER: The transpulmonic velocity was within the normal range  There was trace regurgitation  PERICARDIUM: There was no pericardial effusion  The pericardium was normal in appearance  AORTA: The root exhibited normal size  SYSTEMIC VEINS: IVC: The inferior vena cava was normal in size   SYSTEM MEASUREMENT TABLES 2D %FS: 43 08 % Ao Diam: 3 28 cm EDV(Teich): 64 37 ml EF(Teich): 74 89 % ESV(Teich): 16 16 ml IVSd: 0 86 cm LA Area: 8 86 cm2 LA Diam: 2 72 cm LVEDV MOD A4C: 54 ml LVEF MOD A4C: 62 85 % LVESV MOD A4C: 20 06 ml LVIDd: 3 86 cm LVIDs: 2 2 cm LVLd A4C: 6 62 cm LVLs A4C: 5 54 cm LVOT Diam: 1 87 cm LVPWd: 0 85 cm RA Area: 8 67 cm2 RVIDd: 2 66 cm SV MOD A4C: 33 94 ml SV(Teich): 48 21 ml CW AR Dec Tompkins: 2 31 m/s2 AR Dec Time: 1751 98 ms AR PHT: 508 08 ms AR Vmax: 3 98 m/s AR maxP 55 mmHg AV Env  Ti: 281 64 ms AV VTI: 25 96 cm AV Vmax: 1 32 m/s AV Vmean: 0 92 m/s AV maxP 93 mmHg AV meanPG: 3 7 mmHg TR Vmax: 2 48 m/s TR maxP 69 mmHg MM TAPSE: 1 93 cm PW LINN (VTI): 2 25 cm2 LINN Vmax: 2 28 cm2 E' Sept: 0 04 m/s E/E' Sept: 28 27 LVOT Env  Ti: 285 44 ms LVOT VTI: 21 43 cm LVOT Vmax: 1 1 m/s LVOT Vmean: 0 75 m/s LVOT maxP 81 mmHg LVOT meanP 58 mmHg LVSV Dopp: 58 52 ml MV A Vern: 0 96 m/s MV Dec Tompkins: 6 06 m/s2 MV DecT: 167 41 ms MV E Vern: 1 01 m/s MV E/A Ratio: 1 05 MV PHT: 48 55 ms MVA By PHT: 4 53 cm2 [employer] Accredited Echocardiography [facility] Prepared and electronically signed by Kobe Brown MD Signed 2021 12:04:53

## 2021-07-01 NOTE — PATIENT INSTRUCTIONS
Sutab samples given to pt in the office  Colonoscopy scheduled 08/26/21 @[facility] with Dr Driss Gibbs    Instructions given to pt by Johanna Jones in the office

## 2021-07-06 ENCOUNTER — OFFICE VISIT (OUTPATIENT)
Dept: PHYSICAL THERAPY | Facility: CLINIC | Age: 61
End: 2021-07-06
Payer: COMMERCIAL

## 2021-07-06 DIAGNOSIS — G89.29 CHRONIC BILATERAL LOW BACK PAIN WITH BILATERAL SCIATICA: Primary | ICD-10-CM

## 2021-07-06 DIAGNOSIS — M54.42 CHRONIC BILATERAL LOW BACK PAIN WITH BILATERAL SCIATICA: Primary | ICD-10-CM

## 2021-07-06 DIAGNOSIS — M79.604 BILATERAL LEG AND FOOT PAIN: ICD-10-CM

## 2021-07-06 DIAGNOSIS — R29.898 LEG WEAKNESS, BILATERAL: ICD-10-CM

## 2021-07-06 DIAGNOSIS — M79.672 BILATERAL LEG AND FOOT PAIN: ICD-10-CM

## 2021-07-06 DIAGNOSIS — M79.671 BILATERAL LEG AND FOOT PAIN: ICD-10-CM

## 2021-07-06 DIAGNOSIS — M79.605 BILATERAL LEG AND FOOT PAIN: ICD-10-CM

## 2021-07-06 DIAGNOSIS — M54.41 CHRONIC BILATERAL LOW BACK PAIN WITH BILATERAL SCIATICA: Primary | ICD-10-CM

## 2021-07-06 PROCEDURE — 97112 NEUROMUSCULAR REEDUCATION: CPT | Performed by: PHYSICAL THERAPIST

## 2021-07-06 PROCEDURE — 97530 THERAPEUTIC ACTIVITIES: CPT | Performed by: PHYSICAL THERAPIST

## 2021-07-06 NOTE — PROGRESS NOTES
Daily Note     Today's date: 2021  Patient name: Nilo Veliz  : 1960  MRN: [id number]  Referring provider: Christine Salinas  Dx:   Encounter Diagnosis     ICD-10-CM    1  Chronic bilateral low back pain with bilateral sciatica  M54 42     M54 41     G89 29    2  Bilateral leg and foot pain  M79 604     M79 605     M79 671     M79 672    3  Leg weakness, bilateral  R29 898                   Subjective: Pt reports 0/10 low back pain without radicular symptoms  Pt reports having difficulty ascending stairs  Pt has total of 17 steps at home  Objective: See treatment diary below      Assessment: Pt continues to demonstrate LE weakness with STS, but pt was able to increase volume from previous session  Pt's tolerance to STS improved with verbal cues to lean forward  Plan to progress LE strengthening to improve STS and stair tolerance  Plan: Continue per plan of care  Progress with LE strengthening     Precautions: INCREASED FALL RISK UTILIZE GAIT BELT  Dx: CLBP mobility/stability preference        Manuals      Laser 5000J 5000 J 6000J 6000J 5000 J 5000J 5000J 5000 J     L3-S1 Grade II-IV mobilizations  1x25 ea np np np np np np     Graston R Lumbar Paraspinals     4 min 4 min L/  4 min 4 min                  Neuro Re-Ed             Abdominal bracing 1x10 2x10 5"x20 5"x20 5"x20 5"x20 5"x20 5"x20     DLS marches 6I91 7T16 2x10 3x10 3x10 3x12 3x12 np     Standing Lumbar extension 1x10 1x10 1x10 1x10 1x10 1x10 1x10 1x10     bridges 1x3* pain 2x10 2x10 2x10 3x10 3x10 3x12 3x12     Standing DLS hip ABD  6E12 7F19 3x10  3x12 3x12 3x15 3x15     Standing DLS hip ext  4B34 3C70 2x10 3x10 3x10 3x10 3x15     Standing DLS hip flex  0I03 9A58 3x10 3x10 3x10 3x12 3x15     Tandem stance balance 1x30" ea 1x30" ea 1x40" ea 1x60" ea 1x60" ea 1x60" ea 1x60" ea 1x60" ea     SLS  1x30" ea 1x30" ea 2x30" ea 2x30" ea 2x30" ea 2x30" ea 2x30" ea     FT EO        Lvl 9-11 2x30"     FT EC        2x30"     Ther Ex             Back ext         20#/ 2x10    Leg Press        45#/ 2x5 45#/ 2x10    Knee ext machine             Knee curl                                                                 Ther Activity             stairs   4"/   R/  1x10  L/  1x5* 4"/   1x10 ea 6"/ 1x10 ea 6"/ 1x12 ea 6"/ 1x12 ea 8"/1x10 ea     STS   1x5 Foam/  1x10 Foam/ 1x10 no hands Foam/ 1x10 no hands Foam/ 1x10 no hands Foam/ 2x10 no hands     Gait Training             Ambulation Uneven surfaces                          Modalities

## 2021-07-08 ENCOUNTER — OFFICE VISIT (OUTPATIENT)
Dept: PHYSICAL THERAPY | Facility: CLINIC | Age: 61
End: 2021-07-08
Payer: COMMERCIAL

## 2021-07-08 DIAGNOSIS — M54.41 CHRONIC BILATERAL LOW BACK PAIN WITH BILATERAL SCIATICA: Primary | ICD-10-CM

## 2021-07-08 DIAGNOSIS — M54.42 CHRONIC BILATERAL LOW BACK PAIN WITH BILATERAL SCIATICA: Primary | ICD-10-CM

## 2021-07-08 DIAGNOSIS — G89.29 CHRONIC BILATERAL LOW BACK PAIN WITH BILATERAL SCIATICA: Primary | ICD-10-CM

## 2021-07-08 PROCEDURE — 97112 NEUROMUSCULAR REEDUCATION: CPT | Performed by: PHYSICAL THERAPIST

## 2021-07-08 PROCEDURE — 97110 THERAPEUTIC EXERCISES: CPT | Performed by: PHYSICAL THERAPIST

## 2021-07-08 NOTE — PROGRESS NOTES
Daily Note     Today's date: 2021  Patient name: Vivian Jovel  : 1960  MRN: [id number]  Referring provider: Judge Monreal  Dx:   Encounter Diagnosis     ICD-10-CM    1  Chronic bilateral low back pain with bilateral sciatica  M54 42     M54 41     G89 29                   Subjective: Pt reports 0/10 low back pain today without radicular symptoms  Pt reports some L lateral knee pain, but it appears to be isolated to the knee and not coming from the back  Objective: See treatment diary below      Assessment: Pt demonstrated TTP on L lateral knee, indicating that the pain is most likely isolated to the knee and not a lumbar radicular symptom  Pt demonstrated good tolerance to progression of strengthening program without any low back symptoms  Plan: Continue per plan of care  Progress with LE strengthening     Precautions: INCREASED FALL RISK UTILIZE GAIT BELT  Dx: CLBP mobility/stability preference        Manuals     Laser 5000J 5000 J 6000J 6000J 5000 J 5000J 5000J 5000 J nv    L3-S1 Grade II-IV mobilizations  1x25 ea np np np np np np np    Graston R Lumbar Paraspinals     4 min 4 min L/  4 min 4 min nv                 Neuro Re-Ed             Abdominal bracing 1x10 2x10 5"x20 5"x20 5"x20 5"x20 5"x20 5"x20 5"x20    DLS marches 4R97 0V58 2x10 3x10 3x10 3x12 3x12 np np    Standing Lumbar extension 1x10 1x10 1x10 1x10 1x10 1x10 1x10 1x10 1x10    bridges 1x3* pain 2x10 2x10 2x10 3x10 3x10 3x12 3x12 3x12    Standing DLS hip ABD  4B37 2B25 3x10  3x12 3x12 3x15 3x15 3x15    Standing DLS hip ext  8P13 1I15 2x10 3x10 3x10 3x10 3x15 3x15    Standing DLS hip flex  8H07 2S11 3x10 3x10 3x10 3x12 3x15 3x15    Tandem stance balance 1x30" ea 1x30" ea 1x40" ea 1x60" ea 1x60" ea 1x60" ea 1x60" ea 1x60" ea 1x60" ea    SLS  1x30" ea 1x30" ea 2x30" ea 2x30" ea 2x30" ea 2x30" ea 2x30" ea 2x30" ea    FT EO        Lvl  2x30" Lvl  2x30"    FT EC 2x30" 2x30"    Ther Ex             Back ext         20#/ 2x10    Leg Press        45#/ 2x5 45#/ 3x10    Knee ext machine             Knee curl                                                                 Ther Activity             stairs   4"/   R/  1x10  L/  1x5* 4"/   1x10 ea 6"/ 1x10 ea 6"/ 1x12 ea 6"/ 1x12 ea 8"/1x10 ea 10"/ 1x10 ea    STS   1x5 Foam/  1x10 Foam/ 1x10 no hands Foam/ 1x10 no hands Foam/ 1x10 no hands Foam/ 2x10 no hands Foam/ 2x10 no hands    Gait Training             Ambulation Uneven surfaces                          Modalities

## 2021-07-13 ENCOUNTER — OFFICE VISIT (OUTPATIENT)
Dept: PHYSICAL THERAPY | Facility: CLINIC | Age: 61
End: 2021-07-13
Payer: COMMERCIAL

## 2021-07-13 DIAGNOSIS — R29.898 LEG WEAKNESS, BILATERAL: ICD-10-CM

## 2021-07-13 DIAGNOSIS — M79.604 BILATERAL LEG AND FOOT PAIN: ICD-10-CM

## 2021-07-13 DIAGNOSIS — M54.42 CHRONIC BILATERAL LOW BACK PAIN WITH BILATERAL SCIATICA: Primary | ICD-10-CM

## 2021-07-13 DIAGNOSIS — M79.672 BILATERAL LEG AND FOOT PAIN: ICD-10-CM

## 2021-07-13 DIAGNOSIS — M54.41 CHRONIC BILATERAL LOW BACK PAIN WITH BILATERAL SCIATICA: Primary | ICD-10-CM

## 2021-07-13 DIAGNOSIS — M79.671 BILATERAL LEG AND FOOT PAIN: ICD-10-CM

## 2021-07-13 DIAGNOSIS — G89.29 CHRONIC BILATERAL LOW BACK PAIN WITH BILATERAL SCIATICA: Primary | ICD-10-CM

## 2021-07-13 DIAGNOSIS — M79.605 BILATERAL LEG AND FOOT PAIN: ICD-10-CM

## 2021-07-13 PROCEDURE — 97530 THERAPEUTIC ACTIVITIES: CPT

## 2021-07-13 PROCEDURE — 97112 NEUROMUSCULAR REEDUCATION: CPT

## 2021-07-13 PROCEDURE — 97110 THERAPEUTIC EXERCISES: CPT

## 2021-07-13 NOTE — PROGRESS NOTES
Daily Note     Today's date: 2021  Patient name: Trisha Wolfe  : 1960  MRN: [id number]  Referring provider: [place]  Dx:   Encounter Diagnosis     ICD-10-CM    1  Chronic bilateral low back pain with bilateral sciatica  M54 42     M54 41     G89 29    2  Bilateral leg and foot pain  M79 604     M79 605     M79 671     M79 672    3  Leg weakness, bilateral  R29 898                   Subjective: Pt reports mild left lateral knee pain, no low back pain currently  Pt reports noticing moderate low back pain with driving for longer distances on bumpy roads  Objective: See treatment diary below      Assessment: Pt demonstrated increased bridging and stair tolerance, mild instability with SLS balance  Plan: Continue per plan of care  Progress with LE strengthening     Precautions: INCREASED FALL RISK UTILIZE GAIT BELT  Dx: CLBP mobility/stability preference        Manuals    Laser 5000J 5000 J 6000J 6000J 5000 J 5000J 5000J 5000 J nv 5000J   L3-S1 Grade II-IV mobilizations  1x25 ea np np np np np np np np   Graston R Lumbar Paraspinals     4 min 4 min L/  4 min 4 min nv 4 min                Neuro Re-Ed             Abdominal bracing 1x10 2x10 5"x20 5"x20 5"x20 5"x20 5"x20 5"x20 5"x20 np   DLS marches 0D38 5V16 2x10 3x10 3x10 3x12 3x12 np np np   Standing Lumbar extension 1x10 1x10 1x10 1x10 1x10 1x10 1x10 1x10 1x10 np   bridges 1x3* pain 2x10 2x10 2x10 3x10 3x10 3x12 3x12 3x12 3x15   Standing DLS hip ABD  0Q04 2P71 3x10  3x12 3x12 3x15 3x15 3x15 3x15   Standing DLS hip ext  0A99 9H17 2x10 3x10 3x10 3x10 3x15 3x15 3x15   Standing DLS hip flex  2O81 4O16 3x10 3x10 3x10 3x12 3x15 3x15 3x15   Tandem stance balance 1x30" ea 1x30" ea 1x40" ea 1x60" ea 1x60" ea 1x60" ea 1x60" ea 1x60" ea 1x60" ea 1x60" ea   SLS  1x30" ea 1x30" ea 2x30" ea 2x30" ea 2x30" ea 2x30" ea 2x30" ea 2x30" ea 2x30" ea   FT EO        Lvl  2x30" Lvl  2x30" L9  1x60"   FT EC        2x30" 2x30" LS  1x60"   Ther Ex             Back ext         20#/ 2x10 25#/  3x10   Leg Press        45#/ 2x5 45#/ 3x10 50#/  3x10   Knee ext machine             Knee curl                                                                 Ther Activity             stairs   4"/   R/  1x10  L/  1x5* 4"/   1x10 ea 6"/ 1x10 ea 6"/ 1x12 ea 6"/ 1x12 ea 8"/1x10 ea 10"/ 1x10 ea 10"/ 1x15 ea   STS   1x5 Foam/  1x10 Foam/ 1x10 no hands Foam/ 1x10 no hands Foam/ 1x10 no hands Foam/ 2x10 no hands Foam/ 2x10 no hands Foam/ 2x10 no hands   Gait Training             Ambulation Uneven surfaces                          Modalities

## 2021-07-15 ENCOUNTER — OFFICE VISIT (OUTPATIENT)
Dept: PHYSICAL THERAPY | Facility: CLINIC | Age: 61
End: 2021-07-15
Payer: COMMERCIAL

## 2021-07-15 DIAGNOSIS — R29.898 LEG WEAKNESS, BILATERAL: ICD-10-CM

## 2021-07-15 DIAGNOSIS — M79.605 BILATERAL LEG AND FOOT PAIN: ICD-10-CM

## 2021-07-15 DIAGNOSIS — M79.671 BILATERAL LEG AND FOOT PAIN: ICD-10-CM

## 2021-07-15 DIAGNOSIS — M54.42 CHRONIC BILATERAL LOW BACK PAIN WITH BILATERAL SCIATICA: Primary | ICD-10-CM

## 2021-07-15 DIAGNOSIS — M54.41 CHRONIC BILATERAL LOW BACK PAIN WITH BILATERAL SCIATICA: Primary | ICD-10-CM

## 2021-07-15 DIAGNOSIS — M79.672 BILATERAL LEG AND FOOT PAIN: ICD-10-CM

## 2021-07-15 DIAGNOSIS — M79.604 BILATERAL LEG AND FOOT PAIN: ICD-10-CM

## 2021-07-15 DIAGNOSIS — G89.29 CHRONIC BILATERAL LOW BACK PAIN WITH BILATERAL SCIATICA: Primary | ICD-10-CM

## 2021-07-15 PROCEDURE — 97112 NEUROMUSCULAR REEDUCATION: CPT

## 2021-07-15 PROCEDURE — 97530 THERAPEUTIC ACTIVITIES: CPT

## 2021-07-15 PROCEDURE — 97140 MANUAL THERAPY 1/> REGIONS: CPT

## 2021-07-15 NOTE — PROGRESS NOTES
Daily Note     Today's date: 7/15/2021  Patient name: Rajan Ortiz  : 1960  MRN: [id number]  Referring provider: Lorie Boothe  Dx:   Encounter Diagnosis     ICD-10-CM    1  Chronic bilateral low back pain with bilateral sciatica  M54 42     M54 41     G89 29    2  Bilateral leg and foot pain  M79 604     M79 605     M79 671     M79 672    3  Leg weakness, bilateral  R29 898                   Subjective: ***      Objective: See treatment diary below      Assessment: Tolerated treatment {Tolerated treatment :[id number]}  Patient {assessment:4705907430}      Plan: {PLAN:3689127222}     Precautions: INCREASED FALL RISK UTILIZE GAIT BELT  Dx: CLBP mobility/stability preference        Manuals     Laser  5000 J 6000J 6000J 5000 J 5000J 5000J 5000 J nv 5000J   L3-S1 Grade II-IV mobilizations  1x25 ea np np np np np np np np   Graston R Lumbar Paraspinals     4 min 4 min L/  4 min 4 min nv 4 min                Neuro Re-Ed             Abdominal bracing  2x10 5"x20 5"x20 5"x20 5"x20 5"x20 5"x20 5"x20 np   DLS marches  6V77 2O84 3x10 3x10 3x12 3x12 np np np   Standing Lumbar extension  1x10 1x10 1x10 1x10 1x10 1x10 1x10 1x10 np   bridges  2x10 2x10 2x10 3x10 3x10 3x12 3x12 3x12 3x15   Standing DLS hip ABD  3T54 9Y67 3x10  3x12 3x12 3x15 3x15 3x15 3x15   Standing DLS hip ext  2H16 7V83 2x10 3x10 3x10 3x10 3x15 3x15 3x15   Standing DLS hip flex  1N31 7H28 3x10 3x10 3x10 3x12 3x15 3x15 3x15   Tandem stance balance  1x30" ea 1x40" ea 1x60" ea 1x60" ea 1x60" ea 1x60" ea 1x60" ea 1x60" ea 1x60" ea   SLS  1x30" ea 1x30" ea 2x30" ea 2x30" ea 2x30" ea 2x30" ea 2x30" ea 2x30" ea 2x30" ea   FT EO        Lvl 9-11 2x30" Lvl 9-11 2x30" L9  1x60"   FT EC        2x30" 2x30" LS  1x60"   Ther Ex             Back ext         20#/ 2x10 25#/  3x10   Leg Press        45#/ 2x5 45#/ 3x10 50#/  3x10   Knee ext machine             Knee curl Ther Activity             stairs   4"/   R/  1x10  L/  1x5* 4"/   1x10 ea 6"/ 1x10 ea 6"/ 1x12 ea 6"/ 1x12 ea 8"/1x10 ea 10"/ 1x10 ea 10"/ 1x15 ea   STS   1x5 Foam/  1x10 Foam/ 1x10 no hands Foam/ 1x10 no hands Foam/ 1x10 no hands Foam/ 2x10 no hands Foam/ 2x10 no hands Foam/ 2x10 no hands   Gait Training             Ambulation Uneven surfaces                          Modalities

## 2021-07-15 NOTE — PROGRESS NOTES
Daily Note     Today's date: 7/15/2021  Patient name: Carla Antunez  : 1960  MRN: [id number]  Referring provider: Ramírez Riggins  Dx:   Encounter Diagnosis     ICD-10-CM    1  Chronic bilateral low back pain with bilateral sciatica  M54 42     M54 41     G89 29    2  Bilateral leg and foot pain  M79 604     M79 605     M79 671     M79 672    3  Leg weakness, bilateral  R29 898                   Subjective: Pt reports LB is feeling achy today  Describes her pain as a "headache" but in her LB  Is more "annoying" than anything else  Objective: See treatment diary below      Assessment: Tolerated treatment well  Continued with program as outlined below  Fatigues with assigned exercise requiring occasional rest breaks for recovery  R knee/quad fatigue reported most throughout treatment  Minimal LOB during balance interventions, but was able to self stabilize without use of handrails  Patient would benefit from continued PT to further improve strength, decrease pain, and maximize overall function  Plan: Continue per plan of care  Precautions: INCREASED FALL RISK UTILIZE GAIT BELT  Dx: CLBP mobility/stability preference        Manuals 7/15 6/9 6/14 6/17 6/22 6/28 6/30 7/6 7/8 7/13   Laser  5000 J 6000J 6000J 5000 J 5000J 5000J 5000 J nv 5000J   L3-S1 Grade II-IV mobilizations  1x25 ea np np np np np np np np   Graston R Lumbar Paraspinals     4 min 4 min L/  4 min 4 min nv 4 min                Neuro Re-Ed             Abdominal bracing  2x10 5"x20 5"x20 5"x20 5"x20 5"x20 5"x20 5"x20 np   DLS marches np 7C36 5A19 3x10 3x10 3x12 3x12 np np np   Standing Lumbar extension  1x10 1x10 1x10 1x10 1x10 1x10 1x10 1x10 np   bridges  2x10 2x10 2x10 3x10 3x10 3x12 3x12 3x12 3x15   Standing DLS hip ABD 1H69 1L81 3x10 3x10  3x12 3x12 3x15 3x15 3x15 3x15   Standing DLS hip ext 6D53 2F42 2x10 2x10 3x10 3x10 3x10 3x15 3x15 3x15   Standing DLS hip flex 2Y85 7F26 3x10 3x10 3x10 3x10 3x12 3x15 3x15 3x15   Tandem stance balance  1x30" ea 1x40" ea 1x60" ea 1x60" ea 1x60" ea 1x60" ea 1x60" ea 1x60" ea 1x60" ea   SLS  1x30" ea 1x30" ea 2x30" ea 2x30" ea 2x30" ea 2x30" ea 2x30" ea 2x30" ea 2x30" ea   FT EO        Lvl 9-11 2x30" Lvl 9-11 2x30" L9  1x60"   FT EC        2x30" 2x30" LS  1x60"   Ther Ex             Back ext 30#  3x10        20#/ 2x10 25#/  3x10   Leg Press 50#/  3x10       45#/ 2x5 45#/ 3x10 50#/  3x10   Knee ext machine             Knee curl                                                                 Ther Activity             stairs 10"/ 1x15 ea  4"/   R/  1x10  L/  1x5* 4"/   1x10 ea 6"/ 1x10 ea 6"/ 1x12 ea 6"/ 1x12 ea 8"/1x10 ea 10"/ 1x10 ea 10"/ 1x15 ea   STS Foam/ 2x10 no hands  1x5 Foam/  1x10 Foam/ 1x10 no hands Foam/ 1x10 no hands Foam/ 1x10 no hands Foam/ 2x10 no hands Foam/ 2x10 no hands Foam/ 2x10 no hands   Gait Training             Ambulation Uneven surfaces                          Modalities

## 2021-07-20 ENCOUNTER — OFFICE VISIT (OUTPATIENT)
Dept: PHYSICAL THERAPY | Facility: CLINIC | Age: 61
End: 2021-07-20
Payer: COMMERCIAL

## 2021-07-20 DIAGNOSIS — M79.604 BILATERAL LEG AND FOOT PAIN: ICD-10-CM

## 2021-07-20 DIAGNOSIS — M54.42 CHRONIC BILATERAL LOW BACK PAIN WITH BILATERAL SCIATICA: Primary | ICD-10-CM

## 2021-07-20 DIAGNOSIS — M79.671 BILATERAL LEG AND FOOT PAIN: ICD-10-CM

## 2021-07-20 DIAGNOSIS — R29.898 LEG WEAKNESS, BILATERAL: ICD-10-CM

## 2021-07-20 DIAGNOSIS — G89.29 CHRONIC BILATERAL LOW BACK PAIN WITH BILATERAL SCIATICA: Primary | ICD-10-CM

## 2021-07-20 DIAGNOSIS — M54.41 CHRONIC BILATERAL LOW BACK PAIN WITH BILATERAL SCIATICA: Primary | ICD-10-CM

## 2021-07-20 DIAGNOSIS — M79.605 BILATERAL LEG AND FOOT PAIN: ICD-10-CM

## 2021-07-20 DIAGNOSIS — M79.672 BILATERAL LEG AND FOOT PAIN: ICD-10-CM

## 2021-07-20 PROCEDURE — 97140 MANUAL THERAPY 1/> REGIONS: CPT | Performed by: PHYSICAL THERAPIST

## 2021-07-20 PROCEDURE — 97112 NEUROMUSCULAR REEDUCATION: CPT | Performed by: PHYSICAL THERAPIST

## 2021-07-20 NOTE — PROGRESS NOTES
Daily Note     Today's date: 2021  Patient name: Olivier Stewart  : 1960  MRN: [id number]  Referring provider: Deanna Bansal  Dx:   Encounter Diagnosis     ICD-10-CM    1  Chronic bilateral low back pain with bilateral sciatica  M54 42     M54 41     G89 29    2  Bilateral leg and foot pain  M79 604     M79 605     M79 671     M79 672    3  Leg weakness, bilateral  R29 898                   Subjective: Pt 2/10 low back pain today  Pt reports that she was handed a heavy object at work last Friday and then dropped it leading to an increase in back pain at the time  Objective: See treatment diary below      Assessment: Pt demonstrated good tolerance to lumbar mobilizations and graston, which decreased her low back symptoms  Pt educated on the cause of her low back pain and posture  Pt educated to let physical therapist know if she has any other instances of dropping objects  Plan: Continue per plan of care  Precautions: INCREASED FALL RISK UTILIZE GAIT BELT  Dx: CLBP mobility/stability preference        Manuals 7/15 7/20        7/13   Laser  5000 J        5000J   L3-S1 Grade II-IV mobilizations  1x20 ea        np   Graston B Lumbar Paraspinals  6 min        4 min                Neuro Re-Ed             Abdominal bracing          np   DLS marches np 4B59 ea        np   Standing Lumbar extension          np   bridges  3x15        3x15   Standing DLS hip ABD 3W35 np        3x15   Standing DLS hip ext 8V21 np        3x15   Standing DLS hip flex 3G22 np        3x15   Tandem stance balance  1x60 ea        1x60" ea   SLS  1x30 ea        2x30" ea   FT EO  L9 1x60"        L9  1x60"   FT EC  LS 1x60"        LS  1x60"   Posture Education  3 min           Education on LBP  5 min           Ther Ex             Back ext 30#  3x10 35# 3x10        25#/  3x10   Leg Press 50#/  3x10 nv        50#/  3x10   Knee ext machine             Knee curl Ther Activity             stairs 10"/ 1x15 ea nv        10"/ 1x15 ea   STS Foam/ 2x10 no hands Foam/ 2x10 no hands        Foam/ 2x10 no hands   Gait Training             Ambulation Uneven surfaces                          Modalities

## 2021-07-27 ENCOUNTER — OFFICE VISIT (OUTPATIENT)
Dept: PHYSICAL THERAPY | Facility: CLINIC | Age: 61
End: 2021-07-27
Payer: COMMERCIAL

## 2021-07-27 DIAGNOSIS — M79.672 BILATERAL LEG AND FOOT PAIN: ICD-10-CM

## 2021-07-27 DIAGNOSIS — M79.671 BILATERAL LEG AND FOOT PAIN: ICD-10-CM

## 2021-07-27 DIAGNOSIS — M54.42 CHRONIC BILATERAL LOW BACK PAIN WITH BILATERAL SCIATICA: Primary | ICD-10-CM

## 2021-07-27 DIAGNOSIS — M54.41 CHRONIC BILATERAL LOW BACK PAIN WITH BILATERAL SCIATICA: Primary | ICD-10-CM

## 2021-07-27 DIAGNOSIS — M79.605 BILATERAL LEG AND FOOT PAIN: ICD-10-CM

## 2021-07-27 DIAGNOSIS — M79.604 BILATERAL LEG AND FOOT PAIN: ICD-10-CM

## 2021-07-27 DIAGNOSIS — G89.29 CHRONIC BILATERAL LOW BACK PAIN WITH BILATERAL SCIATICA: Primary | ICD-10-CM

## 2021-07-27 DIAGNOSIS — R29.898 LEG WEAKNESS, BILATERAL: ICD-10-CM

## 2021-07-27 PROCEDURE — 97140 MANUAL THERAPY 1/> REGIONS: CPT

## 2021-07-27 PROCEDURE — 97112 NEUROMUSCULAR REEDUCATION: CPT

## 2021-07-27 PROCEDURE — 97530 THERAPEUTIC ACTIVITIES: CPT

## 2021-07-27 PROCEDURE — 97110 THERAPEUTIC EXERCISES: CPT

## 2021-07-27 NOTE — PROGRESS NOTES
Daily Note     Today's date: 2021  Patient name: Olivier Stewart  : 1960  MRN: [id number]  Referring provider: Jaden Morrison PA-C  Dx: No diagnosis found  Subjective: Pt reports she was in the process of moving last week and her entire midline of LB has had slight soreness ever since  Objective: See treatment diary below      Assessment: Tolerated treatment well  Continued with program as outlined below, focusing on endurance and BLE strength/stability  Is challenged with program but is able to complete all assigned exercise  Fatigues quickly requiring frequent rest breaks for recovery  Patient would benefit from continued PT  Plan: Continue per plan of care  Progress as able  Precautions: INCREASED FALL RISK UTILIZE GAIT BELT  Dx: CLBP mobility/stability preference        Manuals 7/15 7/20 7/27       7/13   Laser  5000 J 5000J       5000J   L3-S1 Grade II-IV mobilizations  1x20 ea        np   Graston B Lumbar Paraspinals  6 min 6 min       4 min                Neuro Re-Ed             Abdominal bracing          np   DLS marches np 5G93 ea nv       np   Standing Lumbar extension          np   bridges  3x15 nv       3x15   Standing DLS hip ABD 6I72 np        3x15   Standing DLS hip ext 8H85 np        3x15   Standing DLS hip flex 2D69 np        3x15   Tandem stance balance  1x60 ea 1x60 ea       1x60" ea   SLS  1x30 ea 2x30" ea       2x30" ea   FT EO  L9 1x60" L9 1x60"       L9  1x60"   FT EC  LS 1x60" LS 1x60"       LS  1x60"   Posture Education  3 min           Education on LBP  5 min           Ther Ex             Back ext 30#  3x10 35# 3x10 35# 3x10       25#/  3x10   Leg Press 50#/  3x10 nv 50#/  3x10       50#/  3x10   Knee ext machine             Knee curl                                                                 Ther Activity             stairs 10"/ 1x15 ea nv 10"/ 1x15 ea       10"/ 1x15 ea   STS Foam/ 2x10 no hands Foam/ 2x10 no hands Foam/ 2x10 no hands       Foam/ 2x10 no hands   Gait Training             Ambulation Uneven surfaces                          Modalities

## 2021-07-28 ENCOUNTER — OFFICE VISIT (OUTPATIENT)
Dept: PHYSICAL THERAPY | Facility: CLINIC | Age: 61
End: 2021-07-28
Payer: COMMERCIAL

## 2021-07-28 DIAGNOSIS — M79.604 BILATERAL LEG AND FOOT PAIN: ICD-10-CM

## 2021-07-28 DIAGNOSIS — M54.42 CHRONIC BILATERAL LOW BACK PAIN WITH BILATERAL SCIATICA: Primary | ICD-10-CM

## 2021-07-28 DIAGNOSIS — M54.41 CHRONIC BILATERAL LOW BACK PAIN WITH BILATERAL SCIATICA: Primary | ICD-10-CM

## 2021-07-28 DIAGNOSIS — M79.605 BILATERAL LEG AND FOOT PAIN: ICD-10-CM

## 2021-07-28 DIAGNOSIS — R29.898 LEG WEAKNESS, BILATERAL: ICD-10-CM

## 2021-07-28 DIAGNOSIS — M79.671 BILATERAL LEG AND FOOT PAIN: ICD-10-CM

## 2021-07-28 DIAGNOSIS — M79.672 BILATERAL LEG AND FOOT PAIN: ICD-10-CM

## 2021-07-28 DIAGNOSIS — G89.29 CHRONIC BILATERAL LOW BACK PAIN WITH BILATERAL SCIATICA: Primary | ICD-10-CM

## 2021-07-28 PROCEDURE — 97110 THERAPEUTIC EXERCISES: CPT

## 2021-07-28 PROCEDURE — 97112 NEUROMUSCULAR REEDUCATION: CPT

## 2021-07-28 PROCEDURE — 97140 MANUAL THERAPY 1/> REGIONS: CPT

## 2021-07-28 PROCEDURE — 97530 THERAPEUTIC ACTIVITIES: CPT

## 2021-07-28 NOTE — PROGRESS NOTES
Daily Note     Today's date: 2021  Patient name: Maritza Maier  : 1960  MRN: [id number]  Referring provider: Nancy Aviles  Dx:   Encounter Diagnosis     ICD-10-CM    1  Chronic bilateral low back pain with bilateral sciatica  M54 42     M54 41     G89 29    2  Bilateral leg and foot pain  M79 604     M79 605     M79 671     M79 672    3  Leg weakness, bilateral  R29 898        Start Time: 1015  Stop Time: 1110  Total time in clinic (min): 55 minutes    Subjective: Patient notes "less than 1/10 low back burning sensation"  Progressing well  Objective: See treatment diary below    Assessment: Tolerated treatment well  Continued with program as outlined below, focusing on endurance and BLE strength/stability  Patient is most challenged with balance activities especially when eyes are closed  Patient would benefit from continued PT  Plan: Continue per plan of care  Progress as able  Precautions: INCREASED FALL RISK UTILIZE GAIT BELT  Dx: CLBP mobility/stability preference      Manuals 7/15 7/20 7/27 7/28      7/13   Laser  5000 J 5000J 5000J      5000J   L3-S1 Grade II-IV mobilizations  1x20 ea        np   Graston B Lumbar Paraspinals  6 min 6 min 6 mins      4 min                Neuro Re-Ed             Abdominal bracing          np   DLS marches np 7W63 ea nv 2x10 ea      np   Standing Lumbar extension          np   bridges  3x15 nv 3x15      3x15   Standing DLS hip ABD 1G26 np        3x15   Standing DLS hip ext 1C83 np        3x15   Standing DLS hip flex 5O01 np        3x15   Tandem stance balance  1x60 ea 1x60 ea 1x60" ea      1x60" ea   SLS  1x30 ea 2x30" ea 2x30" ea      2x30" ea   FT EO  L9 1x60" L9 1x60" L9 1x60"      L9  1x60"   FT EC  LS 1x60" LS 1x60" L9 1x60"      LS  1x60"   Posture Education  3 min           Education on LBP  5 min           Ther Ex             Back ext 30#  3x10 35# 3x10 35# 3x10 35#  3x10      25#/  3x10   Leg Press 50#/  3x10 nv 50#/  3x10 50# 3x10      50#/  3x10   Knee ext machine             Knee curl                                                                 Ther Activity             stairs 10"/ 1x15 ea nv 10"/ 1x15 ea 10"/ 1x15 ea      10"/ 1x15 ea   STS Foam/ 2x10 no hands Foam/ 2x10 no hands Foam/ 2x10 no hands Foam/ 2x10 no hands      Foam/ 2x10 no hands   Gait Training             Ambulation Uneven surfaces                          Modalities

## 2021-08-25 ENCOUNTER — TELEPHONE (OUTPATIENT)
Dept: GASTROENTEROLOGY | Facility: HOSPITAL | Age: 61
End: 2021-08-25

## 2021-08-26 ENCOUNTER — TELEPHONE (OUTPATIENT)
Dept: GASTROENTEROLOGY | Facility: CLINIC | Age: 61
End: 2021-08-26

## 2021-10-19 DIAGNOSIS — J43.9 PULMONARY EMPHYSEMA, UNSPECIFIED EMPHYSEMA TYPE (HCC): ICD-10-CM

## 2021-10-19 RX ORDER — MONTELUKAST SODIUM 10 MG/1
TABLET ORAL
Qty: 30 TABLET | Refills: 5 | Status: SHIPPED | OUTPATIENT
Start: 2021-10-19 | End: 2022-04-25

## 2021-10-20 DIAGNOSIS — J43.9 PULMONARY EMPHYSEMA, UNSPECIFIED EMPHYSEMA TYPE (HCC): ICD-10-CM

## 2021-10-21 DIAGNOSIS — E55.9 VITAMIN D DEFICIENCY: ICD-10-CM

## 2021-10-21 RX ORDER — CALCIUM CARBONATE/VITAMIN D3 600 MG-20
TABLET ORAL
Qty: 30 CAPSULE | Refills: 5 | Status: SHIPPED | OUTPATIENT
Start: 2021-10-21 | End: 2022-04-24

## 2021-10-26 ENCOUNTER — OFFICE VISIT (OUTPATIENT)
Dept: INTERNAL MEDICINE CLINIC | Facility: CLINIC | Age: 61
End: 2021-10-26

## 2021-10-26 VITALS
TEMPERATURE: 97.8 F | DIASTOLIC BLOOD PRESSURE: 77 MMHG | BODY MASS INDEX: 22.88 KG/M2 | WEIGHT: 134 LBS | SYSTOLIC BLOOD PRESSURE: 131 MMHG | HEART RATE: 87 BPM | OXYGEN SATURATION: 98 % | HEIGHT: 64 IN

## 2021-10-26 DIAGNOSIS — R20.0 NUMBNESS AND TINGLING OF BOTH FEET: ICD-10-CM

## 2021-10-26 DIAGNOSIS — R20.2 NUMBNESS AND TINGLING OF BOTH FEET: ICD-10-CM

## 2021-10-26 DIAGNOSIS — G89.29 CHRONIC RIGHT-SIDED LOW BACK PAIN WITH BILATERAL SCIATICA: ICD-10-CM

## 2021-10-26 DIAGNOSIS — J42 CHRONIC BRONCHITIS, UNSPECIFIED CHRONIC BRONCHITIS TYPE (HCC): Primary | ICD-10-CM

## 2021-10-26 DIAGNOSIS — E78.5 DYSLIPIDEMIA: ICD-10-CM

## 2021-10-26 DIAGNOSIS — M54.42 CHRONIC RIGHT-SIDED LOW BACK PAIN WITH BILATERAL SCIATICA: ICD-10-CM

## 2021-10-26 DIAGNOSIS — Z23 NEED FOR IMMUNIZATION AGAINST INFLUENZA: ICD-10-CM

## 2021-10-26 DIAGNOSIS — M54.41 CHRONIC RIGHT-SIDED LOW BACK PAIN WITH BILATERAL SCIATICA: ICD-10-CM

## 2021-10-26 DIAGNOSIS — J45.20 MILD INTERMITTENT ASTHMA WITHOUT COMPLICATION: ICD-10-CM

## 2021-10-26 PROCEDURE — 90682 RIV4 VACC RECOMBINANT DNA IM: CPT | Performed by: PHYSICIAN ASSISTANT

## 2021-10-26 PROCEDURE — 90471 IMMUNIZATION ADMIN: CPT | Performed by: PHYSICIAN ASSISTANT

## 2021-10-26 PROCEDURE — 99213 OFFICE O/P EST LOW 20 MIN: CPT | Performed by: PHYSICIAN ASSISTANT

## 2021-10-26 RX ORDER — CELECOXIB 200 MG/1
200 CAPSULE ORAL DAILY PRN
Qty: 30 CAPSULE | Refills: 0 | Status: SHIPPED | OUTPATIENT
Start: 2021-10-26 | End: 2021-10-29 | Stop reason: SDUPTHER

## 2021-10-26 RX ORDER — ATORVASTATIN CALCIUM 20 MG/1
20 TABLET, FILM COATED ORAL DAILY
Qty: 90 TABLET | Refills: 1 | Status: SHIPPED | OUTPATIENT
Start: 2021-10-26 | End: 2022-03-24

## 2021-10-26 RX ORDER — GABAPENTIN 300 MG/1
300 CAPSULE ORAL 2 TIMES DAILY
Qty: 60 CAPSULE | Refills: 5 | Status: SHIPPED | OUTPATIENT
Start: 2021-10-26 | End: 2022-04-25

## 2021-10-29 PROBLEM — Z12.11 COLON CANCER SCREENING: Status: RESOLVED | Noted: 2021-07-01 | Resolved: 2021-10-29

## 2021-10-29 RX ORDER — CELECOXIB 200 MG/1
200 CAPSULE ORAL DAILY PRN
Qty: 30 CAPSULE | Refills: 0
Start: 2021-10-29 | End: 2021-11-18

## 2022-03-07 DIAGNOSIS — R21 RASH OF HANDS: ICD-10-CM

## 2022-03-23 DIAGNOSIS — E78.5 DYSLIPIDEMIA: ICD-10-CM

## 2022-03-24 RX ORDER — ATORVASTATIN CALCIUM 20 MG/1
TABLET, FILM COATED ORAL
Qty: 90 TABLET | Refills: 1 | Status: SHIPPED | OUTPATIENT
Start: 2022-03-24 | End: 2022-05-25 | Stop reason: ALTCHOICE

## 2022-04-02 ENCOUNTER — HOSPITAL ENCOUNTER (OUTPATIENT)
Dept: RADIOLOGY | Age: 62
Discharge: HOME | End: 2022-04-02
Payer: COMMERCIAL

## 2022-04-02 ENCOUNTER — LAB (OUTPATIENT)
Dept: LAB | Age: 62
End: 2022-04-02
Payer: COMMERCIAL

## 2022-04-02 VITALS — BODY MASS INDEX: 22.36 KG/M2 | WEIGHT: 131 LBS | HEIGHT: 64 IN

## 2022-04-02 DIAGNOSIS — E78.5 DYSLIPIDEMIA: ICD-10-CM

## 2022-04-02 DIAGNOSIS — Z12.31 ENCOUNTER FOR SCREENING MAMMOGRAM FOR MALIGNANT NEOPLASM OF BREAST: ICD-10-CM

## 2022-04-02 LAB
ALBUMIN SERPL BCP-MCNC: 4.4 G/DL (ref 3.5–5)
ALP SERPL-CCNC: 102 U/L (ref 46–116)
ALT SERPL W P-5'-P-CCNC: 46 U/L (ref 12–78)
ANION GAP SERPL CALCULATED.3IONS-SCNC: 5 MMOL/L (ref 4–13)
AST SERPL W P-5'-P-CCNC: 24 U/L (ref 5–45)
BASOPHILS # BLD AUTO: 0.03 THOUSANDS/ΜL (ref 0–0.1)
BASOPHILS NFR BLD AUTO: 0 % (ref 0–1)
BILIRUB SERPL-MCNC: 0.49 MG/DL (ref 0.2–1)
BUN SERPL-MCNC: 10 MG/DL (ref 5–25)
CALCIUM SERPL-MCNC: 9.8 MG/DL (ref 8.3–10.1)
CHLORIDE SERPL-SCNC: 106 MMOL/L (ref 100–108)
CHOLEST SERPL-MCNC: 203 MG/DL
CO2 SERPL-SCNC: 28 MMOL/L (ref 21–32)
CREAT SERPL-MCNC: 0.74 MG/DL (ref 0.6–1.3)
EOSINOPHIL # BLD AUTO: 0.09 THOUSAND/ΜL (ref 0–0.61)
EOSINOPHIL NFR BLD AUTO: 1 % (ref 0–6)
ERYTHROCYTE [DISTWIDTH] IN BLOOD BY AUTOMATED COUNT: 13.6 % (ref 11.6–15.1)
GFR SERPL CREATININE-BSD FRML MDRD: 87 ML/MIN/1.73SQ M
GLUCOSE P FAST SERPL-MCNC: 117 MG/DL (ref 65–99)
HCT VFR BLD AUTO: 44.5 % (ref 34.8–46.1)
HDLC SERPL-MCNC: 57 MG/DL
HGB BLD-MCNC: 14.7 G/DL (ref 11.5–15.4)
IMM GRANULOCYTES # BLD AUTO: 0.03 THOUSAND/UL (ref 0–0.2)
IMM GRANULOCYTES NFR BLD AUTO: 0 % (ref 0–2)
LDLC SERPL CALC-MCNC: 130 MG/DL (ref 0–100)
LYMPHOCYTES # BLD AUTO: 1.87 THOUSANDS/ΜL (ref 0.6–4.47)
LYMPHOCYTES NFR BLD AUTO: 20 % (ref 14–44)
MCH RBC QN AUTO: 31.9 PG (ref 26.8–34.3)
MCHC RBC AUTO-ENTMCNC: 33 G/DL (ref 31.4–37.4)
MCV RBC AUTO: 97 FL (ref 82–98)
MONOCYTES # BLD AUTO: 0.84 THOUSAND/ΜL (ref 0.17–1.22)
MONOCYTES NFR BLD AUTO: 9 % (ref 4–12)
NEUTROPHILS # BLD AUTO: 6.5 THOUSANDS/ΜL (ref 1.85–7.62)
NEUTS SEG NFR BLD AUTO: 70 % (ref 43–75)
NONHDLC SERPL-MCNC: 146 MG/DL
NRBC BLD AUTO-RTO: 0 /100 WBCS
PLATELET # BLD AUTO: 346 THOUSANDS/UL (ref 149–390)
PMV BLD AUTO: 10.1 FL (ref 8.9–12.7)
POTASSIUM SERPL-SCNC: 4 MMOL/L (ref 3.5–5.3)
PROT SERPL-MCNC: 8 G/DL (ref 6.4–8.2)
RBC # BLD AUTO: 4.61 MILLION/UL (ref 3.81–5.12)
SODIUM SERPL-SCNC: 139 MMOL/L (ref 136–145)
TRIGL SERPL-MCNC: 78 MG/DL
WBC # BLD AUTO: 9.36 THOUSAND/UL (ref 4.31–10.16)

## 2022-04-02 PROCEDURE — 77063 BREAST TOMOSYNTHESIS BI: CPT

## 2022-04-02 PROCEDURE — 85025 COMPLETE CBC W/AUTO DIFF WBC: CPT

## 2022-04-02 PROCEDURE — 77067 SCR MAMMO BI INCL CAD: CPT

## 2022-04-02 PROCEDURE — 36415 COLL VENOUS BLD VENIPUNCTURE: CPT

## 2022-04-02 PROCEDURE — 80053 COMPREHEN METABOLIC PANEL: CPT

## 2022-04-02 PROCEDURE — 80061 LIPID PANEL: CPT

## 2022-04-02 NOTE — LETTER
[address]     [address] [facility] [address]      April 19, 2022    MRN: [id number]     RVOPB:[phone]     Dear Ms Taylor Jordan,    We are pleased to inform you that the results of your most recent breast imaging exam performed on April 2, 2022 are normal  Please contact your physician to obtain a prescription/referral for your next imaging breast study  Our radiologist is recommending that you return on or after April 04, 2023 for the following:       - Routine Screening Mammogram in 1 year for both breasts  Screening mammography for early detection of cancer is important for your ongoing health  If you feel you have a lump or have any other reasons for concern, you should inform your health care provider  Early detection requires a combination of monthly breast self-awareness, yearly clinical breast examination and periodic mammography according to your age, risk and physician recommendations  The results of your examination have been sent to Igor Thomas PA-C and will be part of your medical record here at [address]  You can access your report and other health care information online through our patient portal by logging in to [facility]  Your study will become part of your medical record at [address]  Your records will be on file for your ongoing care  If you change health care providers or go to a different facility for a mammogram, you should tell them where and when this study was done  Thank you for choosing [address] for your imaging needs      Sincerely,    [address]

## 2022-04-05 NOTE — RESULT ENCOUNTER NOTE
Please let the patient know there were no findings suggestive of cancer in her mammogram and she will therefore repeat the mammogram in a year

## 2022-04-10 NOTE — RESULT ENCOUNTER NOTE
Please call the patient regarding her recent lab work  Her CMP was largely unremarkable, with only a blood glucose level of 117  This may be elevated if the patient was not fasting, and this will continue to be monitored with routine lab work  Lipid panel showed elevated cholesterol of 203 and elevated LDL (bad cholesterol) of 130  Patient should continue to take statin medication  I would also recommend for patient to avoid animal products high in cholesterol including red meat and dairy products made from whole or reduced fat milk  CBC was unremarkable

## 2022-04-15 ENCOUNTER — APPOINTMENT (OUTPATIENT)
Dept: RADIOLOGY | Age: 62
End: 2022-04-15
Payer: OTHER MISCELLANEOUS

## 2022-04-15 ENCOUNTER — APPOINTMENT (OUTPATIENT)
Dept: URGENT CARE | Age: 62
End: 2022-04-15
Payer: OTHER MISCELLANEOUS

## 2022-04-15 DIAGNOSIS — S69.91XA INJURY OF RIGHT WRIST, INITIAL ENCOUNTER: ICD-10-CM

## 2022-04-15 DIAGNOSIS — S69.91XA INJURY OF RIGHT WRIST, INITIAL ENCOUNTER: Primary | ICD-10-CM

## 2022-04-15 DIAGNOSIS — S69.91XA INJURY OF RIGHT HAND, INITIAL ENCOUNTER: ICD-10-CM

## 2022-04-15 PROCEDURE — 99283 EMERGENCY DEPT VISIT LOW MDM: CPT | Performed by: NURSE PRACTITIONER

## 2022-04-15 PROCEDURE — 73130 X-RAY EXAM OF HAND: CPT

## 2022-04-15 PROCEDURE — 73110 X-RAY EXAM OF WRIST: CPT

## 2022-04-15 PROCEDURE — G0382 LEV 3 HOSP TYPE B ED VISIT: HCPCS | Performed by: NURSE PRACTITIONER

## 2022-04-18 DIAGNOSIS — J43.9 PULMONARY EMPHYSEMA, UNSPECIFIED EMPHYSEMA TYPE (HCC): ICD-10-CM

## 2022-04-18 DIAGNOSIS — E55.9 VITAMIN D DEFICIENCY: ICD-10-CM

## 2022-04-22 ENCOUNTER — APPOINTMENT (OUTPATIENT)
Dept: URGENT CARE | Age: 62
End: 2022-04-22
Payer: OTHER MISCELLANEOUS

## 2022-04-22 PROCEDURE — 99213 OFFICE O/P EST LOW 20 MIN: CPT

## 2022-04-23 DIAGNOSIS — M54.41 CHRONIC RIGHT-SIDED LOW BACK PAIN WITH BILATERAL SCIATICA: ICD-10-CM

## 2022-04-23 DIAGNOSIS — G89.29 CHRONIC RIGHT-SIDED LOW BACK PAIN WITH BILATERAL SCIATICA: ICD-10-CM

## 2022-04-23 DIAGNOSIS — R20.0 NUMBNESS AND TINGLING OF BOTH FEET: ICD-10-CM

## 2022-04-23 DIAGNOSIS — M54.42 CHRONIC RIGHT-SIDED LOW BACK PAIN WITH BILATERAL SCIATICA: ICD-10-CM

## 2022-04-23 DIAGNOSIS — R20.2 NUMBNESS AND TINGLING OF BOTH FEET: ICD-10-CM

## 2022-04-24 RX ORDER — CALCIUM CARBONATE/VITAMIN D3 600 MG-20
TABLET ORAL
Qty: 30 CAPSULE | Refills: 4 | Status: SHIPPED | OUTPATIENT
Start: 2022-04-24

## 2022-04-25 RX ORDER — GABAPENTIN 300 MG/1
CAPSULE ORAL
Qty: 60 CAPSULE | Refills: 5 | Status: SHIPPED | OUTPATIENT
Start: 2022-04-25 | End: 2022-05-25 | Stop reason: ALTCHOICE

## 2022-04-28 ENCOUNTER — APPOINTMENT (OUTPATIENT)
Dept: URGENT CARE | Age: 62
End: 2022-04-28
Payer: OTHER MISCELLANEOUS

## 2022-04-28 PROCEDURE — 99213 OFFICE O/P EST LOW 20 MIN: CPT | Performed by: NURSE PRACTITIONER

## 2022-05-02 ENCOUNTER — APPOINTMENT (OUTPATIENT)
Dept: RADIOLOGY | Facility: OTHER | Age: 62
End: 2022-05-02
Payer: COMMERCIAL

## 2022-05-02 ENCOUNTER — OFFICE VISIT (OUTPATIENT)
Dept: OBGYN CLINIC | Facility: OTHER | Age: 62
End: 2022-05-02
Payer: OTHER MISCELLANEOUS

## 2022-05-02 ENCOUNTER — APPOINTMENT (OUTPATIENT)
Dept: URGENT CARE | Age: 62
End: 2022-05-02
Payer: OTHER MISCELLANEOUS

## 2022-05-02 VITALS
WEIGHT: 135 LBS | DIASTOLIC BLOOD PRESSURE: 31 MMHG | HEIGHT: 61 IN | SYSTOLIC BLOOD PRESSURE: 144 MMHG | BODY MASS INDEX: 25.49 KG/M2 | HEART RATE: 89 BPM

## 2022-05-02 DIAGNOSIS — M25.531 RIGHT WRIST PAIN: ICD-10-CM

## 2022-05-02 DIAGNOSIS — S69.91XD INJURY OF RIGHT WRIST, SUBSEQUENT ENCOUNTER: ICD-10-CM

## 2022-05-02 DIAGNOSIS — M25.531 RIGHT WRIST PAIN: Primary | ICD-10-CM

## 2022-05-02 PROCEDURE — 99244 OFF/OP CNSLTJ NEW/EST MOD 40: CPT | Performed by: ORTHOPAEDIC SURGERY

## 2022-05-02 PROCEDURE — 99213 OFFICE O/P EST LOW 20 MIN: CPT | Performed by: NURSE PRACTITIONER

## 2022-05-02 PROCEDURE — 73110 X-RAY EXAM OF WRIST: CPT

## 2022-05-02 RX ORDER — METAXALONE 400 MG/1
TABLET ORAL
COMMUNITY
Start: 2022-04-28

## 2022-05-02 RX ORDER — PREDNISONE 50 MG/1
TABLET ORAL
COMMUNITY
Start: 2022-04-28

## 2022-05-02 NOTE — LETTER
May 4, 2022     Judson Jacobsen [address] [place] [id number]    Patient: Lenka Lewis   YOB: 1960   Date of Visit: 5/2/2022       Dear Dr Erika Madera: Thank you for referring Juliana Cross to me for evaluation  Below are my notes for this consultation  If you have questions, please do not hesitate to call me  I look forward to following your patient along with you  Sincerely,        Mesha Craven MD        CC: No Recipients  Andrew Ortiz DO  5/2/2022  9:28 PM  Attested  Chief Complaint: Right wrist pain    HPI:    Lenka Lewis is a 64year old Female who presents today for new evaluation and treatment of right wrist pain  She was referred by Edson Rosado  Athlete: No    Injury related: Yes, Fall on 04/14/2022    Description of symptoms:  Patient presents with right wrist pain after injury sustained on 04/14/2022  She states she was walking up a short flight of stairs when she tripped hitting her knee and landed onto her right hand along the medial surface of the base her thumb  She reports pain along her thumb extending to her wrist   She was seen by her PCP and underwent an x-ray of her right wrist which do not show any acute abnormality  Today she still reports some pain along the base of her thumb and her wrist   Pain is worse with activity  She has been wearing a wrist brace which gives her some relief  She denies any numbness and tingling to her fingers  Summary of treatment to-date:  Wrist brace    I have personally reviewed pertinent films in PACS and my interpretation is X-ray of right wrist 05/02/2022: No acute osseous abnormality       Patient Active Problem List   Diagnosis    Absence of teeth    Arthritis    Chronic back pain    Chronic obstructive pulmonary disease (HCC)    Decreased hearing of left ear    Dyslipidemia    Menopause    Mild intermittent asthma without complication    Varicose veins    Vitamin D deficiency    Allergic sinusitis    Right forearm pain    Paresthesia        Current Outpatient Medications on File Prior to Visit   Medication Sig Dispense Refill    albuterol (PROVENTIL HFA,VENTOLIN HFA) 90 mcg/act inhaler Inhale 2 puffs every 6 (six) hours as needed for wheezing or shortness of breath 18 g 2    aspirin 81 mg chewable tablet Chew 1 tablet (81 mg total) daily 90 tablet 1    atorvastatin (LIPITOR) 20 mg tablet TAKE 1 TABLET BY MOUTH EVERY DAY 90 tablet 1    celecoxib (CeleBREX) 200 mg capsule TAKE 1 CAPSULE BY MOUTH DAILY AS NEEDED (FOR BACK PAIN/PAINS) TAKE WITH FOOD 90 capsule 1    [employer]S D3 50 MCG (2000 UT) CAPS TAKE 1 CAPSULE EVERY DAY 30 capsule 4    cyclobenzaprine (FLEXERIL) 5 mg tablet TAKE 1 TABLET (5 MG TOTAL) BY MOUTH 3 (THREE) TIMES A DAY AS NEEDED (NECK, BACK, ARM PAIN) 60 tablet 3    fluticasone (FLONASE) 50 mcg/act nasal spray SPRAY 2 SPRAYS INTO EACH NOSTRIL EVERY DAY 16 mL 3    gabapentin (NEURONTIN) 300 mg capsule TAKE 1 CAPSULE BY MOUTH TWICE A DAY 60 capsule 5    metaxalone (SKELAXIN) 400 MG tablet TAKE 1 TABLET 3 TIMES A DAY AS NEEDED FOR MUSCLE PAIN,MAY CAUSE DROWSINESS      montelukast (SINGULAIR) 10 mg tablet TAKE 1 TABLET BY MOUTH EVERY DAY 90 tablet 4    predniSONE 50 mg tablet TAKE 1 TABLET EVERY DAY AS NEEDED BODY PAIN      Sodium Sulfate-Mag Sulfate-KCl (Sutab) 9430-125-687 MG TABS Lot - [id number]  Exp- 3/2022 24 tablet 0    triamcinolone (KENALOG) 0 1 % ointment APPLY TOPICALLY DAILY AT BEDTIME 30 g 1    Wixela Inhub 100-50 MCG/DOSE inhaler INHALE 1 PUFF 2 TIMES A DAY RINSE MOUTH AFTER USE  60 blister 4     No current facility-administered medications on file prior to visit          Allergies   Allergen Reactions    Seasonal Ic  [Cholestatin]         Tobacco Use: High Risk    Smoking Tobacco Use: Current Every Day Smoker    Smokeless Tobacco Use: Current User        Social Determinants of Health     Tobacco Use: High Risk    Smoking Tobacco Use: Current Every Day Smoker  Smokeless Tobacco Use: Current User   Alcohol Use: Not At Risk    Frequency of Alcohol Consumption: Never    Average Number of Drinks: Not on file    Frequency of Binge Drinking: Never   Financial Resource Strain: Low Risk     Difficulty of Paying Living Expenses: Not very hard   Food Insecurity: Food Insecurity Present    Worried About Running Out of Food in the Last Year: Sometimes true    Simeon of Food in the Last Year: Sometimes true   Transportation Needs: No Transportation Needs    Lack of Transportation (Medical): No    Lack of Transportation (Non-Medical): No   Physical Activity: Sufficiently Active    Days of Exercise per Week: 5 days    Minutes of Exercise per Session: 60 min   Stress: No Stress Concern Present    Feeling of Stress : Not at all   Social Connections: Socially Isolated    Frequency of Communication with Friends and Family: More than three times a week    Frequency of Social Gatherings with Friends and Family: More than three times a week    Attends Evangelical Services: Never    Active Member of Clubs or Organizations: No    Attends Club or Organization Meetings: Never    Marital Status: Never    Intimate Partner Violence: Not At Risk    Fear of Current or Ex-Partner: No    Emotionally Abused: No    Physically Abused: No    Sexually Abused: No   Depression: Not at risk    PHQ-2 Score: 0   Housing Stability: [address] Unable to Pay for Housing in the Last Year: No    Number of Places Lived in the Last Year: 1    Unstable Housing in the Last Year: No               Review of Systems   Constitutional: Negative for chills and fever  HENT: Negative for ear pain and sore throat  Eyes: Negative for pain and visual disturbance  Respiratory: Negative for cough and shortness of breath  Cardiovascular: Negative for chest pain and palpitations  Gastrointestinal: Negative for abdominal pain and vomiting     Genitourinary: Negative for dysuria and hematuria  Musculoskeletal: Negative for arthralgias and back pain  Skin: Negative for color change and rash  Neurological: Negative for seizures and syncope  All other systems reviewed and are negative  Body mass index is 25 51 kg/m²  Physical Exam  Vitals and nursing note reviewed  Constitutional:       General: She is not in acute distress  Appearance: She is well-developed  HENT:      Head: Normocephalic and atraumatic  Eyes:      Conjunctiva/sclera: Conjunctivae normal    Cardiovascular:      Rate and Rhythm: Normal rate and regular rhythm  Heart sounds: No murmur heard  Pulmonary:      Effort: Pulmonary effort is normal  No respiratory distress  Breath sounds: Normal breath sounds  Abdominal:      Palpations: Abdomen is soft  Tenderness: There is no abdominal tenderness  Musculoskeletal:      Cervical back: Neck supple  Skin:     General: Skin is warm and dry  Neurological:      Mental Status: She is alert  Ortho Exam:    Body part: right wrist    Inspection:  No acute osseous abnormality    Palpation:  Tenderness to palpation along anatomic snuffbox  Tenderness along medial metacarpophalangeal joint along 1st digit  Range of motion:  Full flexion and extension with mild discomfort  Special Tests:  Anatomic Snuffbox tenderness  Valgus at UCL of the thumb at 20° reproduces pain  Distal Neurovascular Status: Intact, Yes    Procedures       Assessment:     Diagnosis ICD-10-CM Associated Orders   1  Right wrist pain  M25 531 XR wrist 3+ vw right     MRI wrist right wo contrast   2  Injury of right wrist, subsequent encounter  S69 [id number] XR wrist 3+ vw right        Plan:    Discussed clinical exam and findings with Teto Onofre  We also reviewed her radiologic imaging  She does have findings suspicious for an occult scaphoid fracture and also possible UCL injury  I have requested an MRI to evaluate for an occult scaphoid fracture    Will place her in a thumb spica brace  I will see her back for follow-up after MRI of the wrist has been obtained  Follow-Up:    Follow-up after MRI        Portions of the record may have been created with voice recognition software  Occasional wrong word or "sound alike" substitutions may have occurred due to the inherent limitations of voice recognition software  Please review the chart carefully and recognize, using context, where substitutions/typographical errors may have occurred  Attestation signed by Melodie Paiz MD at 5/3/2022  4:25 PM:  I interviewed, took the history and examined the patient  I discussed the case with the Resident and reviewed the Residents note , prescribed medications, and orders placed  I supervised the Resident and I agree with the Resident management plan as it was presented to me  I was present in the clinic and examined the patient      Melodie Paiz MD 05/03/22

## 2022-05-02 NOTE — PROGRESS NOTES
Chief Complaint: Right wrist pain    HPI:    Shelia Haas is a 64year old Female who presents today for new evaluation and treatment of right wrist pain  She was referred by Chani Snider  Athlete: No    Injury related: Yes, Fall on 04/14/2022    Description of symptoms:  Patient presents with right wrist pain after injury sustained on 04/14/2022  She states she was walking up a short flight of stairs when she tripped hitting her knee and landed onto her right hand along the medial surface of the base her thumb  She reports pain along her thumb extending to her wrist   She was seen by her PCP and underwent an x-ray of her right wrist which do not show any acute abnormality  Today she still reports some pain along the base of her thumb and her wrist   Pain is worse with activity  She has been wearing a wrist brace which gives her some relief  She denies any numbness and tingling to her fingers  Summary of treatment to-date:  Wrist brace    I have personally reviewed pertinent films in PACS and my interpretation is X-ray of right wrist 05/02/2022: No acute osseous abnormality       Patient Active Problem List   Diagnosis    Absence of teeth    Arthritis    Chronic back pain    Chronic obstructive pulmonary disease (HCC)    Decreased hearing of left ear    Dyslipidemia    Menopause    Mild intermittent asthma without complication    Varicose veins    Vitamin D deficiency    Allergic sinusitis    Right forearm pain    Paresthesia        Current Outpatient Medications on File Prior to Visit   Medication Sig Dispense Refill    albuterol (PROVENTIL HFA,VENTOLIN HFA) 90 mcg/act inhaler Inhale 2 puffs every 6 (six) hours as needed for wheezing or shortness of breath 18 g 2    aspirin 81 mg chewable tablet Chew 1 tablet (81 mg total) daily 90 tablet 1    atorvastatin (LIPITOR) 20 mg tablet TAKE 1 TABLET BY MOUTH EVERY DAY 90 tablet 1    celecoxib (CeleBREX) 200 mg capsule TAKE 1 CAPSULE BY MOUTH DAILY AS NEEDED (FOR BACK PAIN/PAINS) TAKE WITH FOOD 90 capsule 1    CVS D3 50 MCG (2000 UT) CAPS TAKE 1 CAPSULE EVERY DAY 30 capsule 4    cyclobenzaprine (FLEXERIL) 5 mg tablet TAKE 1 TABLET (5 MG TOTAL) BY MOUTH 3 (THREE) TIMES A DAY AS NEEDED (NECK, BACK, ARM PAIN) 60 tablet 3    fluticasone (FLONASE) 50 mcg/act nasal spray SPRAY 2 SPRAYS INTO EACH NOSTRIL EVERY DAY 16 mL 3    gabapentin (NEURONTIN) 300 mg capsule TAKE 1 CAPSULE BY MOUTH TWICE A DAY 60 capsule 5    metaxalone (SKELAXIN) 400 MG tablet TAKE 1 TABLET 3 TIMES A DAY AS NEEDED FOR MUSCLE PAIN,MAY CAUSE DROWSINESS      montelukast (SINGULAIR) 10 mg tablet TAKE 1 TABLET BY MOUTH EVERY DAY 90 tablet 4    predniSONE 50 mg tablet TAKE 1 TABLET EVERY DAY AS NEEDED BODY PAIN      Sodium Sulfate-Mag Sulfate-KCl (Sutab) 1766-841-279 MG TABS Lot - [id number]  Exp- 3/2022 24 tablet 0    triamcinolone (KENALOG) 0 1 % ointment APPLY TOPICALLY DAILY AT BEDTIME 30 g 1    Wixela Inhub 100-50 MCG/DOSE inhaler INHALE 1 PUFF 2 TIMES A DAY RINSE MOUTH AFTER USE  60 blister 4     No current facility-administered medications on file prior to visit          Allergies   Allergen Reactions    Seasonal Ic  [Cholestatin]         Tobacco Use: High Risk    Smoking Tobacco Use: Current Every Day Smoker    Smokeless Tobacco Use: Current User        Social Determinants of Health     Tobacco Use: High Risk    Smoking Tobacco Use: Current Every Day Smoker    Smokeless Tobacco Use: Current User   Alcohol Use: Not At Risk    Frequency of Alcohol Consumption: Never    Average Number of Drinks: Not on file    Frequency of Binge Drinking: Never   Financial Resource Strain: Low Risk     Difficulty of Paying Living Expenses: Not very hard   Food Insecurity: Food Insecurity Present    Worried About Running Out of Food in the Last Year: Sometimes true    Simeon of Food in the Last Year: Sometimes true   Transportation Needs: No Transportation Needs    Lack of Transportation (Medical): No    Lack of Transportation (Non-Medical): No   Physical Activity: Sufficiently Active    Days of Exercise per Week: 5 days    Minutes of Exercise per Session: 60 min   Stress: No Stress Concern Present    Feeling of Stress : Not at all   Social Connections: Socially Isolated    Frequency of Communication with Friends and Family: More than three times a week    Frequency of Social Gatherings with Friends and Family: More than three times a week    Attends Congregational Services: Never    Active Member of Clubs or Organizations: No    Attends Club or Organization Meetings: Never    Marital Status: Never    Intimate Partner Violence: Not At Risk    Fear of Current or Ex-Partner: No    Emotionally Abused: No    Physically Abused: No    Sexually Abused: No   Depression: Not at risk    PHQ-2 Score: 0   Housing Stability: [address] Unable to Pay for Housing in the Last Year: No    Number of Places Lived in the Last Year: 1    Unstable Housing in the Last Year: No               Review of Systems   Constitutional: Negative for chills and fever  HENT: Negative for ear pain and sore throat  Eyes: Negative for pain and visual disturbance  Respiratory: Negative for cough and shortness of breath  Cardiovascular: Negative for chest pain and palpitations  Gastrointestinal: Negative for abdominal pain and vomiting  Genitourinary: Negative for dysuria and hematuria  Musculoskeletal: Negative for arthralgias and back pain  Skin: Negative for color change and rash  Neurological: Negative for seizures and syncope  All other systems reviewed and are negative  Body mass index is 25 51 kg/m²  Physical Exam  Vitals and nursing note reviewed  Constitutional:       General: She is not in acute distress  Appearance: She is well-developed  HENT:      Head: Normocephalic and atraumatic     Eyes:      Conjunctiva/sclera: Conjunctivae normal  Cardiovascular:      Rate and Rhythm: Normal rate and regular rhythm  Heart sounds: No murmur heard  Pulmonary:      Effort: Pulmonary effort is normal  No respiratory distress  Breath sounds: Normal breath sounds  Abdominal:      Palpations: Abdomen is soft  Tenderness: There is no abdominal tenderness  Musculoskeletal:      Cervical back: Neck supple  Skin:     General: Skin is warm and dry  Neurological:      Mental Status: She is alert  Ortho Exam:    Body part: right wrist    Inspection:  No acute osseous abnormality    Palpation:  Tenderness to palpation along anatomic snuffbox  Tenderness along medial metacarpophalangeal joint along 1st digit  Range of motion:  Full flexion and extension with mild discomfort  Special Tests:  Anatomic Snuffbox tenderness  Valgus at UCL of the thumb at 20° reproduces pain  Distal Neurovascular Status: Intact, Yes    Procedures       Assessment:     Diagnosis ICD-10-CM Associated Orders   1  Right wrist pain  M25 531 XR wrist 3+ vw right     MRI wrist right wo contrast   2  Injury of right wrist, subsequent encounter  S69 [id number] XR wrist 3+ vw right        Plan:    Discussed clinical exam and findings with Teto Onofre  We also reviewed her radiologic imaging  She does have findings suspicious for an occult scaphoid fracture and also possible UCL injury  I have requested an MRI to evaluate for an occult scaphoid fracture  Will place her in a thumb spica brace  I will see her back for follow-up after MRI of the wrist has been obtained  Follow-Up:    Follow-up after MRI        Portions of the record may have been created with voice recognition software  Occasional wrong word or "sound alike" substitutions may have occurred due to the inherent limitations of voice recognition software  Please review the chart carefully and recognize, using context, where substitutions/typographical errors may have occurred

## 2022-05-03 ENCOUNTER — TELEPHONE (OUTPATIENT)
Dept: OBGYN CLINIC | Facility: CLINIC | Age: 62
End: 2022-05-03

## 2022-05-03 NOTE — TELEPHONE ENCOUNTER
Dr yDer   RE: MRI   #: [phone]      Claim#: [id number]      Juan Singh comp called into the office, she wanted to give the authorization for this patient to have the MRI done  (no auth number)    Please call back with any questions, provided central scheduling # for patient to call and schedule MRI

## 2022-05-07 ENCOUNTER — HOSPITAL ENCOUNTER (OUTPATIENT)
Dept: RADIOLOGY | Facility: HOSPITAL | Age: 62
Discharge: HOME | End: 2022-05-07
Payer: COMMERCIAL

## 2022-05-07 DIAGNOSIS — M25.531 RIGHT WRIST PAIN: ICD-10-CM

## 2022-05-07 PROCEDURE — 73221 MRI JOINT UPR EXTREM W/O DYE: CPT

## 2022-05-07 PROCEDURE — G1004 CDSM NDSC: HCPCS

## 2022-05-08 DIAGNOSIS — J30.9 ALLERGIC SINUSITIS: ICD-10-CM

## 2022-05-09 RX ORDER — FLUTICASONE PROPIONATE 50 MCG
SPRAY, SUSPENSION (ML) NASAL
Qty: 16 ML | Refills: 3 | Status: SHIPPED | OUTPATIENT
Start: 2022-05-09

## 2022-05-12 ENCOUNTER — APPOINTMENT (OUTPATIENT)
Dept: URGENT CARE | Age: 62
End: 2022-05-12
Payer: OTHER MISCELLANEOUS

## 2022-05-12 PROCEDURE — 99215 OFFICE O/P EST HI 40 MIN: CPT | Performed by: PREVENTIVE MEDICINE

## 2022-05-16 DIAGNOSIS — M54.41 CHRONIC RIGHT-SIDED LOW BACK PAIN WITH BILATERAL SCIATICA: ICD-10-CM

## 2022-05-16 DIAGNOSIS — G89.29 CHRONIC RIGHT-SIDED LOW BACK PAIN WITH BILATERAL SCIATICA: ICD-10-CM

## 2022-05-16 DIAGNOSIS — M54.42 CHRONIC RIGHT-SIDED LOW BACK PAIN WITH BILATERAL SCIATICA: ICD-10-CM

## 2022-05-16 RX ORDER — CELECOXIB 200 MG/1
CAPSULE ORAL
Qty: 90 CAPSULE | Refills: 1 | Status: SHIPPED | OUTPATIENT
Start: 2022-05-16

## 2022-05-25 ENCOUNTER — OFFICE VISIT (OUTPATIENT)
Dept: INTERNAL MEDICINE CLINIC | Facility: CLINIC | Age: 62
End: 2022-05-25

## 2022-05-25 VITALS
SYSTOLIC BLOOD PRESSURE: 131 MMHG | WEIGHT: 132 LBS | DIASTOLIC BLOOD PRESSURE: 83 MMHG | BODY MASS INDEX: 24.92 KG/M2 | HEIGHT: 61 IN | HEART RATE: 90 BPM

## 2022-05-25 DIAGNOSIS — G89.29 CHRONIC RIGHT-SIDED LOW BACK PAIN WITH BILATERAL SCIATICA: Primary | ICD-10-CM

## 2022-05-25 DIAGNOSIS — R20.2 NUMBNESS AND TINGLING OF BOTH FEET: ICD-10-CM

## 2022-05-25 DIAGNOSIS — M79.631 RIGHT FOREARM PAIN: ICD-10-CM

## 2022-05-25 DIAGNOSIS — M54.42 CHRONIC RIGHT-SIDED LOW BACK PAIN WITH BILATERAL SCIATICA: Primary | ICD-10-CM

## 2022-05-25 DIAGNOSIS — E78.5 DYSLIPIDEMIA: ICD-10-CM

## 2022-05-25 DIAGNOSIS — M54.41 CHRONIC RIGHT-SIDED LOW BACK PAIN WITH BILATERAL SCIATICA: Primary | ICD-10-CM

## 2022-05-25 DIAGNOSIS — R20.0 NUMBNESS AND TINGLING OF BOTH FEET: ICD-10-CM

## 2022-05-25 PROCEDURE — 99213 OFFICE O/P EST LOW 20 MIN: CPT | Performed by: INTERNAL MEDICINE

## 2022-05-25 RX ORDER — GABAPENTIN 300 MG/1
300 CAPSULE ORAL 3 TIMES DAILY
Qty: 90 CAPSULE | Refills: 5 | Status: SHIPPED | OUTPATIENT
Start: 2022-05-25 | End: 2022-07-15

## 2022-05-25 RX ORDER — ATORVASTATIN CALCIUM 40 MG/1
40 TABLET, FILM COATED ORAL DAILY
Qty: 90 TABLET | Refills: 3 | Status: SHIPPED | OUTPATIENT
Start: 2022-05-25

## 2022-05-25 NOTE — PROGRESS NOTES
[facility]  INTERNAL MEDICINE OFFICE VISIT     PATIENT INFORMATION     Geovanny Murrell   64 y o  female   MRN: [id number]    ASSESSMENT/PLAN      1  Chronic right-sided low back pain with bilateral sciatica  -     gabapentin (Neurontin) 300 mg capsule; Take 1 capsule (300 mg total) by mouth 3 (three) times a day  - Continue celecoxib 200 mg daily PRN  - Patient has prescription written for EMG  Encouraged patient to complete study  Patient is having difficulty scheduling EMG due to insurance and workman's compensation  Patient has information and telephone numbers needed in order to schedule EMG  Patient willing to call appropriate agencies in order to schedule EMG  - Will increase gabapentin to 300 mg TID  2  Numbness and tingling of both feet  -     gabapentin (Neurontin) 300 mg capsule; Take 1 capsule (300 mg total) by mouth 3 (three) times a day    3  Dyslipidemia  -     atorvastatin (LIPITOR) 40 mg tablet; Take 1 tablet (40 mg total) by mouth daily  -     Lipid panel; Future  - Patient with elevated total cholesterol and LDL despite taking atorvastatin 20 mg daily  - Will increase to atorvastatin 40 mg daily  - Instructed patient to complete fasting lipid panel prior to follow-up appointment in 6 months  4  Right forearm pain  - Patient seen by sports medicine for right wrist/forearm pain  Suspected scaphoid fracture, though MRI negative  - Per previous note, patient was instructed to follow-up with sports medicine after completion of MRI, but patient has not followed up  Instructed patient to follow-up with sports medicine       HEALTH MAINTENANCE     Immunization History   Administered Date(s) Administered    Influenza, recombinant, quadrivalent,injectable, preservative free 11/12/2020, 10/26/2021    Pneumococcal Conjugate 13-Valent 12/06/2017    Pneumococcal Polysaccharide PPV23 11/13/2019    Tdap 12/06/2017     Immunizations:  · Patient not interested in receiving COVID vaccine  Screening:  · Screening colonoscopy ordered in past  Instructed patient to follow-up and complete examination  CHIEF COMPLAINT     Chief Complaint   Patient presents with    Follow-up      HISTORY OF PRESENT ILLNESS     Ms Nazario Manning is a 71-year-old female with a past medical history of unspecified COPD/mild intermittent asthma, chronic back pain, dyslipidemia, vitamin D deficiency, paraesthesias of b/l lower extremities, and right forearm pain  Patient presents in office today for follow-up of chronic conditions and to establish with new PCP  Patient was last seen in office on 10/26/2021  At that time, patient's back pain was improving along with numbness and tingling sensations or feet she completed physical therapy was not evident mg b i d   Routine blood work was ordered and completed April which showed a slightly impaired fasting glucose of 117, and total cholesterol 203 with LDL of 130  Today she is still complaining of her usual paracentesis in the bilateral lower extremities, low back pain, and right forearm pain  She was seen at an urgent care and she was given a prescription to complete an EMG  She states that she has been having difficulty scheduling the EMG due to issues with insurance and difficulty stating it covered through workmen's compensation  She was also recently seen by Sports Medicine for right forearm pain  she completed an MRI of her right hand for suspicion of scaphoid fracture, the MRI was negative  She is also given a brace to wear she was instructed to follow up with Sports Medicine after the MRI, the patient has not done this  She presently denies any fever, chills, nausea, vomiting, diarrhea, constipation, chest pain, shortness of breath  She otherwise denies any new or worsening complaints  REVIEW OF SYSTEMS     Review of Systems   Constitutional: Negative for chills, diaphoresis, fatigue and fever     HENT: Negative for congestion, rhinorrhea and sore throat  Eyes: Negative for pain and redness  Respiratory: Negative for cough, chest tightness, shortness of breath and wheezing  Cardiovascular: Negative for chest pain, palpitations and leg swelling  Gastrointestinal: Negative for abdominal distention, abdominal pain, constipation, diarrhea, nausea and vomiting  Endocrine: Negative for cold intolerance, heat intolerance and polydipsia  Genitourinary: Negative for difficulty urinating, dysuria and flank pain  Musculoskeletal: Positive for arthralgias and back pain  Skin: Negative for rash and wound  Neurological: Positive for numbness  Negative for dizziness, syncope, weakness, light-headedness and headaches  Psychiatric/Behavioral: Negative for agitation, behavioral problems and confusion  OBJECTIVE     Vitals:    05/25/22 1601   BP: 131/83   BP Location: Left arm   Patient Position: Sitting   Cuff Size: Adult   Pulse: 90   Weight: 59 9 kg (132 lb)   Height: 5' 1" (1 549 m)     Physical Exam  Constitutional:       General: She is not in acute distress  Appearance: Normal appearance  HENT:      Head: Normocephalic and atraumatic  Mouth/Throat:      Mouth: Mucous membranes are moist       Pharynx: Oropharynx is clear  Eyes:      Extraocular Movements: Extraocular movements intact  Conjunctiva/sclera: Conjunctivae normal       Pupils: Pupils are equal, round, and reactive to light  Cardiovascular:      Rate and Rhythm: Normal rate and regular rhythm  Pulses: Normal pulses  Heart sounds: Normal heart sounds  No murmur heard  Pulmonary:      Effort: Pulmonary effort is normal  No respiratory distress  Breath sounds: Normal breath sounds  No wheezing, rhonchi or rales  Abdominal:      General: Abdomen is flat  Bowel sounds are normal  There is no distension  Palpations: Abdomen is soft  Tenderness: There is no abdominal tenderness     Musculoskeletal:         General: No swelling, tenderness or deformity  Normal range of motion  Right lower leg: No edema  Left lower leg: No edema  Comments: Patient wearing a brace over her right wrist  No tenderness to palpation over lower back  Skin:     General: Skin is warm and dry  Findings: No erythema  Neurological:      General: No focal deficit present  Mental Status: She is alert and oriented to person, place, and time  Motor: No weakness  Psychiatric:         Mood and Affect: Mood normal          Behavior: Behavior normal          Thought Content:  Thought content normal        CURRENT MEDICATIONS     Current Outpatient Medications:     albuterol (PROVENTIL HFA,VENTOLIN HFA) 90 mcg/act inhaler, Inhale 2 puffs every 6 (six) hours as needed for wheezing or shortness of breath, Disp: 18 g, Rfl: 2    aspirin 81 mg chewable tablet, Chew 1 tablet (81 mg total) daily, Disp: 90 tablet, Rfl: 1    atorvastatin (LIPITOR) 40 mg tablet, Take 1 tablet (40 mg total) by mouth daily, Disp: 90 tablet, Rfl: 3    celecoxib (CeleBREX) 200 mg capsule, TAKE 1 CAPSULE BY MOUTH DAILY AS NEEDED FOR BACK PAIN WITH FOOD, Disp: 90 capsule, Rfl: 1    CVS D3 50 MCG (2000 UT) CAPS, TAKE 1 CAPSULE EVERY DAY, Disp: 30 capsule, Rfl: 4    cyclobenzaprine (FLEXERIL) 5 mg tablet, TAKE 1 TABLET (5 MG TOTAL) BY MOUTH 3 (THREE) TIMES A DAY AS NEEDED (NECK, BACK, ARM PAIN), Disp: 60 tablet, Rfl: 3    fluticasone (FLONASE) 50 mcg/act nasal spray, SPRAY 2 SPRAYS INTO EACH NOSTRIL EVERY DAY, Disp: 16 mL, Rfl: 3    gabapentin (Neurontin) 300 mg capsule, Take 1 capsule (300 mg total) by mouth 3 (three) times a day, Disp: 90 capsule, Rfl: 5    metaxalone (SKELAXIN) 400 MG tablet, TAKE 1 TABLET 3 TIMES A DAY AS NEEDED FOR MUSCLE PAIN,MAY CAUSE DROWSINESS, Disp: , Rfl:     montelukast (SINGULAIR) 10 mg tablet, TAKE 1 TABLET BY MOUTH EVERY DAY, Disp: 90 tablet, Rfl: 4    predniSONE 50 mg tablet, TAKE 1 TABLET EVERY DAY AS NEEDED BODY PAIN, Disp: , Rfl:     triamcinolone (KENALOG) 0 1 % ointment, APPLY TOPICALLY DAILY AT BEDTIME, Disp: 30 g, Rfl: 1    Wixela Inhub 100-50 MCG/DOSE inhaler, INHALE 1 PUFF 2 TIMES A DAY RINSE MOUTH AFTER USE , Disp: 60 blister, Rfl: 4    Sodium Sulfate-Mag Sulfate-KCl (Sutab) 2494-348-005 MG TABS, Lot - [id number] Exp- 3/2022, Disp: 24 tablet, Rfl: 0    PAST MEDICAL & SURGICAL HISTORY     Past Medical History:   Diagnosis Date    Asthma     COPD (chronic obstructive pulmonary disease) ([facility] [address] )     Decreased hearing of left ear     Hyperlipidemia     Hypokalemia     last assessed     Spontaneous       Past Surgical History:   Procedure Laterality Date    COLONOSCOPY      NOSE SURGERY      TONSILLECTOMY AND ADENOIDECTOMY      TUBAL LIGATION       SOCIAL & FAMILY HISTORY     Social History     Socioeconomic History    Marital status: Single     Spouse name: Not on file    Number of children: Not on file    Years of education: Not on file    Highest education level: Not on file   Occupational History    Not on file   Tobacco Use    Smoking status: Current Every Day Smoker     Packs/day: 0 50     Types: Cigarettes, Cigars    Smokeless tobacco: Current User    Tobacco comment:  ppd, started about age 29 - denied history of current smoker noted in "allscripts"    Vaping Use    Vaping Use: Never used   Substance and Sexual Activity    Alcohol use: No    Drug use: No    Sexual activity: Never   Other Topics Concern    Not on file   Social History Narrative    Not on file     Social Determinants of Health     Financial Resource Strain: Low Risk     Difficulty of Paying Living Expenses: Not very hard   Food Insecurity: Food Insecurity Present    Worried About Running Out of Food in the Last Year: Sometimes true    Simeon of Food in the Last Year: Sometimes true   Transportation Needs: No Transportation Needs    Lack of Transportation (Medical):  No    Lack of Transportation (Non-Medical): No   Physical Activity: Sufficiently Active    Days of Exercise per Week: 5 days    Minutes of Exercise per Session: 60 min   Stress: No Stress Concern Present    Feeling of Stress : Not at all   Social Connections: Socially Isolated    Frequency of Communication with Friends and Family: More than three times a week    Frequency of Social Gatherings with Friends and Family: More than three times a week    Attends Tenriism Services: Never    Active Member of Clubs or Organizations: No    Attends Club or Organization Meetings: Never    Marital Status: Never    Intimate Partner Violence: Not At Risk    Fear of Current or Ex-Partner: No    Emotionally Abused: No    Physically Abused: No    Sexually Abused: No   Housing Stability: [address] Unable to Pay for Housing in the Last Year: No    Number of Places Lived in the Last Year: 1    Unstable Housing in the Last Year: No     Social History     Substance and Sexual Activity   Alcohol Use No       Social History     Substance and Sexual Activity   Drug Use No     Social History     Tobacco Use   Smoking Status Current Every Day Smoker    Packs/day: 0 50    Types: Cigarettes, Cigars   Smokeless Tobacco Current User   Tobacco Comment    1/2 ppd, started about age 29 - denied history of current smoker noted in "allscripts"      Family History   Problem Relation Age of Onset    Asthma Family     Bipolar disorder Family     Drug abuse Family     Mental illness Family     Heart failure Mother         congestive     Diabetes Father         mellitus     Hypertension Father     Lung cancer Father     Bipolar disorder Brother     No Known Problems Maternal Grandmother     No Known Problems Maternal Grandfather     No Known Problems Paternal Grandmother     No Known Problems Paternal Grandfather     No Known Problems Daughter     No Known Problems Son     No Known Problems Daughter     No Known Problems Brother     No Known Problems Maternal Aunt     No Known Problems Maternal Aunt     No Known Problems Maternal Aunt     No Known Problems Paternal Aunt      ==  Andrew Miguel,   Internal Medicine Residency, PGY-1  [address]   [facility] , [address] [address] [facility] [facility]  Office: [phone]  Fax: [phone]

## 2022-06-02 ENCOUNTER — OFFICE VISIT (OUTPATIENT)
Dept: OBGYN CLINIC | Facility: CLINIC | Age: 62
End: 2022-06-02
Payer: OTHER MISCELLANEOUS

## 2022-06-02 VITALS
HEART RATE: 89 BPM | WEIGHT: 130.51 LBS | HEIGHT: 61 IN | DIASTOLIC BLOOD PRESSURE: 85 MMHG | BODY MASS INDEX: 24.64 KG/M2 | SYSTOLIC BLOOD PRESSURE: 139 MMHG

## 2022-06-02 DIAGNOSIS — G90.511 COMPLEX REGIONAL PAIN SYNDROME TYPE 1 OF RIGHT UPPER EXTREMITY: ICD-10-CM

## 2022-06-02 DIAGNOSIS — M25.531 RIGHT WRIST PAIN: Primary | ICD-10-CM

## 2022-06-02 PROCEDURE — 99213 OFFICE O/P EST LOW 20 MIN: CPT | Performed by: ORTHOPAEDIC SURGERY

## 2022-06-02 RX ORDER — LIDOCAINE 50 MG/G
OINTMENT TOPICAL 3 TIMES DAILY PRN
Qty: 35.44 G | Refills: 2 | Status: SHIPPED | OUTPATIENT
Start: 2022-06-02 | End: 2022-07-15 | Stop reason: SINTOL

## 2022-06-02 NOTE — PROGRESS NOTES
Chief Complaint:  Right wrist and hand pain    HPI:    Geovanny Murrell is a 64year old Female who presents today for follow-up of right wrist and hand pain    Description of symptoms:  Patient was last seen on 5/2/2022 in this regard  She reported a fall onto her right wrist and hand on 4/14/2022  Plain radiograph of the right wrist had not revealed any acute osseous injury  She had continued discomfort on examination and hence an MRI of the right wrist was performed on 5/7/2022  This did not reveal any evidence of acute traumatic injury  Some mild subchondral cystic changes were noted in the lunate representing possibly early osteoarthritis  Patient reports continued pain of the right wrist and hand in a diffuse manner and associated tingling sensation and stiffness in a diffuse manner in all digits of the right hand and wrist   This some radiation of symptoms to her distal forearm  Denies any new injury  She is currently scheduled to have an EMG study later this month        I have personally reviewed pertinent films in PACS and my interpretation is As noted in the HPI      Patient Active Problem List   Diagnosis    Absence of teeth    Arthritis    Chronic back pain    Chronic obstructive pulmonary disease (HCC)    Decreased hearing of left ear    Dyslipidemia    Menopause    Mild intermittent asthma without complication    Varicose veins    Vitamin D deficiency    Allergic sinusitis    Right forearm pain    Paresthesia        Current Outpatient Medications on File Prior to Visit   Medication Sig Dispense Refill    albuterol (PROVENTIL HFA,VENTOLIN HFA) 90 mcg/act inhaler Inhale 2 puffs every 6 (six) hours as needed for wheezing or shortness of breath 18 g 2    aspirin 81 mg chewable tablet Chew 1 tablet (81 mg total) daily 90 tablet 1    atorvastatin (LIPITOR) 40 mg tablet Take 1 tablet (40 mg total) by mouth daily 90 tablet 3    celecoxib (CeleBREX) 200 mg capsule TAKE 1 CAPSULE BY MOUTH DAILY AS NEEDED FOR BACK PAIN WITH FOOD 90 capsule 1    CVS D3 50 MCG (2000 UT) CAPS TAKE 1 CAPSULE EVERY DAY 30 capsule 4    cyclobenzaprine (FLEXERIL) 5 mg tablet TAKE 1 TABLET (5 MG TOTAL) BY MOUTH 3 (THREE) TIMES A DAY AS NEEDED (NECK, BACK, ARM PAIN) 60 tablet 3    fluticasone (FLONASE) 50 mcg/act nasal spray SPRAY 2 SPRAYS INTO EACH NOSTRIL EVERY DAY 16 mL 3    gabapentin (Neurontin) 300 mg capsule Take 1 capsule (300 mg total) by mouth 3 (three) times a day 90 capsule 5    montelukast (SINGULAIR) 10 mg tablet TAKE 1 TABLET BY MOUTH EVERY DAY 90 tablet 4    Sodium Sulfate-Mag Sulfate-KCl (Sutab) 6605-337-752 MG TABS Lot - [id number]  Exp- 3/2022 24 tablet 0    triamcinolone (KENALOG) 0 1 % ointment APPLY TOPICALLY DAILY AT BEDTIME 30 g 1    Wixela Inhub 100-50 MCG/DOSE inhaler INHALE 1 PUFF 2 TIMES A DAY RINSE MOUTH AFTER USE  60 blister 4    metaxalone (SKELAXIN) 400 MG tablet TAKE 1 TABLET 3 TIMES A DAY AS NEEDED FOR MUSCLE PAIN,MAY CAUSE DROWSINESS (Patient not taking: Reported on 6/2/2022)      predniSONE 50 mg tablet TAKE 1 TABLET EVERY DAY AS NEEDED BODY PAIN (Patient not taking: Reported on 6/2/2022)       No current facility-administered medications on file prior to visit          Allergies   Allergen Reactions    Seasonal Ic  [Cholestatin]         Tobacco Use: High Risk    Smoking Tobacco Use: Current Every Day Smoker    Smokeless Tobacco Use: Current User        Social Determinants of Health     Tobacco Use: High Risk    Smoking Tobacco Use: Current Every Day Smoker    Smokeless Tobacco Use: Current User   Alcohol Use: Not At Risk    Frequency of Alcohol Consumption: Never    Average Number of Drinks: Not on file    Frequency of Binge Drinking: Never   Financial Resource Strain: Low Risk     Difficulty of Paying Living Expenses: Not very hard   Food Insecurity: Food Insecurity Present    Worried About Running Out of Food in the Last Year: Sometimes true    Simeon of Food in the Last Year: Sometimes true   Transportation Needs: No Transportation Needs    Lack of Transportation (Medical): No    Lack of Transportation (Non-Medical): No   Physical Activity: Sufficiently Active    Days of Exercise per Week: 5 days    Minutes of Exercise per Session: 60 min   Stress: No Stress Concern Present    Feeling of Stress : Not at all   Social Connections: Socially Isolated    Frequency of Communication with Friends and Family: More than three times a week    Frequency of Social Gatherings with Friends and Family: More than three times a week    Attends Holiness Services: Never    Active Member of Clubs or Organizations: No    Attends Club or Organization Meetings: Never    Marital Status: Never    Intimate Partner Violence: Not At Risk    Fear of Current or Ex-Partner: No    Emotionally Abused: No    Physically Abused: No    Sexually Abused: No   Depression: Not at risk    PHQ-2 Score: 0   Housing Stability: [address] [place] [address] Unable to Pay for Housing in the Last Year: No    Number of Places Lived in the Last Year: 1    Unstable Housing in the Last Year: No               Review of Systems     Body mass index is 24 66 kg/m²  Physical Exam     Ortho Exam:    Body part: right wrist and hand    Inspection:  Minimal diffuse swelling of the right hand digits    Palpation:  Generally tender to palpation in all areas of the right wrist and hand with light touch  Range of motion:  Limited right wrist range of motion as well as limitation of multiple MCP and interphalangeal joints of the right hand    Special Tests:  Paresthesia to light touch diffusely in the right hand        Procedures       Assessment:     Diagnosis ICD-10-CM Associated Orders   1  Right wrist pain  M25 531 Ambulatory Referral to PT/OT Hand Therapy     lidocaine (XYLOCAINE) 5 % ointment   2   Complex regional pain syndrome type 1 of right upper extremity  G90 511 Ambulatory Referral to PT/OT Hand Therapy lidocaine (XYLOCAINE) 5 % ointment        Plan:    Explained my current clinical findings and reviewed the right wrist MRI findings with Zuleima Lucero  Her clinical presentation is consistent with possible complex regional pain syndrome  Differential diagnosis includes peripheral neuropathy  I have advised her to discontinue using her thumb spica brace and encouraged her to do active movement of the right wrist and hand  I will also refer her for a course of PT/OT hand therapy  She may apply some topical lidocaine ointment as needed which was prescribed on today's office visit  We will see her back in about 2 months time after her EMG study for re-evaluation  Portions of the record may have been created with voice recognition software  Occasional wrong word or "sound alike" substitutions may have occurred due to the inherent limitations of voice recognition software  Please review the chart carefully and recognize, using context, where substitutions/typographical errors may have occurred

## 2022-06-27 ENCOUNTER — APPOINTMENT (OUTPATIENT)
Dept: URGENT CARE | Age: 62
End: 2022-06-27
Payer: OTHER MISCELLANEOUS

## 2022-06-27 PROCEDURE — 99213 OFFICE O/P EST LOW 20 MIN: CPT | Performed by: PREVENTIVE MEDICINE

## 2022-07-15 ENCOUNTER — TELEPHONE (OUTPATIENT)
Dept: OBGYN CLINIC | Facility: MEDICAL CENTER | Age: 62
End: 2022-07-15

## 2022-07-15 ENCOUNTER — LAB (OUTPATIENT)
Dept: LAB | Facility: CLINIC | Age: 62
End: 2022-07-15
Payer: COMMERCIAL

## 2022-07-15 ENCOUNTER — OFFICE VISIT (OUTPATIENT)
Dept: INTERNAL MEDICINE CLINIC | Facility: CLINIC | Age: 62
End: 2022-07-15

## 2022-07-15 VITALS
TEMPERATURE: 97.8 F | HEIGHT: 61 IN | DIASTOLIC BLOOD PRESSURE: 64 MMHG | HEART RATE: 78 BPM | WEIGHT: 132 LBS | SYSTOLIC BLOOD PRESSURE: 137 MMHG | BODY MASS INDEX: 24.92 KG/M2

## 2022-07-15 DIAGNOSIS — E78.5 DYSLIPIDEMIA: ICD-10-CM

## 2022-07-15 DIAGNOSIS — Z00.00 ANNUAL PHYSICAL EXAM: Primary | ICD-10-CM

## 2022-07-15 DIAGNOSIS — M79.631 RIGHT FOREARM PAIN: ICD-10-CM

## 2022-07-15 LAB
CHOLEST SERPL-MCNC: 159 MG/DL
HDLC SERPL-MCNC: 43 MG/DL
LDLC SERPL CALC-MCNC: 99 MG/DL (ref 0–100)
NONHDLC SERPL-MCNC: 116 MG/DL
TRIGL SERPL-MCNC: 87 MG/DL

## 2022-07-15 PROCEDURE — 80061 LIPID PANEL: CPT

## 2022-07-15 PROCEDURE — 36415 COLL VENOUS BLD VENIPUNCTURE: CPT

## 2022-07-15 PROCEDURE — 99396 PREV VISIT EST AGE 40-64: CPT | Performed by: INTERNAL MEDICINE

## 2022-07-15 RX ORDER — NICOTINE 21 MG/24HR
1 PATCH, TRANSDERMAL 24 HOURS TRANSDERMAL EVERY 24 HOURS
Qty: 60 PATCH | Refills: 0 | Status: SHIPPED | OUTPATIENT
Start: 2022-07-15 | End: 2022-09-09

## 2022-07-15 NOTE — PROGRESS NOTES
[facility] [place] [facility] [address]    NAME: Harley Campbell  AGE: 58 y o  SEX: female  : 1960     DATE: 7/15/2022     Assessment and Plan:     Problem List Items Addressed This Visit        Other    Right forearm pain    Relevant Medications    Diclofenac Sodium (VOLTAREN) 1 %    Other Relevant Orders    Ambulatory Referral to Orthopedic Surgery      Other Visit Diagnoses     Annual physical exam    -  Primary    Relevant Medications    nicotine (NICODERM CQ) 14 mg/24hr TD 24 hr patch    Other Relevant Orders    Ambulatory referral for colonoscopy    DXA bone density spine hip and pelvis        Assessment/Plan    Annual Physical  Assessment  -Patient due for colonoscopy, DXA scan for screening   -Patient has 30 pack year smoking history, smokes 1/2 pack daily currently, interested in quitting   -Not interested in COVID vaccine  Plan  -Colonoscopy and DXA scan ordered  -Prescribed nicotine patches, 14 mg for smoking cessation  Right Forearm Pain  Assessment  -Patient reports right wrist hand pain since she had a fall in April   -Describes pain as burning with shocking sensation   -Pain has improved, 4/10 at rest 6/10 with exertion   -Tender in lateral hand region near anatomic snuffbox   -Fine motor movements are difficult  -Patient has PT/OT hand therapy ordered   -Patient following with spots medicine, using lidocaine  She feels like the lidocaine does not help and dries her skin  -MRI : No evidence of acute traumatic injury  Plan  -Placed referral to Dr Danie Valdez with orthopedic surgery    -Discontinued lidocaine and prescribed Voltaren gel 1% due to side effects and poor control of pain   -Follow up in 3 months  Immunizations and preventive care screenings were discussed with patient today  Appropriate education was printed on patient's after visit summary      Counseling:  Alcohol/drug use: discussed moderation in alcohol intake, the recommendations for healthy alcohol use, and avoidance of illicit drug use  Injury prevention: discussed safety/seat belts, safety helmets, smoke detectors, carbon dioxide detectors, and smoking near bedding or upholstery  · Exercise: the importance of regular exercise/physical activity was discussed  Recommend exercise 3-5 times per week for at least 30 minutes  Tobacco Cessation Counseling: Tobacco cessation counseling was provided  The patient is sincerely urged to quit consumption of tobacco  She is ready to quit tobacco  Medication options and side effects of medication discussed  Patient agreed to medication  Nicotine patch was prescribed  Return in about 3 months (around 10/15/2022) for Next scheduled follow up  Chief Complaint:     Chief Complaint   Patient presents with    Wrist Pain     Patient fell on April 14 and has hand right wrist pain since      History of Present Illness:     Adult Annual Physical   Patient here for a comprehensive physical exam  The patient reports problems - right hand pain  She reports pain in his right hand and wrist since her injury on April 14th, where she fell and hit her wrist  At this visit she reports burning pain from wrist and shocking pain radiating up right forarm  Her hand feels "stiff " Her pain at rest is a 4/10, increased to a 6/10 with activity  She has been doing exercises for her hand  She recently saw sports medicine for her hand  She has been using lidocaine ointment as prescribed, but feels like it does not help the pain and it dries her skin  She was referred to PT/OT hand therapy but has not begun therapy  Encouraged patient to start therapy  Overall her symptoms have been improving but she is primarily concerned about going back to work next week  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  She is looking for help with the pain      Patient has a 30 year smoking history, currently smokes 1/2 packs daily for 6 years, previously a full pack daily for 27 years  Discussed quitting smoking with the patient  She is willing to try a nicotine patch  Denies other substance use  She is not interested in the COVID vaccine as a family member had health complications in a similar time frame to one of their shots  She is following up with sports medicine next month nad will be having an EMG prior  Patient says she has dry skin especially on her legs and very rarely drinks water  Encouraged hydration to patient  Discussed screenings with patient  She is open to having a DXA scan and a colonoscopy, though she needs to coordinate family to help get her to the coloscopy  Diet and Physical Activity  · Diet/Nutrition: well balanced diet, limited junk food and limited fruits/vegetables  · Exercise: no formal exercise  Depression Screening  PHQ-2/9 Depression Screening         General Health  · Sleep: sleeps well and gets 7-8 hours of sleep on average  · Hearing: decreased - left  Has hearing aid, doesn't help  Reports on and off tinnitus  · Vision: most recent eye exam <1 year ago, wears glasses and nearsighted and farsighted      · Dental: no teeth  She had her teeth out 4 years ago  /GYN Health  · Patient is: postmenopausal  · Last menstrual period: N/A  · Contraceptive method: N/A  Review of Systems:     Review of Systems   Constitutional: Negative for chills, fatigue, fever and unexpected weight change  HENT: Positive for hearing loss  Negative for sore throat  Sometimes has difficulties with hearing  Eyes: Negative for photophobia and visual disturbance  Respiratory: Negative for cough and shortness of breath  Cardiovascular: Negative for chest pain and palpitations  Gastrointestinal: Negative for blood in stool, constipation, diarrhea, nausea and vomiting  Endocrine: Negative for polyuria  Genitourinary: Negative for dysuria and hematuria     Musculoskeletal: Positive for back pain  Chronic back pain that comes and goes  Skin: Negative for rash and wound  Neurological: Negative for dizziness, tremors, seizures and headaches  Psychiatric/Behavioral: Negative for dysphoric mood  The patient is not nervous/anxious         Past Medical History:     Past Medical History:   Diagnosis Date    Asthma     COPD (chronic obstructive pulmonary disease) ([address] )     Decreased hearing of left ear     Hyperlipidemia     Hypokalemia     last assessed [id number]    Spontaneous        Past Surgical History:     Past Surgical History:   Procedure Laterality Date    COLONOSCOPY      NOSE SURGERY      TONSILLECTOMY AND ADENOIDECTOMY      TUBAL LIGATION        Social History:     Social History     Socioeconomic History    Marital status: Single     Spouse name: None    Number of children: None    Years of education: None    Highest education level: None   Occupational History    None   Tobacco Use    Smoking status: Current Every Day Smoker     Packs/day: 0 50     Types: Cigarettes, Cigars    Smokeless tobacco: Current User    Tobacco comment:  ppd, started about age 29 - denied history of current smoker noted in "allscripts"    Vaping Use    Vaping Use: Some days   Substance and Sexual Activity    Alcohol use: No    Drug use: No    Sexual activity: Never   Other Topics Concern    None   Social History Narrative    None     Social Determinants of Health     Financial Resource Strain: Not on file   Food Insecurity: Not on file   Transportation Needs: Not on file   Physical Activity: Sufficiently Active    Days of Exercise per Week: 5 days    Minutes of Exercise per Session: 60 min   Stress: No Stress Concern Present    Feeling of Stress : Not at all   Social Connections: Socially Isolated    Frequency of Communication with Friends and Family: More than three times a week    Frequency of Social Gatherings with Friends and Family: More than three times a week    Attends Orthodoxy Services: Never    Active Member of Clubs or Organizations: No    Attends Club or Organization Meetings: Never    Marital Status: Never    Intimate Partner Violence: Not At Risk    Fear of Current or Ex-Partner: No    Emotionally Abused: No    Physically Abused: No    Sexually Abused: No   Housing Stability: Low Risk     Unable to Pay for Housing in the Last Year: No    Number of Places Lived in the Last Year: 1    Unstable Housing in the Last Year: No      Family History:     Family History   Problem Relation Age of Onset    Asthma Family     Bipolar disorder Family     Drug abuse Family     Mental illness Family     Heart failure Mother         congestive     Diabetes Father         mellitus     Hypertension Father     Lung cancer Father     Bipolar disorder Brother     No Known Problems Maternal Grandmother     No Known Problems Maternal Grandfather     No Known Problems Paternal Grandmother     No Known Problems Paternal Grandfather     No Known Problems Daughter     No Known Problems Son     No Known Problems Daughter     No Known Problems Brother     No Known Problems Maternal Aunt     No Known Problems Maternal Aunt     No Known Problems Maternal Aunt     No Known Problems Paternal Aunt       Current Medications:     Current Outpatient Medications   Medication Sig Dispense Refill    albuterol (PROVENTIL HFA,VENTOLIN HFA) 90 mcg/act inhaler Inhale 2 puffs every 6 (six) hours as needed for wheezing or shortness of breath 18 g 2    aspirin 81 mg chewable tablet Chew 1 tablet (81 mg total) daily 90 tablet 1    atorvastatin (LIPITOR) 40 mg tablet Take 1 tablet (40 mg total) by mouth daily 90 tablet 3    celecoxib (CeleBREX) 200 mg capsule TAKE 1 CAPSULE BY MOUTH DAILY AS NEEDED FOR BACK PAIN WITH FOOD 90 capsule 1    [employer]S D3 50 MCG (2000 UT) CAPS TAKE 1 CAPSULE EVERY DAY 30 capsule 4    cyclobenzaprine (FLEXERIL) 5 mg tablet TAKE 1 TABLET (5 MG TOTAL) BY MOUTH 3 (THREE) TIMES A DAY AS NEEDED (NECK, BACK, ARM PAIN) 60 tablet 3    Diclofenac Sodium (VOLTAREN) 1 % Apply 2 g topically 4 (four) times a day 240 g 2    fluticasone (FLONASE) 50 mcg/act nasal spray SPRAY 2 SPRAYS INTO EACH NOSTRIL EVERY DAY 16 mL 3    gabapentin (Neurontin) 300 mg capsule Take 1 capsule (300 mg total) by mouth 3 (three) times a day 90 capsule 5    montelukast (SINGULAIR) 10 mg tablet TAKE 1 TABLET BY MOUTH EVERY DAY 90 tablet 4    nicotine (NICODERM CQ) 14 mg/24hr TD 24 hr patch Place 1 patch on the skin every 24 hours 60 patch 0    predniSONE 50 mg tablet       Wixela Inhub 100-50 MCG/DOSE inhaler INHALE 1 PUFF 2 TIMES A DAY RINSE MOUTH AFTER USE  60 blister 4    metaxalone (SKELAXIN) 400 MG tablet TAKE 1 TABLET 3 TIMES A DAY AS NEEDED FOR MUSCLE PAIN,MAY CAUSE DROWSINESS (Patient not taking: No sig reported)      Sodium Sulfate-Mag Sulfate-KCl (Sutab) 5968-149-364 MG TABS Lot - [id number]  Exp- 3/2022 24 tablet 0    triamcinolone (KENALOG) 0 1 % ointment APPLY TOPICALLY DAILY AT BEDTIME (Patient not taking: Reported on 7/15/2022) 30 g 1     No current facility-administered medications for this visit  Allergies: Allergies   Allergen Reactions    Seasonal Ic  [Cholestatin]       Physical Exam:     /64 (BP Location: Right arm, Patient Position: Sitting, Cuff Size: Adult)   Pulse 78   Temp 97 8 °F (36 6 °C) (Temporal)   Ht 5' 1" (1 549 m)   Wt 59 9 kg (132 lb)   BMI 24 94 kg/m²     Physical Exam  Vitals and nursing note reviewed  Constitutional:       Appearance: Normal appearance  HENT:      Head: Normocephalic  Right Ear: Tympanic membrane, ear canal and external ear normal       Left Ear: Tympanic membrane, ear canal and external ear normal       Nose: Nose normal       Mouth/Throat:      Mouth: Mucous membranes are moist       Comments: No teeth  Eyes:      Extraocular Movements: Extraocular movements intact        Pupils: Pupils are equal, round, and reactive to light  Cardiovascular:      Rate and Rhythm: Normal rate and regular rhythm  Pulses: Normal pulses  Heart sounds: Normal heart sounds  Pulmonary:      Effort: Pulmonary effort is normal       Breath sounds: Normal breath sounds  Abdominal:      General: Bowel sounds are normal       Palpations: Abdomen is soft  Tenderness: There is no abdominal tenderness  Musculoskeletal:      Cervical back: Normal range of motion and neck supple  No rigidity  Right lower leg: No edema  Left lower leg: No edema  Comments: Some weakness of right hand with strength testing, limited by pain  Pain with external rotation of hand, sensitivity in region of anatomic snuffbox  Other ROM normal    Skin:     General: Skin is warm and dry  Capillary Refill: Capillary refill takes less than 2 seconds  Comments: Dry skin on bilateral legs  Neurological:      General: No focal deficit present  Mental Status: She is alert and oriented to person, place, and time            Giselle Drew MD  [address]

## 2022-07-15 NOTE — PATIENT INSTRUCTIONS
I have prescribed voltaren gel  You have an active referral to PT/OT hand therapy from your last visit with sports medicine  Please begin therapy with them  We will schedule a colonoscopy and a DXA scan  Wellness Visit for Adults   AMBULATORY CARE:   A wellness visit  is when you see your healthcare provider to get screened for health problems  Your healthcare provider will also give you advice on how to stay healthy  Write down your questions so you remember to ask them  Ask your healthcare provider how often you should have a wellness visit  What happens at a wellness visit:  Your healthcare provider will ask about your health, and your family history of health problems  This includes high blood pressure, heart disease, and cancer  He or she will ask if you have symptoms that concern you, if you smoke, and about your mood  You may also be asked about your intake of medicines, supplements, food, and alcohol  Any of the following may be done: Your weight  will be checked  Your height may also be checked so your body mass index (BMI) can be calculated  Your BMI shows if you are at a healthy weight  Your blood pressure  and heart rate will be checked  Your temperature may also be checked  Blood and urine tests  may be done  Blood tests may be done to check your cholesterol levels  Abnormal cholesterol levels increase your risk for heart disease and stroke  You may also need a blood or urine test to check for diabetes if you are at increased risk  Urine tests may be done to look for signs of an infection or kidney disease  A physical exam  includes checking your heartbeat and lungs with a stethoscope  Your healthcare provider may also check your skin to look for sun damage  Screening tests  may be recommended  A screening test is done to check for diseases that may not cause symptoms  The screening tests you may need depend on your age, gender, family history, and lifestyle habits   For example, colorectal screening may be recommended if you are 48years old or older  Screening tests you need if you are a woman:   A Pap smear  is used to screen for cervical cancer  Pap smears are usually done every 3 to 5 years depending on your age  You may need them more often if you have had abnormal Pap smear test results in the past  Ask your healthcare provider how often you should have a Pap smear  A mammogram  is an x-ray of your breasts to screen for breast cancer  Experts recommend mammograms every 2 years starting at age 48 years  You may need a mammogram at age 52 years or younger if you have an increased risk for breast cancer  Talk to your healthcare provider about when you should start having mammograms and how often you need them  Vaccines you may need:   Get an influenza vaccine  every year  The influenza vaccine protects you from the flu  Several types of viruses cause the flu  The viruses change over time, so new vaccines are made each year  Get a tetanus-diphtheria (Td) booster vaccine  every 10 years  This vaccine protects you against tetanus and diphtheria  Tetanus is a severe infection that may cause painful muscle spasms and lockjaw  Diphtheria is a severe bacterial infection that causes a thick covering in the back of your mouth and throat  Get a human papillomavirus (HPV) vaccine  if you are female and aged 23 to 32 or male 23 to 24 and never received it  This vaccine protects you from HPV infection  HPV is the most common infection spread by sexual contact  HPV may also cause vaginal, penile, and anal cancers  Get a pneumococcal vaccine  if you are aged 72 years or older  The pneumococcal vaccine is an injection given to protect you from pneumococcal disease  Pneumococcal disease is an infection caused by pneumococcal bacteria  The infection may cause pneumonia, meningitis, or an ear infection      Get a shingles vaccine  if you are 60 or older, even if you have had shingles before  The shingles vaccine is an injection to protect you from the varicella-zoster virus  This is the same virus that causes chickenpox  Shingles is a painful rash that develops in people who had chickenpox or have been exposed to the virus  How to eat healthy:  My Plate is a model for planning healthy meals  It shows the types and amounts of foods that should go on your plate  Fruits and vegetables make up about half of your plate, and grains and protein make up the other half  A serving of dairy is included on the side of your plate  The amount of calories and serving sizes you need depends on your age, gender, weight, and height  Examples of healthy foods are listed below:  Eat a variety of vegetables  such as dark green, red, and orange vegetables  You can also include canned vegetables low in sodium (salt) and frozen vegetables without added butter or sauces  Eat a variety of fresh fruits , canned fruit in 100% juice, frozen fruit, and dried fruit  Include whole grains  At least half of the grains you eat should be whole grains  Examples include whole-wheat bread, wheat pasta, brown rice, and whole-grain cereals such as oatmeal     Eat a variety of protein foods such as seafood (fish and shellfish), lean meat, and poultry without skin (turkey and chicken)  Examples of lean meats include pork leg, shoulder, or tenderloin, and beef round, sirloin, tenderloin, and extra lean ground beef  Other protein foods include eggs and egg substitutes, beans, peas, soy products, nuts, and seeds  Choose low-fat dairy products such as skim or 1% milk or low-fat yogurt, cheese, and cottage cheese  Limit unhealthy fats  such as butter, hard margarine, and shortening  Exercise:  Exercise at least 30 minutes per day on most days of the week  Some examples of exercise include walking, biking, dancing, and swimming   You can also fit in more physical activity by taking the stairs instead of the elevator or parking farther away from stores  Include muscle strengthening activities 2 days each week  Regular exercise provides many health benefits  It helps you manage your weight, and decreases your risk for type 2 diabetes, heart disease, stroke, and high blood pressure  Exercise can also help improve your mood  Ask your healthcare provider about the best exercise plan for you  General health and safety guidelines:   Do not smoke  Nicotine and other chemicals in cigarettes and cigars can cause lung damage  Ask your healthcare provider for information if you currently smoke and need help to quit  E-cigarettes or smokeless tobacco still contain nicotine  Talk to your healthcare provider before you use these products  Limit alcohol  A drink of alcohol is 12 ounces of beer, 5 ounces of wine, or 1½ ounces of liquor  Lose weight, if needed  Being overweight increases your risk of certain health conditions  These include heart disease, high blood pressure, type 2 diabetes, and certain types of cancer  Protect your skin  Do not sunbathe or use tanning beds  Use sunscreen with a SPF 15 or higher  Apply sunscreen at least 15 minutes before you go outside  Reapply sunscreen every 2 hours  Wear protective clothing, hats, and sunglasses when you are outside  Drive safely  Always wear your seatbelt  Make sure everyone in your car wears a seatbelt  A seatbelt can save your life if you are in an accident  Do not use your cell phone when you are driving  This could distract you and cause an accident  Pull over if you need to make a call or send a text message  Practice safe sex  Use latex condoms if are sexually active and have more than one partner  Your healthcare provider may recommend screening tests for sexually transmitted infections (STIs)  Wear helmets, lifejackets, and protective gear  Always wear a helmet when you ride a bike or motorcycle, go skiing, or play sports that could cause a head injury  Wear protective equipment when you play sports  Wear a lifejacket when you are on a boat or doing water sports  © Copyright [employer] 2022 Information is for End User's use only and may not be sold, redistributed or otherwise used for commercial purposes  All illustrations and images included in CareNotes® are the copyrighted property of A D [employer] M [employer] Inc  or [employer]  The above information is an  only  It is not intended as medical advice for individual conditions or treatments  Talk to your doctor, nurse or pharmacist before following any medical regimen to see if it is safe and effective for you

## 2022-07-15 NOTE — TELEPHONE ENCOUNTER
Patient has seen Dr Jesse Pineda for her rt wrist, she had an EMG completed but they found nothing per patient  She had another appt today with another   Who gave her a referral to Dr DIAZ [facility], but she has a FU with Dr Jesse Pineda to review the EMG    Sent to [facility] for appt      Patient: Melinda Rodriguez    MRN: [id number]    : 1960    Phone:  [phone]    Location:  [facility]

## 2022-07-19 NOTE — RESULT ENCOUNTER NOTE
Please call the patient regarding her recent lipid panel  Patient's total cholesterol and LDL (bad cholesterol) are now within normal limits as compared to previous lipid panel 4 months ago  Please continue taking atorvastatin as prescribed

## 2022-07-20 ENCOUNTER — TELEPHONE (OUTPATIENT)
Dept: INTERNAL MEDICINE CLINIC | Facility: CLINIC | Age: 62
End: 2022-07-20

## 2022-07-20 NOTE — TELEPHONE ENCOUNTER
Patient returning [place] call  I reviewed the message from Dr Wanda Moss on 7/19/22  Patient understood directions and had no further questions

## 2022-08-04 ENCOUNTER — OFFICE VISIT (OUTPATIENT)
Dept: OBGYN CLINIC | Facility: CLINIC | Age: 62
End: 2022-08-04
Payer: COMMERCIAL

## 2022-08-04 VITALS
DIASTOLIC BLOOD PRESSURE: 85 MMHG | HEIGHT: 61 IN | HEART RATE: 79 BPM | SYSTOLIC BLOOD PRESSURE: 148 MMHG | BODY MASS INDEX: 24.92 KG/M2 | WEIGHT: 132 LBS

## 2022-08-04 DIAGNOSIS — M72.0 DUPUYTREN CONTRACTURE: ICD-10-CM

## 2022-08-04 DIAGNOSIS — G90.511 COMPLEX REGIONAL PAIN SYNDROME TYPE 1 OF RIGHT UPPER EXTREMITY: Primary | ICD-10-CM

## 2022-08-04 PROCEDURE — 99213 OFFICE O/P EST LOW 20 MIN: CPT | Performed by: ORTHOPAEDIC SURGERY

## 2022-08-04 NOTE — PATIENT INSTRUCTIONS
For the pain in your wrist and hand, please see the hand surgeon as you are already scheduled  We will refer you to the pain management doctors to consider medicines or nerve blocks to help with the pain  We are referring you to Hand Therapy as well as this is the primary method of treating this type of pain  Please call with questions or concerns

## 2022-08-04 NOTE — PROGRESS NOTES
Chief Complaint:  Right wrist and hand pain    HPI:    Octavia Guevara is a 58year old Female who presents today for follow up of right wrist and pain  Description of symptoms:  Patient was last seen and evaluated on 06/02/2022 in this regard  She reports having fallen onto her right wrist and hand on 04/14/2022  She is right-hand dominant  Previous plain x-ray of the right wrist did not reveal any acute osseous injury  She has previously had an MRI of her right wrist performed on 05/07/2022 that did not reveal any evidence of acute traumatic injury but did show some mild subchondral cystic changes in the lunate representing possible early osteoarthritis  For today's visit, she continues to report pain in the right wrist and hand  He denies any new injuries  She states her pain is qualified as a burning  She has pain that is intermittent in temporality and sporadic/migratory in locality, though she identifies the most recent, concerning pain on her volar wrist and along her lateral thumb proximally  She states her fingers feel tight  She states her range of motion is limited secondary to pain  She feels as though her hand and wrist are swollen  She has recently identified a lump in the middle of her palm  She states her symptoms make it difficult to lift or work with her right hand  She states that overall she has not noticed a change in quality or severity of her symptoms since her last visit  At a previous visit, she was recommended to obtain an EMG of her upper extremity which was done at [facility] on 06/17 - review of Care Everywhere records show that this was read as normal   She was also told at her last visit to report to PT/OT hand therapy for management of her symptoms, which she has not done secondary to reported issues with her cell phone and her ability to contact the therapy office    She was previously given topical lidocaine which she states caused her skin to become red and blotchy, so her PCPs office switch her to Voltaren gel  She states she has no change in her symptoms with Voltaren gel  She also states that her PCPs office try to increase her gabapentin dosing which she says she has difficulty tolerating as it makes her sleepy during the day and makes her left eye "feel twitchy"  Summary of treatment to-date: topical lidocaine and Voltaren gel with no effect  I have personally reviewed pertinent films in PACS       Patient Active Problem List   Diagnosis    Absence of teeth    Arthritis    Chronic back pain    Chronic obstructive pulmonary disease (HCC)    Decreased hearing of left ear    Dyslipidemia    Menopause    Mild intermittent asthma without complication    Varicose veins    Vitamin D deficiency    Allergic sinusitis    Right forearm pain    Paresthesia        Current Outpatient Medications on File Prior to Visit   Medication Sig Dispense Refill    albuterol (PROVENTIL HFA,VENTOLIN HFA) 90 mcg/act inhaler Inhale 2 puffs every 6 (six) hours as needed for wheezing or shortness of breath 18 g 2    aspirin 81 mg chewable tablet Chew 1 tablet (81 mg total) daily 90 tablet 1    atorvastatin (LIPITOR) 40 mg tablet Take 1 tablet (40 mg total) by mouth daily 90 tablet 3    celecoxib (CeleBREX) 200 mg capsule TAKE 1 CAPSULE BY MOUTH DAILY AS NEEDED FOR BACK PAIN WITH FOOD 90 capsule 1    [employer]S D3 50 MCG (2000 UT) CAPS TAKE 1 CAPSULE EVERY DAY 30 capsule 4    cyclobenzaprine (FLEXERIL) 5 mg tablet TAKE 1 TABLET (5 MG TOTAL) BY MOUTH 3 (THREE) TIMES A DAY AS NEEDED (NECK, BACK, ARM PAIN) 60 tablet 3    Diclofenac Sodium (VOLTAREN) 1 % Apply 2 g topically 4 (four) times a day 240 g 2    fluticasone (FLONASE) 50 mcg/act nasal spray SPRAY 2 SPRAYS INTO EACH NOSTRIL EVERY DAY 16 mL 3    metaxalone (SKELAXIN) 400 MG tablet TAKE 1 TABLET 3 TIMES A DAY AS NEEDED FOR MUSCLE PAIN,MAY CAUSE DROWSINESS      montelukast (SINGULAIR) 10 mg tablet TAKE 1 TABLET BY MOUTH EVERY DAY 90 tablet 4    nicotine (NICODERM CQ) 14 mg/24hr TD 24 hr patch Place 1 patch on the skin every 24 hours 60 patch 0    predniSONE 50 mg tablet       Sodium Sulfate-Mag Sulfate-KCl (Sutab) 5189-435-589 MG TABS Lot - [id number]  Exp- 3/2022 24 tablet 0    triamcinolone (KENALOG) 0 1 % ointment APPLY TOPICALLY DAILY AT BEDTIME 30 g 1    Wixela Inhub 100-50 MCG/DOSE inhaler INHALE 1 PUFF 2 TIMES A DAY RINSE MOUTH AFTER USE  60 blister 4    gabapentin (Neurontin) 300 mg capsule Take 1 capsule (300 mg total) by mouth 3 (three) times a day 90 capsule 5     No current facility-administered medications on file prior to visit  Allergies   Allergen Reactions    Lidocaine Rash     Patient states she had some blotching, redness, dryness and itchiness     It was a topical she used for her hands    Seasonal Ic  [Cholestatin]         Tobacco Use: High Risk    Smoking Tobacco Use: Current Every Day Smoker    Smokeless Tobacco Use: Current User        Social Determinants of Health     Tobacco Use: High Risk    Smoking Tobacco Use: Current Every Day Smoker    Smokeless Tobacco Use: Current User   Alcohol Use: Not At Risk    Frequency of Alcohol Consumption: Never    Average Number of Drinks: Not on file    Frequency of Binge Drinking: Never   Financial Resource Strain: Not on file   Food Insecurity: Not on file   Transportation Needs: Not on file   Physical Activity: Sufficiently Active    Days of Exercise per Week: 5 days    Minutes of Exercise per Session: 60 min   Stress: No Stress Concern Present    Feeling of Stress : Not at all   Social Connections: Socially Isolated    Frequency of Communication with Friends and Family: More than three times a week    Frequency of Social Gatherings with Friends and Family: More than three times a week    Attends Yarsani Services: Never    Active Member of Clubs or Organizations: No    Attends Club or Organization Meetings: Never    Marital Status: Never    Intimate Partner Violence: Not At Risk    Fear of Current or Ex-Partner: No    Emotionally Abused: No    Physically Abused: No    Sexually Abused: No   Depression: Not at risk    PHQ-2 Score: 0   Housing Stability: Low Risk     Unable to Pay for Housing in the Last Year: No    Number of Places Lived in the Last Year: 1    Unstable Housing in the Last Year: No               Review of Systems     Body mass index is 24 94 kg/m²  Physical Exam  Vitals and nursing note reviewed  Constitutional:       General: She is not in acute distress  Appearance: Normal appearance  She is normal weight  She is not ill-appearing, toxic-appearing or diaphoretic  HENT:      Head: Normocephalic and atraumatic  Eyes:      General: No scleral icterus  Conjunctiva/sclera: Conjunctivae normal    Cardiovascular:      Pulses: Normal pulses  Pulmonary:      Effort: Pulmonary effort is normal  No respiratory distress  Skin:     General: Skin is warm and dry  Capillary Refill: Capillary refill takes less than 2 seconds  Neurological:      Mental Status: She is alert and oriented to person, place, and time  Ortho Exam:    Body part: right wrist and hand    Inspection:  Small subcutaneous nodule in the center of the palm  No swelling or redness  Palpation:  Tender to mild palpation diffusely across the right hand and wrist though there is increased pain reported on palpation of the proximal thumb near the [facility] snuffbox and first [facility]  Tender subcutaneous nodule in the palm in line with the middle finger  Range of motion:  Intact passive range of motion of the right wrist though there is active limitation in supination, wrist extension, and wrist flexion  Intact active ulnar and radial deviation  Intact active range of motion for the MCP, PIP, and DIP joints of the right hand    Special Tests: Diffuse paresthesias to light touch    Secondary to diminished range of motion, patient unable to participate in Phalen's testing or Prayer sign  Distal Neurovascular Status: 2+ radial pulse    Procedures       Assessment:     Diagnosis ICD-10-CM Associated Orders   1  Complex regional pain syndrome type 1 of right upper extremity  G90 511 Ambulatory Referral to Pain Management     Ambulatory Referral to PT/OT Hand Therapy   2  Dupuytren contracture  M72 0 Ambulatory Referral to PT/OT Hand Therapy        Plan: We reviewed our current clinical findings with Alexsandra Alfonso  We also reviewed her EMG report from [facility], which was read as normal, ruling out peripheral neuropathy  We reviewed with her that as her workup thus far, including x-ray, MRI, and EMG, is nonrevealing for pathology, her signs and symptoms are consistent with complex regional pain syndrome  We reviewed that definitive management for this includes PT/OT of hand therapy, which was again prescribed at today's visit  We reviewed that patient would benefit from a second opinion with a dedicated a Hand Surgeon, which has been already scheduled for this October  She also has new evidence of Dupuytren's contracture of the right hand, which she can also discussed with Hand Surgery  We also reviewed that the patient would benefit from evaluation with pain management for consideration for peripheral nerve blocks and a referral was made for this as well  Patient has no indications for definite follow-up with our office and can follow-up as needed in the future  Patient endorsed understanding and acceptance of the above plan  Follow-Up:    PRN with our office  To follow-up with Hand surgery and Pain Management as directed  Portions of the record may have been created with voice recognition software  Occasional wrong word or "sound alike" substitutions may have occurred due to the inherent limitations of voice recognition software   Please review the chart carefully and recognize, using context, where substitutions/typographical errors may have occurred

## 2022-08-08 ENCOUNTER — EVALUATION (OUTPATIENT)
Dept: PHYSICAL THERAPY | Facility: CLINIC | Age: 62
End: 2022-08-08
Payer: COMMERCIAL

## 2022-08-08 DIAGNOSIS — M72.0 DUPUYTREN CONTRACTURE: ICD-10-CM

## 2022-08-08 DIAGNOSIS — G90.511 COMPLEX REGIONAL PAIN SYNDROME TYPE 1 OF RIGHT UPPER EXTREMITY: Primary | ICD-10-CM

## 2022-08-08 PROCEDURE — 97110 THERAPEUTIC EXERCISES: CPT

## 2022-08-08 PROCEDURE — 97140 MANUAL THERAPY 1/> REGIONS: CPT

## 2022-08-08 PROCEDURE — 97161 PT EVAL LOW COMPLEX 20 MIN: CPT

## 2022-08-18 ENCOUNTER — OFFICE VISIT (OUTPATIENT)
Dept: PHYSICAL THERAPY | Facility: CLINIC | Age: 62
End: 2022-08-18
Payer: COMMERCIAL

## 2022-08-18 DIAGNOSIS — G90.511 COMPLEX REGIONAL PAIN SYNDROME TYPE 1 OF RIGHT UPPER EXTREMITY: Primary | ICD-10-CM

## 2022-08-18 DIAGNOSIS — M72.0 DUPUYTREN CONTRACTURE: ICD-10-CM

## 2022-08-18 PROCEDURE — 97110 THERAPEUTIC EXERCISES: CPT

## 2022-08-18 PROCEDURE — 97112 NEUROMUSCULAR REEDUCATION: CPT

## 2022-08-18 NOTE — PROGRESS NOTES
Daily Note     Today's date: 2022  Patient name: Kimberly Black  : 1960  MRN: [id number]  Referring provider: Patel Bradford DO  Dx:   Encounter Diagnosis     ICD-10-CM    1  Complex regional pain syndrome type 1 of right upper extremity  G90 511    2  Dupuytren contracture  M72 0                   Subjective: Pt reports no change in condition since IE  States she continues to have burning sensation in R wrist  States HEP compliance  States she still has a hard time doing job responsibilities and lifting 24oz coffee cup  Objective: See treatment diary below      Assessment: Tolerated treatment fair- requests frequent breaks during PROM 2/2 burning sensation  Pt demonstrates variable PROM with empty end feel  Patient demonstrated fatigue post treatment, exhibited good technique with therapeutic exercises and would benefit from continued PT      Plan: Continue per plan of care  Progress treatment as tolerated         Precautions: COPD      Manuals            Dupuytren's STM 5 min 5 min           R wrist PROM  5 min           Median n glide  5 min                        Neuro Re-Ed             Median n glide                                                                                           Ther Ex             R wrist flex/ext AROM 2x10 2x10 ea           3rd 4th finger ext PROM 1x30            Wrist flex/ext curls  1#           Digiflex  Yellow 3x10            strength  3x10                                                  Ther Activity                                       Gait Training                                       Modalities

## 2022-08-25 ENCOUNTER — OFFICE VISIT (OUTPATIENT)
Dept: PHYSICAL THERAPY | Facility: CLINIC | Age: 62
End: 2022-08-25
Payer: COMMERCIAL

## 2022-08-25 DIAGNOSIS — M72.0 DUPUYTREN CONTRACTURE: ICD-10-CM

## 2022-08-25 DIAGNOSIS — G90.511 COMPLEX REGIONAL PAIN SYNDROME TYPE 1 OF RIGHT UPPER EXTREMITY: Primary | ICD-10-CM

## 2022-08-25 PROCEDURE — 97140 MANUAL THERAPY 1/> REGIONS: CPT

## 2022-08-25 PROCEDURE — 97110 THERAPEUTIC EXERCISES: CPT

## 2022-08-25 NOTE — PROGRESS NOTES
Daily Note     Today's date: 2022  Patient name: Gela Almazan  : 1960  MRN: [id number]  Referring provider: Gianni Saavedra DO  Dx:   Encounter Diagnosis     ICD-10-CM    1  Complex regional pain syndrome type 1 of right upper extremity  G90 511    2  Dupuytren contracture  M72 0                   Subjective: Pt reports maybe slight improvement  States HEP compliance  Objective: See treatment diary below      Assessment: Tolerated treatment fair- limited tolerance to manual interventions  Pt demonstrates TTP to EPB and EPL  Pt demonstrates no pain with passive stretching of EPB and EPL, no pain with active radial deviation ROM, but painful resisted radial deviation ROM  Pt continues to demonstrate variable R wrist flexion PROM with empty end feel  Plan: Continue per plan of care  Progress treatment as tolerated         Precautions: COPD      Manuals           Dupuytren's STM 5 min 5 min 5 min          R wrist PROM  5 min 1x10 flex/ext          Median n glide  5 min 2x10          3rd and 4th finger ext PROM             Neuro Re-Ed             Median n glide                                                                                           Ther Ex             R wrist flex/ext AROM 2x10 2x10 ea 3x10 ea          R wrist sup/pron AROM   3x10 ea          3rd 4th finger ext PROM 1x30 HEP           Wrist flex/ext curls  1# 1# 1x15 ea          Digiflex  Yellow 3x10 Yellow 3x10           strength  3x10 3x10                                                 Ther Activity                                       Gait Training                                       Modalities

## 2022-08-29 DIAGNOSIS — J30.9 ALLERGIC SINUSITIS: ICD-10-CM

## 2022-08-29 RX ORDER — FLUTICASONE PROPIONATE 50 MCG
SPRAY, SUSPENSION (ML) NASAL
Qty: 16 ML | Refills: 3 | Status: SHIPPED | OUTPATIENT
Start: 2022-08-29

## 2022-09-01 ENCOUNTER — OFFICE VISIT (OUTPATIENT)
Dept: PHYSICAL THERAPY | Facility: CLINIC | Age: 62
End: 2022-09-01
Payer: COMMERCIAL

## 2022-09-01 DIAGNOSIS — M72.0 DUPUYTREN CONTRACTURE: ICD-10-CM

## 2022-09-01 DIAGNOSIS — G90.511 COMPLEX REGIONAL PAIN SYNDROME TYPE 1 OF RIGHT UPPER EXTREMITY: Primary | ICD-10-CM

## 2022-09-01 PROCEDURE — 97140 MANUAL THERAPY 1/> REGIONS: CPT

## 2022-09-01 PROCEDURE — 97110 THERAPEUTIC EXERCISES: CPT

## 2022-09-01 NOTE — PROGRESS NOTES
Daily Note     Today's date: 2022  Patient name: Harley Campbell  : 1960  MRN: [id number]  Referring provider: Zina Sands DO  Dx:   Encounter Diagnosis     ICD-10-CM    1  Complex regional pain syndrome type 1 of right upper extremity  G90 511    2  Dupuytren contracture  M72 0                   Subjective: Pt reports no changes since last visit  Objective: See treatment diary below      Assessment: Added isometric EPB and APL contractions with thumb abduction AROM in neutral position  Pt tolerated fair  Notes pain t/o tx  Pt was unable to tolerate yellow digiflex 2/2 pain- perform soft ball squeezes instead  Continue to progress as able in order to maximize function with ADLs  Plan: Continue per plan of care  Progress treatment as tolerated         Precautions: COPD      Manuals          Dupuytren's STM 5 min 5 min 5 min 5 min         EPB/APL STM    5 min         R wrist PROM  5 min 1x10 flex/ext np         Median n glide  5 min 2x10 np                      Neuro Re-Ed             Median n glide                                                                                           Ther Ex             R wrist flex/ext AROM 2x10 2x10 ea 3x10 ea 3x10 ea         R wrist sup/pron AROM   3x10 ea 3x10 ea         3rd 4th finger ext PROM 1x30 HEP           Wrist flex/ext curls  1# 1# 1x15 ea 1# 1x15 ea         Digiflex  Yellow 3x10 Yellow 3x10 Soft ball squeezes                                                Ther Activity                                       Gait Training                                       Modalities

## 2022-09-07 DIAGNOSIS — J43.9 PULMONARY EMPHYSEMA, UNSPECIFIED EMPHYSEMA TYPE (HCC): ICD-10-CM

## 2022-09-08 ENCOUNTER — OFFICE VISIT (OUTPATIENT)
Dept: PHYSICAL THERAPY | Facility: CLINIC | Age: 62
End: 2022-09-08
Payer: COMMERCIAL

## 2022-09-08 DIAGNOSIS — G90.511 COMPLEX REGIONAL PAIN SYNDROME TYPE 1 OF RIGHT UPPER EXTREMITY: Primary | ICD-10-CM

## 2022-09-08 DIAGNOSIS — Z00.00 ANNUAL PHYSICAL EXAM: ICD-10-CM

## 2022-09-08 DIAGNOSIS — M72.0 DUPUYTREN CONTRACTURE: ICD-10-CM

## 2022-09-08 PROCEDURE — 97110 THERAPEUTIC EXERCISES: CPT

## 2022-09-08 RX ORDER — FLUTICASONE PROPIONATE AND SALMETEROL 100; 50 UG/1; UG/1
POWDER RESPIRATORY (INHALATION)
Qty: 60 BLISTER | Refills: 4 | Status: SHIPPED | OUTPATIENT
Start: 2022-09-08

## 2022-09-08 NOTE — PROGRESS NOTES
Daily Note     Today's date: 2022  Patient name: Sarah Zuniga  : 1960  MRN: [id number]  Referring provider: Yoly Andrew DO  Dx:   Encounter Diagnosis     ICD-10-CM    1  Complex regional pain syndrome type 1 of right upper extremity  G90 511    2  Dupuytren contracture  M72 0                   Subjective: Pt reports soreness in R hand that began 15 minutes after last apt and lasted for 3 days  Objective: See treatment diary below      Assessment: Held manual interventions 2/2 complaint of pain in hand following last visit  Decreased POC 2/2 soreness rules  Added thumb and wrist AROM in order to address pain and maintain function  Continue to progress as able  Plan: Continue per plan of care  Progress treatment as tolerated         Precautions: COPD      Manuals         Dupuytren's STM 5 min 5 min 5 min 5 min held        EPB/APL STM    5 min held        R wrist PROM  5 min 1x10 flex/ext np         Median n glide  5 min 2x10 np                      Neuro Re-Ed             Median n glide                                                                                           Ther Ex             R wrist flex/ext AROM 2x10 2x10 ea 3x10 ea 3x10 ea 3x10 ea        R wrist sup/pron AROM   3x10 ea 3x10 ea 3x10 ea        3rd 4th finger ext PROM 1x30 HEP           Wrist flex/ext curls  1# 1# 1x15 ea 1# 1x15 ea 1# 1x15 ea        Digiflex  Yellow 3x10 Yellow 3x10 Soft ball squeezes Soft ball squeezes 3x10        Thumb abduction AROM     3x10        Thumb abduction iso     3x10        Thumb retropulsion AROM     3x10        Radial deviation AROM     3x10        Ther Activity                                       Gait Training                                       Modalities

## 2022-09-09 RX ORDER — NICOTINE 21 MG/24HR
1 PATCH, TRANSDERMAL 24 HOURS TRANSDERMAL EVERY 24 HOURS
Qty: 28 PATCH | Refills: 1 | Status: SHIPPED | OUTPATIENT
Start: 2022-09-09 | End: 2022-11-08

## 2022-09-13 ENCOUNTER — CONSULT (OUTPATIENT)
Dept: PAIN MEDICINE | Facility: CLINIC | Age: 62
End: 2022-09-13
Payer: COMMERCIAL

## 2022-09-13 VITALS
HEART RATE: 86 BPM | BODY MASS INDEX: 25.32 KG/M2 | SYSTOLIC BLOOD PRESSURE: 135 MMHG | DIASTOLIC BLOOD PRESSURE: 70 MMHG | WEIGHT: 134 LBS

## 2022-09-13 DIAGNOSIS — G90.511 COMPLEX REGIONAL PAIN SYNDROME TYPE 1 OF RIGHT UPPER EXTREMITY: Primary | ICD-10-CM

## 2022-09-13 PROCEDURE — 99244 OFF/OP CNSLTJ NEW/EST MOD 40: CPT | Performed by: PHYSICAL MEDICINE & REHABILITATION

## 2022-09-13 RX ORDER — DULOXETIN HYDROCHLORIDE 30 MG/1
30 CAPSULE, DELAYED RELEASE ORAL DAILY
Qty: 30 CAPSULE | Refills: 1 | Status: SHIPPED | OUTPATIENT
Start: 2022-09-13

## 2022-09-13 NOTE — PROGRESS NOTES
Assessment  1  Complex regional pain syndrome type 1 of right upper extremity        Plan  Ms Nydia Snow is a pleasant 70-year-old female presents to [place] [address] [facility] for initial evaluation regarding a fall on an outstretched arm on April 14, 2022 with continued right upper extremity pain, weakness, swelling  A full workup has been done including x-ray, MRI and EMG which did not reveal any significant findings to explain her continued pain  She is been referred by Sports Medicine regarding suspected CRPS type 1 and I would agree that she is demonstrating signs and symptoms of CRPS type 1 into the right upper extremity  She continues with home exercises, physical therapy and will also be seen by hand specialist on October 19, 2022 regarding suspected Dupuytren's contractures  At this time medication management and interventional approaches would be beneficial and warranted  As such we will   1  Plan for right-sided stellate ganglion block under fluoro and ultrasound guidance  2  Will start Cymbalta 30 mg daily for neuropathic pain control  3  Complete risks and benefits including bleeding, infection, tissue reaction, nerve injury and allergic reaction were discussed  The approach was demonstrated using models and literature was provided  Verbal and written consent was obtained  My impressions and treatment recommendations were discussed in detail with the patient who verbalized understanding and had no further questions  Discharge instructions were provided  I personally saw and examined the patient and I agree with the above discussed plan of care      Orders Placed This Encounter   Procedures    FL spine and pain procedure     PLEASE ALLOW 40 MIN  WILL REQUIRE ULTRASOUND MACHINE AS WELL  SAVE PROCEDURE FOR END OF DAY     Standing Status:   Future     Standing Expiration Date:   9/13/2026     Order Specific Question:   Reason for Exam:     Answer:   Right sided stellate ganglion block Order Specific Question:   Anticoagulant hold needed? Answer:   No     New Medications Ordered This Visit   Medications    DULoxetine (CYMBALTA) 30 mg delayed release capsule     Sig: Take 1 capsule (30 mg total) by mouth daily     Dispense:  30 capsule     Refill:  1       History of Present Illness    Harley Campbell is a 58 y o  female presents to [place] [address] [facility] for initial evaluation regarding right wrist and forearm pain and referral from Sports Medicine regarding suspected CRPS type 1 of the right upper extremity  Patient reports a fall on an outstretched arm 4/14/22 and current Sports Medicine workup has been negative for any significant peripheral neuropathy or soft tissue injury of the wrist   Patient has been referred to Pain Management, hand specialist and PT OT  Today patient reports moderate to severe pain rated 6 to 8/10 and interfering with daily activities  Pain is nearly constant 60-95% of the time that is present throughout the day and night  Describes symptoms as burning, shooting, pressure-like  Also reports dropping objects  Does not use any durable medical equipment for ambulation  Symptoms are unchanged with position  Has had no significant improvements with home exercises and rest   Admits to smoking a half pack per day for the last several years  Denies drinking or marijuana use  Currently using Voltaren gel with minimal relief  Presents today for initial evaluation  I have personally reviewed and/or updated the patient's past medical history, past surgical history, family history, social history, current medications, allergies, and vital signs today  Review of Systems   Constitutional: Negative for fever and unexpected weight change  HENT: Negative for trouble swallowing  Eyes: Negative for visual disturbance  Respiratory: Negative for shortness of breath and wheezing  Cardiovascular: Negative for chest pain and palpitations  Gastrointestinal: Negative for constipation, diarrhea, nausea and vomiting  Endocrine: Negative for cold intolerance, heat intolerance and polydipsia  Genitourinary: Negative for difficulty urinating and frequency  Musculoskeletal: Positive for joint swelling  Negative for arthralgias, gait problem and myalgias  Skin: Negative for rash  Neurological: Positive for weakness and numbness  Negative for dizziness, seizures, syncope and headaches  Hematological: Does not bruise/bleed easily  Psychiatric/Behavioral: Negative for dysphoric mood  All other systems reviewed and are negative        Patient Active Problem List   Diagnosis    Absence of teeth    Arthritis    Chronic back pain    Chronic obstructive pulmonary disease (HCC)    Decreased hearing of left ear    Dyslipidemia    Menopause    Mild intermittent asthma without complication    Varicose veins    Vitamin D deficiency    Allergic sinusitis    Right forearm pain    Paresthesia       Past Medical History:   Diagnosis Date    Asthma     COPD [facility]ch[facility] obstructive pulmonary disease) ([facility] [address] [facility]     Decreased hearing of left ear     Hyperlipidemia     Hypokalemia     last assessed [id number]    Spontaneous         Past Surgical History:   Procedure Laterality Date    COLONOSCOPY      NOSE SURGERY      TONSILLECTOMY AND ADENOIDECTOMY      TUBAL LIGATION         Family History   Problem Relation Age of Onset    Asthma Family     Bipolar disorder Family     Drug abuse Family     Mental illness Family     Heart failure Mother         congestive     Diabetes Father         mellitus     Hypertension Father     Lung cancer Father     Bipolar disorder Brother     No Known Problems Maternal Grandmother     No Known Problems Maternal Grandfather     No Known Problems Paternal Grandmother     No Known Problems Paternal Grandfather     No Known Problems Daughter     No Known Problems Son     No Known Problems Daughter  No Known Problems Brother     No Known Problems Maternal Aunt     No Known Problems Maternal Aunt     No Known Problems Maternal Aunt     No Known Problems Paternal Aunt        Social History     Occupational History    Not on file   Tobacco Use    Smoking status: Current Every Day Smoker     Packs/day: 0 50     Types: Cigarettes, Cigars    Smokeless tobacco: Current User    Tobacco comment: 1/2 ppd, started about age 29 - denied history of current smoker noted in "allscripts"    Vaping Use    Vaping Use: Some days   Substance and Sexual Activity    Alcohol use: No    Drug use: No    Sexual activity: Never       Current Outpatient Medications on File Prior to Visit   Medication Sig    albuterol (PROVENTIL HFA,VENTOLIN HFA) 90 mcg/act inhaler Inhale 2 puffs every 6 (six) hours as needed for wheezing or shortness of breath    aspirin 81 mg chewable tablet Chew 1 tablet (81 mg total) daily    atorvastatin (LIPITOR) 40 mg tablet Take 1 tablet (40 mg total) by mouth daily    celecoxib (CeleBREX) 200 mg capsule TAKE 1 CAPSULE BY MOUTH DAILY AS NEEDED FOR BACK PAIN WITH FOOD    CVS D3 50 MCG (2000 UT) CAPS TAKE 1 CAPSULE EVERY DAY    cyclobenzaprine (FLEXERIL) 5 mg tablet TAKE 1 TABLET (5 MG TOTAL) BY MOUTH 3 (THREE) TIMES A DAY AS NEEDED (NECK, BACK, ARM PAIN)    Diclofenac Sodium (VOLTAREN) 1 % Apply 2 g topically 4 (four) times a day    fluticasone (FLONASE) 50 mcg/act nasal spray SPRAY 2 SPRAYS INTO EACH NOSTRIL EVERY DAY    gabapentin (Neurontin) 300 mg capsule Take 1 capsule (300 mg total) by mouth 3 (three) times a day    metaxalone (SKELAXIN) 400 MG tablet TAKE 1 TABLET 3 TIMES A DAY AS NEEDED FOR MUSCLE PAIN,MAY CAUSE DROWSINESS    montelukast (SINGULAIR) 10 mg tablet TAKE 1 TABLET BY MOUTH EVERY DAY    nicotine (NICODERM CQ) 14 mg/24hr TD 24 hr patch PLACE 1 PATCH ON THE SKIN EVERY 24 HOURS      predniSONE 50 mg tablet     Sodium Sulfate-Mag Sulfate-KCl (Sutab) 1234-555-996 MG TABS Lot - [id number]  Exp- 3/2022    triamcinolone (KENALOG) 0 1 % ointment APPLY TOPICALLY DAILY AT BEDTIME    Wixela Inhub 100-50 MCG/ACT inhaler INHALE 1 PUFF 2 TIMES A DAY  RINSE MOUTH AFTER USE  No current facility-administered medications on file prior to visit  Allergies   Allergen Reactions    Lidocaine Rash     Patient states she had some blotching, redness, dryness and itchiness  It was a topical she used for her hands    Seasonal Ic  [Cholestatin]        Physical Exam    /70   Pulse 86   Wt 60 8 kg (134 lb)   BMI 25 32 kg/m²     Constitutional: normal, well developed, well nourished, alert, in no distress and non-toxic and no overt pain behavior    Eyes: anicteric  HEENT: grossly intact  Neck: supple, symmetric, trachea midline and no masses   Pulmonary:even and unlabored  Cardiovascular:No edema or pitting edema present  Skin:Normal without rashes or lesions and well hydrated  Psychiatric:Mood and affect appropriate  Neurologic:Cranial Nerves II-XII grossly intact  Musculoskeletal:normal gait    Clinical Diagnostic Criteria for Complex Regional Pain Syndrome (CRPS)    1) Continuing pain, which is disproportionate to any inciting event    2) SYMPTOMS (at least one symptom in three of the four following categories)  --SENSORY: Reports of Hyperesthesia  --VASOMOTOR: Denies skin color changes or temp asymmetry  --SUDOMOTOR/EDEMA: Reports of edema  --MOTOR/TROPHIC: Reports of decreased range of motion and weakness    3) SIGNS (at least one sign at time of evaluation in two or more of the following categories)  --SENSORY: Evidence of allodynia to deep somatic pressure  --VASOMOTOR: Evidence of temperature asymmetry cold right forearm  --SUDOMOTOR/EDEMA: Evidence of edema  --MOTOR/TROPHIC: Evidence of decreased range of motion and motor weakness with MMT 4- out of 5 in finger flexion, wrist extension, triceps extension and finger abduction/adduction right upper extremity  5/5 left upper extremity    4) There is no other diagnosis that better explains the signs and symptoms              Imaging    MRI RIGHT WRIST     INDICATION:   M25 531: Pain in right wrist      Comparison:  Multiple priors most recently 5/2/2022     TECHNIQUE:   Multisequence multiplanar MR was obtained of the right wrist  Localizers, Sagittal STIR, axial T1, axial T2 fat sat, coronal T1, coronal T2 fat sat, coronal gradient 3D gradient echo  Gadolinium was not utilized          FINDINGS:     SUBCUTANEOUS TISSUES: Normal     JOINT FLUID: None      SCAPHOLUNATE LIGAMENT: Intact       LUNOTRIQUETRAL LIGAMENT: Intact      TRIANGULAR FIBROCARTILAGE: Intact       FLEXOR/EXTENSOR TENDONS: Intact       CARPAL TUNNEL: Intact       MEDIAN NERVE: Intact        BONES: Normal       ARTICULAR SURFACES:  No high-grade chondrosis identified  Early subchondral cystic change in the lunate, may represent early osteoarthritis      MUSCULATURE: Normal      IMPRESSION:     No evidence of acute traumatic injury      RIGHT HAND     INDICATION:   S69  91XA: Unspecified injury of right wrist, hand and finger(s), initial encounter    Right hand pain     COMPARISON:  None     VIEWS:  XR HAND 3+ VW RIGHT         For the purposes of institution wide universal language the following terms will apply: (thumb=1st digit/finger, index finger=2nd digit/finger, long finger=3rd digit/finger, ring=4th digit/finger and small finger=5th digit/finger)     FINDINGS:     There is no acute fracture or dislocation      No significant degenerative changes      No lytic or blastic osseous lesion      Soft tissues are unremarkable      IMPRESSION:     No acute osseous abnormality

## 2022-09-15 ENCOUNTER — OFFICE VISIT (OUTPATIENT)
Dept: PHYSICAL THERAPY | Facility: CLINIC | Age: 62
End: 2022-09-15
Payer: COMMERCIAL

## 2022-09-15 DIAGNOSIS — G90.511 COMPLEX REGIONAL PAIN SYNDROME TYPE 1 OF RIGHT UPPER EXTREMITY: Primary | ICD-10-CM

## 2022-09-15 DIAGNOSIS — M72.0 DUPUYTREN CONTRACTURE: ICD-10-CM

## 2022-09-15 DIAGNOSIS — E55.9 VITAMIN D DEFICIENCY: ICD-10-CM

## 2022-09-15 PROCEDURE — 97140 MANUAL THERAPY 1/> REGIONS: CPT

## 2022-09-15 NOTE — PROGRESS NOTES
Daily Note     Today's date: 9/15/2022  Patient name: Anita Chang  : 1960  MRN: [id number]  Referring provider: Marlen Hernandez DO  Dx:   Encounter Diagnosis     ICD-10-CM    1  Complex regional pain syndrome type 1 of right upper extremity  G90 511    2  Dupuytren contracture  M72 0                   Subjective: Pt reports usual burning/tingling today  No increased soreness like last visit  Objective: See treatment diary below      Assessment: Continue to emphasis thumb abduction and extension strength  Updated HEP provided  Pt tolerated tx well without pain or difficulty  Continue to progress as able  Plan: Continue per plan of care  Progress treatment as tolerated         Precautions: COPD      Manuals 8/8 8/18 8/25 9/1 9/8 9/15       Dupuytren's STM 5 min 5 min 5 min 5 min held        EPB/APL STM    5 min held        R wrist PROM  5 min 1x10 flex/ext np         Median n glide  5 min 2x10 np                      Neuro Re-Ed             Median n glide                                                                                           Ther Ex             R wrist flex/ext AROM 2x10 2x10 ea 3x10 ea 3x10 ea 3x10 ea 1# 3x10       R wrist sup/pron AROM   3x10 ea 3x10 ea 3x10 ea 1# 3x10       Digiflex  Yellow 3x10 Yellow 3x10 Soft ball squeezes Soft ball squeezes 3x10 Soft ball squeezes 3x10       Thumb abduction AROM     3x10 3x10       Thumb abduction iso     3x10 3x10       Thumb retropulsion AROM     3x10 3x10       Radial deviation AROM     3x10 3x10       Ther Activity                                       Gait Training                                       Modalities

## 2022-09-16 RX ORDER — CALCIUM CARBONATE/VITAMIN D3 600 MG-20
TABLET ORAL
Qty: 30 CAPSULE | Refills: 4 | Status: SHIPPED | OUTPATIENT
Start: 2022-09-16

## 2022-09-22 ENCOUNTER — OFFICE VISIT (OUTPATIENT)
Dept: PHYSICAL THERAPY | Facility: CLINIC | Age: 62
End: 2022-09-22
Payer: COMMERCIAL

## 2022-09-22 ENCOUNTER — TELEPHONE (OUTPATIENT)
Dept: PAIN MEDICINE | Facility: CLINIC | Age: 62
End: 2022-09-22

## 2022-09-22 DIAGNOSIS — M72.0 DUPUYTREN CONTRACTURE: ICD-10-CM

## 2022-09-22 DIAGNOSIS — G90.511 COMPLEX REGIONAL PAIN SYNDROME TYPE 1 OF RIGHT UPPER EXTREMITY: Primary | ICD-10-CM

## 2022-09-22 PROCEDURE — 97110 THERAPEUTIC EXERCISES: CPT

## 2022-09-22 PROCEDURE — 97112 NEUROMUSCULAR REEDUCATION: CPT

## 2022-09-22 NOTE — PROGRESS NOTES
Daily Note     Today's date: 2022  Patient name: June Welch  : 1960  MRN: [id number]  Referring provider: Vesta Edouard DO  Dx:   Encounter Diagnosis     ICD-10-CM    1  Complex regional pain syndrome type 1 of right upper extremity  G90 511    2  Dupuytren contracture  M72 0                   Subjective: Pt reports she got an apt for an injection in her neck to treat "overactive nerves" in November  Objective: See treatment diary below      Assessment: Added median nerve glides and scap retraction to improve neural dynamics and postural positioning  Pt was unable to tolerate more than 5 repetitions of median nerve glides 2/2 nerve pain in anterior forearm  Pt demonstrates good performance of HEP without cueing  Plan: Continue per plan of care  Progress treatment as tolerated         Precautions: COPD      Manuals 8/8 8/18 8/25 9/1 9/8 9/15 9/22      Dupuytren's STM 5 min 5 min 5 min 5 min held        EPB/APL STM    5 min held        R wrist PROM  5 min 1x10 flex/ext np         Median n glide  5 min 2x10 np                      Neuro Re-Ed             Median n glide       3x5      Scap retraction       3x15                   Ther Ex             R wrist flex/ext AROM 2x10 2x10 ea 3x10 ea 3x10 ea 3x10 ea 1# 3x10 1# 3x10      R wrist sup/pron AROM   3x10 ea 3x10 ea 3x10 ea 1# 3x10 1# 3x10      Digiflex  Yellow 3x10 Yellow 3x10 Soft ball squeezes Soft ball squeezes 3x10 Soft ball squeezes 3x10 Soft ball squeezes 3x10      Thumb abduction AROM     3x10 3x10 3x10      Thumb abduction iso     3x10 3x10 3x10      Thumb retropulsion AROM     3x10 3x10 3x10      Radial deviation AROM     3x10 3x10 3x10      Ther Activity                                       Gait Training                                       Modalities

## 2022-09-22 NOTE — TELEPHONE ENCOUNTER
Patient scheduled for procedure with Dr Pimentel 11/14/22 with arrival time of 4:10pm   Patient does not have mychart, so the following instructions were given to patient verbally, no vaccines 14 days prior or after procedure, nothing to eat or drink 1 hr prior to procedure, wear something loose and comfortable, no jewelry, if ill, infection or antibiotics, must call office to reschedule procedure  Take prescription medications as normal and you will need a  18 yrs or older

## 2022-09-29 ENCOUNTER — OFFICE VISIT (OUTPATIENT)
Dept: PHYSICAL THERAPY | Facility: CLINIC | Age: 62
End: 2022-09-29
Payer: COMMERCIAL

## 2022-09-29 DIAGNOSIS — G90.511 COMPLEX REGIONAL PAIN SYNDROME TYPE 1 OF RIGHT UPPER EXTREMITY: Primary | ICD-10-CM

## 2022-09-29 DIAGNOSIS — M72.0 DUPUYTREN CONTRACTURE: ICD-10-CM

## 2022-09-29 PROCEDURE — 97110 THERAPEUTIC EXERCISES: CPT

## 2022-09-29 PROCEDURE — 97112 NEUROMUSCULAR REEDUCATION: CPT

## 2022-09-29 NOTE — PROGRESS NOTES
Daily Note     Today's date: 2022  Patient name: June Welch  : 1960  MRN: [id number]  Referring provider: Jennifer Bowers DO  Dx:   Encounter Diagnosis     ICD-10-CM    1  Complex regional pain syndrome type 1 of right upper extremity  G90 511    2  Dupuytren contracture  M72 0                   Subjective: Pt reports 1/10 pain at the base of her thumb after work today  Objective: See treatment diary below      Assessment: Pt demonstrated increased tolerance to median nerve glides, fatigue with wrist and forearm strengthening  Plan: Continue per plan of care  Progress treatment as tolerated         Precautions: COPD      Manuals 8/8 8/18 8/25 9/1 9/8 9/15 9/22 9/29     Dupuytren's STM 5 min 5 min 5 min 5 min held        EPB/APL STM    5 min held        R wrist PROM  5 min 1x10 flex/ext np         Median n glide  5 min 2x10 np                      Neuro Re-Ed             Median n glide       3x5 3x10     Scap retraction       3x15 3x15                  Ther Ex             R wrist flex/ext AROM 2x10 2x10 ea 3x10 ea 3x10 ea 3x10 ea 1# 3x10 1# 3x10 1#  3x10     R wrist sup/pron AROM   3x10 ea 3x10 ea 3x10 ea 1# 3x10 1# 3x10 1#  3x10     Digiflex  Yellow 3x10 Yellow 3x10 Soft ball squeezes Soft ball squeezes 3x10 Soft ball squeezes 3x10 Soft ball squeezes 3x10 Soft ball squeezes 3x10     Thumb abduction AROM     3x10 3x10 3x10 3x10     Thumb abduction iso     3x10 3x10 3x10 3x10     Thumb retropulsion AROM     3x10 3x10 3x10 3x10     Radial deviation AROM     3x10 3x10 3x10 3x10     Ther Activity                                       Gait Training                                       Modalities

## 2022-10-06 ENCOUNTER — OFFICE VISIT (OUTPATIENT)
Dept: PHYSICAL THERAPY | Facility: CLINIC | Age: 62
End: 2022-10-06
Payer: COMMERCIAL

## 2022-10-06 DIAGNOSIS — G90.511 COMPLEX REGIONAL PAIN SYNDROME TYPE 1 OF RIGHT UPPER EXTREMITY: Primary | ICD-10-CM

## 2022-10-06 DIAGNOSIS — M72.0 DUPUYTREN CONTRACTURE: ICD-10-CM

## 2022-10-06 PROCEDURE — 97112 NEUROMUSCULAR REEDUCATION: CPT

## 2022-10-06 PROCEDURE — 97110 THERAPEUTIC EXERCISES: CPT

## 2022-10-06 NOTE — PROGRESS NOTES
Daily Note     Today's date: 10/6/2022  Patient name: Abraham Martin  : 1960  MRN: [id number]  Referring provider: Betsey Jones DO  Dx:   Encounter Diagnosis     ICD-10-CM    1  Complex regional pain syndrome type 1 of right upper extremity  G90 511    2  Dupuytren contracture  M72 0                   Subjective: Pt reports random stinging prior to start of tx  Objective: See treatment diary below      Assessment: Pt tolerates treatment well  No complaints t/o session  Added additional exercises for DeQuervains syndrome  Also added cervical retraction to address pt possible cervical radiculopathy  Plan: Continue per plan of care  Progress treatment as tolerated         Precautions: COPD      Manuals 8/8 8/18 8/25 9/1 9/8 9/15 9/22 9/29 10/6    Dupuytren's STM 5 min 5 min 5 min 5 min held        EPB/APL STM    5 min held        R wrist PROM  5 min 1x10 flex/ext np         Median n glide  5 min 2x10 np                      Neuro Re-Ed             Median n glide       3x5 3x10 3x10    Scap retraction       3x15 3x15 3x15    Cervical retraction         6x10    Ther Ex             R wrist flex/ext AROM 2x10 2x10 ea 3x10 ea 3x10 ea 3x10 ea 1# 3x10 1# 3x10 1#  3x10 1#  3x10    R wrist sup/pron AROM   3x10 ea 3x10 ea 3x10 ea 1# 3x10 1# 3x10 1#  3x10 1#  3x10    Digiflex  Yellow 3x10 Yellow 3x10 Soft ball squeezes Soft ball squeezes 3x10 Soft ball squeezes 3x10 Soft ball squeezes 3x10 Soft ball squeezes 3x10 Soft ball squeezes 3x10    Thumb extension iso     3x10 3x10 3x10 3x10 3x10    Thumb extension AROM     3x10 3x10 3x10 3x10 3x10    Thumb extension rubber band resisted         3x10    Thumb retropulsion AROM     3x10 3x10 3x10 3x10 3x10    Thumb abduction AROM         3x10    Thumb abduction rubber band resisted         3x10    Thumb flexion AROM         3x10    Thumb flexion w/ ulnar deviation         3x10    Pronated radial deviation AROM     3x10 3x10 3x10 3x10 3x10    Ther Activity Gait Training                                       Modalities

## 2022-10-13 ENCOUNTER — OFFICE VISIT (OUTPATIENT)
Dept: PHYSICAL THERAPY | Facility: CLINIC | Age: 62
End: 2022-10-13
Payer: COMMERCIAL

## 2022-10-13 DIAGNOSIS — M72.0 DUPUYTREN CONTRACTURE: ICD-10-CM

## 2022-10-13 DIAGNOSIS — G90.511 COMPLEX REGIONAL PAIN SYNDROME TYPE 1 OF RIGHT UPPER EXTREMITY: Primary | ICD-10-CM

## 2022-10-13 PROCEDURE — 97112 NEUROMUSCULAR REEDUCATION: CPT

## 2022-10-13 PROCEDURE — 97110 THERAPEUTIC EXERCISES: CPT

## 2022-10-13 NOTE — PROGRESS NOTES
Daily Note     Today's date: 10/13/2022  Patient name: Citlaly Vu  : 1960  MRN: [id number]  Referring provider: Benton Loja DO  Dx:   Encounter Diagnosis     ICD-10-CM    1  Complex regional pain syndrome type 1 of right upper extremity  G90 511    2  Dupuytren contracture  M72 0                   Subjective: Pt reports today is a better day  Very little symptoms  Objective: See treatment diary below      Assessment: Continued new exercises added last week  Pt tolerated well  Some irritation noted with tendon glides  Plan: Continue per plan of care  Progress treatment as tolerated         Precautions: COPD      Manuals 8/8 8/18 8/25 9/1 9/8 9/15 9/22 9/29 10/6 10/13   Dupuytren's STM 5 min 5 min 5 min 5 min held        EPB/APL STM    5 min held        R wrist PROM  5 min 1x10 flex/ext np         Median n glide  5 min 2x10 np                      Neuro Re-Ed             Median n glide       3x5 3x10 3x10 3x10   Scap retraction       3x15 3x15 3x15 3x15   Cervical retraction         6x10 3x10   Ther Ex             R wrist flex/ext AROM 2x10 2x10 ea 3x10 ea 3x10 ea 3x10 ea 1# 3x10 1# 3x10 1#  3x10 1#  3x10 1#  3x10   R wrist sup/pron AROM   3x10 ea 3x10 ea 3x10 ea 1# 3x10 1# 3x10 1#  3x10 1#  3x10 1#  3x10   Digiflex  Yellow 3x10 Yellow 3x10 Soft ball squeezes Soft ball squeezes 3x10 Soft ball squeezes 3x10 Soft ball squeezes 3x10 Soft ball squeezes 3x10 Soft ball squeezes 3x10 Soft ball squeezes 3x10   Thumb extension iso     3x10 3x10 3x10 3x10 3x10 3x10   Thumb extension AROM     3x10 3x10 3x10 3x10 3x10 3x10   Thumb extension rubber band resisted         3x10 3x10   Thumb retropulsion AROM     3x10 3x10 3x10 3x10 3x10 3x10   Thumb abduction AROM         3x10 3x10   Thumb abduction rubber band resisted         3x10 3x10   Thumb flexion AROM         3x10 3x10   Thumb flexion w/ ulnar deviation         3x10 3x10   Pronated radial deviation AROM     3x10 3x10 3x10 3x10 3x10 3x10   Ther Activity                                       Gait Training                                       Modalities

## 2022-10-19 ENCOUNTER — OFFICE VISIT (OUTPATIENT)
Dept: OBGYN CLINIC | Facility: HOSPITAL | Age: 62
End: 2022-10-19
Payer: COMMERCIAL

## 2022-10-19 VITALS
WEIGHT: 127 LBS | HEART RATE: 84 BPM | BODY MASS INDEX: 23.98 KG/M2 | HEIGHT: 61 IN | DIASTOLIC BLOOD PRESSURE: 79 MMHG | SYSTOLIC BLOOD PRESSURE: 131 MMHG

## 2022-10-19 DIAGNOSIS — M72.0 DUPUYTREN'S DISEASE OF PALM OF RIGHT HAND: ICD-10-CM

## 2022-10-19 PROCEDURE — 99244 OFF/OP CNSLTJ NEW/EST MOD 40: CPT | Performed by: ORTHOPAEDIC SURGERY

## 2022-10-19 RX ORDER — CEFAZOLIN SODIUM 1 G/50ML
1000 SOLUTION INTRAVENOUS ONCE
OUTPATIENT
Start: 2022-10-19 | End: 2022-10-19

## 2022-10-19 NOTE — PATIENT INSTRUCTIONS
[facility]  Lillian Rutledge MD  Chief of [address] [place] [address]  [phone]  You have chosen to undergo surgery for your condition  Any surgery is associated with risks of potential complications  Certain medical problems such as smoking, diabetes, thyroid disease, neuropathy, malnutrition or bleeding problems may increase your risk of complications  Complications after surgery are rare but include the following:  Bleeding Infection  Scar Pain  Tenderness Problems with healing  Damage to nerves Damage to blood vessels  Lack of desired results Need for further surgery  Numbness Stiffness  Problems with anesthesia Blood clots  Need for hardware removal (if inserted)    If bony work is done, other risks include:  Delayed bone healing  Lack of bone healing  Bones healing in wrong position    While these risks and complications are rare and infrequent they are still important to know and discuss  If you have any other questions, please let me know  _____________________________________________________________________________________  It can be difficult, but it is possible to get on with your life with only one hand for the first few weeks after your operation  The following are a few suggestions that may be helpful when you're recovering from your hand problem or from your hand surgery  While most of these suggestions are really only applicable if your dominant or your writing hand is affected, some apply to problems involving either hand  Most daily activities can be accomplished with some modifications, rather than the need for store bought devices  Before surgery, if you can:  Ask for help: Have others help you with: childcare, housework, and meals  Practice: Dressing, undressing, using the toilet, brushing your teeth, showering  Prepare: for the first few days after surgery    Open and re-sealed cans and bottles that you may need  Open medication containers and leave them easy-to-read open  Make sure you put these medication containers out of reach of children, even if you don't expect children to visit you  Prepare and think about “no-cut”meals-sandwiches, ground meats, etc     It helps to have…  In the shower  Plastic bags and rubber bands to cover bandages-the lima that newspapers come in are good  Also, small trashcan liners will work  Use 2 of these at a time  The other option is an oversized rubber glove that may be purchased at a food store to aid in dishwashing  A bottle sponge (soft sponge on a long stick)-for the armpit of your“good hand”  Shower brush  At [facility] in the shower will help you to wash your hair  A cotton terrycloth bathrobe to aid you in drying your back    In the bathroom  Toothpaste, shampoo, etc  in a flip top or pump dispenser  Flossers (dental floss on a“Y”shaped handle)  Consider an electric razor    In the bedroom  Back scratcher  Large sleeve shirts and tops  Put away clothing which buttons, fastens or snaps in the back, or which uses drawstrings    In the kitchen  A rubber jar opener Jasen-to help open jars, but also to keep things from sliding around while you are working on them  Double suction cup pads (“the little octopus”)-to hold items while you use or wash them  An electric can opener with a lid magnet strong enough to hold the can in the air-for one-handed use  Consider Felicitas Malling and wear” haircut  Venu Villanueva! Incision & Scar Care  You should keep your bandages dry and clean; change your dressings if you see drainage coming through your dressings  Signs of infection include increased pain, swelling, redness, warmth, and excessive or foul-smelling drainage  Please contact our office if you experience signs of infection  You may wash with soap and water after given permission  Sutures will be removed between 7-14 days after surgery if the wound is healed   Patients who smoke, have diabetes, or have nutritional problems may need longer to heal   Steri-strips may be placed across your incision at this time, which will fall off or peel away  To help prevent infection, do not submerse your incision area for 2-3 weeks (i e  no hot tubs, pools, ocean, dish water, fish ponds, fish tanks, bathtubs)  Swelling, bruising, and numbness are common after surgery  To help reduce these symptoms, keep the area elevated  Scar Care: To improve the appearance of your scar, you can massage the healed area (using circular motions with your fingertip) for 5 minutes 3x a day after your steri-strips peel away  You can use regular hand lotion or Scar zone from the store  You may also use Silicone pads after the stitches are removed (available at the store)  Redness and bumpiness of the scar are expected  These generally improve as healing progresses, but redness can be expected for up to 6-8 months  ** If you have any questions or concerns, please call our office  [phone]  _____________________________________________________________________________________  Pain Management  Pain after an injury or surgery is common and should often be expected  There are many ways to manage and reduce this pain  This often does not include medications or may not exclusively include medication  Each patient, surgery and surgeon are unique, and the approach to management of pain is individual   It is important to try to discuss your concerns and expectations regarding pain with your surgical team before and after surgery  They want to help you get better and have a good patient experience  Your patient experience includes understanding and treating your pain  Here's what you may expect before surgery and how to manage your pain and medications after surgery  Again, the approach to pain management after injury or surgery is individualized, and this is general information   Your surgical team will have more specific recommendations for you  Before using any of the methods explored here, please discuss with your medical team if these pain management methods are appropriate for you  We advise good communication with your team to let them help you achieve the best outcome  Surgery Day  As your surgery begins, your surgeon and anesthesia team may give you medications by mouth, IV, and/or injection  Giving you medication as the surgery progresses not only helps you to decrease pain during the surgery, but it also reduces your pain post-surgery  You may also receive medication in the recovery room after surgery, if needed  In some cases, you will receive prescription pain medication and instructions for its use to use at home in the days following your surgery  You may also receive instructions on using over-the-counter pain medications  It is important to follow these directions carefully, as many over-the counter medications contain some of the same ingredients as prescription pain medications and using them together can result in a dangerous accidental overdose  Post-Surgery Pain Management  While always important to follow your specific postoperative instructions, here are some different methods, outside of medication, that your team may recommend to reduce your pain:   Elevate: Elevating the injured area so it is higher than your heart can reduce swelling and pain  Swelling can increase quickly by putting your hand at your side, and this can make your dressing feel tight  Often, the pain associated with swelling is difficult to control, so it is best to avoid this problem  Take care of your dressing: If your dressing/splint feels tight, and elevation for 10 minutes does not improve the tight sensation, contact your surgical team  It may be recommended that you unravel any tape or elastic wrap and loosen the outer bandage   If this does not help, you may be advised to tear, unravel, or cut the inner layers with blunt tipped scissors  Make sure you are cutting on the opposite side of where your incision is located  When done, you will need to try to rebuild your dressing to keep your wound clean and covered  Before doing any of this, check with your medical team  They may want to be aware of the tight dressing and could have different instructions for loosening the dressing  Keep moving: If allowed by your surgeon, try to frequently move the fingers, wrist, elbow, and/or shoulder that are outside of the splint or cast  You can do this gently and slowly  This improves blood flow, which limits swelling and prevents bandages from feeling tight  It may be uncomfortable to move at first, but the discomfort will often improve with time and frequently improves with motion  Your surgeon will be more specific about what to move and what to rest    Ice the area: Icing the painful area will typically reduce swelling and inflammation and reduce pain  However, there may be certain procedures (such as surgery on arteries, skin grafts or flaps) where ice could be harmful, so consult your surgeon before using ice  Heat the area: If you are in the phase of care where you can remove your dressing or splint, you may be able to try heat  Heat increases blood flow to an area and can help with muscle spasms, muscle soreness and joint pain  Avoid smoking: Chemicals present in cigarettes can increase pain  Reducing or quitting smoking can improve your pain  Consume vitamin C: Consuming 500mg of vitamin C daily for 6 weeks may reduce pain after some injuries  However, it is ascorbic acid, which can upset your stomach if you have heartburn or gastritis  Post-Surgery Medication Management  The pain-management methods listed above are often effective when used in combination with taking medications post-surgery  There are many different classes of medication that can help pain   Some can be purchased over the counter, and some require a prescription  All medicines can have some benefits and some adverse reactions/side effects  Your surgical team will balance these issues to provide a plan for you  Some commonly prescribed medications can include:   Tylenol (Acetaminophen)   Aleve (Naprosyn/naproxen)   Motrin/Advil (Ibuprofen)   Celebrex (Celecoxib)   Toradol (Ketorolac)    When taking medication, keep the following in mind: It may take 30-60 minutes for your body to absorb the medication after you take it by mouth, so be patient  Longer-acting medications used before bedtime may help you sleep better the first few nights after surgery  The first few nights post-surgery will generally be the toughest    Do not exceed the dose recommended by your physician or combine medications without consulting with your physician  If you are unfamiliar with these medications, your surgeon can specify how much medication you should take, for how long, and how often  Opioids  Opioids are a type of pain medication made from the poppy plant that is used to make opium and heroin  They can be effective in treating pain, but opioids should be used at a last resort, in limited amounts, and for a limited number of days  Use of these medications should only be done under the guidance of your doctor  When taking opioids, you are at risk of becoming dependent on the medicine, and they may become less effective over time  Oxycodone and hydrocodone are two of the most commonly used and effective opioid “pain” pills  These pills are frequently combined with Tylenol (acetaminophen), but it must be done carefully  Be sure to consult your surgeon before doing so   Your surgeon will give you a customized plan for managing your pain based on your type of surgery, number of procedures, duration of surgery, etc  Keep in mind that many opioids are combined with Tylenol (acetaminophen) already in the pill, so take care to follow your prescribed directions and not to take more opioids or acetaminophen than prescribed  Overdoses of each of these medications can be dangerous and life threatening  Learn more about opioids, including the side effects, how to safely use them, and how to properly dispose of any extras  Following the program below will greatly decrease your post-operative pain  1  Aleve (naproxen) 220 mg and Tylenol Arthritis 650 mg on the afternoon/evening of surgery  Do NOT take Aleve if you have a history of gastric ulcers, uncontrolled reflux or have been told previously by a physician that you should not take anti-inflammatory medications such as Advil/Aleve/Motrin  2  Aleve (naproxen) 220 mg in the morning and afternoon, for about 2-3 days after the surgery; even if you have no pain  You can stop two days after surgery if your hand does not hurt  3  Tylenol Arthritis (or any brand of acetaminophen 8-hour), 650 mg every eight hours, with a maximum dose of 3000 mg per day, for about 2-3 days after the surgery, even if you have no pain  Tylenol Arthritis plus Aleve is a case of 1+1=3, not 2  That is, they work together as a team to make each other stronger a  The maximum amount of Tylenol is 3000 mg per day, which is the same as 4 of the 650 mg pills  Remember; don't substitute any other medication for the Tylenol: don't take Motrin, aspirin, or any other over the counter medication  It must be Tylenol (or any brand of acetaminophen) for it to work as a team  Remember as well that Tylenol Arthritis is taken every 8 hours, the Aleve is twice a day  4  Norco (hydrocodone/acetaminophen) 5/325 mg or a similar medication to assist with sleeping at night, and possibly every 6 hours during the day, ONLY IF NEEDED, for the first few days  You will be given a written prescription, but many patients find they do not need to take any or all of the Norco  Do not take the medication just because it was given to you; only take it if you need it   Remember that Kilo Dudley is an opioid pain medication and can lead to addiction, respiratory sedation, and death  In 2015 over 17,500 Americans  from opioid overdoses and I don't want this to happen to you  Opioid medications can also cause constipation, so please plan for that, as well as possible mental confusion and drowsiness  Do not drive while you are taking this medication  With this protocol, you can expect your post-operative pain to be very manageable  The worst pain only lasts for the first 48 hours and improves significantly after that  By the time you see your surgeon for your post-operative visit you probably will no longer require any pain medication on a daily basis  Important Information about Painkillers  You are being prescribed an opioid pain medication to help with severe pain after surgery  Use the medication sparingly as needed to reduce your pain  The goal is not to be pain-free, but to make the pain more tolerable  If you are able to take non-steroidal anti-inflammatory drugs (NSAIDs), alternate the prescription pain medication with over-the-counter Ibuprofen or Naproxen (if you do not have a history of reflux or stomach ulcers)  This will allow you to take less opioid medication  Also, elevate the hand to reduce swelling and consider applying ice to the affected area for 15 minutes at a time, several times per day  Opioid medication is powerful and has the risk of overdose, abuse, and addiction  Only use the medication as directed by your physician and keep the pills in a safe place  When you no longer need the medication, please dispose of the pills properly as directed below  Allowing someone else to use your opioid prescription is illegal   Also, possible side effects from opioid medications are over-sedation, itching, nausea/vomiting, and constipation  Do not drive a vehicle or operate machinery while taking the pain medications  Drink plenty of fluids and consider a stool softener to prevent constipation    If the pain you are experiencing is not severe, stop taking the opioid pills and only take over-the-counter medications such as Tylenol and Ibuprofen  Disposing of unused pain medications:  (1) Follow pharmacist instructions on the bottle if available, or  (2) Call 3-836.649.5450 for a [employer] authorized collection site in your area, or  (3) If no collection site is available in your area, mix the pills with an undesirable substance such as used coffee grounds, rufino litter, or dirt  Place this mixture in a sealed plastic bag  Place the bag in the household garbage  Adapted from the opioid awareness section of the [employer], thanks to Mary Riggins and Bibi Wright

## 2022-10-19 NOTE — LETTER
October 19, 2022     Patient: June Welch  YOB: 1960  Date of Visit: 10/19/2022      To Whom it May Concern:    Mel Abdi is under my professional care  Francisca Mora was seen in my office on 10/19/2022  Francisca Mora is expected to be out of work for 4 weeks following her surgical procedure on her right hand  If you have any questions or concerns, please don't hesitate to call           Sincerely,          Charlene Lui MD        CC: No Recipients

## 2022-10-19 NOTE — PROGRESS NOTES
ASSESSMENT/PLAN:    Assessment:   Dupuytrens Disease of the  right  hand    Plan:   The patient will proceed with a right hand Dupuytren nodule excision under sedation    Follow Up: After Surgery    To Do Next Visit:    and Sutures out    General Discussions:       Operative Discussions:     Standard Consent: The risks and benefits of the procedure were explained to the patient, which include, but are not limited to: Bleeding, infection, recurrence, pain, scar, damage to tendons, damage to nerves, and damage to blood vessels, failure to give desired results and complications related to anesthesia  These risks, along with alternative conservative treatment options, and postoperative protocols were voiced back and understood by the patient  All questions were answered to the patient's satisfaction  The patient agrees to comply with a standard postoperative protocol, and is willing to proceed  Education was provided via written and auditory forms  There were no barriers to learning  Written handouts regarding wound care, incision and scar care, and general preoperative information was provided to the patient  Prior to surgery, the patient may be requested to stop all anti-inflammatory medications  Prophylactic aspirin, Plavix, and Coumadin may be allowed to be continued  Medications including vitamin E , ginkgo, and fish oil are requested to be stopped approximately one week prior to surgery  Hypertensive medications and beta blockers, if taken, should be continued  _____________________________________________________  CHIEF COMPLAINT:  Chief Complaint   Patient presents with   • Right Wrist - Pain     Referred by Dr Leopold Care   XR 5/2/22 MRI 5/7/22  EMG 6/17/22 in care everywhere    • Right Hand - Pain     Lumps in palm-XR 4/15/22         SUBJECTIVE:  Abraham Martin is a 58 y o  female who presents with Pain  Severe  Intermittant  Sharp and Aching to the right hand    This started  6 month(s) ago as Due to a personal injury  Patient states that she had a fall in the soup kitchen in April and noticed pain and bumps into her hand ever since  She is unable to put her car into gear and  any items due to the pain in her right hand     Radiation: Yes to the  hand  Previous Treatments: therapy without relief  Associated symptoms: No Complaints  Handedness: right  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 [employer]     PAST MEDICAL HISTORY:  Past Medical History:   Diagnosis Date   • Asthma    • COPD (chronic obstructive pulmonary disease) (HCC)    • Decreased hearing of left ear    • Hyperlipidemia    • Hypokalemia     last assessed    • Spontaneous         PAST SURGICAL HISTORY:  Past Surgical History:   Procedure Laterality Date   • COLONOSCOPY     • NOSE SURGERY     • TONSILLECTOMY AND ADENOIDECTOMY     • TUBAL LIGATION         FAMILY HISTORY:  Family History   Problem Relation Age of Onset   • Asthma Family    • Bipolar disorder Family    • Drug abuse Family    • Mental illness Family    • Heart failure Mother         congestive    • Diabetes Father         mellitus    • Hypertension Father    • Lung cancer Father    • Bipolar disorder Brother    • No Known Problems Maternal Grandmother    • No Known Problems Maternal Grandfather    • No Known Problems Paternal Grandmother    • No Known Problems Paternal Grandfather    • No Known Problems Daughter    • No Known Problems Son    • No Known Problems Daughter    • No Known Problems Brother    • No Known Problems Maternal Aunt    • No Known Problems Maternal Aunt    • No Known Problems Maternal Aunt    • No Known Problems Paternal Aunt        SOCIAL HISTORY:  Social History     Tobacco Use   • Smoking status: Current Every Day Smoker     Packs/day: 0 50     Types: Cigarettes, Cigars   • Smokeless tobacco: Current User   • Tobacco comment:  ppd, started about age 29 - denied history of current smoker noted in "[employer]scripts"    Vaping Use   • Vaping Use: Some days Substance Use Topics   • Alcohol use: No   • Drug use: No       MEDICATIONS:    Current Outpatient Medications:   •  albuterol (PROVENTIL HFA,VENTOLIN HFA) 90 mcg/act inhaler, Inhale 2 puffs every 6 (six) hours as needed for wheezing or shortness of breath, Disp: 18 g, Rfl: 2  •  aspirin 81 mg chewable tablet, Chew 1 tablet (81 mg total) daily, Disp: 90 tablet, Rfl: 1  •  atorvastatin (LIPITOR) 40 mg tablet, Take 1 tablet (40 mg total) by mouth daily, Disp: 90 tablet, Rfl: 3  •  celecoxib (CeleBREX) 200 mg capsule, TAKE 1 CAPSULE BY MOUTH DAILY AS NEEDED FOR BACK PAIN WITH FOOD, Disp: 90 capsule, Rfl: 1  •  CVS D3 50 MCG (2000 UT) CAPS, TAKE 1 CAPSULE BY MOUTH EVERY DAY, Disp: 30 capsule, Rfl: 4  •  cyclobenzaprine (FLEXERIL) 5 mg tablet, TAKE 1 TABLET (5 MG TOTAL) BY MOUTH 3 (THREE) TIMES A DAY AS NEEDED (NECK, BACK, ARM PAIN), Disp: 60 tablet, Rfl: 3  •  Diclofenac Sodium (VOLTAREN) 1 %, Apply 2 g topically 4 (four) times a day, Disp: 240 g, Rfl: 2  •  DULoxetine (CYMBALTA) 30 mg delayed release capsule, Take 1 capsule (30 mg total) by mouth daily, Disp: 30 capsule, Rfl: 1  •  fluticasone (FLONASE) 50 mcg/act nasal spray, SPRAY 2 SPRAYS INTO EACH NOSTRIL EVERY DAY, Disp: 16 mL, Rfl: 3  •  gabapentin (Neurontin) 300 mg capsule, Take 1 capsule (300 mg total) by mouth 3 (three) times a day, Disp: 90 capsule, Rfl: 5  •  metaxalone (SKELAXIN) 400 MG tablet, TAKE 1 TABLET 3 TIMES A DAY AS NEEDED FOR MUSCLE PAIN,MAY CAUSE DROWSINESS, Disp: , Rfl:   •  montelukast (SINGULAIR) 10 mg tablet, TAKE 1 TABLET BY MOUTH EVERY DAY, Disp: 90 tablet, Rfl: 4  •  nicotine (NICODERM CQ) 14 mg/24hr TD 24 hr patch, PLACE 1 PATCH ON THE SKIN EVERY 24 HOURS , Disp: 28 patch, Rfl: 1  •  predniSONE 50 mg tablet, , Disp: , Rfl:   •  Sodium Sulfate-Mag Sulfate-KCl (Sutab) 2486-415-941 MG TABS, Lot - [id number] Exp- 3/2022, Disp: 24 tablet, Rfl: 0  •  triamcinolone (KENALOG) 0 1 % ointment, APPLY TOPICALLY DAILY AT BEDTIME, Disp: 30 g, Rfl: 1  • Wixela Inhub 100-50 MCG/ACT inhaler, INHALE 1 PUFF 2 TIMES A DAY  RINSE MOUTH AFTER USE , Disp: 60 blister, Rfl: 4    ALLERGIES:  Allergies   Allergen Reactions   • Lidocaine Rash     Patient states she had some blotching, redness, dryness and itchiness  It was a topical she used for her hands   • Seasonal Ic  [Cholestatin]        REVIEW OF SYSTEMS:  Pertinent items are noted in HPI  LABS:  HgA1c: No results found for: HGBA1C  BMP:   Lab Results   Component Value Date    CALCIUM 9 8 04/02/2022    K 4 0 04/02/2022    CO2 28 04/02/2022     04/02/2022    BUN 10 04/02/2022    CREATININE 0 74 04/02/2022         _____________________________________________________  PHYSICAL EXAMINATION:  Vital signs: /79   Pulse 84   Ht 5' 1" (1 549 m)   Wt 57 6 kg (127 lb)   BMI 24 00 kg/m²   General: well developed and well nourished, alert, oriented times 3 and appears comfortable  Psychiatric: Normal  HEENT: Trachea Midline, No torticollis  Cardiovascular: No discernable arrhythmia  Pulmonary: No wheezing or stridor  Abdomen: No rebound or guarding  Extremities: No peripheral edema  Skin: No Erythema  Neurovascular: Sensation Intact to the Median, Ulnar, Radial Nerve, Motor Intact to the Median, Ulnar, Radial Nerve and Pulses Intact    MUSCULOSKELETAL EXAMINATION:  RIGHT SIDE:  2- 1x1cm masses between distal and elizalde flexion creases inline with ring and long finger  1- 5x5mm mass just distal to distal elizalde flexion crease inline with ring finger   _____________________________________________________  STUDIES REVIEWED:  Images were reviewed in PACS by Dr Danis Cason and demonstrate: xray of right wrist on 5/2/2022 demonstrates a normal study without evidence of fractures or dislocations  EMG done on 7/23/2020 demonstrates a normal study         PROCEDURES PERFORMED:  Procedures  No Procedures performed today   Scribe Attestation    I,:  Leann Jimenez am acting as a scribe while in the presence of the attending physician :       I,:  Earl Calzada MD personally performed the services described in this documentation    as scribed in my presence :

## 2022-10-20 ENCOUNTER — OFFICE VISIT (OUTPATIENT)
Dept: PHYSICAL THERAPY | Facility: CLINIC | Age: 62
End: 2022-10-20
Payer: COMMERCIAL

## 2022-10-20 DIAGNOSIS — G90.511 COMPLEX REGIONAL PAIN SYNDROME TYPE 1 OF RIGHT UPPER EXTREMITY: Primary | ICD-10-CM

## 2022-10-20 DIAGNOSIS — M72.0 DUPUYTREN CONTRACTURE: ICD-10-CM

## 2022-10-20 PROCEDURE — 97110 THERAPEUTIC EXERCISES: CPT

## 2022-10-20 PROCEDURE — 97112 NEUROMUSCULAR REEDUCATION: CPT

## 2022-10-20 NOTE — PROGRESS NOTES
Daily Note     Today's date: 10/20/2022  Patient name: Griselda Mendoza  : 1960  MRN: [id number]  Referring provider: Carmen Alicea DO  Dx:   Encounter Diagnosis     ICD-10-CM    1  Complex regional pain syndrome type 1 of right upper extremity  G90 511    2  Dupuytren contracture  M72 0                   Subjective: Pt reports this morning was tough because of the cold, she had a lot of discomfort at 1st MP joint  Objective: See treatment diary below      Assessment: Updated HEP provided emphasizing nerve glides, cervical retraction and DeQuervain's tendonitis tendon glides  Pt continues to require cueing for median n glides  Progressed resistance of TE today with 2# and smaller rubberband  Plan: Continue per plan of care  Progress treatment as tolerated         Precautions: COPD      Manuals 10/20    9/8 9/15 9/22 9/29 10/6 10/13   Dupuytren's STM     held        EPB/APL STM     held        R wrist PROM             Median n glide                          Neuro Re-Ed             Median n glide 3x10      3x5 3x10 3x10 3x10   Scap retraction 3x10      3x15 3x15 3x15 3x15   Cervical retraction 3x10        6x10 3x10   Ther Ex             R wrist flex/ext AROM 2# 2x10    3x10 ea 1# 3x10 1# 3x10 1#  3x10 1#  3x10 1#  3x10   R wrist sup/pron AROM 2# 2x10    3x10 ea 1# 3x10 1# 3x10 1#  3x10 1#  3x10 1#  3x10   Digiflex Soft ball squeezes 3x10    Soft ball squeezes 3x10 Soft ball squeezes 3x10 Soft ball squeezes 3x10 Soft ball squeezes 3x10 Soft ball squeezes 3x10 Soft ball squeezes 3x10   Thumb extension iso 3x10    3x10 3x10 3x10 3x10 3x10 3x10   Thumb extension AROM 3x10    3x10 3x10 3x10 3x10 3x10 3x10   Thumb extension rubber band resisted 3x10        3x10 3x10   Thumb retropulsion AROM 3x10    3x10 3x10 3x10 3x10 3x10 3x10   Thumb abduction AROM 3x10        3x10 3x10   Thumb abduction rubber band resisted 3x10        3x10 3x10   Thumb flexion AROM 3x10        3x10 3x10   Thumb flexion w/ ulnar deviation 3x10        3x10 3x10   Pronated radial deviation AROM 3x10    3x10 3x10 3x10 3x10 3x10 3x10   Ther Activity                                       Gait Training                                       Modalities

## 2022-10-27 ENCOUNTER — OFFICE VISIT (OUTPATIENT)
Dept: PHYSICAL THERAPY | Facility: CLINIC | Age: 62
End: 2022-10-27
Payer: COMMERCIAL

## 2022-10-27 DIAGNOSIS — G90.511 COMPLEX REGIONAL PAIN SYNDROME TYPE 1 OF RIGHT UPPER EXTREMITY: Primary | ICD-10-CM

## 2022-10-27 DIAGNOSIS — M72.0 DUPUYTREN CONTRACTURE: ICD-10-CM

## 2022-10-27 PROCEDURE — 97112 NEUROMUSCULAR REEDUCATION: CPT

## 2022-10-27 PROCEDURE — 97110 THERAPEUTIC EXERCISES: CPT

## 2022-10-27 NOTE — PROGRESS NOTES
Daily Note     Today's date: 10/27/2022  Patient name: Shamar Segundo  : 1960  MRN: [id number]  Referring provider: Gibran Duval DO  Dx:   Encounter Diagnosis     ICD-10-CM    1  Complex regional pain syndrome type 1 of right upper extremity  G90 511    2  Dupuytren contracture  M72 0                   Subjective: Pt reports yesterday at work she had increased activity so she had a big increase in pain and reports swelling around DeQuervains tendons  Objective: See treatment diary below      Assessment: Today patients subjective reports of symptoms and symptom location aligns closely with DeQuervain's tendonitis over cervical radiculopathy or CRPS  Continued new intervention routine from last week, did decrease some repetitions 2/2 increased pain  Plan: Continue per plan of care  Progress treatment as tolerated         Precautions: COPD      Manuals 10/20 10/27   9/8 9/15 9/22 9/29 10/6 10/13   Dupuytren's STM     held        EPB/APL STM     held        R wrist PROM             Median n glide                          Neuro Re-Ed             Median n glide 3x10 2x10 pain     3x5 3x10 3x10 3x10   Scap retraction 3x10 3x10     3x15 3x15 3x15 3x15   Cervical retraction 3x10 3x10       6x10 3x10   Ther Ex             R wrist flex/ext AROM 2# 2x10 3x10 no weight   3x10 ea 1# 3x10 1# 3x10 1#  3x10 1#  3x10 1#  3x10   R wrist sup/pron AROM 2# 2x10 3x10 no weight   3x10 ea 1# 3x10 1# 3x10 1#  3x10 1#  3x10 1#  3x10   Digiflex Soft ball squeezes 3x10 Soft ball squeezes 3x10   Soft ball squeezes 3x10 Soft ball squeezes 3x10 Soft ball squeezes 3x10 Soft ball squeezes 3x10 Soft ball squeezes 3x10 Soft ball squeezes 3x10   Thumb extension iso 3x10 3x10   3x10 3x10 3x10 3x10 3x10 3x10   Thumb extension AROM 3x10 3x10   3x10 3x10 3x10 3x10 3x10 3x10   Thumb extension rubber band resisted 3x10 3x10       3x10 3x10   Thumb retropulsion AROM 3x10 3x10   3x10 3x10 3x10 3x10 3x10 3x10   Thumb abduction AROM 3x10 3x10       3x10 3x10   Thumb abduction rubber band resisted 3x10 3x10       3x10 3x10   Thumb flexion AROM 3x10 1x10 pain       3x10 3x10   Thumb flexion w/ ulnar deviation 3x10 3x10       3x10 3x10   Pronated radial deviation AROM 3x10 3x10   3x10 3x10 3x10 3x10 3x10 3x10   Ther Activity                                       Gait Training                                       Modalities

## 2022-10-30 DIAGNOSIS — Z00.00 ANNUAL PHYSICAL EXAM: ICD-10-CM

## 2022-10-31 RX ORDER — NICOTINE 21 MG/24HR
1 PATCH, TRANSDERMAL 24 HOURS TRANSDERMAL EVERY 24 HOURS
Qty: 28 PATCH | Refills: 1 | Status: SHIPPED | OUTPATIENT
Start: 2022-10-31 | End: 2022-12-30

## 2022-11-03 ENCOUNTER — OFFICE VISIT (OUTPATIENT)
Dept: PHYSICAL THERAPY | Facility: CLINIC | Age: 62
End: 2022-11-03

## 2022-11-03 DIAGNOSIS — M72.0 DUPUYTREN CONTRACTURE: ICD-10-CM

## 2022-11-03 DIAGNOSIS — G90.511 COMPLEX REGIONAL PAIN SYNDROME TYPE 1 OF RIGHT UPPER EXTREMITY: Primary | ICD-10-CM

## 2022-11-03 NOTE — PROGRESS NOTES
PT Re-Eval     Today's date: 11/3/2022  Patient name: Chester Hartman  : 1960  MRN: [id number]  Referring provider: Sofía Concepcion DO  Dx:   Encounter Diagnosis     ICD-10-CM    1  Complex regional pain syndrome type 1 of right upper extremity  G90 511    2  Dupuytren contracture  M72 0                   Assessment  Assessment details: Pascual Zepeda is a 57 yo female presenting with complaints of R wrist pain and weakness  Pt continues to demonstrate nodules of 3rd and 4th fingers consistent with early stages of Dupuytren's Contracture and is schedule for surgical release in December  Pt demonstrates improvements in R wrist AROM and is less painful at end ranges compared to initial evaluation  Pt continues to have decreased R wrist strength in all planes which limits functional activities as mentioned in subjective  As Román clinical presentation has evolved over the past weeks, 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  Pt would benefit from skilled physical therapy interventions in order to address the stated impairments, decrease pain with function and increase activity tolerance in order to improve quality of life  No further referral appears necessary at this time based on examination results  Prognosis is good given HEP compliance and PT 1-2/wk   Positive prognostic indicators include positive attitude toward recovery  Please contact me if you have any questions or recommendations  Thank you for the opportunity to share in Camron's care      Impairments: abnormal muscle firing, abnormal muscle tone, abnormal or restricted ROM, activity intolerance, impaired physical strength, lacks appropriate home exercise program, pain with function and poor posture     Symptom irritability: high Barriers to therapy: none  Understanding of Dx/Px/POC: good   Prognosis: good     Goals  Short Term Goals: PARTIALLY MET  Pt will report decreased levels of pain by at least 2 subjective ratings in 4 weeks  Pt will demonstrate improved ROM by at least 10 degrees in 4 weeks  Pt will demonstrate improved strength by ½ grade in 4 weeks  Pt will be able to perform 1 hour of fine motor activities in order to improve working tolerance in 4 weeks    Long Term Goals: PARTIALLY MET  Pt will be independent with HEP in 8 weeks  Pt will be pain free with ADL's in 8 weeks  Pt will be able to open 100 envelopes without pain in 8 weeks     Plan  Patient would benefit from: skilled physical therapy  Referral necessary: No  Planned therapy interventions: body mechanics training, functional ROM exercises, graded activity, graded exercise, home exercise program, joint mobilization, manual therapy, neuromuscular re-education, patient education, postural training, strengthening, stretching, therapeutic activities and therapeutic exercise  Frequency: 1x week  Duration in weeks: 12  Plan of Care beginning date: 11/3/2022  Plan of Care expiration date: 2022  Treatment plan discussed with: patient         Subjective: Pt states she is unable to lift a coffee cup with her R hand, or unable to lift her trash can when trying to empty it 2/2 weakness in R hand  Pt states pain levels have been lower       History of Present Illness  Date of onset: 2022  Mechanism of injury: trauma  Mechanism of injury: Pt reports back in April she lost her balance at work and bumped her R wrist into a doorway causing immediate onset of burning pain  Imaging and EMG studies revealed no tissue injuries  Pt diagnosed with CRPS by referring MD  Pt subjectively reports recurring swelling and varying burning pain  Pt also demonstrates nodules of 3rd and 4th fingers consistent with early phases of Dupuytren's contractures  Pt reports difficulty performing fine motor skill activities required of her job  Reports difficulty performing ADLs like washing dishes and lifting water cup             Recurrent probem    Quality of life: fair    Pain  Current pain ratin  At best pain rating: 3  At worst pain ratin  Location: Anterior aspect of R wrist  Quality: burning  Relieving factors: rest  Aggravating factors: keyboarding and lifting  Progression: no change     Social Support  Lives with: adult children     Employment status: working  Hand dominance: right     Diagnostic Tests  X-ray: normal  EMG: normal  Treatments  Previous treatment: immobilization  Patient Goals  Patient goals for therapy: decreased pain, decreased edema, increased motion, increased strength, independence with ADLs/IADLs, return to sport/leisure activities and return to work              Objective      Neurological Testing      Sensation      Wrist/Hand   Left   Intact: light touch     Right   Intact: light touch     Active Range of Motion   Cervical/Thoracic Spine         Cervical  Subcranial protraction:  WFL   Subcranial retraction:   Restriction level: moderate  Flexion:  WFL  Extension:  Restriction level: moderate  Left lateral flexion:  Restriction level: minimal  Right lateral flexion:  Restriction level minimal  Left rotation:  Restriction level: minimal  Right rotation:  Restriction level: minimal     Left Wrist   Normal active range of motion     Right Wrist   Wrist flexion: 60 degrees with pain  Wrist extension: 56 degrees with pain     Passive Range of Motion      Left Wrist   Normal passive range of motion     Right Wrist   Normal passive range of motion  Wrist extension: with pain     Strength/Myotome Testing   Left Elbow   Flexion: 4  Extension: 4     Right Elbow   Flexion: 4-  Extension: 4-     Left Wrist/Hand   Normal wrist strength  Wrist extension: 4  Wrist flexion: 4  Thumb extension: 4     Right Wrist/Hand   Wrist extension: 4  Wrist flexion: 4  Thumb extension: 4       Precautions: COPD      Manuals 10/20 10/27 11/3      10/6 10/13   Dupuytren's STM             EPB/APL STM             R wrist PROM             Median n glide                          Neuro Re-Ed             Median n glide 3x10 2x10 pain Held 2/2 time      3x10 3x10   Scap retraction 3x10 3x10 3x10      3x15 3x15   Cervical retraction 3x10 3x10 3x10      6x10 3x10   Ther Ex             R wrist flex/ext AROM 2# 2x10 3x10 no weight Held 2/2 time      1#  3x10 1#  3x10   R wrist sup/pron AROM 2# 2x10 3x10 no weight Held 2/2 time      1#  3x10 1#  3x10   Digiflex Soft ball squeezes 3x10 Soft ball squeezes 3x10 Soft ball squeezes 3x10      Soft ball squeezes 3x10 Soft ball squeezes 3x10   Thumb extension iso 3x10 3x10 3x10      3x10 3x10   Thumb extension AROM 3x10 3x10 3x10      3x10 3x10   Thumb extension rubber band resisted 3x10 3x10 3x10      3x10 3x10   Thumb retropulsion AROM 3x10 3x10 3x10      3x10 3x10   Thumb abduction AROM 3x10 3x10 3x10      3x10 3x10   Thumb abduction rubber band resisted 3x10 3x10 3x10      3x10 3x10   Thumb flexion AROM 3x10 1x10 pain 3x10      3x10 3x10   Thumb flexion w/ ulnar deviation 3x10 3x10 3x10      3x10 3x10   Pronated radial deviation AROM 3x10 3x10 3x10      3x10 3x10   Ther Activity                                       Gait Training                                       Modalities

## 2022-11-06 ENCOUNTER — HOSPITAL ENCOUNTER (EMERGENCY)
Facility: HOSPITAL | Age: 62
Discharge: HOME | End: 2022-11-06
Attending: EMERGENCY MEDICINE

## 2022-11-06 ENCOUNTER — APPOINTMENT (EMERGENCY)
Dept: RADIOLOGY | Facility: HOSPITAL | Age: 62
End: 2022-11-06

## 2022-11-06 VITALS
RESPIRATION RATE: 16 BRPM | BODY MASS INDEX: 24.55 KG/M2 | OXYGEN SATURATION: 97 % | HEIGHT: 61 IN | TEMPERATURE: 97.7 F | WEIGHT: 130 LBS | HEART RATE: 74 BPM | DIASTOLIC BLOOD PRESSURE: 80 MMHG | SYSTOLIC BLOOD PRESSURE: 126 MMHG

## 2022-11-06 DIAGNOSIS — J02.9 PHARYNGITIS: Primary | ICD-10-CM

## 2022-11-06 RX ORDER — AMOXICILLIN 500 MG/1
500 CAPSULE ORAL EVERY 12 HOURS SCHEDULED
Qty: 14 CAPSULE | Refills: 0 | Status: SHIPPED | OUTPATIENT
Start: 2022-11-06 | End: 2022-11-13

## 2022-11-07 DIAGNOSIS — G90.511 COMPLEX REGIONAL PAIN SYNDROME TYPE 1 OF RIGHT UPPER EXTREMITY: ICD-10-CM

## 2022-11-07 RX ORDER — DULOXETIN HYDROCHLORIDE 30 MG/1
CAPSULE, DELAYED RELEASE ORAL
Qty: 30 CAPSULE | Refills: 1 | Status: SHIPPED | OUTPATIENT
Start: 2022-11-07

## 2022-11-07 NOTE — DISCHARGE INSTRUCTIONS
You were seen in the ED for sore throat  Return to the ED for any worsening symptoms or new symptoms  Follow up with your primary care doctor as soon as possible

## 2022-11-07 NOTE — ED PROVIDER NOTES
History  Chief Complaint   Patient presents with   • Sore Throat     Pt presents to the ED with c/o having a sore throat for the past 5 days  Per pt she has tried all types of lozenges and nothing has worked  58-year-old female patient with history of COPD presenting with sore throat x5 days  Patient states that she has been eating soup  States has tried lozenges, [place] candy however has not been improving  Patient states mild cough but denies any fevers, chest pain, shortness of breath  States is able to swallow but has pain with swallowing  Denies previous history of this  Prior to Admission Medications   Prescriptions Last Dose Informant Patient Reported? Taking? CVS D3 50 MCG (2000 UT) CAPS   No No   Sig: TAKE 1 CAPSULE BY MOUTH EVERY DAY   Diclofenac Sodium (VOLTAREN) 1 %   No No   Sig: Apply 2 g topically 4 (four) times a day   Sodium Sulfate-Mag Sulfate-KCl (Sutab) 7215-007-849 MG TABS   No No   Sig: Lot - [id number]  Exp- 3/2022   Wixela Inhub 100-50 MCG/ACT inhaler   No No   Sig: INHALE 1 PUFF 2 TIMES A DAY   RINSE MOUTH AFTER USE    albuterol (PROVENTIL HFA,VENTOLIN HFA) 90 mcg/act inhaler  Self No No   Sig: Inhale 2 puffs every 6 (six) hours as needed for wheezing or shortness of breath   aspirin 81 mg chewable tablet  Self No No   Sig: Chew 1 tablet (81 mg total) daily   atorvastatin (LIPITOR) 40 mg tablet   No No   Sig: Take 1 tablet (40 mg total) by mouth daily   celecoxib (CeleBREX) 200 mg capsule   No No   Sig: TAKE 1 CAPSULE BY MOUTH DAILY AS NEEDED FOR BACK PAIN WITH FOOD   cyclobenzaprine (FLEXERIL) 5 mg tablet  Self No No   Sig: TAKE 1 TABLET (5 MG TOTAL) BY MOUTH 3 (THREE) TIMES A DAY AS NEEDED (NECK, BACK, ARM PAIN)   fluticasone (FLONASE) 50 mcg/act nasal spray   No No   Sig: SPRAY 2 SPRAYS INTO EACH NOSTRIL EVERY DAY   gabapentin (Neurontin) 300 mg capsule   No No   Sig: Take 1 capsule (300 mg total) by mouth 3 (three) times a day   metaxalone (SKELAXIN) 400 MG tablet   Yes No Sig: TAKE 1 TABLET 3 TIMES A DAY AS NEEDED FOR MUSCLE PAIN,MAY CAUSE DROWSINESS   montelukast (SINGULAIR) 10 mg tablet   No No   Sig: TAKE 1 TABLET BY MOUTH EVERY DAY   nicotine (NICODERM CQ) 14 mg/24hr TD 24 hr patch   No No   Sig: PLACE 1 PATCH ON THE SKIN EVERY 24 HOURS    triamcinolone (KENALOG) 0 1 % ointment   No No   Sig: APPLY TOPICALLY DAILY AT BEDTIME      Facility-Administered Medications: None       Past Medical History:   Diagnosis Date   • Asthma    • COPD (chronic obstructive pulmonary disease) ([facility])    • Decreased hearing of left ear    • Hyperlipidemia    • Hypokalemia     last assessed    • Spontaneous         Past Surgical History:   Procedure Laterality Date   • COLONOSCOPY     • NOSE SURGERY     • TONSILLECTOMY AND ADENOIDECTOMY     • TUBAL LIGATION         Family History   Problem Relation Age of Onset   • Asthma Family    • Bipolar disorder Family    • Drug abuse Family    • Mental illness Family    • Heart failure Mother         congestive    • Diabetes Father         mellitus    • Hypertension Father    • Lung cancer Father    • Bipolar disorder Brother    • No Known Problems Maternal Grandmother    • No Known Problems Maternal Grandfather    • No Known Problems Paternal Grandmother    • No Known Problems Paternal Grandfather    • No Known Problems Daughter    • No Known Problems Son    • No Known Problems Daughter    • No Known Problems Brother    • No Known Problems Maternal Aunt    • No Known Problems Maternal Aunt    • No Known Problems Maternal Aunt    • No Known Problems Paternal Aunt      I have reviewed and agree with the history as documented      E-Cigarette/Vaping   • E-Cigarette Use Current Some Day User      E-Cigarette/Vaping Substances   • Nicotine No    • THC No    • CBD No    • Flavoring No    • Other No    • Unknown No      Social History     Tobacco Use   • Smoking status: Current Every Day Smoker     Packs/day: 0 50     Types: Cigarettes, Cigars   • Smokeless tobacco: Current User   • Tobacco comment: 1/2 ppd, started about age 29 - denied history of current smoker noted in "allscripts"    Vaping Use   • Vaping Use: Some days   Substance Use Topics   • Alcohol use: No   • Drug use: No        Review of Systems   Constitutional: Negative for chills, fatigue and fever  HENT: Positive for sore throat  Negative for congestion and rhinorrhea  Respiratory: Positive for cough  Negative for chest tightness and shortness of breath  Cardiovascular: Negative for chest pain and leg swelling  Gastrointestinal: Negative for abdominal distention, abdominal pain, diarrhea, nausea and vomiting  Genitourinary: Negative for difficulty urinating and dysuria  Musculoskeletal: Negative for arthralgias, back pain and myalgias  Skin: Negative for rash and wound  Neurological: Negative for dizziness, weakness and headaches  All other systems reviewed and are negative  Physical Exam  ED Triage Vitals [11/06/22 1904]   Temperature Pulse Respirations Blood Pressure SpO2   97 7 °F (36 5 °C) 74 16 126/80 97 %      Temp Source Heart Rate Source Patient Position - Orthostatic VS BP Location FiO2 (%)   Oral Monitor Sitting Left arm --      Pain Score       10 - Worst Possible Pain             Orthostatic Vital Signs  Vitals:    11/06/22 1904   BP: 126/80   Pulse: 74   Patient Position - Orthostatic VS: Sitting       Physical Exam  Constitutional:       Appearance: Normal appearance  HENT:      Head: Normocephalic and atraumatic  Nose: Nose normal       Mouth/Throat:      Mouth: Mucous membranes are moist  Oral lesions (White a vesicular oropharynx lesions in the back of her oropharynx  Lesions on roof of mouth) present  Comments: Dark sputum in mouth  Eyes:      Extraocular Movements: Extraocular movements intact  Conjunctiva/sclera: Conjunctivae normal    Cardiovascular:      Rate and Rhythm: Normal rate and regular rhythm  Pulses: Normal pulses  Heart sounds: Normal heart sounds  Pulmonary:      Effort: Pulmonary effort is normal       Breath sounds: Normal breath sounds  Abdominal:      General: Bowel sounds are normal       Palpations: Abdomen is soft  Tenderness: There is no abdominal tenderness  Musculoskeletal:         General: Normal range of motion  Cervical back: Normal range of motion  Skin:     General: Skin is warm and dry  Neurological:      General: No focal deficit present  Mental Status: She is alert and oriented to person, place, and time  Mental status is at baseline  ED Medications  Medications - No data to display    Diagnostic Studies  Results Reviewed     None                 CT soft tissue neck wo contrast   Final Result by Maggie Bunn MD (11/06 2035)   1  Mild prominence of the tonsillar pillars  There is no suspicious fluid collections within limitations of a noncontrast study  2  There is no pathologic cervical adenopathy         Workstation performed: [id number]               Procedures  Procedures      ED Course                                       MDM  Number of Diagnoses or Management Options  Pharyngitis  Diagnosis management comments: 57 y/o female patient presenting with sore throat  States tried lozenges and [place] candy without improvement  Exam shows white lesions to back of oropharynx, and lesions to top of mouth  Ct soft tissue neck shows prominence of tonsillar pillars  Patient stable for discharge with follow up with ENT and nystatin and amoxicillin  Discussed results with patient  Return precautions given          Amount and/or Complexity of Data Reviewed  Tests in the radiology section of CPT®: ordered and reviewed        Disposition  Final diagnoses:   Pharyngitis     Time reflects when diagnosis was documented in both MDM as applicable and the Disposition within this note     Time User Action Codes Description Comment    11/6/2022  8:41 PM Bijal Silva Add [J02 9] Pharyngitis       ED Disposition     ED Disposition   Discharge    Condition   Stable    Date/Time   Sun Nov 6, 2022  8:41 PM    Comment   [place] discharge to home/self care                 Follow-up Information     Follow up With Specialties Details Why Contact Info Additional [address] [facility] Otolaryngology Schedule an appointment as soon as possible for a visit   [address] [facility] [phone]  [facility], [address], [address] [facility] [address], [address]          Discharge Medication List as of 11/6/2022  8:45 PM      START taking these medications    Details   amoxicillin (AMOXIL) 500 mg capsule Take 1 capsule (500 mg total) by mouth every 12 (twelve) hours for 7 days, Starting Sun 11/6/2022, Until Sun 11/13/2022, Normal      nystatin (MYCOSTATIN) 500,000 units/5 mL suspension Take 5 mL (500,000 Units total) by mouth 4 (four) times a day, Starting Sun 11/6/2022, Normal         CONTINUE these medications which have NOT CHANGED    Details   albuterol (PROVENTIL HFA,VENTOLIN HFA) 90 mcg/act inhaler Inhale 2 puffs every 6 (six) hours as needed for wheezing or shortness of breath, Starting Fri 6/25/2021, Normal      aspirin 81 mg chewable tablet Chew 1 tablet (81 mg total) daily, Starting Mon 2/5/2018, Normal      atorvastatin (LIPITOR) 40 mg tablet Take 1 tablet (40 mg total) by mouth daily, Starting Wed 5/25/2022, Normal      celecoxib (CeleBREX) 200 mg capsule TAKE 1 CAPSULE BY MOUTH DAILY AS NEEDED FOR BACK PAIN WITH FOOD, Normal      CVS D3 50 MCG (2000 UT) CAPS TAKE 1 CAPSULE BY MOUTH EVERY DAY, Normal      cyclobenzaprine (FLEXERIL) 5 mg tablet TAKE 1 TABLET (5 MG TOTAL) BY MOUTH 3 (THREE) TIMES A DAY AS NEEDED (NECK, BACK, ARM PAIN), Starting Tue 6/1/2021, Normal      Diclofenac Sodium (VOLTAREN) 1 % Apply 2 g topically 4 (four) times a day, Starting Fri 7/15/2022, Until Thu 10/13/2022, Normal fluticasone (FLONASE) 50 mcg/act nasal spray SPRAY 2 SPRAYS INTO EACH NOSTRIL EVERY DAY, Normal      gabapentin (Neurontin) 300 mg capsule Take 1 capsule (300 mg total) by mouth 3 (three) times a day, Starting Wed 5/25/2022, Until Fri 7/15/2022, Normal      metaxalone (SKELAXIN) 400 MG tablet TAKE 1 TABLET 3 TIMES A DAY AS NEEDED FOR MUSCLE PAIN,MAY CAUSE DROWSINESS, Historical Med      montelukast (SINGULAIR) 10 mg tablet TAKE 1 TABLET BY MOUTH EVERY DAY, Normal      nicotine (NICODERM CQ) 14 mg/24hr TD 24 hr patch PLACE 1 PATCH ON THE SKIN EVERY 24 HOURS , Starting Mon 10/31/2022, Until Fri 12/30/2022, Normal      Sodium Sulfate-Mag Sulfate-KCl (Sutab) 2635-355-690 MG TABS Lot - [id number]  Exp- 3/2022, Sample      triamcinolone (KENALOG) 0 1 % ointment APPLY TOPICALLY DAILY AT BEDTIME, Starting Tue 3/8/2022, Normal      Wixela Inhub 100-50 MCG/ACT inhaler INHALE 1 PUFF 2 TIMES A DAY  RINSE MOUTH AFTER USE , Normal      DULoxetine (CYMBALTA) 30 mg delayed release capsule Take 1 capsule (30 mg total) by mouth daily, Starting Tue 9/13/2022, Normal           No discharge procedures on file  PDMP Review     None           ED Provider  Attending physically available and evaluated Nikolas Bjawanereida MIKE managed the patient along with the ED Attending      Electronically Signed by         Víctor Garay MD  11/08/22 7814

## 2022-11-07 NOTE — ED ATTENDING ATTESTATION
11/6/2022  ISanti DO, saw and evaluated the patient  I have discussed the patient with the resident/non-physician practitioner and agree with the resident's/non-physician practitioner's findings, Plan of Care, and MDM as documented in the resident's/non-physician practitioner's note, except where noted  All available labs and Radiology studies were reviewed  I was present for key portions of any procedure(s) performed by the resident/non-physician practitioner and I was immediately available to provide assistance  At this point I agree with the current assessment done in the Emergency Department  I have conducted an independent evaluation of this patient a history and physical is as follows:    69-year-old female smoker presents for sore throat  Onset was five days ago  Feels like it is hard to swallow in the posterior oropharynx in the neck area that I cannot visualize  No recent weight loss or constitutional symptoms  On exam she has various areas black substance that may be a “Gomez”  There is no fungating mass there were some vesicles  She does have some exudate as well that may be consistent with thrush  She has very poor dentition  Plan is CT to rule out mass    Treat for thrush or infection as well    ED Course         Critical Care Time  Procedures

## 2022-11-10 ENCOUNTER — OFFICE VISIT (OUTPATIENT)
Dept: PHYSICAL THERAPY | Facility: CLINIC | Age: 62
End: 2022-11-10

## 2022-11-10 DIAGNOSIS — M72.0 DUPUYTREN CONTRACTURE: ICD-10-CM

## 2022-11-10 DIAGNOSIS — G90.511 COMPLEX REGIONAL PAIN SYNDROME TYPE 1 OF RIGHT UPPER EXTREMITY: Primary | ICD-10-CM

## 2022-11-10 NOTE — PROGRESS NOTES
Daily Note     Today's date: 11/10/2022  Patient name: Marci Nieto  : 1960  MRN: [id number]  Referring provider: Maia Gurrola DO  Dx:   Encounter Diagnosis     ICD-10-CM    1  Complex regional pain syndrome type 1 of right upper extremity  G90 511    2  Dupuytren contracture  M72 0                   Subjective: Patient notes some dull ache through the wrist and hand as she forgot to wear her compression garment today  Objective: See treatment diary below    Assessment: Tolerated treatment well  Intermittent discomfort with thumb AROM  Patient demonstrated fatigue post treatment, exhibited good technique with therapeutic exercises and would benefit from continued PT  Plan: Continue per plan of care        Precautions: COPD    Manuals 10/20 10/27 11/3 11/10     10/6 10/13   Dupuytren's STM             EPB/APL STM             R wrist PROM             Median n glide                          Neuro Re-Ed    11/10         Median n glide 3x10 2x10 pain Held 2/2 time      3x10 3x10   Scap retraction 3x10 3x10 3x10 3x10     3x15 3x15   Cervical retraction 3x10 3x10 3x10 3x10     6x10 3x10   Ther Ex    11/10         R wrist flex/ext AROM 2# 2x10 3x10 no weight Held 2/2 time 3x10 0#     1#  3x10 1#  3x10   R wrist sup/pron AROM 2# 2x10 3x10 no weight Held 2/2 time 3x10 0#     1#  3x10 1#  3x10   Digiflex Soft ball squeezes 3x10 Soft ball squeezes 3x10 Soft ball squeezes 3x10 3x10     Soft ball squeezes 3x10 Soft ball squeezes 3x10   Thumb extension iso 3x10 3x10 3x10      3x10 3x10   Thumb extension AROM 3x10 3x10 3x10 3x10     3x10 3x10   Thumb extension rubber band resisted 3x10 3x10 3x10 Blue 3x10     3x10 3x10   Thumb retropulsion AROM 3x10 3x10 3x10      3x10 3x10   Thumb abduction AROM 3x10 3x10 3x10 3x10     3x10 3x10   Thumb abduction rubber band resisted 3x10 3x10 3x10      3x10 3x10   Thumb flexion AROM 3x10 1x10 pain 3x10 3x10     3x10 3x10   Thumb flexion w/ ulnar deviation 3x10 3x10 3x10 3x10 3x10 3x10   Pronated radial deviation AROM 3x10 3x10 3x10 3x10     3x10 3x10   Ther Activity                                       Gait Training                                       Modalities

## 2022-11-14 ENCOUNTER — HOSPITAL ENCOUNTER (OUTPATIENT)
Dept: RADIOLOGY | Facility: CLINIC | Age: 62
Discharge: HOME | End: 2022-11-14

## 2022-11-14 DIAGNOSIS — G90.511 COMPLEX REGIONAL PAIN SYNDROME TYPE 1 OF RIGHT UPPER EXTREMITY: ICD-10-CM

## 2022-11-15 ENCOUNTER — TELEPHONE (OUTPATIENT)
Dept: INTERNAL MEDICINE CLINIC | Facility: CLINIC | Age: 62
End: 2022-11-15

## 2022-11-15 NOTE — TELEPHONE ENCOUNTER
Patient states she had an appt scheduled at [address] [place] [address] (orthopedic) for a procedure for the right hand nerve blockage  Procedure could not be done as she stopped an antibiotic (amoxicillan) on Sunday 11/13/22 which was not 72 hours before the procedure  Orthopedics informed her to get a clearance from her PCP in order to re-schedule the procedure    She states she was taking the antibiotic which was prescribed by ER for her throat due to swollen glands   Patient took her last antibiotic tablet on 11/14/22     Patient states to leave a detailed message if she is unable to answer the phone due her work between 10:30 to 3:30   Please advise or contact patient

## 2022-11-16 NOTE — TELEPHONE ENCOUNTER
VM was left for patient to call back  Pre-op clearance has to be within 30 days of surgery so we would need a surgery date before we can offer her an appt

## 2022-11-17 NOTE — TELEPHONE ENCOUNTER
Patient called to get a status on a Pre-Op Cearance Surgery Appt  I went over with patient the VM that Janak De La Rosa left on 11/16/22  To let her know that her insurance requires her to have a surgery date before we can give her the pre-op clearance appt  Which has to be within 30 days of her surgery appt    Patient seemed like she got a better understanding  She says she is just getting so frustrated with the the surgery office

## 2022-11-21 ENCOUNTER — OFFICE VISIT (OUTPATIENT)
Dept: PHYSICAL THERAPY | Facility: CLINIC | Age: 62
End: 2022-11-21

## 2022-11-21 DIAGNOSIS — M72.0 DUPUYTREN CONTRACTURE: ICD-10-CM

## 2022-11-21 DIAGNOSIS — G90.511 COMPLEX REGIONAL PAIN SYNDROME TYPE 1 OF RIGHT UPPER EXTREMITY: Primary | ICD-10-CM

## 2022-11-21 NOTE — PROGRESS NOTES
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  Objective: See treatment diary below    Assessment: Pt tolerated tx well today  Did not note pain once t/o visit  Attempted to hold 3lb at waist height to see functional activity, pt was unable to perform 2/2 subjective report of weakness  Pt continues to require cueing for median nerve glides  Plan: Continue per plan of care        Precautions: COPD    Manuals 10/20 10/27 11/3 11/10 11/21    10/6 10/13   Dupuytren's STM             EPB/APL STM             R wrist PROM             Median n glide                          Neuro Re-Ed             Median n glide 3x10 2x10 pain Held 2/2 time  3x10    3x10 3x10   Scap retraction 3x10 3x10 3x10 3x10 3x10    3x15 3x15   Cervical retraction 3x10 3x10 3x10 3x10 3x10    6x10 3x10   Ther Ex             R wrist flex/ext AROM 2# 2x10 3x10 no weight Held 2/2 time 3x10 0# 3x10 1#    1#  3x10 1#  3x10   R wrist sup/pron AROM 2# 2x10 3x10 no weight Held 2/2 time 3x10 0# 3x10 1#    1#  3x10 1#  3x10   Digiflex Soft ball squeezes 3x10 Soft ball squeezes 3x10 Soft ball squeezes 3x10 3x10 Soft ball squeezes 3x10    Soft ball squeezes 3x10 Soft ball squeezes 3x10   Thumb extension iso 3x10 3x10 3x10  3x10    3x10 3x10   Thumb extension AROM 3x10 3x10 3x10 3x10 3x10    3x10 3x10   Thumb extension rubber band resisted 3x10 3x10 3x10 Blue 3x10 3x10    3x10 3x10 Thumb retropulsion AROM 3x10 3x10 3x10  3x10    3x10 3x10   Thumb abduction AROM 3x10 3x10 3x10 3x10 3x10    3x10 3x10   Thumb abduction rubber band resisted 3x10 3x10 3x10  3x10    3x10 3x10   Thumb flexion AROM 3x10 1x10 pain 3x10 3x10 3x10    3x10 3x10   Thumb flexion w/ ulnar deviation 3x10 3x10 3x10 3x10 3x10    3x10 3x10   Pronated radial deviation AROM 3x10 3x10 3x10 3x10 3x10    3x10 3x10   Ther Activity                                       Gait Training                                       Modalities

## 2022-12-01 ENCOUNTER — CONSULT (OUTPATIENT)
Dept: GASTROENTEROLOGY | Facility: CLINIC | Age: 62
End: 2022-12-01

## 2022-12-01 ENCOUNTER — OFFICE VISIT (OUTPATIENT)
Dept: PHYSICAL THERAPY | Facility: CLINIC | Age: 62
End: 2022-12-01

## 2022-12-01 VITALS
HEART RATE: 80 BPM | DIASTOLIC BLOOD PRESSURE: 77 MMHG | HEIGHT: 61 IN | WEIGHT: 123 LBS | BODY MASS INDEX: 23.22 KG/M2 | SYSTOLIC BLOOD PRESSURE: 116 MMHG | TEMPERATURE: 98.6 F

## 2022-12-01 DIAGNOSIS — Z00.00 ANNUAL PHYSICAL EXAM: ICD-10-CM

## 2022-12-01 DIAGNOSIS — G90.511 COMPLEX REGIONAL PAIN SYNDROME TYPE 1 OF RIGHT UPPER EXTREMITY: Primary | ICD-10-CM

## 2022-12-01 DIAGNOSIS — Z12.11 SCREENING FOR MALIGNANT NEOPLASM OF COLON: Primary | ICD-10-CM

## 2022-12-01 DIAGNOSIS — M72.0 DUPUYTREN CONTRACTURE: ICD-10-CM

## 2022-12-01 NOTE — PATIENT INSTRUCTIONS
Scheduled date of colonoscopy (as of today): 2/1/23  Physician performing colonoscopy: Dr Monica Philip  Location of colonoscopy: [facility]   Bowel prep reviewed with patient: golytely/dulcolax  Instructions reviewed with patient by: Valeria Ray  Clearances:   n/a

## 2022-12-01 NOTE — PROGRESS NOTES
[facility] - Outpatient Follow-up Note  Johny Wu 58 y o  female MRN: [id number]  Encounter: [id number]      Assessment and Plan    1  Colon cancer screening  The patient had a colonoscopy in  which was normal but had a fair prep  Repeat was recommended 2 years later but was not completed by the patient due to difficulty obtaining a ride  At this time the patient has no GI complaints or alarm symptoms  She has no family history of colon cancer   - discussed colonoscopy and detail, the patient would like to proceed  - GoLYTELY/Dulcolax bowel prep instructions provided to the patient    Follow up as needed after colonoscopy    ______________________________________________________________________    History of Present Illness  Johny Wu is a 58 y o  female here for follow up evaluation of colon cancer screening  The patient had a colonoscopy in  which was normal but had a fair prep  Repeat was recommended 2 years later but was not completed by the patient due to difficulty obtaining a ride  At this time the patient has no GI complaints or alarm symptoms  She has no family history of colon cancer  Review of Systems   Constitutional: Negative for activity change, appetite change, chills, fatigue, fever and unexpected weight change  Gastrointestinal: Negative for abdominal distention, abdominal pain, anal bleeding, blood in stool, constipation, diarrhea, nausea, rectal pain and vomiting  Musculoskeletal: Negative for back pain and gait problem  Psychiatric/Behavioral: Negative for confusion         Past Medical History  Past Medical History:   Diagnosis Date   • Asthma    • COPD (chronic obstructive pulmonary disease) ([facility] [address] )    • Decreased hearing of left ear    • Hyperlipidemia    • Hypokalemia     last assessed [id number]   • Spontaneous         Past Social history  Past Surgical History:   Procedure Laterality Date   • COLONOSCOPY     • NOSE SURGERY     • TONSILLECTOMY AND ADENOIDECTOMY     • TUBAL LIGATION       Social History     Socioeconomic History   • Marital status: Single     Spouse name: Not on file   • Number of children: Not on file   • Years of education: Not on file   • Highest education level: Not on file   Occupational History   • Not on file   Tobacco Use   • Smoking status: Every Day     Packs/day: 0 50     Types: Cigarettes, Cigars   • Smokeless tobacco: Current   • Tobacco comments:     1/2 ppd, started about age 29 - denied history of current smoker noted in "allscripts"    Vaping Use   • Vaping Use: Some days   Substance and Sexual Activity   • Alcohol use: No   • Drug use: No   • Sexual activity: Never   Other Topics Concern   • Not on file   Social History Narrative   • Not on file     Social Determinants of Health     Financial Resource Strain: Not on file   Food Insecurity: Not on file   Transportation Needs: Not on file   Physical Activity: Not on file   Stress: Not on file   Social Connections: Not on file   Intimate Partner Violence: Not on file   Housing Stability: Not on file     Social History     Substance and Sexual Activity   Alcohol Use No     Social History     Substance and Sexual Activity   Drug Use No     Social History     Tobacco Use   Smoking Status Every Day   • Packs/day: 0 50   • Types: Cigarettes, Cigars   Smokeless Tobacco Current   Tobacco Comments    1/2 ppd, started about age 29 - denied history of current smoker noted in "allscripts"        Past Family History  Family History   Problem Relation Age of Onset   • Asthma Family    • Bipolar disorder Family    • Drug abuse Family    • Mental illness Family    • Heart failure Mother         congestive    • Diabetes Father         mellitus    • Hypertension Father    • Lung cancer Father    • Bipolar disorder Brother    • No Known Problems Maternal Grandmother    • No Known Problems Maternal Grandfather    • No Known Problems Paternal Grandmother    • No Known Problems Paternal Grandfather    • No Known Problems Daughter    • No Known Problems Son    • No Known Problems Daughter    • No Known Problems Brother    • No Known Problems Maternal Aunt    • No Known Problems Maternal Aunt    • No Known Problems Maternal Aunt    • No Known Problems Paternal Aunt        Current Medications  Current Outpatient Medications   Medication Sig Dispense Refill   • albuterol (PROVENTIL HFA,VENTOLIN HFA) 90 mcg/act inhaler Inhale 2 puffs every 6 (six) hours as needed for wheezing or shortness of breath 18 g 2   • aspirin 81 mg chewable tablet Chew 1 tablet (81 mg total) daily 90 tablet 1   • atorvastatin (LIPITOR) 40 mg tablet Take 1 tablet (40 mg total) by mouth daily 90 tablet 3   • celecoxib (CeleBREX) 200 mg capsule TAKE 1 CAPSULE BY MOUTH DAILY AS NEEDED FOR BACK PAIN WITH FOOD 30 capsule 2   • CVS D3 50 MCG (2000 UT) CAPS TAKE 1 CAPSULE BY MOUTH EVERY DAY 30 capsule 4   • cyclobenzaprine (FLEXERIL) 5 mg tablet TAKE 1 TABLET (5 MG TOTAL) BY MOUTH 3 (THREE) TIMES A DAY AS NEEDED (NECK, BACK, ARM PAIN) 60 tablet 3   • DULoxetine (CYMBALTA) 30 mg delayed release capsule TAKE 1 CAPSULE BY MOUTH EVERY DAY 30 capsule 1   • fluticasone (FLONASE) 50 mcg/act nasal spray SPRAY 2 SPRAYS INTO EACH NOSTRIL EVERY DAY 16 mL 3   • metaxalone (SKELAXIN) 400 MG tablet TAKE 1 TABLET 3 TIMES A DAY AS NEEDED FOR MUSCLE PAIN,MAY CAUSE DROWSINESS     • montelukast (SINGULAIR) 10 mg tablet TAKE 1 TABLET BY MOUTH EVERY DAY 90 tablet 4   • nicotine (NICODERM CQ) 14 mg/24hr TD 24 hr patch PLACE 1 PATCH ON THE SKIN EVERY 24 HOURS   28 patch 1   • nystatin (MYCOSTATIN) 500,000 units/5 mL suspension Take 5 mL (500,000 Units total) by mouth 4 (four) times a day 60 mL 0   • Sodium Sulfate-Mag Sulfate-KCl (Sutab) 9980-285-131 MG TABS Lot - [id number]  Exp- 3/2022 24 tablet 0   • triamcinolone (KENALOG) 0 1 % ointment APPLY TOPICALLY DAILY AT BEDTIME 30 g 1   • Wixela Inhub 100-50 MCG/ACT inhaler INHALE 1 PUFF 2 TIMES A DAY  RINSE MOUTH AFTER USE  60 blister 4   • Diclofenac Sodium (VOLTAREN) 1 % Apply 2 g topically 4 (four) times a day 240 g 2   • gabapentin (Neurontin) 300 mg capsule Take 1 capsule (300 mg total) by mouth 3 (three) times a day 90 capsule 5     No current facility-administered medications for this visit  Allergies  Allergies   Allergen Reactions   • Lidocaine Rash     Patient states she had some blotching, redness, dryness and itchiness  It was a topical she used for her hands   • Seasonal Ic  [Cholestatin]          The following portions of the patient's history were reviewed and updated as appropriate: allergies, current medications, past medical history, past social history, past surgical history and problem list       Vitals  There were no vitals filed for this visit  Physical Exam  Constitutional   General appearance: Patient is seated and in no acute distress, well appearing and well nourished  Head and Face   Head and face: Normal     Eyes   Conjunctiva and lids: No erythema, swelling or discharge  Anicteric  Ears, Nose, Mouth, and Throat   Hearing: Normal     Neck: Supple, trachea midline  Pulmonary   Respiratory effort: No increased work of breathing or signs of respiratory distress  Lungs: Clear to ascultation, no wheezes, rhonchi, or rales  Cardiovascular   Heart: Regular rate and rhythm, no murmurs gallops or rubs   Examination of extremities for edema and/or varicosities: Normal     Musculoskeletal   Gait and station: Normal     Skin   Skin and subcutaneous tissue: Warm, dry, and intact  No visible jaundice, lesions or rashes  Psychiatric   Judgment and insight: Normal  Recent and remote memory:  Normal  Mood and affect: Normal      Results  No visits with results within 1 Day(s) from this visit     Latest known visit with results is:   Lab on 07/15/2022   Component Date Value   • Cholesterol 07/15/2022 159    • Triglycerides 07/15/2022 87    • HDL, Direct 07/15/2022 43 (L)    • LDL Calculated 07/15/2022 99    • Non-HDL-Chol (CHOL-HDL) 07/15/2022 116        Radiology Results  CT soft tissue neck wo contrast    Result Date: 11/6/2022  Narrative: CT SOFT TISSUE NECK WITHOUT CONTRAST INDICATION: NECK PAIN  SORE THROAT  COMPARISON:  None  TECHNIQUE:  Axial, sagittal, and coronal 2D reformatted images were created from the axial source data and submitted for interpretation  Radiation dose length product (DLP) for this visit:  329 41 mGy-cm   This examination, like all CT scans performed in the [facility], was performed utilizing techniques to minimize radiation dose exposure, including the use of iterative  reconstruction and automated exposure control  IMAGE QUALITY:  Diagnostic  FINDINGS: VISUALIZED BRAIN PARENCHYMA:  Normal visualized brain parenchyma  VISUALIZED ORBITS AND PARANASAL SINUSES:  Normal  NASAL CAVITY AND NASOPHARYNX:  Normal  SUPRAHYOID NECK:  Normal oropharynx and oral cavity  Normal parapharyngeal and retropharyngeal spaces  Normal tonsillar tissue and epiglottis  Normal infratemporal space  INFRAHYOID NECK:  Aryepiglottic folds and piriform sinuses are normal  Normal larynx and subglottic airway  THYROID GLAND:  Unremarkable  PAROTID AND SUBMANDIBULAR GLANDS: Normal  LYMPH NODES:  Small bilateral jugular chain and posterior triangle lymph nodes which are below size criteria for pathologic adenopathy  VASCULAR STRUCTURES:  Limited without contrast  THORACIC INLET:  Lung apices and upper mediastinum are unremarkable  BONY STRUCTURES:  Normal      Impression: 1  Mild prominence of the tonsillar pillars  There is no suspicious fluid collections within limitations of a noncontrast study  2  There is no pathologic cervical adenopathy Workstation performed: [id number]       Orders  No orders of the defined types were placed in this encounter

## 2022-12-01 NOTE — PROGRESS NOTES
Daily Note     Today's date: 2022  Patient name: Sheila Cox  : 1960  MRN: [id number]  Referring provider: Lenka Lynch DO  Dx:   Encounter Diagnosis     ICD-10-CM    1  Complex regional pain syndrome type 1 of right upper extremity  G90 511       2  Dupuytren contracture  M72 0                      Subjective: Patient states her fingers are starting to lock up with ADLs  States she can release them with her other hand  Objective: See treatment diary below    Assessment: Pt continues to tolerate exercises well without significant improvement  Pt is looking forward to surgical release and pain mgmt injection  Continuing PT in order to prevent regression until procedures  Plan: Continue per plan of care        Precautions: COPD    Manuals 10/20 10/27 11/3 11/10 11/21    10/6 10/13   Dupuytren's STM             EPB/APL STM             R wrist PROM             Median n glide                          Neuro Re-Ed             Median n glide 3x10 2x10 pain Held 2/2 time  3x10    3x10 3x10   Scap retraction 3x10 3x10 3x10 3x10 3x10    3x15 3x15   Cervical retraction 3x10 3x10 3x10 3x10 3x10    6x10 3x10   Ther Ex             R wrist flex/ext AROM 2# 2x10 3x10 no weight Held 2/2 time 3x10 0# 3x10 1#    1#  3x10 1#  3x10   R wrist sup/pron AROM 2# 2x10 3x10 no weight Held 2/2 time 3x10 0# 3x10 1#    1#  3x10 1#  3x10   Digiflex Soft ball squeezes 3x10 Soft ball squeezes 3x10 Soft ball squeezes 3x10 3x10 Soft ball squeezes 3x10    Soft ball squeezes 3x10 Soft ball squeezes 3x10   Thumb extension iso 3x10 3x10 3x10  3x10    3x10 3x10   Thumb extension AROM 3x10 3x10 3x10 3x10 3x10    3x10 3x10   Thumb extension rubber band resisted 3x10 3x10 3x10 Blue 3x10 3x10    3x10 3x10   Thumb retropulsion AROM 3x10 3x10 3x10  3x10    3x10 3x10   Thumb abduction AROM 3x10 3x10 3x10 3x10 3x10    3x10 3x10   Thumb abduction rubber band resisted 3x10 3x10 3x10  3x10    3x10 3x10   Thumb flexion AROM 3x10 1x10 pain 3x10 3x10 3x10    3x10 3x10   Thumb flexion w/ ulnar deviation 3x10 3x10 3x10 3x10 3x10    3x10 3x10   Pronated radial deviation AROM 3x10 3x10 3x10 3x10 3x10    3x10 3x10   Ther Activity                                       Gait Training                                       Modalities

## 2022-12-07 ENCOUNTER — TELEPHONE (OUTPATIENT)
Dept: OBGYN CLINIC | Facility: HOSPITAL | Age: 62
End: 2022-12-07

## 2022-12-07 NOTE — TELEPHONE ENCOUNTER
Caller: Patient    Doctor: Ed Ward    Reason for call: Patient seeing PCP for pre-op clearance tomorrow morning (12/8)  Is there anything else you need from her  Please advise      Call back#: [phone]

## 2022-12-08 ENCOUNTER — OFFICE VISIT (OUTPATIENT)
Dept: PHYSICAL THERAPY | Facility: CLINIC | Age: 62
End: 2022-12-08

## 2022-12-08 ENCOUNTER — CONSULT (OUTPATIENT)
Dept: INTERNAL MEDICINE CLINIC | Facility: CLINIC | Age: 62
End: 2022-12-08

## 2022-12-08 VITALS
DIASTOLIC BLOOD PRESSURE: 78 MMHG | BODY MASS INDEX: 23.19 KG/M2 | WEIGHT: 126 LBS | TEMPERATURE: 98.2 F | SYSTOLIC BLOOD PRESSURE: 126 MMHG | HEIGHT: 62 IN | HEART RATE: 74 BPM

## 2022-12-08 DIAGNOSIS — Z01.818 PRE-OP EVALUATION: Primary | ICD-10-CM

## 2022-12-08 DIAGNOSIS — M72.0 DUPUYTREN CONTRACTURE: ICD-10-CM

## 2022-12-08 DIAGNOSIS — G89.29 CHRONIC BILATERAL LOW BACK PAIN, UNSPECIFIED WHETHER SCIATICA PRESENT: ICD-10-CM

## 2022-12-08 DIAGNOSIS — M54.50 CHRONIC BILATERAL LOW BACK PAIN, UNSPECIFIED WHETHER SCIATICA PRESENT: ICD-10-CM

## 2022-12-08 DIAGNOSIS — G90.511 COMPLEX REGIONAL PAIN SYNDROME TYPE 1 OF RIGHT UPPER EXTREMITY: Primary | ICD-10-CM

## 2022-12-08 DIAGNOSIS — F17.200 SMOKING: ICD-10-CM

## 2022-12-08 DIAGNOSIS — Z23 NEED FOR INFLUENZA VACCINATION: ICD-10-CM

## 2022-12-08 DIAGNOSIS — J45.20 MILD INTERMITTENT ASTHMA WITHOUT COMPLICATION: ICD-10-CM

## 2022-12-08 PROBLEM — IMO0001 SMOKING: Status: ACTIVE | Noted: 2022-12-08

## 2022-12-08 PROBLEM — J30.9 ALLERGIC SINUSITIS: Status: RESOLVED | Noted: 2019-11-14 | Resolved: 2022-12-08

## 2022-12-08 RX ORDER — MULTIVIT WITH MINERALS/LUTEIN
1000 TABLET ORAL DAILY
COMMUNITY

## 2022-12-08 RX ORDER — B-COMPLEX WITH VITAMIN C
250 TABLET ORAL DAILY
COMMUNITY

## 2022-12-08 NOTE — PROGRESS NOTES
Daily Note     Today's date: 2022  Patient name: Nia Galvez  : 1960  MRN: [id number]  Referring provider: Maris Sands DO  Dx:   Encounter Diagnosis     ICD-10-CM    1  Complex regional pain syndrome type 1 of right upper extremity  G90 511       2  Dupuytren contracture  M72 0                      Subjective: Patient states no significant changes since last visit  Contracture release procedure is next week  Objective: See treatment diary below    Assessment: Pt performs all exercises with good form and no discomfort  Plan: Continue per plan of care        Precautions: COPD    Manuals 10/20 10/27 11/3 11/10 11/21 12/8   10/6 10/13   Dupuytren's STM             EPB/APL STM             R wrist PROM             Median n glide                          Neuro Re-Ed             Median n glide 3x10 2x10 pain Held 2/2 time  3x10 3x10   3x10 3x10   Scap retraction 3x10 3x10 3x10 3x10 3x10 3x10   3x15 3x15   Cervical retraction 3x10 3x10 3x10 3x10 3x10 3x10   6x10 3x10   Ther Ex             R wrist flex/ext AROM 2# 2x10 3x10 no weight Held 2/2 time 3x10 0# 3x10 1# 3x10 1#   1#  3x10 1#  3x10   R wrist sup/pron AROM 2# 2x10 3x10 no weight Held 2/2 time 3x10 0# 3x10 1# 3x10 1#   1#  3x10 1#  3x10   Digiflex Soft ball squeezes 3x10 Soft ball squeezes 3x10 Soft ball squeezes 3x10 3x10 Soft ball squeezes 3x10 R digiflex 3x10   Soft ball squeezes 3x10 Soft ball squeezes 3x10   Thumb extension iso 3x10 3x10 3x10  3x10 3x10   3x10 3x10   Thumb extension AROM 3x10 3x10 3x10 3x10 3x10 3x10   3x10 3x10   Thumb extension rubber band resisted 3x10 3x10 3x10 Blue 3x10 3x10 3x10   3x10 3x10   Thumb retropulsion AROM 3x10 3x10 3x10  3x10 3x10   3x10 3x10   Thumb abduction AROM 3x10 3x10 3x10 3x10 3x10 3x10   3x10 3x10   Thumb abduction rubber band resisted 3x10 3x10 3x10  3x10 3x10   3x10 3x10   Thumb flexion AROM 3x10 1x10 pain 3x10 3x10 3x10 3x10   3x10 3x10   Thumb flexion w/ ulnar deviation 3x10 3x10 3x10 3x10 3x10 3x10   3x10 3x10   Pronated radial deviation AROM 3x10 3x10 3x10 3x10 3x10 3x10   3x10 3x10   Ther Activity                                       Gait Training                                       Modalities
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
## 2022-12-08 NOTE — PRE-PROCEDURE INSTRUCTIONS
Pre-Surgery Instructions:   Medication Instructions   • albuterol (PROVENTIL HFA,VENTOLIN HFA) 90 mcg/act inhaler Uses PRN- OK to take day of surgery   • Ascorbic Acid (vitamin C) 1000 MG tablet Stop taking 7 days prior to surgery  • aspirin 81 mg chewable tablet Instructions provided by MD   • atorvastatin (LIPITOR) 40 mg tablet Take night before surgery   • bisacodyl (DULCOLAX) 5 mg EC tablet Take day of surgery  • celecoxib (CeleBREX) 200 mg capsule Stop taking 3 days prior to surgery  • CVS D3 48 MCG (2000 UT) CAPS Stop taking 7 days prior to surgery  • cyclobenzaprine (FLEXERIL) 5 mg tablet Uses PRN- DO NOT take day of surgery   • Diclofenac Sodium (VOLTAREN) 1 % Hold day of surgery  • DULoxetine (CYMBALTA) 30 mg delayed release capsule Take day of surgery  • fluticasone (FLONASE) 50 mcg/act nasal spray Take day of surgery  • gabapentin (Neurontin) 300 mg capsule Take day of surgery  • metaxalone (SKELAXIN) 400 MG tablet Uses PRN- DO NOT take day of surgery   • montelukast (SINGULAIR) 10 mg tablet Take night before surgery   • Multiple Vitamins-Minerals (HAIR SKIN AND NAILS FORMULA PO) Stop taking 7 days prior to surgery  • nicotine (NICODERM CQ) 14 mg/24hr TD 24 hr patch Take day of surgery  • Sodium Sulfate-Mag Sulfate-KCl (Sutab) 3113-434-082 MG TABS Take day of surgery  • Thiamine HCl (vitamin B-1) 250 MG tablet Stop taking 7 days prior to surgery  • Wixela Inhub 100-50 MCG/ACT inhaler Take day of surgery       Pt verbalizes understanding of the following:    - Bathing instructions, has chg, neck down, no genitals  - No lotions, powders, sprays, deodorant, jewelry, or make-up  - Remove nailpolish    - NPO after MN  - Avoid OTC non-directed Vit/ Suppl/ Herbals 7 days prior to surgery to ensure no drug interactions with perioperative surgical/ anesthetic meds  - Avoid NSAIDs 3 days prior  - Avoid ASA containing products 5 days prior  - Continue statin medication up through and including the day of surgery    - Bring list of meds with last dose noted  - Bring insurance cards & photo     - Notify surgeon if you develop any cold symptoms, change in your health history or develop open wounds     - Did the surgeon's office give you any other special instructions? No   - Did you require any clearances?  PCP

## 2022-12-08 NOTE — PATIENT INSTRUCTIONS
1) Please follow up for your hand procedure   2) For the runny nose please use the flonase correctly; you can Claritin-d over the counter   3) please follow up for your colonoscopy, DEXA, and gynecologist

## 2022-12-08 NOTE — TELEPHONE ENCOUNTER
Left detailed VM for pt to advise nothing else is needed following her pre-op clearance   Advised pt to call the office back if any additional questions

## 2022-12-12 ENCOUNTER — APPOINTMENT (OUTPATIENT)
Dept: PREADMISSION TESTING | Facility: HOSPITAL | Age: 62
End: 2022-12-12

## 2022-12-12 ENCOUNTER — ANESTHESIA EVENT (OUTPATIENT)
Dept: PERIOP | Facility: HOSPITAL | Age: 62
End: 2022-12-12

## 2022-12-12 NOTE — ANESTHESIA PREPROCEDURE EVALUATION
Procedure:  right hand dupuytrens nodule excision (Right: Hand)    Relevant Problems   MUSCULOSKELETAL   (+) Arthritis   (+) Chronic back pain      NEURO/PSYCH   (+) Chronic back pain   (+) Paresthesia      PULMONARY   (+) Chronic obstructive pulmonary disease (HCC)   (+) Mild intermittent asthma without complication   (+) Smoking        Physical Exam    Airway    Mallampati score: II  TM Distance: >3 FB  Neck ROM: full     Dental       Cardiovascular      Pulmonary      Other Findings        Anesthesia Plan  ASA Score- 3     Anesthesia Type- IV sedation with anesthesia with ASA Monitors  Additional Monitors:   Airway Plan:           Plan Factors-    Chart reviewed  Induction- intravenous  Postoperative Plan-     Informed Consent- Anesthetic plan and risks discussed with patient  I personally reviewed this patient with the CRNA  Discussed and agreed on the Anesthesia Plan with the JESS Junior

## 2022-12-13 ENCOUNTER — HOSPITAL ENCOUNTER (OUTPATIENT)
Facility: HOSPITAL | Age: 62
Setting detail: OUTPATIENT SURGERY
Discharge: HOME | End: 2022-12-13
Attending: ORTHOPAEDIC SURGERY | Admitting: ORTHOPAEDIC SURGERY

## 2022-12-13 ENCOUNTER — TELEPHONE (OUTPATIENT)
Dept: OBGYN CLINIC | Facility: HOSPITAL | Age: 62
End: 2022-12-13

## 2022-12-13 ENCOUNTER — ANESTHESIA (OUTPATIENT)
Dept: PERIOP | Facility: HOSPITAL | Age: 62
End: 2022-12-13

## 2022-12-13 VITALS
DIASTOLIC BLOOD PRESSURE: 67 MMHG | HEIGHT: 62 IN | BODY MASS INDEX: 23.19 KG/M2 | TEMPERATURE: 98.9 F | WEIGHT: 126 LBS | HEART RATE: 73 BPM | RESPIRATION RATE: 16 BRPM | OXYGEN SATURATION: 93 % | SYSTOLIC BLOOD PRESSURE: 132 MMHG

## 2022-12-13 DIAGNOSIS — M72.0 DUPUYTREN'S DISEASE OF FINGER WITH NODULES WITHOUT CONTRACTURE: Primary | ICD-10-CM

## 2022-12-13 DIAGNOSIS — M72.0 DUPUYTREN'S DISEASE OF PALM OF RIGHT HAND: ICD-10-CM

## 2022-12-13 RX ORDER — PROPOFOL 10 MG/ML
INJECTION, EMULSION INTRAVENOUS AS NEEDED
Status: DISCONTINUED | OUTPATIENT
Start: 2022-12-13 | End: 2022-12-13

## 2022-12-13 RX ORDER — HYDROCODONE BITARTRATE AND ACETAMINOPHEN 5; 325 MG/1; MG/1
1 TABLET ORAL EVERY 6 HOURS PRN
Qty: 5 TABLET | Refills: 0 | Status: SHIPPED | OUTPATIENT
Start: 2022-12-13 | End: 2022-12-23

## 2022-12-13 RX ORDER — EPHEDRINE SULFATE 50 MG/ML
INJECTION INTRAVENOUS AS NEEDED
Status: DISCONTINUED | OUTPATIENT
Start: 2022-12-13 | End: 2022-12-13

## 2022-12-13 RX ORDER — FENTANYL CITRATE/PF 50 MCG/ML
50 SYRINGE (ML) INJECTION
Status: DISCONTINUED | OUTPATIENT
Start: 2022-12-13 | End: 2022-12-13 | Stop reason: HOSPADM

## 2022-12-13 RX ORDER — ACETAMINOPHEN 325 MG/1
650 TABLET ORAL EVERY 6 HOURS PRN
Status: DISCONTINUED | OUTPATIENT
Start: 2022-12-13 | End: 2022-12-13 | Stop reason: HOSPADM

## 2022-12-13 RX ORDER — ONDANSETRON 2 MG/ML
INJECTION INTRAMUSCULAR; INTRAVENOUS AS NEEDED
Status: DISCONTINUED | OUTPATIENT
Start: 2022-12-13 | End: 2022-12-13

## 2022-12-13 RX ORDER — CEFAZOLIN SODIUM 1 G/50ML
1000 SOLUTION INTRAVENOUS ONCE
Status: DISCONTINUED | OUTPATIENT
Start: 2022-12-13 | End: 2022-12-13 | Stop reason: HOSPADM

## 2022-12-13 RX ORDER — FENTANYL CITRATE 50 UG/ML
INJECTION, SOLUTION INTRAMUSCULAR; INTRAVENOUS AS NEEDED
Status: DISCONTINUED | OUTPATIENT
Start: 2022-12-13 | End: 2022-12-13

## 2022-12-13 RX ORDER — SODIUM CHLORIDE, SODIUM LACTATE, POTASSIUM CHLORIDE, CALCIUM CHLORIDE 600; 310; 30; 20 MG/100ML; MG/100ML; MG/100ML; MG/100ML
INJECTION, SOLUTION INTRAVENOUS CONTINUOUS PRN
Status: DISCONTINUED | OUTPATIENT
Start: 2022-12-13 | End: 2022-12-13

## 2022-12-13 RX ORDER — DEXAMETHASONE SODIUM PHOSPHATE 10 MG/ML
INJECTION, SOLUTION INTRAMUSCULAR; INTRAVENOUS AS NEEDED
Status: DISCONTINUED | OUTPATIENT
Start: 2022-12-13 | End: 2022-12-13

## 2022-12-13 RX ORDER — SENNOSIDES 8.6 MG
650 CAPSULE ORAL EVERY 8 HOURS PRN
Qty: 30 TABLET | Refills: 0 | Status: SHIPPED | OUTPATIENT
Start: 2022-12-13

## 2022-12-13 RX ORDER — SODIUM CHLORIDE, SODIUM LACTATE, POTASSIUM CHLORIDE, CALCIUM CHLORIDE 600; 310; 30; 20 MG/100ML; MG/100ML; MG/100ML; MG/100ML
50 INJECTION, SOLUTION INTRAVENOUS CONTINUOUS
Status: DISCONTINUED | OUTPATIENT
Start: 2022-12-13 | End: 2022-12-13 | Stop reason: HOSPADM

## 2022-12-13 RX ORDER — COVID-19 ANTIGEN TEST
220 KIT MISCELLANEOUS 2 TIMES DAILY
Qty: 60 CAPSULE | Refills: 0 | Status: SHIPPED | OUTPATIENT
Start: 2022-12-13 | End: 2023-01-12

## 2022-12-13 RX ORDER — CEFAZOLIN SODIUM 1 G/3ML
INJECTION, POWDER, FOR SOLUTION INTRAMUSCULAR; INTRAVENOUS AS NEEDED
Status: DISCONTINUED | OUTPATIENT
Start: 2022-12-13 | End: 2022-12-13

## 2022-12-13 RX ORDER — TRAMADOL HYDROCHLORIDE 50 MG/1
50 TABLET ORAL EVERY 6 HOURS PRN
Status: DISCONTINUED | OUTPATIENT
Start: 2022-12-13 | End: 2022-12-13 | Stop reason: HOSPADM

## 2022-12-13 RX ORDER — ONDANSETRON 2 MG/ML
4 INJECTION INTRAMUSCULAR; INTRAVENOUS EVERY 6 HOURS PRN
Status: DISCONTINUED | OUTPATIENT
Start: 2022-12-13 | End: 2022-12-13 | Stop reason: HOSPADM

## 2022-12-13 RX ORDER — ONDANSETRON 2 MG/ML
4 INJECTION INTRAMUSCULAR; INTRAVENOUS ONCE AS NEEDED
Status: DISCONTINUED | OUTPATIENT
Start: 2022-12-13 | End: 2022-12-13 | Stop reason: HOSPADM

## 2022-12-13 RX ORDER — MIDAZOLAM HYDROCHLORIDE 2 MG/2ML
INJECTION, SOLUTION INTRAMUSCULAR; INTRAVENOUS AS NEEDED
Status: DISCONTINUED | OUTPATIENT
Start: 2022-12-13 | End: 2022-12-13

## 2022-12-13 RX ORDER — METOCLOPRAMIDE HYDROCHLORIDE 5 MG/ML
10 INJECTION INTRAMUSCULAR; INTRAVENOUS ONCE AS NEEDED
Status: DISCONTINUED | OUTPATIENT
Start: 2022-12-13 | End: 2022-12-13 | Stop reason: HOSPADM

## 2022-12-13 RX ADMIN — SODIUM CHLORIDE, SODIUM LACTATE, POTASSIUM CHLORIDE, AND CALCIUM CHLORIDE 50 ML/HR: .6; .31; .03; .02 INJECTION, SOLUTION INTRAVENOUS at 07:05

## 2022-12-13 RX ADMIN — FENTANYL CITRATE 25 MCG: 50 INJECTION INTRAMUSCULAR; INTRAVENOUS at 08:59

## 2022-12-13 RX ADMIN — PROPOFOL 150 MG: 10 INJECTION, EMULSION INTRAVENOUS at 08:40

## 2022-12-13 RX ADMIN — MIDAZOLAM 2 MG: 1 INJECTION INTRAMUSCULAR; INTRAVENOUS at 08:33

## 2022-12-13 RX ADMIN — CEFAZOLIN 2000 MG: 1 INJECTION, POWDER, FOR SOLUTION INTRAMUSCULAR; INTRAVENOUS at 08:43

## 2022-12-13 RX ADMIN — ACETAMINOPHEN 650 MG: 325 TABLET, FILM COATED ORAL at 10:14

## 2022-12-13 RX ADMIN — EPHEDRINE SULFATE 10 MG: 50 INJECTION INTRAVENOUS at 08:51

## 2022-12-13 RX ADMIN — ONDANSETRON 4 MG: 2 INJECTION INTRAMUSCULAR; INTRAVENOUS at 08:42

## 2022-12-13 RX ADMIN — EPHEDRINE SULFATE 5 MG: 50 INJECTION INTRAVENOUS at 08:57

## 2022-12-13 RX ADMIN — DEXAMETHASONE SODIUM PHOSPHATE 10 MG: 10 INJECTION, SOLUTION INTRAMUSCULAR; INTRAVENOUS at 08:42

## 2022-12-13 RX ADMIN — SODIUM CHLORIDE, SODIUM LACTATE, POTASSIUM CHLORIDE, AND CALCIUM CHLORIDE: .6; .31; .03; .02 INJECTION, SOLUTION INTRAVENOUS at 08:24

## 2022-12-13 NOTE — TELEPHONE ENCOUNTER
Caller: Mel(Patients daughter)    Doctor: Dr Jeri Krishna    Reason for call: Patient and daughter is at her [address] trying to  the patients medication but one medication Naproxen Sodium 220 mg is not able to go through the patients insurance  The pharmacy did tell the patient that she can buy over the counter but patient would like through her insurance  Patient has inquired about the generic brand  The patient is suppose to take this medication tonight      Call back#: [phone] (Daughters)

## 2022-12-13 NOTE — ANESTHESIA POSTPROCEDURE EVALUATION
Post-Op Assessment Note    CV Status:  Stable  Pain Score: 0    Pain management: adequate     Mental Status:  Alert and awake   Hydration Status:  Euvolemic   PONV Controlled:  Controlled   Airway Patency:  Patent      Post Op Vitals Reviewed: Yes      Staff: Anesthesiologist, CRNA         No notable events documented      BP   160/74   Temp   97 1   Pulse  82   Resp   16   SpO2   99
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
## 2022-12-13 NOTE — OP NOTE
OPERATIVE REPORT  PATIENT NAME: Missy Gifford  :  1960  MRN: [id number]  Pt Location: [facility]    SURGERY DATE: 22    Surgeon(s) and Role:     * Mine Hardin MD - Primary     * Cristel Martines PA-C - Assisting    Pre-Op Diagnosis:  Dupuytren's disease of palm of right hand [M72 0]    Post-Op Diagnosis:    Dupuytren's disease of palm of right hand [M72 0]    Procedure(s) (LRB):  right hand dupuytrens nodule excision (Right)    Specimen(s):  Order Name Source Comment Collection Info Order Time   TISSUE EXAM Hand, Right  Collected By: Mine Hardin MD 2022  8:57 AM     Release to patient through Mychart   Immediate            Estimated Blood Loss:   Minimal      Anesthesia Type:   IV Sedation with Anesthesia    Operative Indications: The patient has a history of Dupuytren's disease on the palmar aspect of the right hand that was recalcitrant to conservative management  The decision was made to bring the patient to the operating room for Dupuytren's excision palmar aspect right hand  Risks of the procedure were explained which include, but are not limited to bleeding; infection; damage to nerves, arteries,veins, tendons; scar; pain; need for reoperation; failure to give desired result; and risks of anaesthesia  All questions were answered to satisfaction and they were willing to proceed  Operative Findings:  Dupuytren's nodules multiple palmar aspect right hand    Complications:   None    Procedure and Technique:  After the patient, site, and procedure were identified, the patient was brought into the operating room in a supine position  General anaesthesia was provided  A well padded tourniquet was applied to the extremity, set at 250 mmHg  The  right upper extremity was then prepped and drapped in a normal, sterile, orthopedic fashion  After the patient, site, and procedure identified attention was turned towards the right hand    An Esmarch bandage was used to exsanguinate the limb and the tourniquet was inflated to 250 mmHg  A Chiara shaped incision was made transversely and extending distally along the ulnar aspect of the incision and full-thickness skin flaps were then elevated both proximally and distally  The Dupuytren's nodules of concern were identified  This included the transverse palmar fascia and then pathologic pretendinous cords running towards the long and ring finger  We carefully identified the flexor tendons of the long and ring finger as well as the neurovascular bundles on each side of these tendons  These structures were protected  This fascial grouping which incorporated all of her palpated nodules (5) were removed in its entirety  This included fascia in line with the long and ring finger as well as transverse fascia between them  At this point this was sent for routine pathologic evaluation  The tourniquet was then deflated  No nodules were remaining  There was rapid restoration of blood supply into the hand  There was no evidence of injury to the neurovascular or tendinous structures  At the completion of the procedure, hemostasis was obtained with cautery and direct pressure  The wounds were copiously irrigated with sterile solution  The wounds were closed with Prolene  Sterile dressings were applied, including Xeroform, gauze, tweeners, webril, ACE  Please note, all sponge, needle, and instrument counts were correct prior to closure  Loupe magnification was utilized  The patient tolerated the procedure well       I was present for all critical portions of the procedure, A qualified resident physician was not available and A physician assistant was required during the procedure for retraction tissue handling,dissection and suturing    Patient Disposition:  PACU , hemodynamically stable and extubated and stable    SIGNATURE: Nabeel Shahid MD  DATE: 12/13/22  TIME: 9:31 AM

## 2022-12-14 ENCOUNTER — TELEPHONE (OUTPATIENT)
Dept: OBGYN CLINIC | Facility: HOSPITAL | Age: 62
End: 2022-12-14

## 2022-12-14 NOTE — TELEPHONE ENCOUNTER
Spoke to patient and information above relayed  Verbalized understanding  Will call with any worsening of symptoms  For worsening headache pain PCP for evaluation and treatment

## 2022-12-14 NOTE — TELEPHONE ENCOUNTER
These advise the patient that she can take Tylenol and anti-inflammatories that were also prescribed  If she wants to loosen up the Ace wrap, she can do this to help with any of the pain and discomfort  Thank you

## 2022-12-14 NOTE — TELEPHONE ENCOUNTER
Caller: Patient    Doctor: Toño Mejia    Reason for call: Patient stated she has pain in hand  Headache, feels like there is a knot in her head and feels swollen  Ice isn't helping  Bandage is irritating her arm  Pain medication isn't relieving any symptoms  Is there anything else she could take?  [facility] kwasi gandara    Call back#: [phone]

## 2022-12-15 ENCOUNTER — APPOINTMENT (OUTPATIENT)
Dept: PHYSICAL THERAPY | Facility: CLINIC | Age: 62
End: 2022-12-15

## 2022-12-18 DIAGNOSIS — J30.9 ALLERGIC SINUSITIS: ICD-10-CM

## 2022-12-19 DIAGNOSIS — G90.511 COMPLEX REGIONAL PAIN SYNDROME TYPE 1 OF RIGHT UPPER EXTREMITY: ICD-10-CM

## 2022-12-19 DIAGNOSIS — Z00.00 ANNUAL PHYSICAL EXAM: ICD-10-CM

## 2022-12-19 RX ORDER — NICOTINE 21 MG/24HR
1 PATCH, TRANSDERMAL 24 HOURS TRANSDERMAL EVERY 24 HOURS
Qty: 28 PATCH | Refills: 1 | Status: SHIPPED | OUTPATIENT
Start: 2022-12-19 | End: 2023-02-17

## 2022-12-19 RX ORDER — DULOXETIN HYDROCHLORIDE 30 MG/1
CAPSULE, DELAYED RELEASE ORAL
Qty: 30 CAPSULE | Refills: 1 | Status: SHIPPED | OUTPATIENT
Start: 2022-12-19

## 2022-12-19 RX ORDER — FLUTICASONE PROPIONATE 50 MCG
SPRAY, SUSPENSION (ML) NASAL
Qty: 16 ML | Refills: 3 | Status: SHIPPED | OUTPATIENT
Start: 2022-12-19

## 2022-12-21 ENCOUNTER — OFFICE VISIT (OUTPATIENT)
Dept: OBGYN CLINIC | Facility: HOSPITAL | Age: 62
End: 2022-12-21

## 2022-12-21 VITALS
HEIGHT: 62 IN | DIASTOLIC BLOOD PRESSURE: 72 MMHG | SYSTOLIC BLOOD PRESSURE: 120 MMHG | HEART RATE: 83 BPM | BODY MASS INDEX: 23.19 KG/M2 | WEIGHT: 126 LBS

## 2022-12-21 DIAGNOSIS — M72.0 DUPUYTREN'S DISEASE OF PALM OF RIGHT HAND: Primary | ICD-10-CM

## 2022-12-21 NOTE — PROGRESS NOTES
Assessment:   S/P right hand dupuytrens nodule excision - Right on 12/13/2022    Plan:   Patient will start hand therapy for ROM   Discussed stretching sensation is normal   Again we discussed she has a 10% chance of it returning  Discussed we do not recommend doing anything with the left palm  OTC pain medications    Follow Up:  6-8  week(s)    To Do Next Visit:  recheck      CHIEF COMPLAINT:  Chief Complaint   Patient presents with   • Right Hand - Post-op, Suture / Staple Removal     S/P right hand dupuytrens nodule excision DOS 12/13/22         SUBJECTIVE:  Tuyet Brown is a 58 y o  female who presents for follow up after right hand dupuytrens nodule excision - Right on 12/13/2022  Today patient has Pain  Moderate  Intermittant  Sharp, Dull and Aching         PHYSICAL EXAMINATION:  Vital signs: /72   Pulse 83   Ht 5' 2" (1 575 m)   Wt 57 2 kg (126 lb)   BMI 23 05 kg/m²   General: well developed and well nourished, alert, oriented times 3 and appears comfortable  Psychiatric: Normal    MUSCULOSKELETAL EXAMINATION:  Incision: Clean, dry, intact  Range of Motion: As expected  Neurovascular status: Neuro intact, good cap refill  Activity Restrictions: No restrictions  Done today: Sutures out    Dupuytren's nodule left palm    STUDIES REVIEWED:  No Studies to review      PROCEDURES PERFORMED:  Procedures  No Procedures performed today   Scribe Attestation    I,:  Tia Leone am acting as a scribe while in the presence of the attending physician :       I,:  Marino Heller MD personally performed the services described in this documentation    as scribed in my presence :

## 2022-12-22 ENCOUNTER — OFFICE VISIT (OUTPATIENT)
Dept: PHYSICAL THERAPY | Facility: CLINIC | Age: 62
End: 2022-12-22

## 2022-12-22 DIAGNOSIS — G90.511 COMPLEX REGIONAL PAIN SYNDROME TYPE 1 OF RIGHT UPPER EXTREMITY: Primary | ICD-10-CM

## 2022-12-22 NOTE — PROGRESS NOTES
Daily Note     Today's date: 2022  Patient name: Shailesh Hart  : 1960  MRN: [id number]  Referring provider: Angeles Chavez DO  Dx:   Encounter Diagnosis     ICD-10-CM    1  Complex regional pain syndrome type 1 of right upper extremity  G90 511                      Subjective: Patient states no significant changes since last visit  Contracture release procedure is next week  Objective: See treatment diary below    Assessment: Held some exercises 2/2 post surgical pain and tightness from Dupuytren release  Pt appears to be healing well  Does have limited extension ROM at all joints in all digits  Added self passive stretching  Pt is going to schedule IE with [facility], may discharge at time of IE with [facility]  Plan: Continue per plan of care        Precautions: COPD    Manuals 10/20 10/27 11/3 11/10 11/21 12/8 12/22  10/6 10/13   Dupuytren's STM             EPB/APL STM             R wrist PROM             Median n glide                          Neuro Re-Ed             Median n glide 3x10 2x10 pain Held 2/2 time  3x10 3x10   3x10 3x10   Scap retraction 3x10 3x10 3x10 3x10 3x10 3x10 3x10  3x15 3x15   Cervical retraction 3x10 3x10 3x10 3x10 3x10 3x10 3x10  6x10 3x10   Ther Ex             R wrist flex/ext AROM 2# 2x10 3x10 no weight Held 2/2 time 3x10 0# 3x10 1# 3x10 1# 3x10  1#  3x10 1#  3x10   R wrist sup/pron AROM 2# 2x10 3x10 no weight Held 2/2 time 3x10 0# 3x10 1# 3x10 1# 3x10  1#  3x10 1#  3x10   Digiflex Soft ball squeezes 3x10 Soft ball squeezes 3x10 Soft ball squeezes 3x10 3x10 Soft ball squeezes 3x10 R digiflex 3x10   Soft ball squeezes 3x10 Soft ball squeezes 3x10   Thumb extension iso 3x10 3x10 3x10  3x10 3x10 3x10  3x10 3x10   Thumb extension AROM 3x10 3x10 3x10 3x10 3x10 3x10 3x10  3x10 3x10   Thumb extension rubber band resisted 3x10 3x10 3x10 Blue 3x10 3x10 3x10   3x10 3x10   Thumb retropulsion AROM 3x10 3x10 3x10  3x10 3x10 3x10  3x10 3x10   Thumb abduction AROM 3x10 3x10 3x10 3x10 3x10 3x10 3x10  3x10 3x10   Thumb abduction rubber band resisted 3x10 3x10 3x10  3x10 3x10   3x10 3x10   Thumb flexion AROM 3x10 1x10 pain 3x10 3x10 3x10 3x10 3x10  3x10 3x10   Thumb flexion w/ ulnar deviation 3x10 3x10 3x10 3x10 3x10 3x10 3x10  3x10 3x10   Pronated radial deviation AROM 3x10 3x10 3x10 3x10 3x10 3x10 3x10  3x10 3x10   Ther Activity                                       Gait Training                                       Modalities

## 2022-12-27 ENCOUNTER — EVALUATION (OUTPATIENT)
Dept: OCCUPATIONAL THERAPY | Age: 62
End: 2022-12-27

## 2022-12-27 DIAGNOSIS — M72.0 DUPUYTREN'S DISEASE OF PALM OF RIGHT HAND: ICD-10-CM

## 2022-12-27 NOTE — PROGRESS NOTES
OT Evaluation     Today's date: 2022  Patient name: Melyssa Walter  : 1960  MRN: [id number]  Referring provider: Galen Stewart MD  Dx:   Encounter Diagnosis     ICD-10-CM    1  Dupuytren's disease of palm of right hand  M72 0 Ambulatory Referral to PT/OT Hand Therapy          Start Time: 3  Stop Time: 1152  Total time in clinic (min): 54 minutes    Assessment  Assessment details: Patient reported that she fell in April and that she believes that she began growing nodules after this fall  Patient underwent dupuytren's nodule excision right hand on 22  Patient reported that she had four nodules that were removed  She was referred to occupational therapy for range of motion exercises  Melyssa Walter is a 58 y o  female who presents with Dupuytren's disease of palm of right hand  Patient tolerated session well  Felecia Dent reported difficulty with activities of daily living secondary to decreased range of motion and pain with function  Provided home exercise program for digit and wrist range of motion  Patient was able to demonstrate home program past instruction with use of handouts  Patient advised to contact doctor if there is a change of status  Patient advised to discontinue any exercise which cause increased pain  Patient is a good candidate to benefit from skilled occupational therapy to address impairments and return to maximal level of function with minimal symptoms      Impairments: abnormal or restricted ROM, activity intolerance, impaired physical strength, lacks appropriate home exercise program, pain with function and weight-bearing intolerance  Understanding of Dx/Px/POC: good   Prognosis: good    Goals  Short term goals:  Patient to demonstrate understanding of home exercise program in 2 weeks for decreased pain with activities of daily living  Patient to demonstrate increased active range of motion of wrist to 45/45 degrees in 4 weeks to aid in showering  Patient to demonstrate increased  strength to 15 lbs in 4 weeks to increase  on full cup  Patient to demonstrate decreased edema by 0 8 cm in 4 weeks to increase range of motion for dressing  Patient to increase composite digital flexion to touch distal elizalde crease in 4 weeks to aid in holding utensils  Patient to report a decrease in pain by at least 1 point on a 0-10 scale in 2 weeks to aid in dressing    Long term goals:  Patient to demonstrate functional active range of motion for independent ADL's by time of discharge  Patient to demonstrate functional strength for independent ADL's by time of discharge  Patient to demonstrate understanding of final discharge home exercise program by time of discharge    Plan  Patient would benefit from: OT eval and skilled occupational therapy  Planned modality interventions: ultrasound, thermotherapy: hydrocollator packs and fluidotherapy  Planned therapy interventions: joint mobilization, manual therapy, massage, strengthening, stretching, therapeutic activities, therapeutic exercise, fine motor coordination training, flexibility, functional ROM exercises, home exercise program, activity modification, neuromuscular re-education, patient education, graded activity and graded exercise  Frequency: 2x week  Duration in weeks: 10  Plan of Care beginning date: 12/27/2022  Plan of Care expiration date: 3/7/2023  Treatment plan discussed with: patient        Subjective Evaluation    History of Present Illness  Date of surgery: 12/13/2022  Mechanism of injury: Patient reported that she fell in April and that she believes that she began growing nodules after this fall  Patient underwent dupuytren's nodule excision right hand on 12/13/22  Patient reported that she had four nodules that were removed  She was referred to occupational therapy for range of motion exercises  "I get swelling  I get a little numbness in the finger tips   I haven't picked up anything " Patient reported tightness in her palm with range of motion  Patient reported that she has been utilizing her left hand more than her right  "It can do some things, but it causes more pain " Patient presented with guarding behaviors of right hand  Pain  At best pain ratin  At worst pain ratin  Location: right hand  Quality: radiating and tight (shock)  Relieving factors: ice  Aggravating factors: lifting    Social Support    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   Hobbies: crocheting)  Hand dominance: right    Patient Goals  Patient goals for therapy: decreased pain, increased motion, increased strength, return to work and independence with ADLs/IADLs  Patient goal: To be able to straighten the fingers        Objective     Observations     Right Wrist/Hand   Positive for incision  Additional Observation Details  3 5cm healing incision   Mild bruising noted surrounding incision    Active Range of Motion     Left Wrist   Wrist flexion: 65 degrees   Wrist extension: 68 degrees     Right Wrist   Wrist flexion: 35 degrees   Wrist extension: 48 degrees     Left Digits    Flexion   Index     MCP: 74    PIP: 106    DIP: 46  Middle     MCP: 74    PIP: 104    DIP: 60  Ring     MCP: 64    PIP: 102    DIP: 58  Little     MCP: 80    PIP: 94    DIP: 60  Extension   Index     MCP: -4  Middle     PIP: -8    Right Digits   Flexion   Index     MCP: 54    PIP: 60    DIP: 34  Middle     MCP: 52    PIP: 70    DIP: 30  Ring     MCP: 44    PIP: 66    DIP: 18  Little     MCP: 40    PIP: 58    DIP: 28  Extension   Index     MCP: -42    PIP: -20    DIP: -8  Middle     MCP: -40    PIP: -38    DIP: -10  Ring     MCP: -30    PIP: -40    DIP: -6  Little     MCP: -12    PIP: -42    DIP: -12    Additional Active Range of Motion Details  Distance to [facility]: Right  Thumb: 3 cm  Index: 4 cm  Lon cm  Ring: 3 5 cm  Small: 3 cm    Tips of all digits of left hand able to touch [facility]    Swelling     Left Wrist/Hand Circumference MCP: 15 2 cm    Right Wrist/Hand   Circumference MCP: 16 5 cm             Precautions: s/p R hand dupuytren's nodule excision 12/13/22    Manuals 12/27            STM                                                    Neuro Re-Ed                                                                                                        Ther Ex             Tendon glides x10            PROM digits 10 sec x5            BROM digits x10            Digit ext/abd x10            Thumb opp x10            AROM wrist  x10            PROM wrist flex/ext 10 sec x5            Prayer stretch 10 sec x5                         Ther Activity             Translation             Opposition                                                    Modalities             Moist heat 5'

## 2022-12-29 ENCOUNTER — OFFICE VISIT (OUTPATIENT)
Dept: PHYSICAL THERAPY | Facility: CLINIC | Age: 62
End: 2022-12-29

## 2022-12-29 DIAGNOSIS — G89.29 CHRONIC RIGHT-SIDED LOW BACK PAIN WITH BILATERAL SCIATICA: ICD-10-CM

## 2022-12-29 DIAGNOSIS — M54.42 CHRONIC RIGHT-SIDED LOW BACK PAIN WITH BILATERAL SCIATICA: ICD-10-CM

## 2022-12-29 DIAGNOSIS — G90.511 COMPLEX REGIONAL PAIN SYNDROME TYPE 1 OF RIGHT UPPER EXTREMITY: Primary | ICD-10-CM

## 2022-12-29 DIAGNOSIS — M54.41 CHRONIC RIGHT-SIDED LOW BACK PAIN WITH BILATERAL SCIATICA: ICD-10-CM

## 2022-12-29 RX ORDER — CELECOXIB 200 MG/1
CAPSULE ORAL
Qty: 30 CAPSULE | Refills: 2 | Status: SHIPPED | OUTPATIENT
Start: 2022-12-29

## 2022-12-29 NOTE — PROGRESS NOTES
Daily Note     Today's date: 2022  Patient name: Boy Meyer  : 1960  MRN: [id number]  Referring provider: Edilson Santiago DO  Dx:   Encounter Diagnosis     ICD-10-CM    1  Complex regional pain syndrome type 1 of right upper extremity  G90 511                      Subjective: Patient states no significant changes since last visit  Contracture release procedure is next week  Objective: See treatment diary below    Assessment: Reviewed patients exercises given to her at OT  Patient states those exercises are going well  Pt states she has been keeping up with PT exercises  States pain in her forearm is improving  Will continue PT until she received injections  Pt educated to prioritize OT exercises and postural exercises to improve CRPS nerve pain  Pt's DeQuervains symptoms seem to be improving, likely due to patient is on work leave currently 2/2 Dupuytren release  Plan: Continue per plan of care        Precautions: COPD    Manuals 10/20 10/27 11/3 11/10 11/21 12/8 12/22 12/29     Dupuytren's STM             EPB/APL STM             R wrist PROM             Median n glide                          Neuro Re-Ed             Median n glide 3x10 2x10 pain Held 2/2 time  3x10 3x10       Scap retraction 3x10 3x10 3x10 3x10 3x10 3x10 3x10 3x10     Cervical retraction 3x10 3x10 3x10 3x10 3x10 3x10 3x10 3x10     Ther Ex             R wrist flex/ext AROM 2# 2x10 3x10 no weight Held 2/2 time 3x10 0# 3x10 1# 3x10 1# 3x10 3x10     R wrist sup/pron AROM 2# 2x10 3x10 no weight Held 2/2 time 3x10 0# 3x10 1# 3x10 1# 3x10 3x10     Digiflex Soft ball squeezes 3x10 Soft ball squeezes 3x10 Soft ball squeezes 3x10 3x10 Soft ball squeezes 3x10 R digiflex 3x10       Thumb extension iso 3x10 3x10 3x10  3x10 3x10 3x10 3x10     Thumb extension AROM 3x10 3x10 3x10 3x10 3x10 3x10 3x10 3x10     Thumb extension rubber band resisted 3x10 3x10 3x10 Blue 3x10 3x10 3x10       Thumb retropulsion AROM 3x10 3x10 3x10  3x10 3x10 3x10 3x10     Thumb abduction AROM 3x10 3x10 3x10 3x10 3x10 3x10 3x10 3x10     Thumb abduction rubber band resisted 3x10 3x10 3x10  3x10 3x10       Thumb flexion AROM 3x10 1x10 pain 3x10 3x10 3x10 3x10 3x10 3x10     Thumb flexion w/ ulnar deviation 3x10 3x10 3x10 3x10 3x10 3x10 3x10 3x10     Pronated radial deviation AROM 3x10 3x10 3x10 3x10 3x10 3x10 3x10 3x10     Ther Activity                                       Gait Training                                       Modalities

## 2023-01-04 ENCOUNTER — OFFICE VISIT (OUTPATIENT)
Dept: OCCUPATIONAL THERAPY | Age: 63
End: 2023-01-04

## 2023-01-04 DIAGNOSIS — M72.0 DUPUYTREN'S DISEASE OF PALM OF RIGHT HAND: Primary | ICD-10-CM

## 2023-01-04 NOTE — PROGRESS NOTES
Daily Note     Today's date: 2023  Patient name: Kiana Gardiner  : 1960  MRN: [id number]  Referring provider: Rachael Chavez MD  Dx:   Encounter Diagnosis     ICD-10-CM    1  Dupuytren's disease of palm of right hand  M72 0                      Subjective: "It's hard"      Objective: See treatment diary below      Assessment: Tolerated well, AROM quickly improving    Plan: Continue per plan of care        Precautions: COPD         Precautions: s/p R hand dupuytren's nodule excision 22    Manuals            STM                                                    Neuro Re-Ed                                                                                                        Ther Ex             Tendon glides x10            PROM digits 10 sec x5            BROM digits x10            Digit ext/abd x10            Thumb opp x10            AROM wrist  x10            PROM wrist flex/ext 10 sec x5            Prayer stretch 10 sec x5            EDC  sm rb x 30           Ther Activity             Translation  Coins x 30           Opposition                                                    Modalities             Moist heat 5' 5

## 2023-01-06 ENCOUNTER — OFFICE VISIT (OUTPATIENT)
Dept: OCCUPATIONAL THERAPY | Age: 63
End: 2023-01-06

## 2023-01-06 DIAGNOSIS — M72.0 DUPUYTREN'S DISEASE OF PALM OF RIGHT HAND: Primary | ICD-10-CM

## 2023-01-06 NOTE — PROGRESS NOTES
Daily Note     Today's date: 2023  Patient name: Prince Agarwal  : 1960  MRN: [id number]  Referring provider: Jennifer Mcgovern MD  Dx:   Encounter Diagnosis     ICD-10-CM    1  Dupuytren's disease of palm of right hand  M72 0                      Subjective: Pt reported pain with AAROM of MP ext with the Ips flx  Objective: See treatment diary below      Assessment: Pt has improved ROM in the digits, getting more successful with the full fist position on the R  She was able to successfully complete in-hand manipulation and EDC tasks with no complaints of pain or soreness  Client had soreness to with hook fist and reverse blocking activities  Client was unable to fully extend her MPs due to intrinsic muscle tightness  She will cont with her HEP as well as massaging her scar 2-3x/day with cream for 2 minutes (as eschar has sloughed)  Plan: Continue per plan of care        Precautions: COPD         Precautions: s/p R hand dupuytren's nodule excision 22    Manuals           STM                                                    Neuro Re-Ed                                                                                                        Ther Ex             Tendon glides x10            PROM digits 10 sec x5  5          Hook roll   Pencil 10s x 10                       Reverse blockig   3x10          AROM wrist  x10            PROM wrist flex/ext 10 sec x5            Prayer stretch 10 sec x5            EDC  sm rb x 30 sm rb x 30          Ther Activity             Translation  Coins x 30 Coins x 30          Opposition                                                    Modalities             Moist heat 5' 5 5

## 2023-01-10 ENCOUNTER — OFFICE VISIT (OUTPATIENT)
Dept: OCCUPATIONAL THERAPY | Age: 63
End: 2023-01-10

## 2023-01-10 DIAGNOSIS — M72.0 DUPUYTREN'S DISEASE OF PALM OF RIGHT HAND: Primary | ICD-10-CM

## 2023-01-10 NOTE — PROGRESS NOTES
Daily Note     Today's date: 1/10/2023  Patient name: Missy Gifford  : 1960  MRN: [id number]  Referring provider: Noemi Dickey MD  Dx:   Encounter Diagnosis     ICD-10-CM    1  Dupuytren's disease of palm of right hand  M72 0                      Subjective: "It's a lot better"      Objective: See treatment diary below      Assessment: Some extension limitation still but scar tissue well controlled and motion imprved    Plan: Continue per plan of care        Precautions: COPD         Precautions: s/p R hand dupuytren's nodule excision 22    Manuals 12/27 1/4 1/6 1/10         STM                                                    Neuro Re-Ed                                                                                                        Ther Ex             Tendon glides x10            PROM digits 10 sec x5  5 5         Hook roll   Pencil 10s x 10 10x10s pencil         Grasp    GPW 3x10,red digicizer 3x10         Reverse blockig   3x10 3x10         AROM wrist  x10            PROM wrist flex/ext 10 sec x5            Prayer stretch 10 sec x5            EDC  sm rb x 30 sm rb x 30 lrg rb x 25         Ther Activity             Translation  Coins x 30 Coins x 30 KP         Opposition                                                    Modalities             Moist heat 5' 5 5 5

## 2023-01-11 ENCOUNTER — OFFICE VISIT (OUTPATIENT)
Dept: INTERNAL MEDICINE CLINIC | Facility: CLINIC | Age: 63
End: 2023-01-11

## 2023-01-11 VITALS
HEIGHT: 62 IN | SYSTOLIC BLOOD PRESSURE: 127 MMHG | DIASTOLIC BLOOD PRESSURE: 79 MMHG | BODY MASS INDEX: 23 KG/M2 | WEIGHT: 125 LBS | HEART RATE: 76 BPM | TEMPERATURE: 98.2 F

## 2023-01-11 DIAGNOSIS — F17.200 SMOKING: ICD-10-CM

## 2023-01-11 DIAGNOSIS — M19.90 ARTHRITIS: ICD-10-CM

## 2023-01-11 DIAGNOSIS — M79.631 RIGHT FOREARM PAIN: ICD-10-CM

## 2023-01-11 DIAGNOSIS — J42 CHRONIC BRONCHITIS, UNSPECIFIED CHRONIC BRONCHITIS TYPE (HCC): Primary | ICD-10-CM

## 2023-01-11 NOTE — PROGRESS NOTES
Name: Kimberly Black      : 1960      MRN: [id number]  Encounter Provider: Lex Haq MD  Encounter Date: 2023   Encounter department: [address]     1  Dupuytren/s contracture   - Now s/p surgical removal of nodules   - Undergoing PT    2  Positive RF   - RF positive at 10  In light of recent surgery unable to get inflammatory markers No synovitis on exam and no complaints of joint pain  - Will obtain further testing at next visit     4  Smoking  - Decreased tobacco use to 3-5 cigarettes daily   - Cont nicotine patches   - PFTs normal, low DLCO noted  Exam unremarkable  May be due to smoking     5  Healthcare Maintenance   Dexa scan ordered   Colonoscopy ordered        Subjective      HPI   71-year-old female with a history of Dupuytren contractures status post surgery, tobacco use  presenting with follow-up visit for right forearm pain  Patient was seen back in July after having a traumatic injury to the right forearm but since then she states she is not in any pain in that area however she underwent surgery a couple months ago to remove nodules for her Dupuytren's contractures and has been undergoing PT  she has not been able to close her right hand and has decreased range of motion of her wrist and hand that her orthopedic surgeon and PT are aware of  Patient has been trying to cut back on tobacco nausea nicotine patches and was down to 5 cigarettes daily  Patient was placed needed DEXA scan and colonoscopy however because of surgery was unable to get them and has both scheduled for this year  Denies chest pain shortness of breath fever chills nausea vomiting abdominal pain headaches  Review of Systems   Constitutional: Negative for chills and fever  HENT: Negative for ear pain and sore throat  Eyes: Negative for pain and visual disturbance  Respiratory: Negative for cough and shortness of breath      Cardiovascular: Negative for chest pain and palpitations  Gastrointestinal: Negative for abdominal pain and vomiting  Genitourinary: Negative for dysuria and hematuria  Musculoskeletal: Negative for arthralgias and back pain  Skin: Negative for color change and rash  Neurological: Negative for seizures and syncope  All other systems reviewed and are negative  Current Outpatient Medications on File Prior to Visit   Medication Sig   • acetaminophen (TYLENOL) 650 mg CR tablet Take 1 tablet (650 mg total) by mouth every 8 (eight) hours as needed for mild pain   • albuterol (PROVENTIL HFA,VENTOLIN HFA) 90 mcg/act inhaler Inhale 2 puffs every 6 (six) hours as needed for wheezing or shortness of breath   • Ascorbic Acid (vitamin C) 1000 MG tablet Take 1,000 mg by mouth daily   • aspirin 81 mg chewable tablet Chew 1 tablet (81 mg total) daily   • atorvastatin (LIPITOR) 40 mg tablet Take 1 tablet (40 mg total) by mouth daily   • bisacodyl (DULCOLAX) 5 mg EC tablet Take 1 tablet (5 mg total) by mouth once for 1 dose   • celecoxib (CeleBREX) 200 mg capsule TAKE 1 CAPSULE BY MOUTH DAILY AS NEEDED FOR BACK PAIN WITH FOOD   • [employer] D3 50 MCG (2000 UT) CAPS TAKE 1 CAPSULE BY MOUTH EVERY DAY   • cyclobenzaprine (FLEXERIL) 5 mg tablet TAKE 1 TABLET (5 MG TOTAL) BY MOUTH 3 (THREE) TIMES A DAY AS NEEDED (NECK, BACK, ARM PAIN)   • fluticasone (FLONASE) 50 mcg/act nasal spray SPRAY 2 SPRAYS INTO EACH NOSTRIL EVERY DAY   • gabapentin (Neurontin) 300 mg capsule Take 1 capsule (300 mg total) by mouth 3 (three) times a day (Patient taking differently: Take 300 mg by mouth 2 (two) times a day)   • montelukast (SINGULAIR) 10 mg tablet TAKE 1 TABLET BY MOUTH EVERY DAY   • Naproxen Sodium 220 MG CAPS Take 1 capsule (220 mg total) by mouth 2 (two) times a day   • nicotine (NICODERM CQ) 14 mg/24hr TD 24 hr patch PLACE 1 PATCH ON THE SKIN EVERY 24 HOURS     • Sodium Sulfate-Mag Sulfate-KCl (Sutab) 8549-681-742 MG TABS Lot - [id number]  Exp- 3/2022 (Patient taking differently: Take by mouth in the morning Lot - [id number]  Exp- 3/2022)   • Thiamine HCl (vitamin B-1) 250 MG tablet Take 250 mg by mouth daily   • Wixela Inhub 100-50 MCG/ACT inhaler INHALE 1 PUFF 2 TIMES A DAY  RINSE MOUTH AFTER USE  • Diclofenac Sodium (VOLTAREN) 1 % Apply 2 g topically 4 (four) times a day (Patient not taking: Reported on 1/11/2023)   • DULoxetine (CYMBALTA) 30 mg delayed release capsule TAKE 1 CAPSULE BY MOUTH EVERY DAY   • metaxalone (SKELAXIN) 400 MG tablet TAKE 1 TABLET 3 TIMES A DAY AS NEEDED FOR MUSCLE PAIN,MAY CAUSE DROWSINESS   • Multiple Vitamins-Minerals (HAIR SKIN AND NAILS FORMULA PO) Take by mouth (Patient not taking: Reported on 1/11/2023)       Objective     /79 (BP Location: Left arm, Patient Position: Sitting, Cuff Size: Adult)   Pulse 76   Temp 98 2 °F (36 8 °C) (Temporal)   Ht 5' 2" (1 575 m)   Wt 56 7 kg (125 lb)   BMI 22 86 kg/m²     Physical Exam  Constitutional:       Appearance: Normal appearance  HENT:      Nose: Nose normal       Mouth/Throat:      Mouth: Mucous membranes are moist       Pharynx: Oropharynx is clear  Eyes:      Extraocular Movements: Extraocular movements intact  Conjunctiva/sclera: Conjunctivae normal    Cardiovascular:      Rate and Rhythm: Normal rate and regular rhythm  Pulmonary:      Effort: Pulmonary effort is normal       Breath sounds: Normal breath sounds  Abdominal:      General: Bowel sounds are normal  There is no distension  Palpations: Abdomen is soft  Tenderness: There is no abdominal tenderness  Musculoskeletal:      Cervical back: Normal range of motion  Right lower leg: No edema  Left lower leg: No edema  Comments: Right dorsal hand with well healing scar, no tenderness   Decreased ROM of the fingers and wrist    Neurological:      General: No focal deficit present  Mental Status: She is alert and oriented to person, place, and time         Yessica Beckett MD

## 2023-01-12 ENCOUNTER — OFFICE VISIT (OUTPATIENT)
Dept: OCCUPATIONAL THERAPY | Age: 63
End: 2023-01-12

## 2023-01-12 DIAGNOSIS — M72.0 DUPUYTREN'S DISEASE OF PALM OF RIGHT HAND: Primary | ICD-10-CM

## 2023-01-12 NOTE — PROGRESS NOTES
Daily Note     Today's date: 2023  Patient name: Citlaly Vu  : 1960  MRN: [id number]  Referring provider: Mabel Holland MD  Dx:   Encounter Diagnosis     ICD-10-CM    1  Dupuytren's disease of palm of right hand  M72 0                      Subjective: Pt reports continued shooting pain through her LF  Objective: See treatment diary below      Assessment: Tolerated treatment fair  Ulnar nerve tension with MNG  Demonstrated weakness in the SF/RF with the digizer  Appeared fatigued moving slowly through PREs  Plan: Continue per plan of care        Precautions: COPD         Precautions: s/p R hand dupuytren's nodule excision 22    Manuals 12/27 1/4 1/6 1/10 1/12        STM     8        MNG     7                                  Neuro Re-Ed                                                                                                        Ther Ex             Tendon glides x10            PROM digits 10 sec x5  5 5 5        Hook roll   Pencil 10s x 10 10x10s pencil 10x10s pencil        Grasp    GPW 3x10,red digicizer 3x10 GPW 3x10,red digicizer 3x10        Reverse blockig   3x10 3x10 3x10        AROM wrist  x10            PROM wrist flex/ext 10 sec x5            Prayer stretch 10 sec x5            EDC  sm rb x 30 sm rb x 30 lrg rb x 25 lrg rb x 30        Ther Activity             Translation  Coins x 30 Coins x 30 KP kp        Opposition                                                    Modalities             Moist heat 5' 5 5 5 5

## 2023-01-16 ENCOUNTER — OFFICE VISIT (OUTPATIENT)
Dept: PHYSICAL THERAPY | Facility: CLINIC | Age: 63
End: 2023-01-16

## 2023-01-16 ENCOUNTER — HOSPITAL ENCOUNTER (OUTPATIENT)
Dept: RADIOLOGY | Facility: CLINIC | Age: 63
Discharge: HOME | End: 2023-01-16
Admitting: PHYSICAL MEDICINE & REHABILITATION

## 2023-01-16 ENCOUNTER — TELEPHONE (OUTPATIENT)
Age: 63
End: 2023-01-16

## 2023-01-16 ENCOUNTER — OFFICE VISIT (OUTPATIENT)
Dept: OCCUPATIONAL THERAPY | Age: 63
End: 2023-01-16

## 2023-01-16 VITALS
RESPIRATION RATE: 20 BRPM | TEMPERATURE: 97.5 F | OXYGEN SATURATION: 95 % | SYSTOLIC BLOOD PRESSURE: 124 MMHG | DIASTOLIC BLOOD PRESSURE: 76 MMHG | HEART RATE: 75 BPM

## 2023-01-16 DIAGNOSIS — M72.0 DUPUYTREN'S DISEASE OF PALM OF RIGHT HAND: Primary | ICD-10-CM

## 2023-01-16 DIAGNOSIS — G90.511 COMPLEX REGIONAL PAIN SYNDROME TYPE 1 OF RIGHT UPPER EXTREMITY: Primary | ICD-10-CM

## 2023-01-16 RX ORDER — LIDOCAINE HYDROCHLORIDE 10 MG/ML
10 INJECTION, SOLUTION EPIDURAL; INFILTRATION; INTRACAUDAL; PERINEURAL ONCE
Status: COMPLETED | OUTPATIENT
Start: 2023-01-16 | End: 2023-01-16

## 2023-01-16 RX ADMIN — SODIUM CHLORIDE 250 ML: 0.9 INJECTION, SOLUTION INTRAVENOUS at 13:11

## 2023-01-16 RX ADMIN — LIDOCAINE HYDROCHLORIDE 7 ML: 10 INJECTION, SOLUTION EPIDURAL; INFILTRATION; INTRACAUDAL at 13:32

## 2023-01-16 RX ADMIN — IOHEXOL 2 ML: 300 INJECTION, SOLUTION INTRAVENOUS at 13:38

## 2023-01-16 NOTE — PROGRESS NOTES
Daily Note     Today's date: 2023  Patient name: Justina Costa  : 1960  MRN: [id number]  Referring provider: Marlene Way MD  Dx:   Encounter Diagnosis     ICD-10-CM    1  Dupuytren's disease of palm of right hand  M72 0                      Subjective: Pt reported still having sharp pain in her palm below her IF and numbness around incision site  Objective: See treatment diary below      Assessment: Tolerated treatment fair  Pt's scar tissue is beginning to soften, cont with the scar massage to further mobilization  No numbness with MNGs  Tried to use compensatory strategies with reverse blocking by trying to extend her IPs  Demonstrated fatigue and cramping with increased activity, resolved with rest      Plan: Continue per plan of care        Precautions: COPD         Precautions: s/p R hand dupuytren's nodule excision 22    Manuals 12/27 1/4 1/6 1/10 1/12 1/16       STM     8 8       MNG     7 7                                 Neuro Re-Ed                                                                                                        Ther Ex             Tendon glides x10     `       PROM digits 10 sec x5  5 5 5 5       Hook roll   Pencil 10s x 10 10x10s pencil 10x10s pencil        Grasp    GPW 3x10,red digicizer 3x10 GPW 3x10,red digicizer 3x10 GPW 3x10,red digicizer 3x10, RPB 3x10 WF/WE/S/P       Reverse blockig   3x10 3x10 3x10 3x 10       AROM wrist  x10            PROM wrist flex/ext 10 sec x5            Prayer stretch 10 sec x5            EDC  sm rb x 30 sm rb x 30 lrg rb x 25 lrg rb x 30 y gummy 3x10       Ther Activity             Translation  Coins x 30 Coins x 30 KP kp KP       Opposition                                                    Modalities             Moist heat 5' 5 5 5 5 5

## 2023-01-16 NOTE — DISCHARGE INSTR - LAB
Activity  Do not drive or operate machinery today  Attend therapy or home therapy as prescribed  Resume normal activities tomorrow as tolerated  Care of the Injection Site  If you have soreness or pain, apply ice to the area today  (20 minutes on/20 minutes off)  Starting tomorrow you may use warm, moist heat or ice if needed  Notify the [facility] if you have any of the following: redness, drainage, swelling or fever above 100 F degrees  Special Instructions  You may eat when your voice returns to normal  Begin with sips of water and then progress to solid foods as tolerated  If you have any changes in your normal breathing pattern such as shortness of breath or pain, or swelling in your neck, go to any emergency room  Tell them you had a Stellate Ganglion Block  It is normal to have any of the following up to 8 hours after the procedure:    Droopy eye  Bloodshot eye   Tearing  Nasal Stuffiness   Hoarse voice  Feeling of a "lump in your throat"   Sensation of warmth, tingling or weakness in your arm/hand  Call the office tomorrow with a progress report    Medications  Continue to take all your routine medications  Our office may have instructed you to hold some medications  You may resume ____________________
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
## 2023-01-16 NOTE — PROGRESS NOTES
Daily Note     Today's date: 2023  Patient name: Clement Jean Baptiste  : 1960  MRN: [id number]  Referring provider: Sara Tucker DO  Dx:   Encounter Diagnosis     ICD-10-CM    1  Complex regional pain syndrome type 1 of right upper extremity  G90 511                      Subjective: Patient states her injection went well  States she has had no side effects thus far  States she is having some burning along thumb tendons; however, this is not unusual      Objective: See treatment diary below    Assessment: Pt unable to tolerate DeQuervains tendon glides with thumb flexion and ulnar deviation  Added additional cervical retraction and median nerve glides in order to address burning pain  Pt will be d/c to HEP at this time as she was not making progress with PT and we were performing interventions for symptom management until patient was able to get injections which she did today  Pt is also working with hand therapy to address Dupuytren's contracture so exercises to address DeQuervains exercises there  Pt is independent with HEP and appropriate for d/c at this time  Plan: D/c to HEP       Precautions: COPD    Manuals 10/20 10/27 11/3 11/10 11/21 12/8 12/22 12/29 1/16    Dupuytren's STM             EPB/APL STM             R wrist PROM             Median n glide                          Neuro Re-Ed             Median n glide 3x10 2x10 pain Held 2/2 time  3x10 3x10   PT (A) 2x10    Scap retraction 3x10 3x10 3x10 3x10 3x10 3x10 3x10 3x10 3x10    Cervical retraction 3x10 3x10 3x10 3x10 3x10 3x10 3x10 3x10 3x10    Ther Ex             R wrist flex/ext AROM 2# 2x10 3x10   3x10 0# 3x10 1# 3x10 1# 3x10 3x10 3x10    R wrist sup/pron AROM 2# 2x10 3x10 no weight Held 2/2 time 3x10 0# 3x10 1# 3x10 1# 3x10 3x10 3x10    Digiflex Soft ball squeezes 3x10 Soft ball squeezes 3x10 Soft ball squeezes 3x10 3x10 Soft ball squeezes 3x10 R digiflex 3x10       Thumb extension iso 3x10 3x10 3x10  3x10 3x10 3x10 3x10 3x10    Thumb extension AROM 3x10 3x10 3x10 3x10 3x10 3x10 3x10 3x10 3x10    Thumb extension rubber band resisted 3x10 3x10 3x10 Blue 3x10 3x10 3x10       Thumb retropulsion AROM 3x10 3x10 3x10  3x10 3x10 3x10 3x10 3x10    Thumb abduction AROM 3x10 3x10 3x10 3x10 3x10 3x10 3x10 3x10 3x10    Thumb abduction rubber band resisted 3x10 3x10 3x10  3x10 3x10       Thumb flexion AROM 3x10 1x10 pain 3x10 3x10 3x10 3x10 3x10 3x10 3x10    Thumb flexion w/ ulnar deviation 3x10 3x10 3x10 3x10 3x10 3x10 3x10 3x10 Held pain 1x6    Pronated radial deviation AROM 3x10 3x10 3x10 3x10 3x10 3x10 3x10 3x10 3x10    Ther Activity                                       Gait Training                                       Modalities

## 2023-01-16 NOTE — PROGRESS NOTES
Slight Rt eye droop, no pain of Rt hand/wrist  Denies diff breathing, swallowing  VS stable for discharge

## 2023-01-17 NOTE — TELEPHONE ENCOUNTER
S/w pt who states that she has very little pain after procedure yesterday  Pt feels that PT went well and that she only had difficulty with one exercise  Pt still has weakness of her right arm  Pt feels that all of her symptoms from yesterday have subsided  Denies hoarseness, difficulty swallowing,neck swelling or a lump in her throat  Pt is eating normally      F/u [facility] scheduled with [facility] at [facility]

## 2023-01-20 ENCOUNTER — OFFICE VISIT (OUTPATIENT)
Dept: OCCUPATIONAL THERAPY | Age: 63
End: 2023-01-20

## 2023-01-20 DIAGNOSIS — M72.0 DUPUYTREN'S DISEASE OF PALM OF RIGHT HAND: Primary | ICD-10-CM

## 2023-01-20 NOTE — PROGRESS NOTES
Daily Note     Today's date: 2023  Patient name: Drake Ozuna  : 1960  MRN: [id number]  Referring provider: Jared Posadas MD  Dx: No diagnosis found  Subjective: ***      Objective: See treatment diary below      Assessment: Tolerated treatment {Tolerated treatment :7809732734}   Patient {assessment:[id number]}      Plan: {PLAN:6063919934}     Precautions: COPD    Manuals 10/20 10/27 11/3 11/10 11/21 12/8 12/22 12/29 1/16    Dupuytren's STM             EPB/APL STM             R wrist PROM             Median n glide                          Neuro Re-Ed             Median n glide 3x10 2x10 pain Held 2/2 time  3x10 3x10   PT (A) 2x10    Scap retraction 3x10 3x10 3x10 3x10 3x10 3x10 3x10 3x10 3x10    Cervical retraction 3x10 3x10 3x10 3x10 3x10 3x10 3x10 3x10 3x10    Ther Ex             R wrist flex/ext AROM 2# 2x10 3x10   3x10 0# 3x10 1# 3x10 1# 3x10 3x10 3x10    R wrist sup/pron AROM 2# 2x10 3x10 no weight Held 2/2 time 3x10 0# 3x10 1# 3x10 1# 3x10 3x10 3x10    Digiflex Soft ball squeezes 3x10 Soft ball squeezes 3x10 Soft ball squeezes 3x10 3x10 Soft ball squeezes 3x10 R digiflex 3x10       Thumb extension iso 3x10 3x10 3x10  3x10 3x10 3x10 3x10 3x10    Thumb extension AROM 3x10 3x10 3x10 3x10 3x10 3x10 3x10 3x10 3x10    Thumb extension rubber band resisted 3x10 3x10 3x10 Blue 3x10 3x10 3x10       Thumb retropulsion AROM 3x10 3x10 3x10  3x10 3x10 3x10 3x10 3x10    Thumb abduction AROM 3x10 3x10 3x10 3x10 3x10 3x10 3x10 3x10 3x10    Thumb abduction rubber band resisted 3x10 3x10 3x10  3x10 3x10       Thumb flexion AROM 3x10 1x10 pain 3x10 3x10 3x10 3x10 3x10 3x10 3x10    Thumb flexion w/ ulnar deviation 3x10 3x10 3x10 3x10 3x10 3x10 3x10 3x10 Held pain 1x6    Pronated radial deviation AROM 3x10 3x10 3x10 3x10 3x10 3x10 3x10 3x10 3x10    Ther Activity                                       Gait Training                                       Modalities

## 2023-01-20 NOTE — PROGRESS NOTES
Daily Note     Today's date: 2023  Patient name: Torri Paulson  : 1960  MRN: [id number]  Referring provider: Vicky Molina MD  Dx:   Encounter Diagnosis     ICD-10-CM    1  Dupuytren's disease of palm of right hand  M72 0                 Subjective: It was hurting yesterday  Objective: See treatment diary below      Assessment: Pt did well with therapy today  Still reports numbness/tingling in her incision and some pain with manual mobilization  Tolerated vibration on the scar well, helped to decrease hypertrophic scarring  Slight pain with extrinisic ext stretching  Updated HEP to include different (Forearm based median) nerve glides for her numbness/tingling  Plan: Continue per plan of care        Precautions: COPD         Precautions: s/p R hand dupuytren's nodule excision 22  HEP: Stretching and MNGs  Manuals 12/27 1/4 1/6 1/10 1/12 1/16 1/20      STM     8 8 8      MNG     7 7                                 Neuro Re-Ed                                                                                                        Ther Ex             Tendon glides x10     `       PROM digits 10 sec x5  5 5 5 5 5      Hook roll   Pencil 10s x 10 10x10s pencil 10x10s pencil        Grasp    GPW 3x10,red digicizer 3x10 GPW 3x10,red digicizer 3x10 GPW 3x10,red digicizer 3x10, RPB 3x10 WF/WE/S/P GPW 3x10,red digicizer 3x10, RPB 3x10 WF/WE/S/P      Reverse blockig   3x10 3x10 3x10 3x 10 3x10      AROM wrist  x10            PROM wrist flex/ext 10 sec x5            Prayer stretch 10 sec x5            EDC  sm rb x 30 sm rb x 30 lrg rb x 25 lrg rb x 30 y gummy 3x10 y gummy 3x10      Ther Activity             Translation  Coins x 30 Coins x 30 KP kp KP       Opposition                                                    Modalities             Moist heat 5' 5 5 5 5 5 5

## 2023-01-23 ENCOUNTER — TELEPHONE (OUTPATIENT)
Dept: RADIOLOGY | Facility: MEDICAL CENTER | Age: 63
End: 2023-01-23

## 2023-01-24 ENCOUNTER — OFFICE VISIT (OUTPATIENT)
Dept: OCCUPATIONAL THERAPY | Age: 63
End: 2023-01-24

## 2023-01-24 DIAGNOSIS — M72.0 DUPUYTREN'S DISEASE OF PALM OF RIGHT HAND: Primary | ICD-10-CM

## 2023-01-24 NOTE — PROGRESS NOTES
Daily Note     Today's date: 2023  Patient name: Armani Viramontes  : 1960  MRN: [id number]  Referring provider: Chris Mendez MD  Dx:   Encounter Diagnosis     ICD-10-CM    1  Dupuytren's disease of palm of right hand  M72 0                      Subjective: "My hand is stiff"      Objective: See treatment diary below      Assessment: Stil presents with irritation from hypertrophic scarring, mild intrinsic tightness  Overall progressing well  Plan: Continue per plan of care        Precautions: COPD         Precautions: s/p R hand dupuytren's nodule excision 22  HEP: Stretching and MNGs  Manuals 12/27 1/4 1/6 1/10 1/12 1/16 1/20 1/24     STM     8 8 8      MNG     7 7                                 Neuro Re-Ed                                                                                                        Ther Ex             Tendon glides x10     `       PROM digits 10 sec x5  5 5 5 5 5 5     Hook roll   Pencil 10s x 10 10x10s pencil 10x10s pencil   10x10s                               Wall walking        Tb x10     Grasp    GPW 3x10,red digicizer 3x10 GPW 3x10,red digicizer 3x10 GPW 3x10,red digicizer 3x10, RPB 3x10 WF/WE/S/P GPW 3x10,red digicizer 3x10, RPB 3x10 WF/WE/S/P GPW 3x10,red digicizer 3x10, RPB 3x10 WF/WE/S/P     Reverse blockig   3x10 3x10 3x10 3x 10 3x10 20     AROM wrist  x10            PROM wrist flex/ext 10 sec x5            Prayer stretch 10 sec x5            EDC  sm rb x 30 sm rb x 30 lrg rb x 25 lrg rb x 30 y gummy 3x10 y gummy 3x10 y gummy 3x10     Ther Activity             Translation  Coins x 30 Coins x 30 KP kp KP       Opposition                                                    Modalities             Moist heat 5' 5 5 5 5 5 5

## 2023-01-26 ENCOUNTER — OFFICE VISIT (OUTPATIENT)
Dept: OCCUPATIONAL THERAPY | Age: 63
End: 2023-01-26

## 2023-01-26 DIAGNOSIS — M72.0 DUPUYTREN'S DISEASE OF PALM OF RIGHT HAND: Primary | ICD-10-CM

## 2023-01-26 NOTE — PROGRESS NOTES
Daily Note     Today's date: 2023  Patient name: Maite Okeefe  : 1960  MRN: [id number]  Referring provider: Ami Zhang MD  Dx:   Encounter Diagnosis     ICD-10-CM    1  Dupuytren's disease of palm of right hand  M72 0                      Subjective: "It's stiff"      Objective: See treatment diary below      Assessment: Good tolerance, no pain    Plan: Continue per plan of care        Precautions: COPD         Precautions: s/p R hand dupuytren's nodule excision 22  HEP: Stretching and MNGs  Manuals 12/27 1/4 1/6 1/10 1/12 1/16 1/20 1/24 1/26    STM     8 8 8      MNG     7 7       Myofacial release         100                 Neuro Re-Ed                                                                                                        Ther Ex             Tendon glides x10     `       PROM digits 10 sec x5  5 5 5 5 5 5 5    Hook roll   Pencil 10s x 10 10x10s pencil 10x10s pencil   10x10s 10x10s                              Wall walking        Tb x10 Tb x 10    Grasp    GPW 3x10,red digicizer 3x10 GPW 3x10,red digicizer 3x10 GPW 3x10,red digicizer 3x10, RPB 3x10 WF/WE/S/P GPW 3x10,red digicizer 3x10, RPB 3x10 WF/WE/S/P GPW 3x10,red digicizer 3x10, RPB 3x10 WF/WE/S/P BPW 3x10,red digicizer 3x10, RPB 3x10 WF/WE/S/P    Reverse blockig   3x10 3x10 3x10 3x 10 3x10 20 20    AROM wrist  x10            PROM wrist flex/ext 10 sec x5            Prayer stretch 10 sec x5            EDC  sm rb x 30 sm rb x 30 lrg rb x 25 lrg rb x 30 y gummy 3x10 y gummy 3x10 y gummy 3x10 y gummy 3x10    Ther Activity             Translation  Coins x 30 Coins x 30 KP kp KP       Opposition                                                    Modalities             Moist heat 5' 5 5 5 5 5 5

## 2023-01-30 DIAGNOSIS — J43.9 PULMONARY EMPHYSEMA, UNSPECIFIED EMPHYSEMA TYPE (HCC): ICD-10-CM

## 2023-01-30 RX ORDER — FLUTICASONE PROPIONATE AND SALMETEROL 100; 50 UG/1; UG/1
POWDER RESPIRATORY (INHALATION)
Qty: 180 BLISTER | Refills: 4 | Status: SHIPPED | OUTPATIENT
Start: 2023-01-30

## 2023-01-31 ENCOUNTER — OFFICE VISIT (OUTPATIENT)
Dept: OCCUPATIONAL THERAPY | Age: 63
End: 2023-01-31

## 2023-01-31 DIAGNOSIS — M72.0 DUPUYTREN'S DISEASE OF PALM OF RIGHT HAND: Primary | ICD-10-CM

## 2023-01-31 NOTE — PROGRESS NOTES
Daily Note     Today's date: 2023  Patient name: Gal Holliday  : 1960  MRN: [id number]  Referring provider: Yaz Baum MD  Dx:   Encounter Diagnosis     ICD-10-CM    1  Dupuytren's disease of palm of right hand  M72 0                      Subjective: "It gets stiff"      Objective: See treatment diary below      Assessment: Adjusted activities to focus on desensitization to tolerate grasp patterns  Plan: Progress note during next visit        Precautions: COPD         Precautions: s/p R hand dupuytren's nodule excision 22  HEP: Stretching and MNGs  Manuals 12/27 1/4 1/6 1/10 1/12 1/16 1/20 1/24 1/26 1/31   STM     8 8 8      MNG     7 7       Myofacial release         10    Sensory re-ed          PP x 5m, vibration 5m   Neuro Re-Ed                                                                                                        Ther Ex             Tendon glides x10     `       PROM digits 10 sec x5  5 5 5 5 5 5 5 5   Hook roll   Pencil 10s x 10 10x10s pencil 10x10s pencil   10x10s 10x10s 10x10s                             Wall walking        Tb x10 Tb x 10 Tb x10   Grasp    GPW 3x10,red digicizer 3x10 GPW 3x10,red digicizer 3x10 GPW 3x10,red digicizer 3x10, RPB 3x10 WF/WE/S/P GPW 3x10,red digicizer 3x10, RPB 3x10 WF/WE/S/P GPW 3x10,red digicizer 3x10, RPB 3x10 WF/WE/S/P BPW 3x10,red [id number], Rr 3PB 3x10 WF/WE/S/P BPW 3x10,red [id number], Rr 3PB 3x10 WF/WE/S/P   Reverse blockig   3x10 3x10 3x10 3x 10 3x10 20 20    AROM wrist  x10            PROM wrist flex/ext 10 sec x5            Prayer stretch 10 sec x5            EDC  sm rb x 30 sm rb x 30 lrg rb x 25 lrg rb x 30 y gummy 3x10 y gummy 3x10 y gummy 3x10 y gummy 3x10 O Gummy 3x10   Ther Activity             Translation  Coins x 30 Coins x 30 KP kp KP       Opposition                                                    Modalities             Moist heat 5' 5 5 5 5 5 5   5

## 2023-02-02 ENCOUNTER — EVALUATION (OUTPATIENT)
Dept: OCCUPATIONAL THERAPY | Age: 63
End: 2023-02-02

## 2023-02-02 DIAGNOSIS — M72.0 DUPUYTREN'S DISEASE OF PALM OF RIGHT HAND: Primary | ICD-10-CM

## 2023-02-02 NOTE — PROGRESS NOTES
Daily Note     Today's date: 2023  Patient name: Rick Dietz  : 1960  MRN: [id number]  Referring provider: Briseida Rosado MD  Dx:   Encounter Diagnosis     ICD-10-CM    1  Dupuytren's disease of palm of right hand  M72 0                      Subjective: "It's getting better"      Objective: See treatment diary below      Assessment: See RE    Plan: Potential discharge next visit       Precautions: COPD         Precautions: s/p R hand dupuytren's nodule excision 22  HEP: Stretching and MNGs  Manuals 2/2 1/4 1/6 1/10 1/12 1/16 1/20 1/24 1/26 1/31   STM     8 8 8      MNG     7 7       Myofacial release         10    Sensory re-ed          PP x 5m, vibration 5m   Neuro Re-Ed                                                                                                        Ther Ex             Tendon glides      `       PROM digits   5 5 5 5 5 5 5 5   Hook roll   Pencil 10s x 10 10x10s pencil 10x10s pencil   10x10s 10x10s 10x10s                             Wall walking        Tb x10 Tb x 10 Tb x10   Grasp iso wrist 4 planes 3x 10 RPB, sustained g2 4x 30s; deviation 1# with lid 5/5   GPW 3x10,red digicizer 3x10 GPW 3x10,red digicizer 3x10 GPW 3x10,red digicizer 3x10, RPB 3x10 WF/WE/S/P GPW 3x10,red digicizer 3x10, RPB 3x10 WF/WE/S/P GPW 3x10,red digicizer 3x10, RPB 3x10 WF/WE/S/P BPW 3x10,red [id number], Rr 3PB 3x10 WF/WE/S/P BPW 3x10,red [id number], Rr 3PB 3x10 WF/WE/S/P   Reverse blockig   3x10 3x10 3x10 3x 10 3x10 20 20    AROM wrist              PROM wrist flex/ext             Prayer stretch             EDC  sm rb x 30 sm rb x 30 lrg rb x 25 lrg rb x 30 y gummy 3x10 y gummy 3x10 y gummy 3x10 y gummy 3x10 O Gummy 3x10   Ther Activity             Translation  Coins x 30 Coins x 30 KP kp KP       Opposition                                                    Modalities             Moist heat  5 5 5 5 5 5   5

## 2023-02-02 NOTE — PROGRESS NOTES
OT Reevaluation    Today's date: 2023  Patient name: Malka Carrizales  : 1960  MRN: [id number]  Referring provider: Jordon Temple MD  Dx:   Encounter Diagnosis     ICD-10-CM    1  Dupuytren's disease of palm of right hand  M72 0                      Assessment  Assessment details: Laurie Malave demonstrates significant AROM and Pain improvements  She reports and demonstrates continued weakness with ADLs and IADLs  She would benefit from continued tx  Impairments: abnormal or restricted ROM, activity intolerance, impaired physical strength, lacks appropriate home exercise program, pain with function and weight-bearing intolerance  Understanding of Dx/Px/POC: good   Prognosis: good    Goals  Short term goals:    STG) Grasp strength will improve 15-25% to assist with cooking tasks in 2 weeks      Patient to demonstrate understanding of home exercise program in 2 weeks for decreased pain with activities of daily living MET  Patient to demonstrate increased active range of motion of wrist to 45/45 degrees in 4 weeks to aid in showering MET  Patient to demonstrate increased  strength to 15 lbs in 4 weeks to increase  on full cup MET  Patient to demonstrate decreased edema by 0 8 cm in 4 weeks to increase range of motion for dressing MET  Patient to increase composite digital flexion to touch distal elizalde crease in 4 weeks to aid in holding utensils MET  Patient to report a decrease in pain by at least 1 point on a 0-10 scale in 2 weeks to aid in dressing MET    Long term goals:  Patient to demonstrate functional active range of motion for independent ADL's by time of discharge  Patient to demonstrate functional strength for independent ADL's by time of discharge  Patient to demonstrate understanding of final discharge home exercise program by time of discharge    Plan  Patient would benefit from: OT eval and skilled occupational therapy  Planned modality interventions: ultrasound, thermotherapy: hydrocollator packs and fluidotherapy  Planned therapy interventions: joint mobilization, manual therapy, massage, strengthening, stretching, therapeutic activities, therapeutic exercise, fine motor coordination training, flexibility, functional ROM exercises, home exercise program, activity modification, neuromuscular re-education, patient education, graded activity and graded exercise  Frequency: 2x week  Duration in weeks: 10  Plan of Care beginning date: 2022  Plan of Care expiration date: 3/7/2023  Treatment plan discussed with: patient        Subjective Evaluation    History of Present Illness  Date of surgery: 2022  Mechanism of injury: Patient reported that she fell in April and that she believes that she began growing nodules after this fall  Patient underwent dupuytren's nodule excision right hand on 22  Patient reported that she had four nodules that were removed  She was referred to occupational therapy for range of motion exercises  "I get swelling  I get a little numbness in the finger tips  I haven't picked up anything " Patient reported tightness in her palm with range of motion  Patient reported that she has been utilizing her left hand more than her right  "It can do some things, but it causes more pain " Patient presented with guarding behaviors of right hand     Pain  Current pain ratin  At worst pain ratin  Location: right hand  Quality: radiating and tight (shock)  Relieving factors: ice  Aggravating factors: lifting    Social Support    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   Hobbies: crocheting)  Hand dominance: right    Patient Goals  Patient goals for therapy: decreased pain, increased motion, increased strength, return to work and independence with ADLs/IADLs  Patient goal: To be able to straighten the fingers        Objective     Active Range of Motion     Right Wrist   Wrist flexion: 65 degrees   Wrist extension: 60 degrees Left Digits    Flexion   Index     MCP: 74    PIP: 106    DIP: 46  Middle     MCP: 74    PIP: 104    DIP: 60  Ring     MCP: 64    PIP: 102    DIP: 58  Little     MCP: 80    PIP: 94    DIP: 60  Extension   Index     MCP: -4  Middle     PIP: -8    Right Digits   Flexion   Index     MCP: 72    PIP: 100    DIP: 50  Middle     MCP: 52    PIP: 70    DIP: 30  Ring     MCP: 80    PIP: 95    DIP: 44  Little     MCP: 72    PIP: 92    DIP: 68  Extension   Index     MCP: 0    PIP: -2    DIP: 0  Middle     MCP: -6    PIP: 0    DIP: 4  Ring     MCP: -2    PIP: -4    DIP: 0  Little     MCP: 14    PIP: 8    DIP: 0    Strength/Myotome Testing     Left Wrist/Hand      (2nd hand position)     Trial 1: 43 3    Right Wrist/Hand      (2nd hand position)     Trial 1: 17 3             Precautions: s/p R hand dupuytren's nodule excision 12/13/22    Manuals 12/27            STM                                                    Neuro Re-Ed                                                                                                        Ther Ex             Tendon glides x10            PROM digits 10 sec x5            BROM digits x10            Digit ext/abd x10            Thumb opp x10            AROM wrist  x10            PROM wrist flex/ext 10 sec x5            Prayer stretch 10 sec x5                         Ther Activity             Translation             Opposition                                                    Modalities             Moist heat 5'

## 2023-02-07 ENCOUNTER — OFFICE VISIT (OUTPATIENT)
Dept: OCCUPATIONAL THERAPY | Age: 63
End: 2023-02-07

## 2023-02-07 DIAGNOSIS — M72.0 DUPUYTREN'S DISEASE OF PALM OF RIGHT HAND: Primary | ICD-10-CM

## 2023-02-07 NOTE — PROGRESS NOTES
Daily Note     Today's date: 2023  Patient name: Casey Child  : 1960  MRN: [id number]  Referring provider: Kaycee Pack MD  Dx:   Encounter Diagnosis     ICD-10-CM    1  Dupuytren's disease of palm of right hand  M72 0           Start Time: 730  Stop Time: 0810  Total time in clinic (min): 40 minutes    Subjective: Pt reports its been feeling really tender the last few days  Also reported some difficulty straightening the MF/RF  Objective: See treatment diary below  SF: Dip- 50   Pip-98    Mp- 90    Flexion    Assessment: Client did well during therapy today  Continuing to work on  strength, specifically in S/P for opening jars  Reported soreness after each activity  Had to downgrade from G2 to G1 for sustained grasp  Brought back the EDC activity to work on tightness in the digits with ext  Plan: Continue per plan of care        Precautions: COPD  Precautions: s/p R hand dupuytren's nodule excision 22  HEP: Stretching and MNGs  Manuals /2 2/7 1/6 1/10 1/12 1/16 1/20 1/24 1/26 1/31   STM     8 8 8      MNG     7 7       Myofacial release         10    Sensory re-ed          PP x 5m, vibration 5m   Neuro Re-Ed                                                                                                        Ther Ex             Tendon glides      `       PROM digits   5 5 5 5 5 5 5 5   Hook roll   Pencil 10s x 10 10x10s pencil 10x10s pencil   10x10s 10x10s 10x10s                             Wall walking        Tb x10 Tb x 10 Tb x10   Grasp iso wrist planes4  3x RPB, sustained g2 4x 30s;10  deviation 1# with lid 5/5 iso wrist planes4 [id number] RPB, sustained g1 4x 30s; 15 deviation 1 # with lid; GPW S/P iso  GPW 3x10,red digicizer 3x10 GPW 3x10,red digicizer 3x10 GPW 3x10,red digicizer 3x10, RPB 3x10 WF/WE/S/P GPW 3x10,red digicizer 3x10, RPB 3x10 WF/WE/S/P GPW 3x10,red digicizer 3x10, RPB 3x10 WF/WE/S/P BPW 3x10,red [id number], Rr 3PB 3x10 WF/WE/S/P BPW 3x10,red [id number], Rr 3PB 3x10 WF/WE/S/P   Reverse blockig   3x10 3x10 3x10 3x 10 3x10 20 20    AROM wrist              PROM wrist flex/ext             Prayer stretch             EDC  O Gummy 3x10 sm rb x 30 lrg rb x 25 lrg rb x 30 y gummy 3x10 y gummy 3x10 y gummy 3x10 y gummy 3x10 O Gummy 3x10   Ther Activity             Translation   Coins x 30 KP kp KP       Opposition                                                    Modalities             Moist heat  5 5 5 5 5 5   5

## 2023-02-07 NOTE — TELEPHONE ENCOUNTER
Caller: Hu, [address] [facility]  CB#:[phone]    % of improvement: 50  Pain Scale (1-10): 1-2

## 2023-02-08 ENCOUNTER — OFFICE VISIT (OUTPATIENT)
Dept: OBGYN CLINIC | Facility: HOSPITAL | Age: 63
End: 2023-02-08

## 2023-02-08 VITALS
BODY MASS INDEX: 23 KG/M2 | HEIGHT: 62 IN | SYSTOLIC BLOOD PRESSURE: 123 MMHG | WEIGHT: 125 LBS | DIASTOLIC BLOOD PRESSURE: 76 MMHG | HEART RATE: 76 BPM

## 2023-02-08 DIAGNOSIS — M72.0 DUPUYTREN'S DISEASE OF PALM OF RIGHT HAND: Primary | ICD-10-CM

## 2023-02-08 NOTE — PROGRESS NOTES
Assessment:   S/P right hand dupuytrens nodule excision - Right on 12/13/2022    Plan:   Patient instructed to be more aggressive with her stretching  Patient instructed in home stretching program    Follow Up:  PRN        CHIEF COMPLAINT:  Chief Complaint   Patient presents with   • Right Hand - Post-op     S/P right hand dupuytrens nodule excision - 12/13/2022         SUBJECTIVE:  Bret Ac is a 58 y o  female who presents for follow up 8 weeks after right hand dupuytrens nodule excision - Right on 12/13/2022  Patient is attending therapy as instructed         PHYSICAL EXAMINATION:  Vital signs: /76   Pulse 76   Ht 5' 2" (1 575 m)   Wt 56 7 kg (125 lb)   BMI 22 86 kg/m²   General: well developed and well nourished, alert, oriented times 3 and appears comfortable  Psychiatric: Normal    MUSCULOSKELETAL EXAMINATION:  Incision: Clean, dry, intact, healed and palpable scar tissue  Range of Motion: Limited due to stiffness  Can achieve full passive extension  Neurovascular status: Neuro intact, good cap refill  Activity Restrictions: No restrictions         STUDIES REVIEWED:  No Studies to review      PROCEDURES PERFORMED:  Procedures  No Procedures performed today   Scribe Attestation    I,:  Sylvie Velasquez am acting as a scribe while in the presence of the attending physician :       I,:  Jacque Mims MD personally performed the services described in this documentation    as scribed in my presence :

## 2023-02-08 NOTE — LETTER
February 8, 2023     Patient: Leda Costello  YOB: 1960  Date of Visit: 2/8/2023      To Whom it May Concern:    Sony Padilla is under my professional care  Zacarias Landeros was seen in my office on 2/8/2023  Zacarias Landeros may return to full duty 2/13/23  If you have any questions or concerns, please don't hesitate to call           Sincerely,          Sherri Downs MD        CC: No Recipients

## 2023-02-09 ENCOUNTER — OFFICE VISIT (OUTPATIENT)
Dept: OCCUPATIONAL THERAPY | Age: 63
End: 2023-02-09

## 2023-02-09 DIAGNOSIS — Z98.890 S/P MUSCULOSKELETAL SYSTEM SURGERY: Primary | ICD-10-CM

## 2023-02-09 NOTE — PROGRESS NOTES
Daily Note     Today's date: 2023  Patient name: Tuyet Brown  : 1960  MRN: [id number]  Referring provider: Rima Bennett MD  Dx:   Encounter Diagnosis     ICD-10-CM    1  S/P musculoskeletal system surgery  [id number]           Start Time: 0730  Stop Time: 0800  Total time in clinic (min): 30 minutes    Subjective: "They released me today "      Objective: See treatment diary below      Assessment: Pt did well during therapy today  Pt is continuing to progress her strength and her ROM is [facility]  Reported feeling minimal strain during PREs  Pt no longer requires skilled services  She should continue with HEP of stretching and putty strengthening         Plan: D/C     Precautions: COPD  Precautions: s/p R hand dupuytren's nodule excision 22  HEP: Stretching and MNGs  Manuals 2/2 2/7 2/9 1/10 1/12 1/16 1/20 1/24 1/26 1/31   STM     8 8 8      MNG     7 7       Myofacial release         10    Sensory re-ed          PP x 5m, vibration 5m   Neuro Re-Ed                                                                                                        Ther Ex             Tendon glides      `       PROM digits    5 5 5 5 5 5 5   Hook roll    10x10s pencil 10x10s pencil   10x10s 10x10s 10x10s                             Wall walking        Tb x10 Tb x 10 Tb x10   Grasp iso wrist planes4  3x RPB, sustained g2 4x 30s;10  deviation 1# with lid 5/5 iso wrist planes4 [id number] RPB, sustained g1 4x 30s; 15 deviation 1 # with lid; GPW S/P iso RPB 4 planes 3x10    sustained G2 4x 30s;    15 dev with 1#    GPW S/P 3x10 iso GPW 3x10,red digicizer 3x10 GPW 3x10,red digicizer 3x10 GPW 3x10,red digicizer 3x10, RPB 3x10 WF/WE/S/P GPW 3x10,red digicizer 3x10, RPB 3x10 WF/WE/S/P GPW 3x10,red digicizer 3x10, RPB 3x10 WF/WE/S/P BPW 3x10,red [id number], Rr 3PB 3x10 WF/WE/S/P BPW 3x10,red [id number], Rr 3PB 3x10 WF/WE/S/P   Reverse blockig    3x10 3x10 3x 10 3x10 20 20    AROM wrist              PROM wrist flex/ext Prayer stretch             EDC  O Gummy 3x10 O Gummy 3x10 lrg rb x 25 lrg rb x 30 y gummy 3x10 y gummy 3x10 y gummy 3x10 y gummy 3x10 O Gummy 3x10   Ther Activity             Translation    KP kp KP       Opposition                                                    Modalities             Moist heat  5 5 5 5 5 5   5

## 2023-02-14 DIAGNOSIS — Z00.00 ANNUAL PHYSICAL EXAM: ICD-10-CM

## 2023-02-14 RX ORDER — NICOTINE 21 MG/24HR
1 PATCH, TRANSDERMAL 24 HOURS TRANSDERMAL EVERY 24 HOURS
Qty: 28 PATCH | Refills: 1 | Status: SHIPPED | OUTPATIENT
Start: 2023-02-14 | End: 2023-04-15

## 2023-02-20 ENCOUNTER — OFFICE VISIT (OUTPATIENT)
Dept: PAIN MEDICINE | Facility: CLINIC | Age: 63
End: 2023-02-20

## 2023-02-20 VITALS
WEIGHT: 125 LBS | BODY MASS INDEX: 23 KG/M2 | HEIGHT: 62 IN | RESPIRATION RATE: 18 BRPM | DIASTOLIC BLOOD PRESSURE: 72 MMHG | SYSTOLIC BLOOD PRESSURE: 118 MMHG | HEART RATE: 76 BPM

## 2023-02-20 DIAGNOSIS — G90.511 COMPLEX REGIONAL PAIN SYNDROME TYPE 1 OF RIGHT UPPER EXTREMITY: ICD-10-CM

## 2023-02-20 DIAGNOSIS — M79.641 RIGHT HAND PAIN: ICD-10-CM

## 2023-02-20 DIAGNOSIS — G89.4 CHRONIC PAIN SYNDROME: Primary | ICD-10-CM

## 2023-02-20 RX ORDER — DULOXETIN HYDROCHLORIDE 30 MG/1
30 CAPSULE, DELAYED RELEASE ORAL DAILY
Qty: 30 CAPSULE | Refills: 2 | Status: SHIPPED | OUTPATIENT
Start: 2023-02-20

## 2023-02-20 NOTE — LETTER
February 20, 2023     Patient: Sam Walleres  YOB: 1960  Date of Visit: 2/20/2023      To Whom it May Concern:    Thomas Reid is under my professional care  Halliezev Sim was seen in my office on 2/20/2023  Hallie Sim may return to work on 2/20/2023  If you have any questions or concerns, please don't hesitate to call           Sincerely,          MARVA Haque        CC: Sam Plata
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
## 2023-04-03 DIAGNOSIS — R20.0 NUMBNESS AND TINGLING OF BOTH FEET: ICD-10-CM

## 2023-04-03 DIAGNOSIS — M54.42 CHRONIC RIGHT-SIDED LOW BACK PAIN WITH BILATERAL SCIATICA: ICD-10-CM

## 2023-04-03 DIAGNOSIS — R20.2 NUMBNESS AND TINGLING OF BOTH FEET: ICD-10-CM

## 2023-04-03 DIAGNOSIS — M54.41 CHRONIC RIGHT-SIDED LOW BACK PAIN WITH BILATERAL SCIATICA: ICD-10-CM

## 2023-04-03 DIAGNOSIS — G89.29 CHRONIC RIGHT-SIDED LOW BACK PAIN WITH BILATERAL SCIATICA: ICD-10-CM

## 2023-04-03 RX ORDER — GABAPENTIN 300 MG/1
CAPSULE ORAL
Qty: 90 CAPSULE | Refills: 5 | Status: SHIPPED | OUTPATIENT
Start: 2023-04-03

## 2023-04-05 DIAGNOSIS — J43.9 PULMONARY EMPHYSEMA, UNSPECIFIED EMPHYSEMA TYPE (HCC): ICD-10-CM

## 2023-04-06 RX ORDER — MONTELUKAST SODIUM 10 MG/1
TABLET ORAL
Qty: 90 TABLET | Refills: 4 | Status: SHIPPED | OUTPATIENT
Start: 2023-04-06

## 2023-04-14 PROBLEM — Z01.818 PRE-OP EVALUATION: Status: RESOLVED | Noted: 2022-12-08 | Resolved: 2023-04-14

## 2023-04-14 PROBLEM — M79.605 PAIN OF LEFT LOWER EXTREMITY: Status: ACTIVE | Noted: 2023-04-14

## 2023-04-14 PROBLEM — Z78.0 MENOPAUSE: Status: RESOLVED | Noted: 2017-05-04 | Resolved: 2023-04-14

## 2023-04-14 PROBLEM — M79.631 RIGHT FOREARM PAIN: Status: RESOLVED | Noted: 2020-11-09 | Resolved: 2023-04-14

## 2023-05-17 DIAGNOSIS — E78.5 DYSLIPIDEMIA: ICD-10-CM

## 2023-05-17 RX ORDER — ATORVASTATIN CALCIUM 40 MG/1
TABLET, FILM COATED ORAL
Qty: 90 TABLET | Refills: 3 | Status: SHIPPED | OUTPATIENT
Start: 2023-05-17

## 2023-06-11 DIAGNOSIS — Z00.00 ANNUAL PHYSICAL EXAM: ICD-10-CM

## 2023-06-12 RX ORDER — NICOTINE 21 MG/24HR
1 PATCH, TRANSDERMAL 24 HOURS TRANSDERMAL EVERY 24 HOURS
Qty: 28 PATCH | Refills: 1 | Status: SHIPPED | OUTPATIENT
Start: 2023-06-12 | End: 2023-08-11

## 2023-07-17 ENCOUNTER — OFFICE VISIT (OUTPATIENT)
Dept: INTERNAL MEDICINE CLINIC | Facility: CLINIC | Age: 63
End: 2023-07-17

## 2023-07-17 ENCOUNTER — HOSPITAL ENCOUNTER (OUTPATIENT)
Dept: RADIOLOGY | Facility: HOSPITAL | Age: 63
Discharge: HOME | End: 2023-07-17
Payer: COMMERCIAL

## 2023-07-17 VITALS
HEART RATE: 89 BPM | OXYGEN SATURATION: 97 % | BODY MASS INDEX: 23.41 KG/M2 | WEIGHT: 128 LBS | TEMPERATURE: 98.6 F | DIASTOLIC BLOOD PRESSURE: 80 MMHG | SYSTOLIC BLOOD PRESSURE: 131 MMHG

## 2023-07-17 DIAGNOSIS — M25.562 CHRONIC PAIN OF LEFT KNEE: ICD-10-CM

## 2023-07-17 DIAGNOSIS — M25.552 LEFT HIP PAIN: ICD-10-CM

## 2023-07-17 DIAGNOSIS — G90.511 COMPLEX REGIONAL PAIN SYNDROME TYPE 1 OF RIGHT UPPER EXTREMITY: ICD-10-CM

## 2023-07-17 DIAGNOSIS — M54.42 CHRONIC RIGHT-SIDED LOW BACK PAIN WITH BILATERAL SCIATICA: ICD-10-CM

## 2023-07-17 DIAGNOSIS — G89.29 CHRONIC PAIN OF LEFT KNEE: ICD-10-CM

## 2023-07-17 DIAGNOSIS — M54.41 CHRONIC RIGHT-SIDED LOW BACK PAIN WITH BILATERAL SCIATICA: ICD-10-CM

## 2023-07-17 DIAGNOSIS — G89.29 CHRONIC RIGHT-SIDED LOW BACK PAIN WITH BILATERAL SCIATICA: ICD-10-CM

## 2023-07-17 DIAGNOSIS — M72.0 DUPUYTREN'S DISEASE OF PALM OF RIGHT HAND: ICD-10-CM

## 2023-07-17 DIAGNOSIS — M25.552 LEFT HIP PAIN: Primary | ICD-10-CM

## 2023-07-17 PROCEDURE — 73502 X-RAY EXAM HIP UNI 2-3 VIEWS: CPT

## 2023-07-17 PROCEDURE — 73562 X-RAY EXAM OF KNEE 3: CPT

## 2023-07-17 PROCEDURE — 99214 OFFICE O/P EST MOD 30 MIN: CPT | Performed by: PHYSICIAN ASSISTANT

## 2023-07-17 RX ORDER — DULOXETIN HYDROCHLORIDE 60 MG/1
60 CAPSULE, DELAYED RELEASE ORAL DAILY
Qty: 90 CAPSULE | Refills: 1 | Status: SHIPPED | OUTPATIENT
Start: 2023-07-17

## 2023-07-17 RX ORDER — CELECOXIB 200 MG/1
200 CAPSULE ORAL DAILY
Qty: 30 CAPSULE | Refills: 2 | Status: SHIPPED | OUTPATIENT
Start: 2023-07-17

## 2023-07-17 NOTE — PROGRESS NOTES
Name: Kahlil Diaz      : 1960      MRN: [id number]  Encounter Provider: Dina Starkey PA-C  Encounter Date: 2023   Encounter department: [address]     1. Left hip pain  Assessment & Plan:  Due to persistency of complaints, will evaluate further with x-rays. Recommend supportive treatment. Warm/cool compresses. Stretch throughout the day. Start celebrex 200 mg daily. She had this previously and remembers it helping. Do not take with any other NSAIDs. Continue gabapentin 300 mg TID. We also reviewed that Cymbalta can help with chronic pain. She was agreeable to increasing it to 60 mg daily. Reviewed possible side effects. May take 4-6 weeks to reach maximum efficacy. Recommend recheck in 2 months, sooner if needed. Orders:  -     XR hip/pelv 2-3 vws left if performed; Future; Expected date: 2023    2. Chronic pain of left knee  Assessment & Plan: Will evaluate further with x-ray due to persistency of complaints. No prior left knee imaging. See plan of care for hip. Orders:  -     XR knee 3 vw left non injury; Future; Expected date: 2023    3. Dupuytren's disease of palm of right hand  Assessment & Plan:  History of Dupuytren's disease right hand. She mentioned sometimes her hand cramps up and feel stiff. Will also have a different sensation that she can not describe. I reviewed that she did complete PT/OT for this, but it ended a few months ago. She states this did help, but does not want to do it again. She has not been doing the stretches and exercises they taught her at home. States she will work on this and if she feels no improvement, is open to formal PT/OT. 4. Complex regional pain syndrome type 1 of right upper extremity  Assessment & Plan:  History of chronic back and complex regional pain. She was following with pain management.  With her multiple complaints related to pain, recommend a follow up with pain management as well as scheduling a physical with PCP. Orders:  -     DULoxetine (CYMBALTA) 60 mg delayed release capsule; Take 1 capsule (60 mg total) by mouth daily    5. Chronic right-sided low back pain with bilateral sciatica  Assessment & Plan:  See above. Orders:  -     celecoxib (CeleBREX) 200 mg capsule; Take 1 capsule (200 mg total) by mouth daily         Subjective      Patient is a 45-year-old female presenting for a follow up on pain. She states she has been having left leg pain for approximately 2-3 months. States it started after moving heavy boxes down her stairs. It starts in her left outer hip and radiates to her knee. States she is having pain in her knee as well. She rates both pain as approximately 4-5 out of 10. States it is a throbbing achy pain. Occasional numbness and tingling. States pain is worse with standing and walking. Pain is constant, but sometimes worse than others. She has been taking her normal medications with no relief. She can not name them by name, just states whatever is in her record. States she has also tried Epsom salt and warm compresses with no improvement. This is never happened before. Review of Systems   Constitutional: Negative for chills and fever. Respiratory: Negative for cough, shortness of breath and wheezing. Cardiovascular: Negative for chest pain, palpitations and leg swelling. Gastrointestinal: Negative for abdominal pain, diarrhea, nausea and vomiting. Musculoskeletal: Positive for arthralgias and myalgias. Negative for back pain, gait problem and joint swelling. Skin: Negative for rash. Neurological: Positive for numbness. Negative for dizziness, weakness, light-headedness and headaches. Hematological: Negative for adenopathy.        Current Outpatient Medications on File Prior to Visit   Medication Sig   • acetaminophen (TYLENOL) 650 mg CR tablet Take 1 tablet (650 mg total) by mouth every 8 (eight) hours as needed for mild pain   • albuterol (PROVENTIL HFA,VENTOLIN HFA) 90 mcg/act inhaler Inhale 2 puffs every 6 (six) hours as needed for wheezing or shortness of breath   • Ascorbic Acid (vitamin C) 1000 MG tablet Take 1,000 mg by mouth daily   • aspirin 81 mg chewable tablet Chew 1 tablet (81 mg total) daily   • atorvastatin (LIPITOR) 40 mg tablet TAKE 1 TABLET BY MOUTH EVERY DAY   •  Ultra Strength 50 MCG (2000 UT) CAPS TAKE 1 CAPSULE BY MOUTH EVERY DAY   • fluticasone (FLONASE) 50 mcg/act nasal spray SPRAY 2 SPRAYS INTO EACH NOSTRIL EVERY DAY   • gabapentin (NEURONTIN) 300 mg capsule TAKE 1 CAPSULE BY MOUTH THREE TIMES A DAY   • metaxalone (SKELAXIN) 400 MG tablet TAKE 1 TABLET 3 TIMES A DAY AS NEEDED FOR MUSCLE PAIN,MAY CAUSE DROWSINESS   • montelukast (SINGULAIR) 10 mg tablet TAKE 1 TABLET BY MOUTH EVERY DAY   • Multiple Vitamins-Minerals (HAIR SKIN AND NAILS FORMULA PO) Take by mouth   • nicotine (NICODERM CQ) 14 mg/24hr TD 24 hr patch PLACE 1 PATCH ON THE SKIN EVERY 24 HOURS. • Sodium Sulfate-Mag Sulfate-KCl (Sutab) 7189-400-630 MG TABS Lot - [id number]  Exp- 3/2022 (Patient taking differently: Take by mouth in the morning Lot - [id number]  Exp- 3/2022)   • Thiamine HCl (vitamin B-1) 250 MG tablet Take 250 mg by mouth daily   • Wixela Inhub 100-50 MCG/ACT inhaler INHALE 1 PUFF 2 TIMES A DAY. RINSE MOUTH AFTER USE. Objective     /80 (BP Location: Right arm, Patient Position: Sitting, Cuff Size: Standard)   Pulse 89   Temp 98.6 °F (37 °C) (Temporal)   Wt 58.1 kg (128 lb)   SpO2 97%   BMI 23.41 kg/m²     Physical Exam  Vitals and nursing note reviewed. Constitutional:       General: She is awake. She is not in acute distress. Appearance: Normal appearance. She is well-developed, well-groomed and normal weight. She is not ill-appearing. HENT:      Head: Normocephalic and atraumatic.    Eyes:      Conjunctiva/sclera: Conjunctivae normal.   Cardiovascular:      Rate and Rhythm: Normal rate and regular rhythm. Pulses: Normal pulses. Heart sounds: Normal heart sounds. No murmur heard. Pulmonary:      Effort: Pulmonary effort is normal. No respiratory distress. Breath sounds: Normal breath sounds. No wheezing, rhonchi or rales. Musculoskeletal:         General: Normal range of motion. Cervical back: Neck supple. Lumbar back: Normal. Negative right straight leg raise test and negative left straight leg raise test.      Right hip: Normal.      Left hip: Tenderness (left inner and outer hip) present. No deformity, lacerations, bony tenderness or crepitus. Normal range of motion. Normal strength. Right upper leg: Normal.      Left upper leg: Normal.      Right knee: Normal.      Left knee: Crepitus present. No swelling, deformity, effusion, erythema, ecchymosis, lacerations or bony tenderness. Normal range of motion. Tenderness present over the patellar tendon. No medial joint line, lateral joint line, MCL, LCL, ACL or PCL tenderness. No LCL laxity, MCL laxity, ACL laxity or PCL laxity. Normal alignment, normal meniscus and normal patellar mobility. Normal pulse. Instability Tests: Anterior drawer test negative. Posterior drawer test negative. Anterior Lachman test negative. Medial Russ test negative and lateral Russ test negative. Right lower leg: Normal. No edema. Left lower leg: Normal. No edema. Lymphadenopathy:      Cervical: No cervical adenopathy. Skin:     General: Skin is warm. Coloration: Skin is not cyanotic, jaundiced, mottled or pale. Findings: No ecchymosis, erythema, petechiae, rash or wound. Neurological:      General: No focal deficit present. Mental Status: She is alert and oriented to person, place, and time. Mental status is at baseline. Sensory: Sensation is intact. Motor: Motor function is intact. Coordination: Coordination is intact. Gait: Gait abnormal (antalgic).    Psychiatric:         Attention and Perception: Attention normal.         Mood and Affect: Mood and affect normal.         Speech: Speech normal.         Behavior: Behavior normal. Behavior is cooperative.          Cognition and Memory: Cognition normal.       Jordyn Mendez PA-C

## 2023-07-19 PROBLEM — M25.552 LEFT HIP PAIN: Status: ACTIVE | Noted: 2023-07-19

## 2023-07-19 PROBLEM — G89.29 CHRONIC PAIN OF LEFT KNEE: Status: ACTIVE | Noted: 2023-07-19

## 2023-07-19 PROBLEM — M25.562 CHRONIC PAIN OF LEFT KNEE: Status: ACTIVE | Noted: 2023-07-19

## 2023-07-19 PROBLEM — M72.0 DUPUYTREN'S DISEASE OF PALM OF RIGHT HAND: Status: ACTIVE | Noted: 2023-07-19

## 2023-07-19 NOTE — ASSESSMENT & PLAN NOTE
History of Dupuytren's disease right hand. She mentioned sometimes her hand cramps up and feel stiff. Will also have a different sensation that she can not describe. I reviewed that she did complete PT/OT for this, but it ended a few months ago. She states this did help, but does not want to do it again. She has not been doing the stretches and exercises they taught her at home. States she will work on this and if she feels no improvement, is open to formal PT/OT.

## 2023-07-19 NOTE — ASSESSMENT & PLAN NOTE
Due to persistency of complaints, will evaluate further with x-rays. Recommend supportive treatment. Warm/cool compresses. Stretch throughout the day. Start celebrex 200 mg daily. She had this previously and remembers it helping. Do not take with any other NSAIDs. Continue gabapentin 300 mg TID. We also reviewed that Cymbalta can help with chronic pain. She was agreeable to increasing it to 60 mg daily. Reviewed possible side effects. May take 4-6 weeks to reach maximum efficacy. Recommend recheck in 2 months, sooner if needed.

## 2023-07-19 NOTE — ASSESSMENT & PLAN NOTE
History of chronic back and complex regional pain. She was following with pain management. With her multiple complaints related to pain, recommend a follow up with pain management as well as scheduling a physical with PCP.

## 2023-07-19 NOTE — ASSESSMENT & PLAN NOTE
Will evaluate further with x-ray due to persistency of complaints. No prior left knee imaging. See plan of care for hip.

## 2023-08-04 DIAGNOSIS — Z00.00 ANNUAL PHYSICAL EXAM: ICD-10-CM

## 2023-08-07 RX ORDER — NICOTINE 21 MG/24HR
1 PATCH, TRANSDERMAL 24 HOURS TRANSDERMAL EVERY 24 HOURS
Qty: 28 PATCH | Refills: 1 | Status: SHIPPED | OUTPATIENT
Start: 2023-08-07 | End: 2023-10-06

## 2023-08-14 ENCOUNTER — TELEPHONE (OUTPATIENT)
Dept: INTERNAL MEDICINE CLINIC | Facility: CLINIC | Age: 63
End: 2023-08-14

## 2023-08-14 DIAGNOSIS — E55.9 VITAMIN D DEFICIENCY: ICD-10-CM

## 2023-08-14 DIAGNOSIS — Z13.820 ENCOUNTER FOR SCREENING FOR OSTEOPOROSIS: Primary | ICD-10-CM

## 2023-08-25 DIAGNOSIS — J30.9 ALLERGIC SINUSITIS: ICD-10-CM

## 2023-08-25 RX ORDER — FLUTICASONE PROPIONATE 50 MCG
SPRAY, SUSPENSION (ML) NASAL
Qty: 16 ML | Refills: 3 | Status: SHIPPED | OUTPATIENT
Start: 2023-08-25

## 2023-09-21 ENCOUNTER — HOSPITAL ENCOUNTER (OUTPATIENT)
Dept: BONE DENSITY | Facility: HOSPITAL | Age: 63
End: 2023-09-21
Payer: COMMERCIAL

## 2023-09-21 VITALS — BODY MASS INDEX: 24.56 KG/M2 | WEIGHT: 130.07 LBS | HEIGHT: 61 IN

## 2023-09-21 DIAGNOSIS — E55.9 VITAMIN D DEFICIENCY: ICD-10-CM

## 2023-09-21 DIAGNOSIS — Z13.820 ENCOUNTER FOR SCREENING FOR OSTEOPOROSIS: ICD-10-CM

## 2023-09-21 PROCEDURE — 77080 DXA BONE DENSITY AXIAL: CPT

## 2023-09-25 ENCOUNTER — OFFICE VISIT (OUTPATIENT)
Dept: INTERNAL MEDICINE CLINIC | Facility: CLINIC | Age: 63
End: 2023-09-25

## 2023-09-25 ENCOUNTER — APPOINTMENT (OUTPATIENT)
Dept: LAB | Facility: CLINIC | Age: 63
End: 2023-09-25
Payer: COMMERCIAL

## 2023-09-25 VITALS
HEIGHT: 61 IN | WEIGHT: 130 LBS | SYSTOLIC BLOOD PRESSURE: 104 MMHG | DIASTOLIC BLOOD PRESSURE: 67 MMHG | TEMPERATURE: 98.1 F | BODY MASS INDEX: 24.55 KG/M2 | HEART RATE: 80 BPM

## 2023-09-25 DIAGNOSIS — M54.50 CHRONIC BILATERAL LOW BACK PAIN WITHOUT SCIATICA: ICD-10-CM

## 2023-09-25 DIAGNOSIS — G89.29 CHRONIC BILATERAL LOW BACK PAIN WITHOUT SCIATICA: ICD-10-CM

## 2023-09-25 DIAGNOSIS — M85.88 OSTEOPENIA OF LUMBAR SPINE: ICD-10-CM

## 2023-09-25 DIAGNOSIS — E78.5 DYSLIPIDEMIA: ICD-10-CM

## 2023-09-25 DIAGNOSIS — Z00.00 ANNUAL PHYSICAL EXAM: Primary | ICD-10-CM

## 2023-09-25 LAB
25(OH)D3 SERPL-MCNC: 70.2 NG/ML (ref 30–100)
ALBUMIN SERPL BCP-MCNC: 4.6 G/DL (ref 3.5–5)
ALP SERPL-CCNC: 102 U/L (ref 34–104)
ALT SERPL W P-5'-P-CCNC: 14 U/L (ref 7–52)
ANION GAP SERPL CALCULATED.3IONS-SCNC: 7 MMOL/L
AST SERPL W P-5'-P-CCNC: 15 U/L (ref 13–39)
BASOPHILS # BLD AUTO: 0.04 THOUSANDS/ÂΜL (ref 0–0.1)
BASOPHILS NFR BLD AUTO: 0 % (ref 0–1)
BILIRUB SERPL-MCNC: 0.64 MG/DL (ref 0.2–1)
BUN SERPL-MCNC: 18 MG/DL (ref 5–25)
CALCIUM SERPL-MCNC: 10.1 MG/DL (ref 8.4–10.2)
CHLORIDE SERPL-SCNC: 103 MMOL/L (ref 96–108)
CHOLEST SERPL-MCNC: 212 MG/DL
CO2 SERPL-SCNC: 30 MMOL/L (ref 21–32)
CREAT SERPL-MCNC: 0.68 MG/DL (ref 0.6–1.3)
EOSINOPHIL # BLD AUTO: 0.15 THOUSAND/ÂΜL (ref 0–0.61)
EOSINOPHIL NFR BLD AUTO: 1 % (ref 0–6)
ERYTHROCYTE [DISTWIDTH] IN BLOOD BY AUTOMATED COUNT: 12.8 % (ref 11.6–15.1)
EST. AVERAGE GLUCOSE BLD GHB EST-MCNC: 126 MG/DL
GFR SERPL CREATININE-BSD FRML MDRD: 93 ML/MIN/1.73SQ M
GLUCOSE P FAST SERPL-MCNC: 79 MG/DL (ref 65–99)
HBA1C MFR BLD: 6 %
HCT VFR BLD AUTO: 43.7 % (ref 34.8–46.1)
HDLC SERPL-MCNC: 44 MG/DL
HGB BLD-MCNC: 15 G/DL (ref 11.5–15.4)
IMM GRANULOCYTES # BLD AUTO: 0.06 THOUSAND/UL (ref 0–0.2)
IMM GRANULOCYTES NFR BLD AUTO: 0 % (ref 0–2)
LDLC SERPL CALC-MCNC: 151 MG/DL (ref 0–100)
LYMPHOCYTES # BLD AUTO: 1.87 THOUSANDS/ÂΜL (ref 0.6–4.47)
LYMPHOCYTES NFR BLD AUTO: 13 % (ref 14–44)
MCH RBC QN AUTO: 32.8 PG (ref 26.8–34.3)
MCHC RBC AUTO-ENTMCNC: 34.3 G/DL (ref 31.4–37.4)
MCV RBC AUTO: 95 FL (ref 82–98)
MONOCYTES # BLD AUTO: 1.25 THOUSAND/ÂΜL (ref 0.17–1.22)
MONOCYTES NFR BLD AUTO: 9 % (ref 4–12)
NEUTROPHILS # BLD AUTO: 10.82 THOUSANDS/ÂΜL (ref 1.85–7.62)
NEUTS SEG NFR BLD AUTO: 77 % (ref 43–75)
NONHDLC SERPL-MCNC: 168 MG/DL
NRBC BLD AUTO-RTO: 0 /100 WBCS
PLATELET # BLD AUTO: 341 THOUSANDS/UL (ref 149–390)
PMV BLD AUTO: 10.7 FL (ref 8.9–12.7)
POTASSIUM SERPL-SCNC: 4.5 MMOL/L (ref 3.5–5.3)
PROT SERPL-MCNC: 7.8 G/DL (ref 6.4–8.4)
RBC # BLD AUTO: 4.58 MILLION/UL (ref 3.81–5.12)
SODIUM SERPL-SCNC: 140 MMOL/L (ref 135–147)
TRIGL SERPL-MCNC: 84 MG/DL
WBC # BLD AUTO: 14.19 THOUSAND/UL (ref 4.31–10.16)

## 2023-09-25 PROCEDURE — 99213 OFFICE O/P EST LOW 20 MIN: CPT | Performed by: INTERNAL MEDICINE

## 2023-09-25 PROCEDURE — 36415 COLL VENOUS BLD VENIPUNCTURE: CPT

## 2023-09-25 PROCEDURE — 80061 LIPID PANEL: CPT

## 2023-09-25 PROCEDURE — 83036 HEMOGLOBIN GLYCOSYLATED A1C: CPT

## 2023-09-25 PROCEDURE — 80053 COMPREHEN METABOLIC PANEL: CPT

## 2023-09-25 PROCEDURE — 99396 PREV VISIT EST AGE 40-64: CPT | Performed by: INTERNAL MEDICINE

## 2023-09-25 PROCEDURE — 82306 VITAMIN D 25 HYDROXY: CPT

## 2023-09-25 PROCEDURE — 85025 COMPLETE CBC W/AUTO DIFF WBC: CPT

## 2023-09-25 RX ORDER — IBUPROFEN 200 MG
1 CAPSULE ORAL DAILY
Qty: 30 TABLET | Refills: 2 | Status: SHIPPED | OUTPATIENT
Start: 2023-09-25

## 2023-09-25 RX ORDER — METAXALONE 400 MG/1
400 TABLET ORAL 2 TIMES DAILY PRN
Qty: 60 TABLET | Refills: 0 | Status: SHIPPED | OUTPATIENT
Start: 2023-09-25 | End: 2023-10-03 | Stop reason: ALTCHOICE
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
## 2023-09-28 ENCOUNTER — OFFICE VISIT (OUTPATIENT)
Dept: PAIN MEDICINE | Facility: CLINIC | Age: 63
End: 2023-09-28
Payer: COMMERCIAL

## 2023-09-28 VITALS
WEIGHT: 130 LBS | HEART RATE: 82 BPM | SYSTOLIC BLOOD PRESSURE: 140 MMHG | BODY MASS INDEX: 24.56 KG/M2 | DIASTOLIC BLOOD PRESSURE: 82 MMHG

## 2023-09-28 DIAGNOSIS — G90.511 COMPLEX REGIONAL PAIN SYNDROME TYPE 1 OF RIGHT UPPER EXTREMITY: Primary | ICD-10-CM

## 2023-09-28 DIAGNOSIS — M72.0 DUPUYTREN'S DISEASE OF PALM OF RIGHT HAND: ICD-10-CM

## 2023-09-28 DIAGNOSIS — M17.12 OSTEOARTHRITIS OF LEFT KNEE, UNSPECIFIED OSTEOARTHRITIS TYPE: ICD-10-CM

## 2023-09-28 DIAGNOSIS — Z00.00 ANNUAL PHYSICAL EXAM: ICD-10-CM

## 2023-09-28 PROCEDURE — 99214 OFFICE O/P EST MOD 30 MIN: CPT | Performed by: PHYSICAL MEDICINE & REHABILITATION

## 2023-09-28 RX ORDER — GABAPENTIN 600 MG/1
600 TABLET ORAL 3 TIMES DAILY
Qty: 90 TABLET | Refills: 2 | Status: SHIPPED | OUTPATIENT
Start: 2023-09-28 | End: 2023-09-28

## 2023-09-28 RX ORDER — NICOTINE 21 MG/24HR
1 PATCH, TRANSDERMAL 24 HOURS TRANSDERMAL EVERY 24 HOURS
Qty: 28 PATCH | Refills: 1 | Status: SHIPPED | OUTPATIENT
Start: 2023-09-28 | End: 2023-11-27

## 2023-09-28 RX ORDER — GABAPENTIN 600 MG/1
600 TABLET ORAL 3 TIMES DAILY
Qty: 90 TABLET | Refills: 2 | Status: SHIPPED | OUTPATIENT
Start: 2023-09-28
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
## 2023-09-29 ENCOUNTER — TELEPHONE (OUTPATIENT)
Dept: INTERNAL MEDICINE CLINIC | Facility: CLINIC | Age: 63
End: 2023-09-29

## 2023-09-29 NOTE — TELEPHONE ENCOUNTER
Received fax for patient metaxalone needing prior auth. Prior auth started and faxed thru covermymeds Key: [id number], will f/u in 2 business days.

## 2023-10-02 DIAGNOSIS — D72.829 LEUKOCYTOSIS, UNSPECIFIED TYPE: ICD-10-CM

## 2023-10-02 DIAGNOSIS — E78.5 DYSLIPIDEMIA: Primary | ICD-10-CM

## 2023-10-02 NOTE — RESULT ENCOUNTER NOTE
I attempted to contact patient regarding her recent blood work but she was not available to answer the phone. I left a voice message for patient that this was a non-urgent call to review recent blood work. Please attempt to inform patient that the results of her recent blood work showed that her total cholesterol and LDL (bad cholesterol) were high. Her A1c was also elevated at 6.0% indicating prediabetes. White blood cells were also slightly elevated which we will monitor. Please recommend for patient to take her prescribed statin daily. She should also reduce amount of foods she consumes that are high in fat, including animal based products like meat and whole dairy. I would encourage patient to participate in moderate to high intensity exercise at least three times per week. Please inform patient that we will need to repeat her blood work in 3 months to check her cholesterol and white blood cells. If no improvement in cholesterol we will talk about increasing her statin medication. Thank you.

## 2023-10-03 DIAGNOSIS — G89.29 CHRONIC BILATERAL LOW BACK PAIN WITHOUT SCIATICA: Primary | ICD-10-CM

## 2023-10-03 DIAGNOSIS — M54.50 CHRONIC BILATERAL LOW BACK PAIN WITHOUT SCIATICA: Primary | ICD-10-CM

## 2023-10-03 RX ORDER — CYCLOBENZAPRINE HCL 5 MG
5 TABLET ORAL 3 TIMES DAILY PRN
Qty: 30 TABLET | Refills: 0 | Status: SHIPPED | OUTPATIENT
Start: 2023-10-03

## 2023-10-03 NOTE — TELEPHONE ENCOUNTER
Prior auth denied, they want patient to try and fail their formulary medications.     Forulary medication include,     Baclofen, flexeril, methocarbamol, and tizanidine

## 2023-10-05 ENCOUNTER — PROCEDURE VISIT (OUTPATIENT)
Dept: PAIN MEDICINE | Facility: CLINIC | Age: 63
End: 2023-10-05
Payer: COMMERCIAL

## 2023-10-05 ENCOUNTER — TELEPHONE (OUTPATIENT)
Age: 63
End: 2023-10-05

## 2023-10-05 VITALS
SYSTOLIC BLOOD PRESSURE: 128 MMHG | BODY MASS INDEX: 24.56 KG/M2 | DIASTOLIC BLOOD PRESSURE: 81 MMHG | WEIGHT: 130 LBS | HEART RATE: 83 BPM

## 2023-10-05 DIAGNOSIS — G89.29 CHRONIC PAIN OF LEFT KNEE: Primary | ICD-10-CM

## 2023-10-05 DIAGNOSIS — M25.562 CHRONIC PAIN OF LEFT KNEE: Primary | ICD-10-CM

## 2023-10-05 PROCEDURE — 20611 DRAIN/INJ JOINT/BURSA W/US: CPT | Performed by: PHYSICAL MEDICINE & REHABILITATION

## 2023-10-05 RX ORDER — BUPIVACAINE HYDROCHLORIDE 2.5 MG/ML
10 INJECTION, SOLUTION EPIDURAL; INFILTRATION; INTRACAUDAL ONCE
Status: COMPLETED | OUTPATIENT
Start: 2023-10-05 | End: 2023-10-05

## 2023-10-05 RX ORDER — METHYLPREDNISOLONE ACETATE 40 MG/ML
40 INJECTION, SUSPENSION INTRA-ARTICULAR; INTRALESIONAL; INTRAMUSCULAR; SOFT TISSUE ONCE
Status: COMPLETED | OUTPATIENT
Start: 2023-10-05 | End: 2023-10-05

## 2023-10-05 RX ADMIN — METHYLPREDNISOLONE ACETATE 40 MG: 40 INJECTION, SUSPENSION INTRA-ARTICULAR; INTRALESIONAL; INTRAMUSCULAR; SOFT TISSUE at 13:44

## 2023-10-05 RX ADMIN — BUPIVACAINE HYDROCHLORIDE 10 ML: 2.5 INJECTION, SOLUTION EPIDURAL; INFILTRATION; INTRACAUDAL at 13:44

## 2023-10-05 NOTE — TELEPHONE ENCOUNTER
Caller: Toña Barry    Doctor: Caprice Mills    Reason for call: Pt says she had a missed call from the schedule coordinator transferred her over    Call back#: [phone]

## 2023-10-05 NOTE — PROGRESS NOTES
Indication: Knee pain  Preprocedure diagnosis: 1. Knee arthropathy  2. Knee pain  Postprocedure diagnosis: 1. Knee arthropathy  2. Knee pain  Procedure: Ultrasound guided left knee joint injection  After discussing the risks, benefits, and alternatives to the procedure, the patient expressed understanding and wished to proceed. The patient was brought to the procedure suite and placed in the supine position with a bolster under the knee. A procedural pause was conducted to verify: correct patient identity, procedure to be performed and as applicable, correct side and site, correct patient position, and availability of implants, special equipment or special requirements. A simple surgical tray was used. Prior to the procedure, the knee was examined with a 12 MHz linear transducer to visualize the suprapatellar recess of the knee joint and determine the optimal needle path. Following this, the area was prepared with a ChloraPrep scrub, then re-examined using the same transducer, a sterile ultrasound transducer cover, and sterile ultrasound transducer gel. Thereafter, using continuous ultrasound guidance, a 2.5-inch 25-gauge needle was advanced into the knee joint in the suprapatellar recess of the joint space. After visualization of the tip and negative aspiration for blood, a mixture of 40 mg Depo-Medrol and 3 mL of 0.25% bupivacaine was injected into the knee joint. Following the injection, the needle was withdrawn. The patient tolerated the procedure well and there were no apparent complications. After an appropriate amount of observation, the patient was dismissed from the clinic in good condition under their own power.

## 2023-10-12 ENCOUNTER — TELEPHONE (OUTPATIENT)
Dept: PAIN MEDICINE | Facility: CLINIC | Age: 63
End: 2023-10-12

## 2023-10-20 NOTE — TELEPHONE ENCOUNTER
Caller: fernanda    Doctor: kathryn    Reason for call: 90% improvement    Call back#: [phone]

## 2023-12-06 DIAGNOSIS — G89.29 CHRONIC RIGHT-SIDED LOW BACK PAIN WITH BILATERAL SCIATICA: ICD-10-CM

## 2023-12-06 DIAGNOSIS — M54.41 CHRONIC RIGHT-SIDED LOW BACK PAIN WITH BILATERAL SCIATICA: ICD-10-CM

## 2023-12-06 DIAGNOSIS — M54.42 CHRONIC RIGHT-SIDED LOW BACK PAIN WITH BILATERAL SCIATICA: ICD-10-CM

## 2023-12-06 RX ORDER — CELECOXIB 200 MG/1
200 CAPSULE ORAL DAILY
Qty: 30 CAPSULE | Refills: 2 | Status: SHIPPED | OUTPATIENT
Start: 2023-12-06

## 2024-01-08 DIAGNOSIS — G90.511 COMPLEX REGIONAL PAIN SYNDROME TYPE 1 OF RIGHT UPPER EXTREMITY: ICD-10-CM

## 2024-01-09 RX ORDER — DULOXETIN HYDROCHLORIDE 60 MG/1
60 CAPSULE, DELAYED RELEASE ORAL DAILY
Qty: 30 CAPSULE | Refills: 5 | Status: SHIPPED | OUTPATIENT
Start: 2024-01-09

## 2024-01-24 ENCOUNTER — LAB (OUTPATIENT)
Dept: LAB | Facility: CLINIC | Age: 64
End: 2024-01-24
Payer: COMMERCIAL

## 2024-01-24 DIAGNOSIS — E78.5 DYSLIPIDEMIA: ICD-10-CM

## 2024-01-24 DIAGNOSIS — D72.829 LEUKOCYTOSIS, UNSPECIFIED TYPE: ICD-10-CM

## 2024-01-24 LAB
BASOPHILS # BLD AUTO: 0.03 THOUSANDS/ÂΜL (ref 0–0.1)
BASOPHILS NFR BLD AUTO: 1 % (ref 0–1)
CHOLEST SERPL-MCNC: 155 MG/DL
EOSINOPHIL # BLD AUTO: 0.09 THOUSAND/ÂΜL (ref 0–0.61)
EOSINOPHIL NFR BLD AUTO: 2 % (ref 0–6)
ERYTHROCYTE [DISTWIDTH] IN BLOOD BY AUTOMATED COUNT: 13.2 % (ref 11.6–15.1)
HCT VFR BLD AUTO: 41.7 % (ref 34.8–46.1)
HDLC SERPL-MCNC: 44 MG/DL
HGB BLD-MCNC: 13.6 G/DL (ref 11.5–15.4)
IMM GRANULOCYTES # BLD AUTO: 0.01 THOUSAND/UL (ref 0–0.2)
IMM GRANULOCYTES NFR BLD AUTO: 0 % (ref 0–2)
LDLC SERPL CALC-MCNC: 90 MG/DL (ref 0–100)
LYMPHOCYTES # BLD AUTO: 1.93 THOUSANDS/ÂΜL (ref 0.6–4.47)
LYMPHOCYTES NFR BLD AUTO: 32 % (ref 14–44)
MCH RBC QN AUTO: 31.3 PG (ref 26.8–34.3)
MCHC RBC AUTO-ENTMCNC: 32.6 G/DL (ref 31.4–37.4)
MCV RBC AUTO: 96 FL (ref 82–98)
MONOCYTES # BLD AUTO: 0.78 THOUSAND/ÂΜL (ref 0.17–1.22)
MONOCYTES NFR BLD AUTO: 13 % (ref 4–12)
NEUTROPHILS # BLD AUTO: 3.17 THOUSANDS/ÂΜL (ref 1.85–7.62)
NEUTS SEG NFR BLD AUTO: 52 % (ref 43–75)
NONHDLC SERPL-MCNC: 111 MG/DL
NRBC BLD AUTO-RTO: 0 /100 WBCS
PLATELET # BLD AUTO: 308 THOUSANDS/UL (ref 149–390)
PMV BLD AUTO: 10.9 FL (ref 8.9–12.7)
RBC # BLD AUTO: 4.35 MILLION/UL (ref 3.81–5.12)
TRIGL SERPL-MCNC: 106 MG/DL
WBC # BLD AUTO: 6.01 THOUSAND/UL (ref 4.31–10.16)

## 2024-01-24 PROCEDURE — 85025 COMPLETE CBC W/AUTO DIFF WBC: CPT

## 2024-01-24 PROCEDURE — 80061 LIPID PANEL: CPT

## 2024-01-24 PROCEDURE — 36415 COLL VENOUS BLD VENIPUNCTURE: CPT

## 2024-01-24 NOTE — RESULT ENCOUNTER NOTE
I reviewed patient's recent blood work.  Lipid panel shows desirable total cholesterol of 155, LDL at 90, HDL at 44.  Advised patient to adhere to dietary and exercise recommendations.  CBC largely unremarkable.  I called patient via telephone and informed her of these results and of my recommendations.  Advised her that we will continue to routinely check these labs.  Patient understood and had no further questions at this time.

## 2024-01-25 ENCOUNTER — OFFICE VISIT (OUTPATIENT)
Dept: INTERNAL MEDICINE CLINIC | Facility: CLINIC | Age: 64
End: 2024-01-25

## 2024-01-25 VITALS
HEART RATE: 91 BPM | BODY MASS INDEX: 25.68 KG/M2 | DIASTOLIC BLOOD PRESSURE: 84 MMHG | TEMPERATURE: 97.4 F | HEIGHT: 61 IN | SYSTOLIC BLOOD PRESSURE: 140 MMHG | WEIGHT: 136 LBS

## 2024-01-25 DIAGNOSIS — Z12.11 COLON CANCER SCREENING: ICD-10-CM

## 2024-01-25 DIAGNOSIS — R73.03 PRE-DIABETES: Primary | ICD-10-CM

## 2024-01-25 DIAGNOSIS — Z12.4 CERVICAL CANCER SCREENING: ICD-10-CM

## 2024-01-25 PROCEDURE — 99213 OFFICE O/P EST LOW 20 MIN: CPT | Performed by: INTERNAL MEDICINE

## 2024-01-25 NOTE — PATIENT INSTRUCTIONS
Prediabetes   WHAT YOU NEED TO KNOW:   What is prediabetes?  Prediabetes is a blood glucose (sugar) level that is higher than normal. It is not high enough to be considered diabetes. Prediabetes increases your risk for type 2 diabetes and heart disease. The risk is highest if you have high blood pressure or high cholesterol.  What increases my risk for prediabetes?   Excess body weight or obesity    Lack of physical activity    Older age    Family history of diabetes (parent or sibling)    A history of heart disease, gestational diabetes, or polycystic ovary syndrome (PCOS)    High blood pressure or cholesterol levels    Being , , ,  American, or     In children, having a mother with diabetes or gestational diabetes mellitus (GDM) during the pregnancy    What are the signs and symptoms of prediabetes?  Prediabetes may not cause any symptoms.  How is prediabetes diagnosed?  Blood tests can help diagnose prediabetes even if no signs or symptoms have started. This is also called screening. Adults who have excess body weight or obesity are usually tested every 3 years, starting at age 35. Your healthcare provider may recommend screening starting at an earlier or later age, depending on your overall risk for diabetes. Children who are at risk for diabetes may be tested. The following may be used to diagnose prediabetes:  A fasting plasma glucose test  may be done to check your blood sugar level after you have not eaten for 8 hours.    A 2-hour plasma glucose test  starts with a blood sugar level check after you have not eaten for 8 hours. You are then given a glucose drink. Your blood sugar level is checked after 2 hours.    A hemoglobin A1c  is a blood test that measures your average blood sugar level for the past 2 to 3 months. An A1c of 5.7% to 6.4% means you have prediabetes.    How do I prevent or delay type 2 diabetes?  Healthy choices work best to delay or  prevent type 2 diabetes. You may be given the following guidelines from your healthcare provider:  Get regular physical activity.  Adults should get at least 150 minutes (2.5 hours) of moderate physical activity every week. Spread the amount of activity over at least 3 days a week. Do not skip more than 2 days in a row. Children should get at least 60 minutes of moderate physical activity on most days of the week. Examples of moderate physical activity include brisk walking, running, and swimming. Do not sit for longer than 30 minutes at a time. Work with your healthcare provider to create a plan for physical activity.         Maintain a healthy body weight.  Your healthcare provider can tell you what a healthy weight is for you. Your provider can help you create a weight-loss plan, if needed. Even a weight loss of 3% to 7% of excess body weight can be helpful.    Eat a variety of healthy foods.  Examples include vegetables, fruit, whole-grains, low-fat dairy, healthy fats, fish, and lean meat or protein foods. Eat fewer sweets, such as candy, cookies, regular soda, and sweetened drinks. You can also decrease calories by eating smaller portion sizes. Work with your healthcare provider or dietitian to develop a meal plan that is right for you.         Take medicine as directed.  Your healthcare provider may give diabetes medicine if you are at high risk for diabetes. You may also need medicines for high blood pressure or high cholesterol.    Follow up with your healthcare provider as directed.  You will need to return every year to get tested for diabetes, if you have prediabetes. Your provider may also recommend counseling to help you make food or physical activity changes.    Do not smoke.  Do not use e-cigarettes or smokeless tobacco in place of cigarettes or to help you quit. They still contain nicotine. Ask your healthcare provider for information if you currently smoke and need help quitting.    Start with  small goals and work your way up.  For example, a goal may be to get 3 days of physical activity each week. You can add a day after 3 weeks or so of activity. A goal may also be safe and steady weight loss. Examples are losing 1 to 2 pounds each week or focusing on losing 5 pounds at a time.    When should I call my doctor?   You have more hunger or thirst than usual.    You are urinating more often than usual.    You are always exhausted.    You have blurred vision.    You have questions or concerns about your condition or care.    CARE AGREEMENT:   You have the right to help plan your care. Learn about your health condition and how it may be treated. Discuss treatment options with your healthcare providers to decide what care you want to receive. You always have the right to refuse treatment. The above information is an  only. It is not intended as medical advice for individual conditions or treatments. Talk to your doctor, nurse or pharmacist before following any medical regimen to see if it is safe and effective for you.  © Copyright [employer]ative 2023 Information is for End User's use only and may not be sold, redistributed or otherwise used for commercial purposes.

## 2024-01-25 NOTE — PROGRESS NOTES
Name: Camron Lui      : 1960      MRN: [id number]  Encounter Provider: Fiordaliza Dillon MD  Encounter Date: 2024   Encounter department: [facility]    Assessment & Plan     1. Pre-diabetes  A1c of 6.0   - dietary and lifestyle modifications discussed   - patient instructions given     2. Colon cancer screening  -     Cologuard    3. Cervical cancer screening  -     Ambulatory Referral to Obstetrics / Gynecology; Future           Subjective      HPI  62 yo F with hx COPD, tobacco use, HLD, chronic back pain presenting with lab follow-up. Patient recently had blood work done after starting lipitor and her lipid panel shows improvement. A1c obtained shows prediabetes and patient has many questions regarding what that is and what she can do to prevent diabetes. Counseled on dietary and lifeystyle modifications. Denies chest pain, abd pain, nausea, vomiting, sob. Continues to smoke 1/2 pack a day and is not interested in stopping.          Review of Systems   Constitutional:  Negative for chills and fever.   HENT:  Negative for ear pain and sore throat.    Eyes:  Negative for pain and visual disturbance.   Respiratory:  Negative for cough and shortness of breath.    Cardiovascular:  Negative for chest pain and palpitations.   Gastrointestinal:  Negative for abdominal pain and vomiting.   Genitourinary:  Negative for dysuria and hematuria.   Musculoskeletal:  Negative for arthralgias and back pain.   Skin:  Negative for color change and rash.   Neurological:  Negative for seizures and syncope.   All other systems reviewed and are negative.      Current Outpatient Medications on File Prior to Visit   Medication Sig    acetaminophen (TYLENOL) 650 mg CR tablet Take 1 tablet (650 mg total) by mouth every 8 (eight) hours as needed for mild pain    albuterol (PROVENTIL HFA,VENTOLIN HFA) 90 mcg/act inhaler Inhale 2 puffs every 6 (six) hours as needed for wheezing or  "shortness of breath    Ascorbic Acid (vitamin C) 1000 MG tablet Take 1,000 mg by mouth daily    aspirin 81 mg chewable tablet Chew 1 tablet (81 mg total) daily    atorvastatin (LIPITOR) 40 mg tablet TAKE 1 TABLET BY MOUTH EVERY DAY    calcium citrate (CALCITRATE) 950 (200 Ca) MG tablet Take 1 tablet (950 mg total) by mouth daily    celecoxib (CeleBREX) 200 mg capsule TAKE 1 CAPSULE BY MOUTH EVERY DAY    cyclobenzaprine (FLEXERIL) 5 mg tablet Take 1 tablet (5 mg total) by mouth 3 (three) times a day as needed for muscle spasms     Ultra Strength 50 MCG (2000 UT) CAPS TAKE 1 CAPSULE BY MOUTH EVERY DAY    DULoxetine (CYMBALTA) 60 mg delayed release capsule TAKE 1 CAPSULE BY MOUTH EVERY DAY    fluticasone (FLONASE) 50 mcg/act nasal spray SPRAY 2 SPRAYS INTO EACH NOSTRIL EVERY DAY    gabapentin (Neurontin) 600 MG tablet Take 1 tablet (600 mg total) by mouth 3 (three) times a day    montelukast (SINGULAIR) 10 mg tablet TAKE 1 TABLET BY MOUTH EVERY DAY    Multiple Vitamins-Minerals (HAIR SKIN AND NAILS FORMULA PO) Take by mouth    Sodium Sulfate-Mag Sulfate-KCl (Sutab) 6067-833-449 MG TABS Lot - [id number]  Exp- 3/2022 (Patient taking differently: Take by mouth in the morning Lot - [id number]  Exp- 3/2022)    Thiamine HCl (vitamin B-1) 250 MG tablet Take 250 mg by mouth daily    Wixela Inhub 100-50 MCG/ACT inhaler INHALE 1 PUFF 2 TIMES A DAY. RINSE MOUTH AFTER USE.       Objective     /84 (BP Location: Right arm, Patient Position: Sitting, Cuff Size: Large)   Pulse 91   Temp (!) 97.4 °F (36.3 °C) (Temporal)   Ht 5' 1\" (1.549 m)   Wt 61.7 kg (136 lb)   BMI 25.70 kg/m²     Physical Exam  Constitutional:       Appearance: Normal appearance.   HENT:      Nose: Nose normal.      Mouth/Throat:      Mouth: Mucous membranes are moist.      Pharynx: Oropharynx is clear.   Eyes:      Extraocular Movements: Extraocular movements intact.      Conjunctiva/sclera: Conjunctivae normal.   Cardiovascular:      Rate and Rhythm: " Normal rate and regular rhythm.   Pulmonary:      Effort: Pulmonary effort is normal.      Breath sounds: Normal breath sounds.   Abdominal:      General: Bowel sounds are normal.      Palpations: Abdomen is soft.   Musculoskeletal:         General: Normal range of motion.      Cervical back: Normal range of motion.      Right lower leg: No edema.      Left lower leg: No edema.   Neurological:      General: No focal deficit present.      Mental Status: She is alert and oriented to person, place, and time.      Cranial Nerves: No cranial nerve deficit.      Motor: No weakness.       Fiordaliza Dillon MD

## 2024-01-28 DIAGNOSIS — M85.88 OSTEOPENIA OF LUMBAR SPINE: ICD-10-CM

## 2024-01-30 RX ORDER — IBUPROFEN 200 MG
1 CAPSULE ORAL DAILY
Qty: 90 TABLET | Refills: 3 | Status: SHIPPED | OUTPATIENT
Start: 2024-01-30

## 2024-02-07 DIAGNOSIS — E55.9 VITAMIN D DEFICIENCY: ICD-10-CM

## 2024-02-07 RX ORDER — ACETAMINOPHEN 160 MG
2000 TABLET,DISINTEGRATING ORAL DAILY
Qty: 30 CAPSULE | Refills: 10 | Status: SHIPPED | OUTPATIENT
Start: 2024-02-07

## 2024-02-26 DIAGNOSIS — M54.41 CHRONIC RIGHT-SIDED LOW BACK PAIN WITH BILATERAL SCIATICA: ICD-10-CM

## 2024-02-26 DIAGNOSIS — M54.42 CHRONIC RIGHT-SIDED LOW BACK PAIN WITH BILATERAL SCIATICA: ICD-10-CM

## 2024-02-26 DIAGNOSIS — G89.29 CHRONIC RIGHT-SIDED LOW BACK PAIN WITH BILATERAL SCIATICA: ICD-10-CM

## 2024-02-29 RX ORDER — CELECOXIB 200 MG/1
200 CAPSULE ORAL DAILY
Qty: 30 CAPSULE | Refills: 2 | Status: SHIPPED | OUTPATIENT
Start: 2024-02-29

## 2024-04-02 LAB — COLOGUARD RESULT REPORTABLE: NEGATIVE

## 2024-04-22 DIAGNOSIS — G90.511 COMPLEX REGIONAL PAIN SYNDROME TYPE 1 OF RIGHT UPPER EXTREMITY: ICD-10-CM

## 2024-04-22 DIAGNOSIS — M17.12 OSTEOARTHRITIS OF LEFT KNEE, UNSPECIFIED OSTEOARTHRITIS TYPE: ICD-10-CM

## 2024-04-22 DIAGNOSIS — M72.0 DUPUYTREN'S DISEASE OF PALM OF RIGHT HAND: ICD-10-CM

## 2024-04-22 RX ORDER — GABAPENTIN 600 MG/1
600 TABLET ORAL 3 TIMES DAILY
Qty: 90 TABLET | Refills: 2 | Status: SHIPPED | OUTPATIENT
Start: 2024-04-22

## 2024-05-01 ENCOUNTER — OFFICE VISIT (OUTPATIENT)
Dept: OBGYN CLINIC | Facility: CLINIC | Age: 64
End: 2024-05-01

## 2024-05-01 ENCOUNTER — PATIENT OUTREACH (OUTPATIENT)
Dept: OBGYN CLINIC | Facility: CLINIC | Age: 64
End: 2024-05-01

## 2024-05-01 VITALS
BODY MASS INDEX: 24.62 KG/M2 | WEIGHT: 130.4 LBS | RESPIRATION RATE: 18 BRPM | SYSTOLIC BLOOD PRESSURE: 103 MMHG | HEIGHT: 61 IN | DIASTOLIC BLOOD PRESSURE: 70 MMHG | HEART RATE: 77 BPM

## 2024-05-01 DIAGNOSIS — Z12.4 CERVICAL CANCER SCREENING: ICD-10-CM

## 2024-05-01 DIAGNOSIS — Z01.419 ENCOUNTER FOR ANNUAL ROUTINE GYNECOLOGICAL EXAMINATION: Primary | ICD-10-CM

## 2024-05-01 PROBLEM — M25.552 LEFT HIP PAIN: Status: RESOLVED | Noted: 2023-07-19 | Resolved: 2024-05-01

## 2024-05-01 PROBLEM — M79.605 PAIN OF LEFT LOWER EXTREMITY: Status: RESOLVED | Noted: 2023-04-14 | Resolved: 2024-05-01

## 2024-05-01 PROCEDURE — 88175 CYTOPATH C/V AUTO FLUID REDO: CPT

## 2024-05-01 PROCEDURE — 87624 HPV HI-RISK TYP POOLED RSLT: CPT

## 2024-05-01 PROCEDURE — 99386 PREV VISIT NEW AGE 40-64: CPT | Performed by: OBSTETRICS & GYNECOLOGY

## 2024-05-01 PROCEDURE — 87491 CHLMYD TRACH DNA AMP PROBE: CPT

## 2024-05-01 PROCEDURE — 87591 N.GONORRHOEAE DNA AMP PROB: CPT
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
## 2024-05-02 LAB
HPV HR 12 DNA CVX QL NAA+PROBE: NEGATIVE
HPV16 DNA CVX QL NAA+PROBE: NEGATIVE
HPV18 DNA CVX QL NAA+PROBE: NEGATIVE

## 2024-05-03 LAB
C TRACH DNA SPEC QL NAA+PROBE: NEGATIVE
N GONORRHOEA DNA SPEC QL NAA+PROBE: NEGATIVE

## 2024-05-06 ENCOUNTER — APPOINTMENT (OUTPATIENT)
Dept: LAB | Facility: CLINIC | Age: 64
End: 2024-05-06
Payer: COMMERCIAL

## 2024-05-06 ENCOUNTER — OFFICE VISIT (OUTPATIENT)
Dept: INTERNAL MEDICINE CLINIC | Facility: CLINIC | Age: 64
End: 2024-05-06

## 2024-05-06 ENCOUNTER — TELEPHONE (OUTPATIENT)
Dept: PSYCHIATRY | Facility: CLINIC | Age: 64
End: 2024-05-06

## 2024-05-06 VITALS
DIASTOLIC BLOOD PRESSURE: 74 MMHG | HEART RATE: 87 BPM | WEIGHT: 131.25 LBS | BODY MASS INDEX: 24.78 KG/M2 | SYSTOLIC BLOOD PRESSURE: 137 MMHG | RESPIRATION RATE: 18 BRPM | TEMPERATURE: 98.6 F | HEIGHT: 61 IN | OXYGEN SATURATION: 96 %

## 2024-05-06 DIAGNOSIS — J30.2 SEASONAL ALLERGIC RHINITIS, UNSPECIFIED TRIGGER: Primary | ICD-10-CM

## 2024-05-06 DIAGNOSIS — J45.20 MILD INTERMITTENT ASTHMA WITHOUT COMPLICATION: ICD-10-CM

## 2024-05-06 DIAGNOSIS — Z01.419 ENCOUNTER FOR ANNUAL ROUTINE GYNECOLOGICAL EXAMINATION: ICD-10-CM

## 2024-05-06 DIAGNOSIS — J43.9 PULMONARY EMPHYSEMA, UNSPECIFIED EMPHYSEMA TYPE (HCC): ICD-10-CM

## 2024-05-06 DIAGNOSIS — F32.A DEPRESSION, UNSPECIFIED DEPRESSION TYPE: ICD-10-CM

## 2024-05-06 PROCEDURE — 86780 TREPONEMA PALLIDUM: CPT

## 2024-05-06 PROCEDURE — 87389 HIV-1 AG W/HIV-1&-2 AB AG IA: CPT

## 2024-05-06 PROCEDURE — 36415 COLL VENOUS BLD VENIPUNCTURE: CPT

## 2024-05-06 PROCEDURE — 86803 HEPATITIS C AB TEST: CPT

## 2024-05-06 PROCEDURE — 87340 HEPATITIS B SURFACE AG IA: CPT

## 2024-05-06 PROCEDURE — 99213 OFFICE O/P EST LOW 20 MIN: CPT | Performed by: HOSPITALIST

## 2024-05-06 RX ORDER — CETIRIZINE HYDROCHLORIDE 10 MG/1
10 TABLET ORAL DAILY
Qty: 30 TABLET | Refills: 3 | Status: SHIPPED | OUTPATIENT
Start: 2024-05-06

## 2024-05-06 RX ORDER — ALBUTEROL SULFATE 90 UG/1
2 AEROSOL, METERED RESPIRATORY (INHALATION) EVERY 6 HOURS PRN
Qty: 18 G | Refills: 2 | Status: SHIPPED | OUTPATIENT
Start: 2024-05-06

## 2024-05-06 RX ORDER — MONTELUKAST SODIUM 10 MG/1
10 TABLET ORAL DAILY
Qty: 90 TABLET | Refills: 4 | Status: SHIPPED | OUTPATIENT
Start: 2024-05-06
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
## 2024-05-06 NOTE — TELEPHONE ENCOUNTER
Reached out to patient in regards to routine referral and adding to proper wait list. Spoke with pt stated she was interested in Talk therapy no provider preference at the [place] location. Writer informed pt of current wait-list pt verbalized understanding and was placed accordingly on wait-list.     Closed referral.

## 2024-05-07 LAB
HBV SURFACE AG SER QL: NORMAL
HIV 1+2 AB+HIV1 P24 AG SERPL QL IA: NORMAL
HIV 2 AB SERPL QL IA: NORMAL
HIV1 AB SERPL QL IA: NORMAL
HIV1 P24 AG SERPL QL IA: NORMAL
TREPONEMA PALLIDUM IGG+IGM AB [PRESENCE] IN SERUM OR PLASMA BY IMMUNOASSAY: NORMAL

## 2024-05-08 ENCOUNTER — PATIENT OUTREACH (OUTPATIENT)
Dept: INTERNAL MEDICINE CLINIC | Facility: CLINIC | Age: 64
End: 2024-05-08

## 2024-05-08 DIAGNOSIS — Z78.9 NEEDS ASSISTANCE WITH COMMUNITY RESOURCES: Primary | ICD-10-CM

## 2024-05-08 LAB
HCV AB S/CO SERPL IA: NON REACTIVE
SL AMB INTERPRETATION: NORMAL
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
## 2024-05-09 LAB
LAB AP GYN PRIMARY INTERPRETATION: NORMAL
Lab: NORMAL
PATH INTERP SPEC-IMP: NORMAL

## 2024-05-10 ENCOUNTER — TELEPHONE (OUTPATIENT)
Dept: OBGYN CLINIC | Facility: CLINIC | Age: 64
End: 2024-05-10

## 2024-05-10 NOTE — TELEPHONE ENCOUNTER
Spoke with patient to advise of normal and neg test results. Patient stated understanding and had no further questions.     ----- Message from Lisa Contreras MD sent at 5/10/2024 12:43 AM EDT -----  Regarding: Normal labs  Hi all,    Could you call this patient and let her know that her labs (including pap, HPV, gonorrhea, chlamydia, HIV, hep B, hep C, and syphilis) were normal?    Thanks!    Lisa Contreras MD  OBGYN Resident  05/10/24  12:45 AM

## 2024-05-14 ENCOUNTER — HOSPITAL ENCOUNTER (OUTPATIENT)
Dept: RADIOLOGY | Age: 64
Discharge: HOME | End: 2024-05-14
Payer: COMMERCIAL

## 2024-05-14 DIAGNOSIS — Z12.31 ENCOUNTER FOR SCREENING MAMMOGRAM FOR MALIGNANT NEOPLASM OF BREAST: ICD-10-CM

## 2024-05-14 PROCEDURE — 77063 BREAST TOMOSYNTHESIS BI: CPT

## 2024-05-14 PROCEDURE — 77067 SCR MAMMO BI INCL CAD: CPT

## 2024-05-15 ENCOUNTER — HOSPITAL ENCOUNTER (EMERGENCY)
Facility: HOSPITAL | Age: 64
Discharge: HOME | End: 2024-05-15
Attending: EMERGENCY MEDICINE
Payer: COMMERCIAL

## 2024-05-15 ENCOUNTER — APPOINTMENT (EMERGENCY)
Dept: RADIOLOGY | Facility: HOSPITAL | Age: 64
End: 2024-05-15
Payer: COMMERCIAL

## 2024-05-15 ENCOUNTER — PATIENT OUTREACH (OUTPATIENT)
Dept: INTERNAL MEDICINE CLINIC | Facility: CLINIC | Age: 64
End: 2024-05-15

## 2024-05-15 VITALS
DIASTOLIC BLOOD PRESSURE: 73 MMHG | HEART RATE: 86 BPM | RESPIRATION RATE: 20 BRPM | SYSTOLIC BLOOD PRESSURE: 140 MMHG | OXYGEN SATURATION: 96 % | TEMPERATURE: 97.6 F

## 2024-05-15 DIAGNOSIS — F41.9 ANXIETY: ICD-10-CM

## 2024-05-15 DIAGNOSIS — R07.9 CHEST PAIN: Primary | ICD-10-CM

## 2024-05-15 LAB
ALBUMIN SERPL BCP-MCNC: 4.5 G/DL (ref 3.5–5)
ALP SERPL-CCNC: 81 U/L (ref 34–104)
ALT SERPL W P-5'-P-CCNC: 24 U/L (ref 7–52)
ANION GAP SERPL CALCULATED.3IONS-SCNC: 7 MMOL/L (ref 4–13)
AST SERPL W P-5'-P-CCNC: 20 U/L (ref 13–39)
BASOPHILS # BLD AUTO: 0.03 THOUSANDS/ÂΜL (ref 0–0.1)
BASOPHILS NFR BLD AUTO: 0 % (ref 0–1)
BILIRUB SERPL-MCNC: 0.38 MG/DL (ref 0.2–1)
BUN SERPL-MCNC: 16 MG/DL (ref 5–25)
CALCIUM SERPL-MCNC: 9.4 MG/DL (ref 8.4–10.2)
CARDIAC TROPONIN I PNL SERPL HS: <2 NG/L
CHLORIDE SERPL-SCNC: 105 MMOL/L (ref 96–108)
CO2 SERPL-SCNC: 27 MMOL/L (ref 21–32)
CREAT SERPL-MCNC: 0.82 MG/DL (ref 0.6–1.3)
EOSINOPHIL # BLD AUTO: 0.11 THOUSAND/ÂΜL (ref 0–0.61)
EOSINOPHIL NFR BLD AUTO: 1 % (ref 0–6)
ERYTHROCYTE [DISTWIDTH] IN BLOOD BY AUTOMATED COUNT: 13.6 % (ref 11.6–15.1)
GFR SERPL CREATININE-BSD FRML MDRD: 76 ML/MIN/1.73SQ M
GLUCOSE SERPL-MCNC: 95 MG/DL (ref 65–140)
HCT VFR BLD AUTO: 41.7 % (ref 34.8–46.1)
HGB BLD-MCNC: 13.8 G/DL (ref 11.5–15.4)
IMM GRANULOCYTES # BLD AUTO: 0.04 THOUSAND/UL (ref 0–0.2)
IMM GRANULOCYTES NFR BLD AUTO: 0 % (ref 0–2)
LYMPHOCYTES # BLD AUTO: 3 THOUSANDS/ÂΜL (ref 0.6–4.47)
LYMPHOCYTES NFR BLD AUTO: 28 % (ref 14–44)
MCH RBC QN AUTO: 32.4 PG (ref 26.8–34.3)
MCHC RBC AUTO-ENTMCNC: 33.1 G/DL (ref 31.4–37.4)
MCV RBC AUTO: 98 FL (ref 82–98)
MONOCYTES # BLD AUTO: 0.87 THOUSAND/ÂΜL (ref 0.17–1.22)
MONOCYTES NFR BLD AUTO: 8 % (ref 4–12)
NEUTROPHILS # BLD AUTO: 6.61 THOUSANDS/ÂΜL (ref 1.85–7.62)
NEUTS SEG NFR BLD AUTO: 63 % (ref 43–75)
NRBC BLD AUTO-RTO: 0 /100 WBCS
PLATELET # BLD AUTO: 313 THOUSANDS/UL (ref 149–390)
PMV BLD AUTO: 9.6 FL (ref 8.9–12.7)
POTASSIUM SERPL-SCNC: 3.9 MMOL/L (ref 3.5–5.3)
PROT SERPL-MCNC: 7.5 G/DL (ref 6.4–8.4)
RBC # BLD AUTO: 4.26 MILLION/UL (ref 3.81–5.12)
SODIUM SERPL-SCNC: 139 MMOL/L (ref 135–147)
WBC # BLD AUTO: 10.66 THOUSAND/UL (ref 4.31–10.16)

## 2024-05-15 PROCEDURE — 99285 EMERGENCY DEPT VISIT HI MDM: CPT | Performed by: EMERGENCY MEDICINE

## 2024-05-15 PROCEDURE — 71046 X-RAY EXAM CHEST 2 VIEWS: CPT

## 2024-05-15 PROCEDURE — 36415 COLL VENOUS BLD VENIPUNCTURE: CPT

## 2024-05-15 PROCEDURE — 99285 EMERGENCY DEPT VISIT HI MDM: CPT

## 2024-05-15 PROCEDURE — 93005 ELECTROCARDIOGRAM TRACING: CPT

## 2024-05-15 PROCEDURE — 84484 ASSAY OF TROPONIN QUANT: CPT

## 2024-05-15 PROCEDURE — 85025 COMPLETE CBC W/AUTO DIFF WBC: CPT

## 2024-05-15 PROCEDURE — 80053 COMPREHEN METABOLIC PANEL: CPT

## 2024-05-15 PROCEDURE — 96374 THER/PROPH/DIAG INJ IV PUSH: CPT

## 2024-05-15 RX ORDER — ACETAMINOPHEN 325 MG/1
975 TABLET ORAL ONCE
Status: COMPLETED | OUTPATIENT
Start: 2024-05-15 | End: 2024-05-15

## 2024-05-15 RX ORDER — KETOROLAC TROMETHAMINE 30 MG/ML
15 INJECTION, SOLUTION INTRAMUSCULAR; INTRAVENOUS ONCE
Status: COMPLETED | OUTPATIENT
Start: 2024-05-15 | End: 2024-05-15

## 2024-05-15 RX ORDER — ALBUTEROL SULFATE 2.5 MG/3ML
2.5 SOLUTION RESPIRATORY (INHALATION) ONCE
Status: COMPLETED | OUTPATIENT
Start: 2024-05-15 | End: 2024-05-15

## 2024-05-15 RX ADMIN — KETOROLAC TROMETHAMINE 15 MG: 30 INJECTION, SOLUTION INTRAMUSCULAR; INTRAVENOUS at 17:45

## 2024-05-15 RX ADMIN — ALBUTEROL SULFATE 2.5 MG: 2.5 SOLUTION RESPIRATORY (INHALATION) at 17:44

## 2024-05-15 RX ADMIN — ACETAMINOPHEN 975 MG: 325 TABLET, FILM COATED ORAL at 17:42

## 2024-05-15 RX ADMIN — DICLOFENAC SODIUM TOPICAL GEL, 1% 2 G: 10 GEL TOPICAL at 17:47
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
## 2024-05-15 NOTE — DISCHARGE INSTRUCTIONS
Return to ED if any worsening symptoms.  For your chest pain, recommend taking Tylenol, 1000 mg every 6 hours, do not use more than 4000 mg in 24 hours.  Can use Voltaren gel up to 4 times a day.  Follow-up with psychiatry for any increasing anxiety.

## 2024-05-15 NOTE — PROGRESS NOTES
Outgoing call   05/15/2024    CMOC received referral from CLAUDIA Carlisle to assist patient with Housing applications.     CMOC review CLAUDIA Turk's notes today.     1st outreach done today 05/15/2024    Next outreach is schedule for Friday 05/17/2024.

## 2024-05-15 NOTE — ED ATTENDING ATTESTATION
5/15/2024  I, Lizandro Gates MD, saw and evaluated the patient. I have discussed the patient with the resident/non-physician practitioner and agree with the resident's/non-physician practitioner's findings, Plan of Care, and MDM as documented in the resident's/non-physician practitioner's note, except where noted. All available labs and Radiology studies were reviewed.  I was present for key portions of any procedure(s) performed by the resident/non-physician practitioner and I was immediately available to provide assistance.       At this point I agree with the current assessment done in the Emergency Department.  I have conducted an independent evaluation of this patient a history and physical is as follows:    S:  Chief Complaint   Patient presents with    Chest Pain     C/o chest pain that feels like pressure ever since mammogram on may 14th. +SOB.      Camron is a 63 y.o. female who has had chest pain since yesterday afternoon when she had a mammography.  She is reporting mild shortness of breath as well.  No nausea, vomiting or diaphoresis..  No skin changes or bruising. Her pmh is significant for asthma and dyslipidemia.       O:  ED Triage Vitals [05/15/24 1704]   Temperature Pulse Respirations Blood Pressure SpO2   97.6 °F (36.4 °C) 86 20 140/73 96 %      Temp Source Heart Rate Source Patient Position - Orthostatic VS BP Location FiO2 (%)   Oral Monitor Lying Right arm --      Pain Score       --         Physical Exam  Vitals and nursing note reviewed.   Constitutional:       General: She is in acute distress (mild).      Appearance: She is well-developed.   HENT:      Head: Normocephalic and atraumatic.   Eyes:      Pupils: Pupils are equal, round, and reactive to light.   Neck:      Vascular: No JVD.   Cardiovascular:      Rate and Rhythm: Normal rate and regular rhythm.      Heart sounds: Normal heart sounds. No murmur heard.     No friction rub. No gallop.   Pulmonary:      Effort: Pulmonary  effort is normal. No respiratory distress.      Breath sounds: Normal breath sounds. No wheezing or rales.   Chest:      Chest wall: No tenderness.   Musculoskeletal:         General: No tenderness. Normal range of motion.      Cervical back: Normal range of motion.   Skin:     General: Skin is warm and dry.   Neurological:      General: No focal deficit present.      Mental Status: She is alert and oriented to person, place, and time.   Psychiatric:         Behavior: Behavior normal.         Thought Content: Thought content normal.         Judgment: Judgment normal.       A/P:  Background: 63 y.o. female with chest pain    Differential DX includes but is not limited to: local pain secondary to mammography, acs/mi, pe, pleurisy, dissection, pneumonia, musculoskeletal chest pain    Plan: cardiac workup    ED Course     Labs Reviewed   CBC AND DIFFERENTIAL - Abnormal       Result Value Ref Range Status    WBC 10.66 (*) 4.31 - 10.16 Thousand/uL Final    RBC 4.26  3.81 - 5.12 Million/uL Final    Hemoglobin 13.8  11.5 - 15.4 g/dL Final    Hematocrit 41.7  34.8 - 46.1 % Final    MCV 98  82 - 98 fL Final    MCH 32.4  26.8 - 34.3 pg Final    MCHC 33.1  31.4 - 37.4 g/dL Final    RDW 13.6  11.6 - 15.1 % Final    MPV 9.6  8.9 - 12.7 fL Final    Platelets 313  149 - 390 Thousands/uL Final    nRBC 0  /100 WBCs Final    Segmented % 63  43 - 75 % Final    Immature Grans % 0  0 - 2 % Final    Lymphocytes % 28  14 - 44 % Final    Monocytes % 8  4 - 12 % Final    Eosinophils Relative 1  0 - 6 % Final    Basophils Relative 0  0 - 1 % Final    Absolute Neutrophils 6.61  1.85 - 7.62 Thousands/µL Final    Absolute Immature Grans 0.04  0.00 - 0.20 Thousand/uL Final    Absolute Lymphocytes 3.00  0.60 - 4.47 Thousands/µL Final    Absolute Monocytes 0.87  0.17 - 1.22 Thousand/µL Final    Eosinophils Absolute 0.11  0.00 - 0.61 Thousand/µL Final    Basophils Absolute 0.03  0.00 - 0.10 Thousands/µL Final   HS TROPONIN I 0HR - Normal    hs TnI  "0hr <2  \"Refer to ACS Flowchart\"- see link ng/L Final    Comment:                                              Initial (time 0) result  If >=50 ng/L, Myocardial injury suggested ;  Type of myocardial injury and treatment strategy  to be determined.  If 5-49 ng/L, a delta result at 2 hours and or 4 hours will be needed to further evaluate.  If <4 ng/L, and chest pain has been >3 hours since onset, patient may qualify for discharge based on the HEART score in the ED.  If <5 ng/L and <3hours since onset of chest pain, a delta result at 2 hours will be needed to further evaluate.    HS Troponin 99th Percentile URL of a Health Population=12 ng/L with a 95% Confidence Interval of 8-18 ng/L.    Second Troponin (time 2 hours)  If calculated delta >= 20 ng/L,  Myocardial injury suggested ; Type of myocardial injury and treatment strategy to be determined.  If 5-49 ng/L and the calculated delta is 5-19 ng/L, consult medical service for evaluation.  Continue evaluation for ischemia on ecg and other possible etiology and repeat hs troponin at 4 hours.  If delta is <5 ng/L at 2 hours, consider discharge based on risk stratification via the HEART score (if in ED), or JOSH risk score in IP/Observation.    HS Troponin 99th Percentile URL of a Health Population=12 ng/L with a 95% Confidence Interval of 8-18 ng/L.   COMPREHENSIVE METABOLIC PANEL    Sodium 139  135 - 147 mmol/L Final    Potassium 3.9  3.5 - 5.3 mmol/L Final    Chloride 105  96 - 108 mmol/L Final    CO2 27  21 - 32 mmol/L Final    ANION GAP 7  4 - 13 mmol/L Final    BUN 16  5 - 25 mg/dL Final    Creatinine 0.82  0.60 - 1.30 mg/dL Final    Comment: Standardized to IDMS reference method    Glucose 95  65 - 140 mg/dL Final    Comment: If the patient is fasting, the ADA then defines impaired fasting glucose as > 100 mg/dL and diabetes as > or equal to 123 mg/dL.    Calcium 9.4  8.4 - 10.2 mg/dL Final    AST 20  13 - 39 U/L Final    ALT 24  7 - 52 U/L Final    Comment: " Specimen collection should occur prior to Sulfasalazine administration due to the potential for falsely depressed results.     Alkaline Phosphatase 81  34 - 104 U/L Final    Total Protein 7.5  6.4 - 8.4 g/dL Final    Albumin 4.5  3.5 - 5.0 g/dL Final    Total Bilirubin 0.38  0.20 - 1.00 mg/dL Final    Comment: Use of this assay is not recommended for patients undergoing treatment with eltrombopag due to the potential for falsely elevated results.  N-acetyl-p-benzoquinone imine (metabolite of Acetaminophen) will generate erroneously low results in samples for patients that have taken an overdose of Acetaminophen.    eGFR 76  ml/min/1.73sq m Final    Narrative:     National Kidney Disease Foundation guidelines for Chronic Kidney Disease (CKD):     Stage 1 with normal or high GFR (GFR > 90 mL/min/1.73 square meters)    Stage 2 Mild CKD (GFR = 60-89 mL/min/1.73 square meters)    Stage 3A Moderate CKD (GFR = 45-59 mL/min/1.73 square meters)    Stage 3B Moderate CKD (GFR = 30-44 mL/min/1.73 square meters)    Stage 4 Severe CKD (GFR = 15-29 mL/min/1.73 square meters)    Stage 5 End Stage CKD (GFR <15 mL/min/1.73 square meters)  Note: GFR calculation is accurate only with a steady state creatinine     XR chest 2 views   ED Interpretation   No acute cardiopulmonary disease        Medications   albuterol inhalation solution 2.5 mg (2.5 mg Nebulization Given 5/15/24 1744)   acetaminophen (TYLENOL) tablet 975 mg (975 mg Oral Given 5/15/24 1742)   ketorolac (TORADOL) injection 15 mg (15 mg Intravenous Given 5/15/24 1745)   Diclofenac Sodium (VOLTAREN) 1 % topical gel 2 g (2 g Topical Given 5/15/24 1747)         Critical Care Time  Procedures    Time reflects when diagnosis was documented in both MDM as applicable and the Disposition within this note       Time User Action Codes Description Comment    5/15/2024  5:49 PM Sanjuana Rojas Add [R07.9] Chest pain     5/15/2024  5:49 PM Sanjuana Rojas Add [F41.9] Anxiety           ED  Disposition       ED Disposition   Discharge    Condition   Stable    Date/Time   Wed May 15, 2024  5:49 PM    Comment   Camron Lui discharge to home/self care.                   Follow-up Information       Follow up With Specialties Details Why Contact Info Additional Information    [facility] Schedule an appointment as soon as possible for a visit today for follow up of anxiety [address]  [facility] [address] [phone] [facility], [address] [facility] [place] [place], [place], [address] [phone]

## 2024-05-17 ENCOUNTER — PATIENT OUTREACH (OUTPATIENT)
Dept: INTERNAL MEDICINE CLINIC | Facility: CLINIC | Age: 64
End: 2024-05-17

## 2024-05-17 DIAGNOSIS — E78.5 DYSLIPIDEMIA: ICD-10-CM

## 2024-05-17 LAB
ATRIAL RATE: 80 BPM
P AXIS: 59 DEGREES
PR INTERVAL: 140 MS
QRS AXIS: 22 DEGREES
QRSD INTERVAL: 76 MS
QT INTERVAL: 366 MS
QTC INTERVAL: 422 MS
T WAVE AXIS: 79 DEGREES
VENTRICULAR RATE: 80 BPM

## 2024-05-17 PROCEDURE — 93010 ELECTROCARDIOGRAM REPORT: CPT | Performed by: INTERNAL MEDICINE

## 2024-05-17 RX ORDER — ATORVASTATIN CALCIUM 40 MG/1
TABLET, FILM COATED ORAL
Qty: 90 TABLET | Refills: 3 | Status: SHIPPED | OUTPATIENT
Start: 2024-05-17
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
## 2024-05-19 ENCOUNTER — HOSPITAL ENCOUNTER (EMERGENCY)
Facility: HOSPITAL | Age: 64
Discharge: HOME | End: 2024-05-20
Attending: EMERGENCY MEDICINE
Payer: COMMERCIAL

## 2024-05-19 DIAGNOSIS — R07.9 CHEST PAIN: Primary | ICD-10-CM

## 2024-05-19 PROCEDURE — 93005 ELECTROCARDIOGRAM TRACING: CPT

## 2024-05-19 PROCEDURE — 99285 EMERGENCY DEPT VISIT HI MDM: CPT

## 2024-05-19 PROCEDURE — 99285 EMERGENCY DEPT VISIT HI MDM: CPT | Performed by: EMERGENCY MEDICINE

## 2024-05-19 RX ORDER — ACETAMINOPHEN 325 MG/1
975 TABLET ORAL ONCE
Status: COMPLETED | OUTPATIENT
Start: 2024-05-20 | End: 2024-05-20

## 2024-05-19 RX ORDER — FAMOTIDINE 20 MG/1
20 TABLET, FILM COATED ORAL ONCE
Status: COMPLETED | OUTPATIENT
Start: 2024-05-20 | End: 2024-05-20

## 2024-05-19 RX ORDER — KETOROLAC TROMETHAMINE 30 MG/ML
15 INJECTION, SOLUTION INTRAMUSCULAR; INTRAVENOUS ONCE
Status: COMPLETED | OUTPATIENT
Start: 2024-05-20 | End: 2024-05-20

## 2024-05-20 ENCOUNTER — APPOINTMENT (EMERGENCY)
Dept: RADIOLOGY | Facility: HOSPITAL | Age: 64
End: 2024-05-20
Payer: COMMERCIAL

## 2024-05-20 VITALS
OXYGEN SATURATION: 94 % | SYSTOLIC BLOOD PRESSURE: 101 MMHG | DIASTOLIC BLOOD PRESSURE: 52 MMHG | HEART RATE: 84 BPM | RESPIRATION RATE: 19 BRPM

## 2024-05-20 LAB
2HR DELTA HS TROPONIN: 2 NG/L
ALBUMIN SERPL BCP-MCNC: 4.1 G/DL (ref 3.5–5)
ALP SERPL-CCNC: 74 U/L (ref 34–104)
ALT SERPL W P-5'-P-CCNC: 20 U/L (ref 7–52)
ANION GAP SERPL CALCULATED.3IONS-SCNC: 9 MMOL/L (ref 4–13)
AST SERPL W P-5'-P-CCNC: 18 U/L (ref 13–39)
ATRIAL RATE: 90 BPM
ATRIAL RATE: 90 BPM
BACTERIA UR QL AUTO: ABNORMAL /HPF
BASOPHILS # BLD AUTO: 0.06 THOUSANDS/ÂΜL (ref 0–0.1)
BASOPHILS NFR BLD AUTO: 0 % (ref 0–1)
BILIRUB SERPL-MCNC: 0.25 MG/DL (ref 0.2–1)
BILIRUB UR QL STRIP: NEGATIVE
BUN SERPL-MCNC: 15 MG/DL (ref 5–25)
CALCIUM SERPL-MCNC: 9.2 MG/DL (ref 8.4–10.2)
CARDIAC TROPONIN I PNL SERPL HS: 6 NG/L
CARDIAC TROPONIN I PNL SERPL HS: 8 NG/L
CHLORIDE SERPL-SCNC: 104 MMOL/L (ref 96–108)
CLARITY UR: CLEAR
CO2 SERPL-SCNC: 27 MMOL/L (ref 21–32)
COLOR UR: YELLOW
CREAT SERPL-MCNC: 0.79 MG/DL (ref 0.6–1.3)
EOSINOPHIL # BLD AUTO: 0.17 THOUSAND/ÂΜL (ref 0–0.61)
EOSINOPHIL NFR BLD AUTO: 1 % (ref 0–6)
ERYTHROCYTE [DISTWIDTH] IN BLOOD BY AUTOMATED COUNT: 13.7 % (ref 11.6–15.1)
GFR SERPL CREATININE-BSD FRML MDRD: 79 ML/MIN/1.73SQ M
GLUCOSE SERPL-MCNC: 124 MG/DL (ref 65–140)
GLUCOSE UR STRIP-MCNC: NEGATIVE MG/DL
HCT VFR BLD AUTO: 38.2 % (ref 34.8–46.1)
HGB BLD-MCNC: 12.8 G/DL (ref 11.5–15.4)
HGB UR QL STRIP.AUTO: NEGATIVE
IMM GRANULOCYTES # BLD AUTO: 0.08 THOUSAND/UL (ref 0–0.2)
IMM GRANULOCYTES NFR BLD AUTO: 0 % (ref 0–2)
KETONES UR STRIP-MCNC: NEGATIVE MG/DL
LEUKOCYTE ESTERASE UR QL STRIP: ABNORMAL
LIPASE SERPL-CCNC: 29 U/L (ref 11–82)
LYMPHOCYTES # BLD AUTO: 2.07 THOUSANDS/ÂΜL (ref 0.6–4.47)
LYMPHOCYTES NFR BLD AUTO: 11 % (ref 14–44)
MCH RBC QN AUTO: 32.9 PG (ref 26.8–34.3)
MCHC RBC AUTO-ENTMCNC: 33.5 G/DL (ref 31.4–37.4)
MCV RBC AUTO: 98 FL (ref 82–98)
MONOCYTES # BLD AUTO: 1.3 THOUSAND/ÂΜL (ref 0.17–1.22)
MONOCYTES NFR BLD AUTO: 7 % (ref 4–12)
NEUTROPHILS # BLD AUTO: 14.95 THOUSANDS/ÂΜL (ref 1.85–7.62)
NEUTS SEG NFR BLD AUTO: 81 % (ref 43–75)
NITRITE UR QL STRIP: NEGATIVE
NON-SQ EPI CELLS URNS QL MICRO: ABNORMAL /HPF
NRBC BLD AUTO-RTO: 0 /100 WBCS
P AXIS: 64 DEGREES
P AXIS: 74 DEGREES
PH UR STRIP.AUTO: 6 [PH]
PLATELET # BLD AUTO: 304 THOUSANDS/UL (ref 149–390)
PMV BLD AUTO: 9.6 FL (ref 8.9–12.7)
POTASSIUM SERPL-SCNC: 3.6 MMOL/L (ref 3.5–5.3)
PR INTERVAL: 136 MS
PR INTERVAL: 136 MS
PROT SERPL-MCNC: 6.7 G/DL (ref 6.4–8.4)
PROT UR STRIP-MCNC: ABNORMAL MG/DL
QRS AXIS: 22 DEGREES
QRS AXIS: 33 DEGREES
QRSD INTERVAL: 74 MS
QRSD INTERVAL: 78 MS
QT INTERVAL: 332 MS
QT INTERVAL: 342 MS
QTC INTERVAL: 406 MS
QTC INTERVAL: 418 MS
RBC # BLD AUTO: 3.89 MILLION/UL (ref 3.81–5.12)
RBC #/AREA URNS AUTO: ABNORMAL /HPF
SODIUM SERPL-SCNC: 140 MMOL/L (ref 135–147)
SP GR UR STRIP.AUTO: 1.02 (ref 1–1.03)
T WAVE AXIS: 81 DEGREES
T WAVE AXIS: 85 DEGREES
UROBILINOGEN UR STRIP-ACNC: 4 MG/DL
VENTRICULAR RATE: 90 BPM
VENTRICULAR RATE: 90 BPM
WBC # BLD AUTO: 18.63 THOUSAND/UL (ref 4.31–10.16)
WBC #/AREA URNS AUTO: ABNORMAL /HPF

## 2024-05-20 PROCEDURE — 71046 X-RAY EXAM CHEST 2 VIEWS: CPT

## 2024-05-20 PROCEDURE — 36415 COLL VENOUS BLD VENIPUNCTURE: CPT

## 2024-05-20 PROCEDURE — 84484 ASSAY OF TROPONIN QUANT: CPT

## 2024-05-20 PROCEDURE — 85025 COMPLETE CBC W/AUTO DIFF WBC: CPT

## 2024-05-20 PROCEDURE — 80053 COMPREHEN METABOLIC PANEL: CPT

## 2024-05-20 PROCEDURE — 87086 URINE CULTURE/COLONY COUNT: CPT

## 2024-05-20 PROCEDURE — 87077 CULTURE AEROBIC IDENTIFY: CPT

## 2024-05-20 PROCEDURE — 93010 ELECTROCARDIOGRAM REPORT: CPT | Performed by: INTERNAL MEDICINE

## 2024-05-20 PROCEDURE — 93005 ELECTROCARDIOGRAM TRACING: CPT

## 2024-05-20 PROCEDURE — 96374 THER/PROPH/DIAG INJ IV PUSH: CPT

## 2024-05-20 PROCEDURE — 81001 URINALYSIS AUTO W/SCOPE: CPT

## 2024-05-20 PROCEDURE — 83690 ASSAY OF LIPASE: CPT

## 2024-05-20 RX ADMIN — FAMOTIDINE 20 MG: 20 TABLET, FILM COATED ORAL at 00:12

## 2024-05-20 RX ADMIN — ACETAMINOPHEN 975 MG: 325 TABLET, FILM COATED ORAL at 00:12

## 2024-05-20 RX ADMIN — KETOROLAC TROMETHAMINE 15 MG: 30 INJECTION, SOLUTION INTRAMUSCULAR; INTRAVENOUS at 00:12

## 2024-05-20 NOTE — DISCHARGE INSTRUCTIONS
Please follow-up with cardiology to be reevaluated for your symptoms.    Please return if you develop any new or concerning symptoms including worsening chest pain, double to breathing, or heart palpitations.

## 2024-05-20 NOTE — ED ATTENDING ATTESTATION
5/19/2024  I, Bryant Parsons MD, saw and evaluated the patient. I have discussed the patient with the resident/non-physician practitioner and agree with the resident's/non-physician practitioner's findings, Plan of Care, and MDM as documented in the resident's/non-physician practitioner's note, except where noted. All available labs and Radiology studies were reviewed.  I was present for key portions of any procedure(s) performed by the resident/non-physician practitioner and I was immediately available to provide assistance.       At this point I agree with the current assessment done in the Emergency Department.  I have conducted an independent evaluation of this patient a history and physical is as follows:    63-year-old female with COPD, current everyday smoker, hyperlipidemia presents to the emergency department for evaluation of retrosternal chest pain that radiates to the jaw.  Has since improved since onset.  No associated shortness of breath, diaphoresis, lightheadedness, nausea or vomiting.  Was seen at [facility] for similar with a negative workup.  No fevers or chills.    On exam, patient was comfortably in bed in no acute distress, head is normocephalic atraumatic, pupils equal round reactive, neck is supple without meningismus signs, heart is regular rate and rhythm with intact distal pulses, no increased work of breathing, respiratory distress, or stridor.  Abdomen soft, nontender nondistended without rebound or guarding.    Differential diagnosis includes but is not limited to arrhythmia, symptomatic anemia, ACS, pneumonia, pneumothorax, musculoskeletal chest wall pain.  Will check cardiac workup and reassess.    EKG interpreted by myself demonstrates normal sinus rhythm, normal axis, normal intervals, nonspecific ST segments in the inferior leads.    Labs grossly unremarkable.  Urinalysis negative for infection.  Delta troponin improved, heart score is low, patient stable for discharge with  outpatient cardiology follow-up.    ED Course  ED Course as of 05/20/24 0244   Mon May 20, 2024   0106 Bacteria, UA: None Seen         Critical Care Time  Procedures

## 2024-05-21 ENCOUNTER — PATIENT OUTREACH (OUTPATIENT)
Dept: INTERNAL MEDICINE CLINIC | Facility: CLINIC | Age: 64
End: 2024-05-21

## 2024-05-21 ENCOUNTER — OFFICE VISIT (OUTPATIENT)
Dept: INTERNAL MEDICINE CLINIC | Facility: CLINIC | Age: 64
End: 2024-05-21

## 2024-05-21 VITALS
BODY MASS INDEX: 24.75 KG/M2 | HEART RATE: 101 BPM | DIASTOLIC BLOOD PRESSURE: 66 MMHG | TEMPERATURE: 98.8 F | SYSTOLIC BLOOD PRESSURE: 116 MMHG | WEIGHT: 131 LBS

## 2024-05-21 DIAGNOSIS — R01.1 SYSTOLIC MURMUR: Primary | ICD-10-CM

## 2024-05-21 DIAGNOSIS — R07.9 CHEST PAIN, UNSPECIFIED TYPE: ICD-10-CM

## 2024-05-21 LAB
BACTERIA UR CULT: ABNORMAL
BACTERIA UR CULT: ABNORMAL

## 2024-05-21 PROCEDURE — 99213 OFFICE O/P EST LOW 20 MIN: CPT | Performed by: INTERNAL MEDICINE
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
## 2024-05-22 ENCOUNTER — PATIENT OUTREACH (OUTPATIENT)
Dept: INTERNAL MEDICINE CLINIC | Facility: CLINIC | Age: 64
End: 2024-05-22

## 2024-05-22 NOTE — PROGRESS NOTES
CLAUDIA has met with pt after Natividad CMOC visit this date to help pt f/u on her MH referral.  SW assisted with a call to [facility] [phone] to ask for an appointment.    They checked her insurance and shared the in addition to her [employer] insurance she also has another [place] plan on file [employer] [place] from [facility] .    Pt can not schedule with MH until this is removed.    CLAUDIA assisted with a call to [employer] [phone] to ask them to remove this coverage.    Pt shared she has closed her previous coverage and she did not recognize this name.   We spoke to Mr. Mccabe who reviewed that [employer] and [employer] begins June 1st.   The [employer] is the MH coverage in [facility] and it ends May 31st.   [facility] requested to call pt back then and CLAUDIA asked pt to also f/u with [facility].  Cm Team to f/u and assist as indicated.

## 2024-05-22 NOTE — PROGRESS NOTES
In-office visit   05/22/2024    CMOC  met with patient in the office today to complete Housing applications.     Pt states she wants a place of her own where no one tells her she has to leave.     Housing applications were signed and will be completed in the next week. Pt is open to different locations and areas and pt is aware the housing wait list can take a few years.     The following housing applications will be completed:     [facility] [place] [address]  [phone]  Application will be dropped off today 05/23/2024 at 4:pm    [facility]  [address] [place] [address]  [phone]    [facility]  [address] [facility] [address]  [phone]  [email or web address]    [facility] [facility] [employer]  [address] [facility] [address]  [phone]  This phone number is no longer in service.     [address] [address] [phone]    [facility] [address] [address]  [phone]    [facility]  [address] [facility] [address]  [phone]    [facility]   [address] [facility] [address]  Pt is on the wait list.   Received e-mail confirmation from   Hero Henson,    I did receive your voicemail as well concerning Linda. It does look like we received Linda's application and placed her on the waiting list. Have a great day!    Thank you!  Mariluz Viveros  Imperator.  Office: [phone] ext.[phone]  Fax:  [phone]  Email: [email or web address]      [place]  [address] [facility] [address]  [phone]    [facility]   [address] [facility] [address]  [place] [address]  [phone]  Application mail out on 05/22/2024    [facility]  [address] [place] [address]    [place] [address]  [address] [facility]  [address]  [phone]    Next outreach is schedule for 05/29/2024

## 2024-05-23 ENCOUNTER — PATIENT OUTREACH (OUTPATIENT)
Dept: INTERNAL MEDICINE CLINIC | Facility: CLINIC | Age: 64
End: 2024-05-23

## 2024-05-23 NOTE — PROGRESS NOTES
Outgoing call   05/23/2024    CMOC spoke to patient briefly today to get information for the housing applications.     Application for Sukhwinder Chris will be dropped today at 4 pm.     CMOC working on housing applications.     Next outreach is schedule for 05/30/2024.

## 2024-05-28 ENCOUNTER — PATIENT OUTREACH (OUTPATIENT)
Dept: INTERNAL MEDICINE CLINIC | Facility: CLINIC | Age: 64
End: 2024-05-28

## 2024-05-28 NOTE — PROGRESS NOTES
05/28/2024    CMOC brought the [place] [address] application today. Pt needs to call next week to be given a number on the wait list. The wait list for [facility] is 4 to 5 years.     [facility] completed other housing applications and these will be mail out tomorrow.     [facility]   [facility]    Next outreach is schedule for 06/04/2024

## 2024-05-30 ENCOUNTER — HOSPITAL ENCOUNTER (OUTPATIENT)
Dept: NON INVASIVE DIAGNOSTICS | Facility: HOSPITAL | Age: 64
Discharge: HOME | End: 2024-05-30
Payer: COMMERCIAL

## 2024-05-30 VITALS
BODY MASS INDEX: 24.73 KG/M2 | DIASTOLIC BLOOD PRESSURE: 51 MMHG | HEART RATE: 83 BPM | HEIGHT: 61 IN | SYSTOLIC BLOOD PRESSURE: 98 MMHG | WEIGHT: 131 LBS

## 2024-05-30 DIAGNOSIS — R01.1 SYSTOLIC MURMUR: ICD-10-CM

## 2024-05-30 LAB
AORTIC ROOT: 3 CM
AORTIC VALVE MEAN VELOCITY: 10 M/S
APICAL FOUR CHAMBER EJECTION FRACTION: 68 %
AV AREA BY CONTINUOUS VTI: 2.1 CM2
AV AREA PEAK VELOCITY: 2.1 CM2
AV LVOT MEAN GRADIENT: 3 MMHG
AV LVOT PEAK GRADIENT: 4 MMHG
AV MEAN GRADIENT: 4 MMHG
AV PEAK GRADIENT: 8 MMHG
AV VALVE AREA: 2.13 CM2
AV VELOCITY RATIO: 0.74
BSA FOR ECHO PROCEDURE: 1.58 M2
DOP CALC AO PEAK VEL: 1.4 M/S
DOP CALC AO VTI: 29.89 CM
DOP CALC LVOT AREA: 2.83 CM2
DOP CALC LVOT CARDIAC INDEX: 3.15 L/MIN/M2
DOP CALC LVOT CARDIAC OUTPUT: 4.98 L/MIN
DOP CALC LVOT DIAMETER: 1.9 CM
DOP CALC LVOT PEAK VEL VTI: 22.45 CM
DOP CALC LVOT PEAK VEL: 1.04 M/S
DOP CALC LVOT STROKE INDEX: 40.5 ML/M2
DOP CALC LVOT STROKE VOLUME: 63.62
E WAVE DECELERATION TIME: 165 MS
E/A RATIO: 0.88
FRACTIONAL SHORTENING: 34 (ref 28–44)
INTERVENTRICULAR SEPTUM IN DIASTOLE (PARASTERNAL SHORT AXIS VIEW): 1 CM
INTERVENTRICULAR SEPTUM: 1 CM (ref 0.6–1.1)
LAAS-AP2: 13.1 CM2
LAAS-AP4: 13.6 CM2
LEFT ATRIUM SIZE: 3 CM
LEFT ATRIUM VOLUME (MOD BIPLANE): 33 ML
LEFT ATRIUM VOLUME INDEX (MOD BIPLANE): 20.9 ML/M2
LEFT INTERNAL DIMENSION IN SYSTOLE: 2.3 CM (ref 2.1–4)
LEFT VENTRICULAR INTERNAL DIMENSION IN DIASTOLE: 3.5 CM (ref 3.5–6)
LEFT VENTRICULAR POSTERIOR WALL IN END DIASTOLE: 1 CM
LEFT VENTRICULAR STROKE VOLUME: 33 ML
LVSV (TEICH): 33 ML
MV E'TISSUE VEL-LAT: 8 CM/S
MV E'TISSUE VEL-SEP: 10 CM/S
MV PEAK A VEL: 1.06 M/S
MV PEAK E VEL: 93 CM/S
RIGHT ATRIAL 2D VOLUME: 34 ML
RIGHT ATRIUM AREA SYSTOLE A4C: 13.8 CM2
RIGHT VENTRICLE ID DIMENSION: 3 CM
SL CV LEFT ATRIUM LENGTH A2C: 4.3 CM
SL CV PED ECHO LEFT VENTRICLE DIASTOLIC VOLUME (MOD BIPLANE) 2D: 51 ML
SL CV PED ECHO LEFT VENTRICLE SYSTOLIC VOLUME (MOD BIPLANE) 2D: 18 ML
TRICUSPID ANNULAR PLANE SYSTOLIC EXCURSION: 1.9 CM

## 2024-05-30 PROCEDURE — 93306 TTE W/DOPPLER COMPLETE: CPT

## 2024-05-30 PROCEDURE — 93306 TTE W/DOPPLER COMPLETE: CPT | Performed by: INTERNAL MEDICINE

## 2024-05-31 ENCOUNTER — HOSPITAL ENCOUNTER (EMERGENCY)
Facility: HOSPITAL | Age: 64
Discharge: HOME | End: 2024-05-31
Attending: EMERGENCY MEDICINE
Payer: COMMERCIAL

## 2024-05-31 VITALS
WEIGHT: 130.4 LBS | OXYGEN SATURATION: 95 % | RESPIRATION RATE: 18 BRPM | TEMPERATURE: 98.5 F | DIASTOLIC BLOOD PRESSURE: 59 MMHG | HEART RATE: 73 BPM | BODY MASS INDEX: 24.64 KG/M2 | SYSTOLIC BLOOD PRESSURE: 119 MMHG

## 2024-05-31 DIAGNOSIS — S00.31XA ABRASION OF NOSE, INITIAL ENCOUNTER: ICD-10-CM

## 2024-05-31 DIAGNOSIS — S81.819A SKIN TEAR OF LOWER LEG WITHOUT COMPLICATION: ICD-10-CM

## 2024-05-31 DIAGNOSIS — R55 SYNCOPE: Primary | ICD-10-CM

## 2024-05-31 LAB
ALBUMIN SERPL BCP-MCNC: 4.6 G/DL (ref 3.5–5)
ALP SERPL-CCNC: 81 U/L (ref 34–104)
ALT SERPL W P-5'-P-CCNC: 15 U/L (ref 7–52)
ANION GAP SERPL CALCULATED.3IONS-SCNC: 5 MMOL/L (ref 4–13)
AST SERPL W P-5'-P-CCNC: 18 U/L (ref 13–39)
BASOPHILS # BLD AUTO: 0.05 THOUSANDS/ÂΜL (ref 0–0.1)
BASOPHILS NFR BLD AUTO: 0 % (ref 0–1)
BILIRUB SERPL-MCNC: 0.44 MG/DL (ref 0.2–1)
BUN SERPL-MCNC: 11 MG/DL (ref 5–25)
CALCIUM SERPL-MCNC: 9.4 MG/DL (ref 8.4–10.2)
CARDIAC TROPONIN I PNL SERPL HS: <2 NG/L
CHLORIDE SERPL-SCNC: 106 MMOL/L (ref 96–108)
CO2 SERPL-SCNC: 27 MMOL/L (ref 21–32)
CREAT SERPL-MCNC: 0.72 MG/DL (ref 0.6–1.3)
EOSINOPHIL # BLD AUTO: 0.17 THOUSAND/ÂΜL (ref 0–0.61)
EOSINOPHIL NFR BLD AUTO: 1 % (ref 0–6)
ERYTHROCYTE [DISTWIDTH] IN BLOOD BY AUTOMATED COUNT: 13.9 % (ref 11.6–15.1)
GFR SERPL CREATININE-BSD FRML MDRD: 89 ML/MIN/1.73SQ M
GLUCOSE SERPL-MCNC: 93 MG/DL (ref 65–140)
HCT VFR BLD AUTO: 43 % (ref 34.8–46.1)
HGB BLD-MCNC: 14.4 G/DL (ref 11.5–15.4)
IMM GRANULOCYTES # BLD AUTO: 0.05 THOUSAND/UL (ref 0–0.2)
IMM GRANULOCYTES NFR BLD AUTO: 0 % (ref 0–2)
LYMPHOCYTES # BLD AUTO: 2.95 THOUSANDS/ÂΜL (ref 0.6–4.47)
LYMPHOCYTES NFR BLD AUTO: 21 % (ref 14–44)
MCH RBC QN AUTO: 32.8 PG (ref 26.8–34.3)
MCHC RBC AUTO-ENTMCNC: 33.5 G/DL (ref 31.4–37.4)
MCV RBC AUTO: 98 FL (ref 82–98)
MONOCYTES # BLD AUTO: 1.15 THOUSAND/ÂΜL (ref 0.17–1.22)
MONOCYTES NFR BLD AUTO: 8 % (ref 4–12)
NEUTROPHILS # BLD AUTO: 9.53 THOUSANDS/ÂΜL (ref 1.85–7.62)
NEUTS SEG NFR BLD AUTO: 70 % (ref 43–75)
NRBC BLD AUTO-RTO: 0 /100 WBCS
PLATELET # BLD AUTO: 310 THOUSANDS/UL (ref 149–390)
PMV BLD AUTO: 9.7 FL (ref 8.9–12.7)
POTASSIUM SERPL-SCNC: 4.1 MMOL/L (ref 3.5–5.3)
PROT SERPL-MCNC: 7.4 G/DL (ref 6.4–8.4)
RBC # BLD AUTO: 4.39 MILLION/UL (ref 3.81–5.12)
SODIUM SERPL-SCNC: 138 MMOL/L (ref 135–147)
WBC # BLD AUTO: 13.9 THOUSAND/UL (ref 4.31–10.16)

## 2024-05-31 PROCEDURE — 84484 ASSAY OF TROPONIN QUANT: CPT | Performed by: EMERGENCY MEDICINE

## 2024-05-31 PROCEDURE — 80053 COMPREHEN METABOLIC PANEL: CPT | Performed by: EMERGENCY MEDICINE

## 2024-05-31 PROCEDURE — 93005 ELECTROCARDIOGRAM TRACING: CPT

## 2024-05-31 PROCEDURE — 85025 COMPLETE CBC W/AUTO DIFF WBC: CPT | Performed by: EMERGENCY MEDICINE

## 2024-05-31 PROCEDURE — 99284 EMERGENCY DEPT VISIT MOD MDM: CPT

## 2024-05-31 PROCEDURE — 36415 COLL VENOUS BLD VENIPUNCTURE: CPT | Performed by: EMERGENCY MEDICINE

## 2024-05-31 PROCEDURE — 99285 EMERGENCY DEPT VISIT HI MDM: CPT | Performed by: EMERGENCY MEDICINE
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
## 2024-06-02 LAB
ATRIAL RATE: 82 BPM
P AXIS: 37 DEGREES
PR INTERVAL: 120 MS
QRS AXIS: -8 DEGREES
QRSD INTERVAL: 74 MS
QT INTERVAL: 354 MS
QTC INTERVAL: 413 MS
T WAVE AXIS: 62 DEGREES
VENTRICULAR RATE: 82 BPM

## 2024-06-02 PROCEDURE — 93010 ELECTROCARDIOGRAM REPORT: CPT | Performed by: INTERNAL MEDICINE

## 2024-06-03 ENCOUNTER — CONSULT (OUTPATIENT)
Dept: CARDIOLOGY CLINIC | Facility: CLINIC | Age: 64
End: 2024-06-03
Payer: COMMERCIAL

## 2024-06-03 VITALS
BODY MASS INDEX: 24.55 KG/M2 | HEIGHT: 61 IN | WEIGHT: 130 LBS | SYSTOLIC BLOOD PRESSURE: 118 MMHG | DIASTOLIC BLOOD PRESSURE: 66 MMHG | OXYGEN SATURATION: 96 % | HEART RATE: 90 BPM

## 2024-06-03 DIAGNOSIS — R55 SYNCOPE, UNSPECIFIED SYNCOPE TYPE: Primary | ICD-10-CM

## 2024-06-03 DIAGNOSIS — R07.9 CHEST PAIN: ICD-10-CM

## 2024-06-03 PROCEDURE — 99244 OFF/OP CNSLTJ NEW/EST MOD 40: CPT | Performed by: INTERNAL MEDICINE
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
## 2024-06-05 ENCOUNTER — PATIENT OUTREACH (OUTPATIENT)
Dept: INTERNAL MEDICINE CLINIC | Facility: CLINIC | Age: 64
End: 2024-06-05

## 2024-06-05 ENCOUNTER — OFFICE VISIT (OUTPATIENT)
Dept: INTERNAL MEDICINE CLINIC | Facility: CLINIC | Age: 64
End: 2024-06-05

## 2024-06-05 VITALS
HEIGHT: 61 IN | SYSTOLIC BLOOD PRESSURE: 136 MMHG | TEMPERATURE: 98.1 F | WEIGHT: 130 LBS | BODY MASS INDEX: 24.55 KG/M2 | HEART RATE: 80 BPM | DIASTOLIC BLOOD PRESSURE: 73 MMHG

## 2024-06-05 DIAGNOSIS — S00.33XD CONTUSION OF NOSE, SUBSEQUENT ENCOUNTER: ICD-10-CM

## 2024-06-05 DIAGNOSIS — S80.811D ABRASION OF RIGHT LOWER EXTREMITY, SUBSEQUENT ENCOUNTER: ICD-10-CM

## 2024-06-05 DIAGNOSIS — R55 VASOVAGAL SYNCOPE: Primary | ICD-10-CM

## 2024-06-05 PROCEDURE — 99214 OFFICE O/P EST MOD 30 MIN: CPT | Performed by: FAMILY MEDICINE
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
## 2024-06-05 NOTE — PROGRESS NOTES
Sw has met with pt after her PCP appointment this date .  Pt seen s/p an ED visit for syncope.  Pt denies any other falls. She will be getting a further medical work up.  She continues to live with her dgt and son in law.     SW has assisted her with a call back to [facility] [phone] and they have offered an appointment for Monday June10 th  @ 11:30 AM with the Therapist [place].    Pt is receptive to same and shares she can GPS the directions and get herself there.    SW shared the [facility] will f/u with her re her Housing applications.

## 2024-06-06 ENCOUNTER — PATIENT OUTREACH (OUTPATIENT)
Dept: INTERNAL MEDICINE CLINIC | Facility: CLINIC | Age: 64
End: 2024-06-06

## 2024-06-06 DIAGNOSIS — G89.29 CHRONIC RIGHT-SIDED LOW BACK PAIN WITH BILATERAL SCIATICA: ICD-10-CM

## 2024-06-06 DIAGNOSIS — M54.41 CHRONIC RIGHT-SIDED LOW BACK PAIN WITH BILATERAL SCIATICA: ICD-10-CM

## 2024-06-06 DIAGNOSIS — M54.42 CHRONIC RIGHT-SIDED LOW BACK PAIN WITH BILATERAL SCIATICA: ICD-10-CM

## 2024-06-06 NOTE — PROGRESS NOTES
Outgoing call   06/06/2024    CMOC spoke to patient today in regards to housing applications.     Pt states she is waiting for a heart monitor in the mail. Pt states she has not received and if she does not get it by Monday pt will call Cardiology office.     Next outreach is schedule for 06/13/2024

## 2024-06-07 ENCOUNTER — TELEPHONE (OUTPATIENT)
Age: 64
End: 2024-06-07

## 2024-06-07 PROBLEM — S80.811A ABRASION OF RIGHT LEG: Status: ACTIVE | Noted: 2024-06-07

## 2024-06-07 PROBLEM — R55 VASOVAGAL SYNCOPE: Status: ACTIVE | Noted: 2024-06-07

## 2024-06-07 PROBLEM — S00.33XA CONTUSION OF NOSE: Status: ACTIVE | Noted: 2024-06-07

## 2024-06-07 NOTE — TELEPHONE ENCOUNTER
Caller: Patient (Camron)    Doctor: Dr Benavidez    Reason for call: Patient was seen by Dr Benavidez on 6/3 and Zio was ordered. Patient is a little concerned that she has not received it yet, as she has a follow up scheduled with him on 7/17. I told her that it does take a few days to get delivered and that she should call us if she still has not received it by Monday.     Call back#: [phone]

## 2024-06-10 NOTE — ASSESSMENT & PLAN NOTE
Small scabbed area healing appropriately , she can place vaseline or antibiotic ointment daily as needed

## 2024-06-10 NOTE — ASSESSMENT & PLAN NOTE
See detailed HPI , pt had been sitting on a covered porch for a few hours the day of incident , she had  not eaten anything not unusual for her , she had a 24 oz coffee thermal container that she had only taken a few sips out of when she fell , she was a bit light headed no other sx . She had no loss of urine or tongue biting , she didn't hit her head . Work up in ED , neg labs neg EKG , recent 2 d echo showed EF 65 % no valve abnormalities . She was monitored in ED and discharged .Felt to be vasovagal   She has been  feeling well since , she is scheduled for o cardiac monitor and nuclear stress test , await all

## 2024-06-10 NOTE — ASSESSMENT & PLAN NOTE
Area looks good no evidence of infection , advised local care as she has been can place vaseline or antibiotic ointment daily as needed

## 2024-06-11 RX ORDER — CELECOXIB 200 MG/1
200 CAPSULE ORAL DAILY
Qty: 30 CAPSULE | Refills: 2 | Status: SHIPPED | OUTPATIENT
Start: 2024-06-11

## 2024-06-12 ENCOUNTER — TELEPHONE (OUTPATIENT)
Age: 64
End: 2024-06-12

## 2024-06-12 NOTE — TELEPHONE ENCOUNTER
Pt states she is going for an exercise stress test on 6/17 however she wearing a zio monitor. She is wondering if she can still wear the zio monitor during her stress test. Advised yes that she can continue to wear the monitor during her stress test. Pt understood.

## 2024-06-14 ENCOUNTER — TELEPHONE (OUTPATIENT)
Dept: INTERNAL MEDICINE CLINIC | Facility: CLINIC | Age: 64
End: 2024-06-14

## 2024-06-17 ENCOUNTER — HOSPITAL ENCOUNTER (OUTPATIENT)
Dept: NUCLEAR MEDICINE | Facility: HOSPITAL | Age: 64
Discharge: HOME | End: 2024-06-17
Attending: INTERNAL MEDICINE
Payer: COMMERCIAL

## 2024-06-17 ENCOUNTER — HOSPITAL ENCOUNTER (OUTPATIENT)
Dept: NON INVASIVE DIAGNOSTICS | Facility: HOSPITAL | Age: 64
Discharge: HOME | End: 2024-06-17
Attending: INTERNAL MEDICINE
Payer: COMMERCIAL

## 2024-06-17 ENCOUNTER — PATIENT OUTREACH (OUTPATIENT)
Dept: INTERNAL MEDICINE CLINIC | Facility: CLINIC | Age: 64
End: 2024-06-17

## 2024-06-17 DIAGNOSIS — R07.9 CHEST PAIN: ICD-10-CM

## 2024-06-17 LAB
MAX DIASTOLIC BP: 72 MMHG
MAX DIASTOLIC BP: 72 MMHG
MAX PREDICTED HEART RATE: 156 BPM
MAX PREDICTED HEART RATE: 156 BPM
NUC STRESS EJECTION FRACTION: 69 %
PROTOCOL NAME: NORMAL
PROTOCOL NAME: NORMAL
RATE PRESSURE PRODUCT: NORMAL
REASON FOR TERMINATION: NORMAL
REASON FOR TERMINATION: NORMAL
SL CV REST NUCLEAR ISOTOPE DOSE: 10.9 MCI
SL CV STRESS NUCLEAR ISOTOPE DOSE: 33 MCI
SL CV STRESS RECOVERY BP: NORMAL MMHG
SL CV STRESS RECOVERY HR: 88 BPM
SL CV STRESS RECOVERY O2 SAT: 96 %
STRESS ANGINA INDEX: 0
STRESS BASELINE BP: NORMAL MMHG
STRESS BASELINE HR: 68 BPM
STRESS O2 SAT REST: 96 %
STRESS PEAK HR: 116 BPM
STRESS POST EXERCISE DUR MIN: 3 MIN
STRESS POST EXERCISE DUR MIN: 3 MIN
STRESS POST EXERCISE DUR SEC: 2 SEC
STRESS POST EXERCISE DUR SEC: 2 SEC
STRESS POST O2 SAT PEAK: 98 %
STRESS POST PEAK BP: 114 MMHG
STRESS POST PEAK HR: 116 BPM
STRESS POST PEAK HR: 116 BPM
STRESS POST PEAK SYSTOLIC BP: 124 MMHG
STRESS POST PEAK SYSTOLIC BP: 124 MMHG
STRESS/REST PERFUSION RATIO: 1.09
TARGET HR FORMULA: NORMAL
TARGET HR FORMULA: NORMAL
TEST INDICATION: NORMAL
TEST INDICATION: NORMAL

## 2024-06-17 PROCEDURE — 93017 CV STRESS TEST TRACING ONLY: CPT

## 2024-06-17 PROCEDURE — 78452 HT MUSCLE IMAGE SPECT MULT: CPT

## 2024-06-17 PROCEDURE — 93016 CV STRESS TEST SUPVJ ONLY: CPT | Performed by: INTERNAL MEDICINE

## 2024-06-17 PROCEDURE — 93018 CV STRESS TEST I&R ONLY: CPT | Performed by: INTERNAL MEDICINE

## 2024-06-17 PROCEDURE — 78452 HT MUSCLE IMAGE SPECT MULT: CPT | Performed by: INTERNAL MEDICINE

## 2024-06-17 PROCEDURE — A9502 TC99M TETROFOSMIN: HCPCS

## 2024-06-17 RX ORDER — REGADENOSON 0.08 MG/ML
0.4 INJECTION, SOLUTION INTRAVENOUS ONCE
Status: COMPLETED | OUTPATIENT
Start: 2024-06-17 | End: 2024-06-17

## 2024-06-17 RX ADMIN — REGADENOSON 0.4 MG: 0.08 INJECTION, SOLUTION INTRAVENOUS at 09:31

## 2024-06-20 ENCOUNTER — PATIENT OUTREACH (OUTPATIENT)
Dept: INTERNAL MEDICINE CLINIC | Facility: CLINIC | Age: 64
End: 2024-06-20

## 2024-06-20 NOTE — PROGRESS NOTES
Outgoing call   06/20/2024    [facility] left message for patient to call me back.     Re: housing applications.     Next outreach is schedule for 06/27/2024.

## 2024-06-21 ENCOUNTER — PATIENT OUTREACH (OUTPATIENT)
Dept: INTERNAL MEDICINE CLINIC | Facility: CLINIC | Age: 64
End: 2024-06-21

## 2024-06-21 NOTE — PROGRESS NOTES
Incoming call   06/21/2024    CMOC received incoming call from patient today. CMOC completed the remaining of the housing applications.     Applications will go out on Monday.     CMOC provided supportive listening today.    [facility] Housing Application completed   For [address].     Next outreach is schedule for 06/28/2024.

## 2024-06-24 ENCOUNTER — PATIENT OUTREACH (OUTPATIENT)
Dept: INTERNAL MEDICINE CLINIC | Facility: CLINIC | Age: 64
End: 2024-06-24

## 2024-06-24 DIAGNOSIS — G90.511 COMPLEX REGIONAL PAIN SYNDROME TYPE 1 OF RIGHT UPPER EXTREMITY: ICD-10-CM

## 2024-06-24 RX ORDER — DULOXETIN HYDROCHLORIDE 60 MG/1
60 CAPSULE, DELAYED RELEASE ORAL DAILY
Qty: 30 CAPSULE | Refills: 1 | Status: SHIPPED | OUTPATIENT
Start: 2024-06-24

## 2024-06-24 NOTE — PROGRESS NOTES
The following housing applications have been mail out today:      [facility] - [address] apartments in [facility]    Copies of the applications have been mail out to patient today.     Please see my note from 05/22/2024 to see the address of the housing applications.     Next outreach is schedule for 07/01/2024.

## 2024-06-28 ENCOUNTER — CLINICAL SUPPORT (OUTPATIENT)
Dept: CARDIOLOGY CLINIC | Facility: CLINIC | Age: 64
End: 2024-06-28

## 2024-06-28 DIAGNOSIS — R55 SYNCOPE, UNSPECIFIED SYNCOPE TYPE: ICD-10-CM

## 2024-07-02 ENCOUNTER — PATIENT OUTREACH (OUTPATIENT)
Dept: INTERNAL MEDICINE CLINIC | Facility: CLINIC | Age: 64
End: 2024-07-02

## 2024-07-02 NOTE — PROGRESS NOTES
Incoming call   07/02/2024    CMOC received incoming call from [facility]. [facility] is closed at this time. They are not accepting applications. The [facility] application will be send back to patient.     CMOC left message for patient to call me back.     Next outreach is schedule for 07/09/2024

## 2024-07-10 ENCOUNTER — PATIENT OUTREACH (OUTPATIENT)
Dept: INTERNAL MEDICINE CLINIC | Facility: CLINIC | Age: 64
End: 2024-07-10

## 2024-07-10 NOTE — PROGRESS NOTES
Outgoing call   07/10/2024    Housing applications have been completed. Pt is aware if her address or telephone numbers change she must report the information to the housing authority.     CMOC will close case today.     Pleas see my notes for the applications that were completed.

## 2024-07-11 ENCOUNTER — PATIENT OUTREACH (OUTPATIENT)
Dept: INTERNAL MEDICINE CLINIC | Facility: CLINIC | Age: 64
End: 2024-07-11

## 2024-07-11 NOTE — PROGRESS NOTES
SW has received an ON BASKET message from [facility] that pt's applications have been completed.  SW also reached out to pt this date and she confirms same,  She is aware she needs to keep in touch with the Housing Authority for where she is on the list and also if she changes her address or phone #.  She also shares the she is connected with [employer] for [facility].  She feels she is doing well at present.    Patient does not have any further questions, concerns, or other needs at this time.  Patient has my contact # and PCP office # if needed.  Social Work to remain available to assist as indicated.    Please re-consult Social Work if needed.

## 2024-07-12 ENCOUNTER — OFFICE VISIT (OUTPATIENT)
Dept: INTERNAL MEDICINE CLINIC | Facility: CLINIC | Age: 64
End: 2024-07-12

## 2024-07-12 VITALS
TEMPERATURE: 98.3 F | HEART RATE: 71 BPM | DIASTOLIC BLOOD PRESSURE: 69 MMHG | HEIGHT: 61 IN | SYSTOLIC BLOOD PRESSURE: 133 MMHG | BODY MASS INDEX: 24.35 KG/M2 | WEIGHT: 129 LBS

## 2024-07-12 DIAGNOSIS — R55 VASOVAGAL SYNCOPE: Primary | ICD-10-CM

## 2024-07-12 DIAGNOSIS — J45.20 MILD INTERMITTENT ASTHMA WITHOUT COMPLICATION: ICD-10-CM

## 2024-07-12 DIAGNOSIS — L74.3 MILIARIA: ICD-10-CM

## 2024-07-12 DIAGNOSIS — F17.200 SMOKING: ICD-10-CM

## 2024-07-12 DIAGNOSIS — J43.9 PULMONARY EMPHYSEMA, UNSPECIFIED EMPHYSEMA TYPE (HCC): ICD-10-CM

## 2024-07-12 DIAGNOSIS — R94.2 ABNORMAL DIFFUSION CAPACITY DETERMINED BY PULMONARY FUNCTION TEST: ICD-10-CM
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
## 2024-07-14 DIAGNOSIS — J45.20 MILD INTERMITTENT ASTHMA WITHOUT COMPLICATION: ICD-10-CM

## 2024-07-16 RX ORDER — ALBUTEROL SULFATE 90 UG/1
AEROSOL, METERED RESPIRATORY (INHALATION)
Qty: 18 G | Refills: 2 | Status: SHIPPED | OUTPATIENT
Start: 2024-07-16

## 2024-07-19 ENCOUNTER — OFFICE VISIT (OUTPATIENT)
Dept: CARDIOLOGY CLINIC | Facility: CLINIC | Age: 64
End: 2024-07-19
Payer: COMMERCIAL

## 2024-07-19 VITALS
SYSTOLIC BLOOD PRESSURE: 120 MMHG | HEIGHT: 61 IN | OXYGEN SATURATION: 96 % | DIASTOLIC BLOOD PRESSURE: 68 MMHG | HEART RATE: 72 BPM | WEIGHT: 130 LBS | BODY MASS INDEX: 24.55 KG/M2

## 2024-07-19 DIAGNOSIS — R07.89 ATYPICAL CHEST PAIN: Primary | ICD-10-CM

## 2024-07-19 DIAGNOSIS — E78.5 DYSLIPIDEMIA: ICD-10-CM

## 2024-07-19 PROCEDURE — 99214 OFFICE O/P EST MOD 30 MIN: CPT | Performed by: INTERNAL MEDICINE
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
## 2024-07-22 ENCOUNTER — PATIENT OUTREACH (OUTPATIENT)
Dept: INTERNAL MEDICINE CLINIC | Facility: CLINIC | Age: 64
End: 2024-07-22

## 2024-07-22 DIAGNOSIS — J30.9 ALLERGIC SINUSITIS: ICD-10-CM

## 2024-07-22 RX ORDER — FLUTICASONE PROPIONATE 50 MCG
SPRAY, SUSPENSION (ML) NASAL
Qty: 16 ML | Refills: 1 | Status: SHIPPED | OUTPATIENT
Start: 2024-07-22

## 2024-07-22 NOTE — PROGRESS NOTES
Outgoing call   07/22/2024    CMOC received letter from [employer]. Page 3 is missing information.     CMOC verified with [facility] today and pt has submitted the information required.     Pt is on the waitlist her # is [phone] on the waitlist.     No further outreach to be made to patient.

## 2024-08-06 ENCOUNTER — HOSPITAL ENCOUNTER (OUTPATIENT)
Dept: RADIOLOGY | Age: 64
Discharge: HOME | End: 2024-08-06
Payer: COMMERCIAL

## 2024-08-06 DIAGNOSIS — J43.9 PULMONARY EMPHYSEMA, UNSPECIFIED EMPHYSEMA TYPE (HCC): ICD-10-CM

## 2024-08-06 DIAGNOSIS — J45.20 MILD INTERMITTENT ASTHMA WITHOUT COMPLICATION: ICD-10-CM

## 2024-08-06 DIAGNOSIS — F17.200 SMOKING: ICD-10-CM

## 2024-08-06 DIAGNOSIS — R94.2 ABNORMAL DIFFUSION CAPACITY DETERMINED BY PULMONARY FUNCTION TEST: ICD-10-CM

## 2024-08-06 PROCEDURE — 71250 CT THORAX DX C-: CPT

## 2024-08-17 DIAGNOSIS — G90.511 COMPLEX REGIONAL PAIN SYNDROME TYPE 1 OF RIGHT UPPER EXTREMITY: ICD-10-CM

## 2024-08-19 RX ORDER — DULOXETIN HYDROCHLORIDE 60 MG/1
60 CAPSULE, DELAYED RELEASE ORAL DAILY
Qty: 30 CAPSULE | Refills: 3 | Status: SHIPPED | OUTPATIENT
Start: 2024-08-19

## 2024-08-22 ENCOUNTER — TELEPHONE (OUTPATIENT)
Dept: INTERNAL MEDICINE CLINIC | Facility: CLINIC | Age: 64
End: 2024-08-22

## 2024-08-22 NOTE — TELEPHONE ENCOUNTER
Contacted patient's [facility] pharmacy. Verbal refill order completed for patient's Duloxetine with pharmacist.

## 2024-09-01 DIAGNOSIS — M54.42 CHRONIC RIGHT-SIDED LOW BACK PAIN WITH BILATERAL SCIATICA: ICD-10-CM

## 2024-09-01 DIAGNOSIS — G89.29 CHRONIC RIGHT-SIDED LOW BACK PAIN WITH BILATERAL SCIATICA: ICD-10-CM

## 2024-09-01 DIAGNOSIS — J30.2 SEASONAL ALLERGIC RHINITIS, UNSPECIFIED TRIGGER: ICD-10-CM

## 2024-09-01 DIAGNOSIS — M54.41 CHRONIC RIGHT-SIDED LOW BACK PAIN WITH BILATERAL SCIATICA: ICD-10-CM

## 2024-09-03 RX ORDER — CELECOXIB 200 MG/1
200 CAPSULE ORAL DAILY
Qty: 30 CAPSULE | Refills: 2 | Status: SHIPPED | OUTPATIENT
Start: 2024-09-03

## 2024-09-03 RX ORDER — CETIRIZINE HYDROCHLORIDE 10 MG/1
10 TABLET ORAL DAILY
Qty: 30 TABLET | Refills: 3 | Status: SHIPPED | OUTPATIENT
Start: 2024-09-03

## 2024-09-06 ENCOUNTER — VBI (OUTPATIENT)
Dept: ADMINISTRATIVE | Facility: OTHER | Age: 64
End: 2024-09-06

## 2024-09-06 NOTE — TELEPHONE ENCOUNTER
09/06/24 12:03 PM     Chart reviewed for Pap Smear (HPV) aka Cervical Cancer Screening was/were submitted to the patient's insurance.     Trisha Cantu MA   [facility]

## 2024-09-15 DIAGNOSIS — J30.9 ALLERGIC SINUSITIS: ICD-10-CM

## 2024-09-16 RX ORDER — FLUTICASONE PROPIONATE 50 MCG
SPRAY, SUSPENSION (ML) NASAL
Qty: 16 ML | Refills: 3 | Status: SHIPPED | OUTPATIENT
Start: 2024-09-16

## 2024-10-02 DIAGNOSIS — M17.12 OSTEOARTHRITIS OF LEFT KNEE, UNSPECIFIED OSTEOARTHRITIS TYPE: ICD-10-CM

## 2024-10-02 DIAGNOSIS — M72.0 DUPUYTREN'S DISEASE OF PALM OF RIGHT HAND: ICD-10-CM

## 2024-10-02 DIAGNOSIS — J43.9 PULMONARY EMPHYSEMA, UNSPECIFIED EMPHYSEMA TYPE (HCC): ICD-10-CM

## 2024-10-02 DIAGNOSIS — G90.511 COMPLEX REGIONAL PAIN SYNDROME TYPE 1 OF RIGHT UPPER EXTREMITY: ICD-10-CM

## 2024-10-02 DIAGNOSIS — J45.20 MILD INTERMITTENT ASTHMA WITHOUT COMPLICATION: ICD-10-CM

## 2024-10-02 RX ORDER — GABAPENTIN 600 MG/1
600 TABLET ORAL 3 TIMES DAILY
Qty: 90 TABLET | Refills: 2 | Status: SHIPPED | OUTPATIENT
Start: 2024-10-02

## 2024-10-02 RX ORDER — FLUTICASONE PROPIONATE AND SALMETEROL 100; 50 UG/1; UG/1
1 POWDER RESPIRATORY (INHALATION) 2 TIMES DAILY
Qty: 180 BLISTER | Refills: 4 | Status: SHIPPED | OUTPATIENT
Start: 2024-10-02

## 2024-10-02 RX ORDER — ALBUTEROL SULFATE 90 UG/1
2 INHALANT RESPIRATORY (INHALATION) EVERY 6 HOURS PRN
Qty: 18 G | Refills: 2 | Status: SHIPPED | OUTPATIENT
Start: 2024-10-02

## 2024-10-02 NOTE — TELEPHONE ENCOUNTER
Received call from patient requesting a refill of her gabapentin and Inhalers.     Scripts sent to pharmacy for refill.

## 2024-11-26 DIAGNOSIS — M54.41 CHRONIC RIGHT-SIDED LOW BACK PAIN WITH BILATERAL SCIATICA: ICD-10-CM

## 2024-11-26 DIAGNOSIS — M54.42 CHRONIC RIGHT-SIDED LOW BACK PAIN WITH BILATERAL SCIATICA: ICD-10-CM

## 2024-11-26 DIAGNOSIS — G89.29 CHRONIC RIGHT-SIDED LOW BACK PAIN WITH BILATERAL SCIATICA: ICD-10-CM

## 2024-11-26 RX ORDER — CELECOXIB 200 MG/1
200 CAPSULE ORAL DAILY
Qty: 30 CAPSULE | Refills: 2 | Status: SHIPPED | OUTPATIENT
Start: 2024-11-26

## 2024-12-29 DIAGNOSIS — J30.2 SEASONAL ALLERGIC RHINITIS, UNSPECIFIED TRIGGER: ICD-10-CM

## 2024-12-30 RX ORDER — CETIRIZINE HYDROCHLORIDE 10 MG/1
10 TABLET ORAL DAILY
Qty: 90 TABLET | Refills: 3 | Status: SHIPPED | OUTPATIENT
Start: 2024-12-30

## 2025-01-01 DIAGNOSIS — G90.511 COMPLEX REGIONAL PAIN SYNDROME TYPE 1 OF RIGHT UPPER EXTREMITY: ICD-10-CM

## 2025-01-02 DIAGNOSIS — G90.511 COMPLEX REGIONAL PAIN SYNDROME TYPE 1 OF RIGHT UPPER EXTREMITY: ICD-10-CM

## 2025-01-02 RX ORDER — DULOXETIN HYDROCHLORIDE 60 MG/1
60 CAPSULE, DELAYED RELEASE ORAL DAILY
Qty: 30 CAPSULE | Refills: 2 | Status: SHIPPED | OUTPATIENT
Start: 2025-01-02 | End: 2025-01-02 | Stop reason: SDUPTHER

## 2025-01-02 RX ORDER — DULOXETIN HYDROCHLORIDE 60 MG/1
60 CAPSULE, DELAYED RELEASE ORAL DAILY
Qty: 30 CAPSULE | Refills: 2 | Status: SHIPPED | OUTPATIENT
Start: 2025-01-02

## 2025-01-02 NOTE — TELEPHONE ENCOUNTER
Contacted patient's [facility] [employer] at . Spoke with pharmacist. Completed verbal refill for patient's Duloxetine with 3 refills due to 2 (two) failed transmissions.

## 2025-01-12 DIAGNOSIS — J30.9 ALLERGIC SINUSITIS: ICD-10-CM

## 2025-01-13 RX ORDER — FLUTICASONE PROPIONATE 50 MCG
SPRAY, SUSPENSION (ML) NASAL
Qty: 16 ML | Refills: 3 | Status: SHIPPED | OUTPATIENT
Start: 2025-01-13

## 2025-01-24 ENCOUNTER — TELEPHONE (OUTPATIENT)
Age: 65
End: 2025-01-24

## 2025-01-24 NOTE — TELEPHONE ENCOUNTER
Contacted patient off of [facility]  to verify needs of services in attempts to offer patient an available appointment. LVM for patient to contact intake dept  in regards to wait list.     Olvin GASPAR verified via [facility], ID: [id number]    1st atempt

## 2025-01-31 DIAGNOSIS — G89.29 CHRONIC RIGHT-SIDED LOW BACK PAIN WITH BILATERAL SCIATICA: ICD-10-CM

## 2025-01-31 DIAGNOSIS — M54.41 CHRONIC RIGHT-SIDED LOW BACK PAIN WITH BILATERAL SCIATICA: ICD-10-CM

## 2025-01-31 DIAGNOSIS — M54.42 CHRONIC RIGHT-SIDED LOW BACK PAIN WITH BILATERAL SCIATICA: ICD-10-CM

## 2025-01-31 RX ORDER — CELECOXIB 200 MG/1
200 CAPSULE ORAL DAILY
Qty: 30 CAPSULE | Refills: 2 | Status: SHIPPED | OUTPATIENT
Start: 2025-01-31

## 2025-02-05 DIAGNOSIS — E55.9 VITAMIN D DEFICIENCY: ICD-10-CM

## 2025-02-06 RX ORDER — ACETAMINOPHEN 160 MG
2000 TABLET,DISINTEGRATING ORAL DAILY
Qty: 30 CAPSULE | Refills: 10 | Status: SHIPPED | OUTPATIENT
Start: 2025-02-06

## 2025-02-19 ENCOUNTER — OFFICE VISIT (OUTPATIENT)
Dept: INTERNAL MEDICINE CLINIC | Facility: CLINIC | Age: 65
End: 2025-02-19

## 2025-02-19 VITALS
HEIGHT: 61 IN | TEMPERATURE: 97.6 F | HEART RATE: 89 BPM | SYSTOLIC BLOOD PRESSURE: 117 MMHG | BODY MASS INDEX: 27.56 KG/M2 | WEIGHT: 146 LBS | DIASTOLIC BLOOD PRESSURE: 78 MMHG

## 2025-02-19 DIAGNOSIS — R22.42 SUBCUTANEOUS MASS OF TOE OF LEFT FOOT: ICD-10-CM

## 2025-02-19 DIAGNOSIS — M72.0 DUPUYTREN'S DISEASE OF PALM OF RIGHT HAND: ICD-10-CM

## 2025-02-19 DIAGNOSIS — J43.9 PULMONARY EMPHYSEMA, UNSPECIFIED EMPHYSEMA TYPE (HCC): ICD-10-CM

## 2025-02-19 DIAGNOSIS — I25.10 CORONARY ARTERY CALCIFICATION SEEN ON CAT SCAN: ICD-10-CM

## 2025-02-19 DIAGNOSIS — E78.5 DYSLIPIDEMIA: ICD-10-CM

## 2025-02-19 DIAGNOSIS — M17.12 OSTEOARTHRITIS OF LEFT KNEE, UNSPECIFIED OSTEOARTHRITIS TYPE: ICD-10-CM

## 2025-02-19 DIAGNOSIS — J30.9 ALLERGIC SINUSITIS: ICD-10-CM

## 2025-02-19 DIAGNOSIS — Z13.9 SCREENING DUE: ICD-10-CM

## 2025-02-19 DIAGNOSIS — J30.2 SEASONAL ALLERGIC RHINITIS, UNSPECIFIED TRIGGER: ICD-10-CM

## 2025-02-19 DIAGNOSIS — F17.200 SMOKING: ICD-10-CM

## 2025-02-19 DIAGNOSIS — R79.89 ELEVATED D-DIMER: ICD-10-CM

## 2025-02-19 DIAGNOSIS — M25.562 LEFT KNEE PAIN, UNSPECIFIED CHRONICITY: ICD-10-CM

## 2025-02-19 DIAGNOSIS — Z00.00 ANNUAL PHYSICAL EXAM: Primary | ICD-10-CM

## 2025-02-19 DIAGNOSIS — J45.20 MILD INTERMITTENT ASTHMA WITHOUT COMPLICATION: ICD-10-CM

## 2025-02-19 DIAGNOSIS — G90.511 COMPLEX REGIONAL PAIN SYNDROME TYPE 1 OF RIGHT UPPER EXTREMITY: ICD-10-CM

## 2025-02-19 DIAGNOSIS — Z12.31 ENCOUNTER FOR SCREENING MAMMOGRAM FOR BREAST CANCER: ICD-10-CM

## 2025-02-19 DIAGNOSIS — R73.03 PREDIABETES: ICD-10-CM

## 2025-02-19 PROCEDURE — 99396 PREV VISIT EST AGE 40-64: CPT | Performed by: INTERNAL MEDICINE

## 2025-02-19 RX ORDER — ASPIRIN 81 MG/1
81 TABLET, CHEWABLE ORAL DAILY
Qty: 90 TABLET | Refills: 3 | Status: SHIPPED | OUTPATIENT
Start: 2025-02-19

## 2025-02-19 RX ORDER — CETIRIZINE HYDROCHLORIDE 10 MG/1
10 TABLET ORAL DAILY
Qty: 90 TABLET | Refills: 3 | Status: SHIPPED | OUTPATIENT
Start: 2025-02-19 | End: 2025-02-21

## 2025-02-19 RX ORDER — FLUTICASONE PROPIONATE 50 MCG
2 SPRAY, SUSPENSION (ML) NASAL DAILY
Qty: 16 ML | Refills: 5 | Status: SHIPPED | OUTPATIENT
Start: 2025-02-19

## 2025-02-19 RX ORDER — ATORVASTATIN CALCIUM 40 MG/1
40 TABLET, FILM COATED ORAL DAILY
Qty: 90 TABLET | Refills: 3 | Status: SHIPPED | OUTPATIENT
Start: 2025-02-19

## 2025-02-19 RX ORDER — FLUTICASONE PROPIONATE AND SALMETEROL 100; 50 UG/1; UG/1
1 POWDER RESPIRATORY (INHALATION) 2 TIMES DAILY
Qty: 180 BLISTER | Refills: 3 | Status: SHIPPED | OUTPATIENT
Start: 2025-02-19

## 2025-02-19 RX ORDER — ALBUTEROL SULFATE 90 UG/1
2 INHALANT RESPIRATORY (INHALATION) EVERY 6 HOURS PRN
Qty: 18 G | Refills: 5 | Status: SHIPPED | OUTPATIENT
Start: 2025-02-19

## 2025-02-19 RX ORDER — MONTELUKAST SODIUM 10 MG/1
10 TABLET ORAL DAILY
Qty: 90 TABLET | Refills: 3 | Status: CANCELLED | OUTPATIENT
Start: 2025-02-19

## 2025-02-19 RX ORDER — MONTELUKAST SODIUM 10 MG/1
10 TABLET ORAL DAILY
Qty: 90 TABLET | Refills: 3 | Status: SHIPPED | OUTPATIENT
Start: 2025-02-19

## 2025-02-19 RX ORDER — DULOXETIN HYDROCHLORIDE 60 MG/1
60 CAPSULE, DELAYED RELEASE ORAL DAILY
Qty: 90 CAPSULE | Refills: 3 | Status: SHIPPED | OUTPATIENT
Start: 2025-02-19

## 2025-02-19 RX ORDER — GABAPENTIN 600 MG/1
600 TABLET ORAL 3 TIMES DAILY
Qty: 270 TABLET | Refills: 3 | Status: SHIPPED | OUTPATIENT
Start: 2025-02-19

## 2025-02-19 NOTE — ASSESSMENT & PLAN NOTE
Predominantly centrilobular emphysema on recent lung cancer screening CT.  No active issues otherwise, continue to encourage smoking cessation.    Orders:    Fluticasone-Salmeterol (Wixela Inhub) 100-50 mcg/dose inhaler; Inhale 1 puff 2 (two) times a day Rinse mouth after use.    montelukast (SINGULAIR) 10 mg tablet; Take 1 tablet (10 mg total) by mouth daily

## 2025-02-19 NOTE — ASSESSMENT & PLAN NOTE
History of hyperlipidemia, on high intensity statin.  Medication refilled at this visit.  Orders:    atorvastatin (LIPITOR) 40 mg tablet; Take 1 tablet (40 mg total) by mouth daily

## 2025-02-19 NOTE — PATIENT INSTRUCTIONS
"Patient Education     Routine physical for adults   The Basics   Written by the doctors and editors at [facility]   What is a physical? -- A physical is a routine visit, or \"check-up,\" with your doctor. You might also hear it called a \"wellness visit\" or \"preventive visit.\"  During each visit, the doctor will:   Ask about your physical and mental health   Ask about your habits, behaviors, and lifestyle   Do an exam   Give you vaccines if needed   Talk to you about any medicines you take   Give advice about your health   Answer your questions  Getting regular check-ups is an important part of taking care of your health. It can help your doctor find and treat any problems you have. But it's also important for preventing health problems.  A routine physical is different from a \"sick visit.\" A sick visit is when you see a doctor because of a health concern or problem. Since physicals are scheduled ahead of time, you can think about what you want to ask the doctor.  How often should I get a physical? -- It depends on your age and health. In general, for people age 21 years and older:   If you are younger than 50 years, you might be able to get a physical every 3 years.   If you are 50 years or older, your doctor might recommend a physical every year.  If you have an ongoing health condition, like diabetes or high blood pressure, your doctor will probably want to see you more often.  What happens during a physical? -- In general, each visit will include:   Physical exam - The doctor or nurse will check your height, weight, heart rate, and blood pressure. They will also look at your eyes and ears. They will ask about how you are feeling and whether you have any symptoms that bother you.   Medicines - It's a good idea to bring a list of all the medicines you take to each doctor visit. Your doctor will talk to you about your medicines and answer any questions. Tell them if you are having any side effects that bother you. You " "should also tell them if you are having trouble paying for any of your medicines.   Habits and behaviors - This includes:   Your diet   Your exercise habits   Whether you smoke, drink alcohol, or use drugs   Whether you are sexually active   Whether you feel safe at home  Your doctor will talk to you about things you can do to improve your health and lower your risk of health problems. They will also offer help and support. For example, if you want to quit smoking, they can give you advice and might prescribe medicines. If you want to improve your diet or get more physical activity, they can help you with this, too.   Lab tests, if needed - The tests you get will depend on your age and situation. For example, your doctor might want to check your:   Cholesterol   Blood sugar   Iron level   Vaccines - The recommended vaccines will depend on your age, health, and what vaccines you already had. Vaccines are very important because they can prevent certain serious or deadly infections.   Discussion of screening - \"Screening\" means checking for diseases or other health problems before they cause symptoms. Your doctor can recommend screening based on your age, risk, and preferences. This might include tests to check for:   Cancer, such as breast, prostate, cervical, ovarian, colorectal, prostate, lung, or skin cancer   Sexually transmitted infections, such as chlamydia and gonorrhea   Mental health conditions like depression and anxiety  Your doctor will talk to you about the different types of screening tests. They can help you decide which screenings to have. They can also explain what the results might mean.   Answering questions - The physical is a good time to ask the doctor or nurse questions about your health. If needed, they can refer you to other doctors or specialists, too.  Adults older than 65 years often need other care, too. As you get older, your doctor will talk to you about:   How to prevent falling at " home   Hearing or vision tests   Memory testing   How to take your medicines safely   Making sure that you have the help and support you need at home  All topics are updated as new evidence becomes available and our peer review process is complete.  This topic retrieved from [employer] on: May 02, 2024.  Topic 318497 Version 1.0  Release: 32.4.3 - C32.122  © 2024 UpToDate, Inc. and/or its affiliates. All rights reserved.  Consumer Information Use and Disclaimer   Disclaimer: This generalized information is a limited summary of diagnosis, treatment, and/or medication information. It is not meant to be comprehensive and should be used as a tool to help the user understand and/or assess potential diagnostic and treatment options. It does NOT include all information about conditions, treatments, medications, side effects, or risks that may apply to a specific patient. It is not intended to be medical advice or a substitute for the medical advice, diagnosis, or treatment of a health care provider based on the health care provider's examination and assessment of a patient's specific and unique circumstances. Patients must speak with a health care provider for complete information about their health, medical questions, and treatment options, including any risks or benefits regarding use of medications. This information does not endorse any treatments or medications as safe, effective, or approved for treating a specific patient. UpToDate, Inc. and its affiliates disclaim any warranty or liability relating to this information or the use thereof.The use of this information is governed by the Terms of Use, available at https://www.w[employer]tersPure Technologiesuwer.com/en/know/clinical-effectiveness-terms. 2024© UpToDate, Inc. and its affiliates and/or licensors. All rights reserved.  Copyright   © 2024 UpToDate, Inc. and/or its affiliates. All rights reserved.     Dysuria

## 2025-02-19 NOTE — ASSESSMENT & PLAN NOTE
Orders:    gabapentin (NEURONTIN) 600 MG tablet; Take 1 tablet (600 mg total) by mouth 3 (three) times a day    DULoxetine (CYMBALTA) 60 mg delayed release capsule; Take 1 capsule (60 mg total) by mouth daily

## 2025-02-19 NOTE — PROGRESS NOTES
Adult Annual Physical  Name: Camron Lui      : 1960      MRN: [id number]  Encounter Provider: Dnay Wang MD  Encounter Date: 2025   Encounter department: [facility]    Assessment & Plan  Annual physical exam  Patient presenting for annual physical examination. Counseled extensively regarding alcohol/drug use, dental care, injury prevention, sexual health, exercise, diet and development and relevant testing/medications as outlined below.         Screening due    Orders:    CBC and differential; Future    Comprehensive metabolic panel; Future    Lipid Panel with Direct LDL reflex; Future    Hemoglobin A1C; Future    Left knee pain, unspecified chronicity  Recent fall ~1 week prior, injured left knee at that time.  Was initially more swollen and ecchymotic, has since improved.  On exam mildly ecchymotic anteriorly with small healed scab over the patella.   Full ROM of the left knee, able to bear weight and non-antalgic gait.    Continue supportive care as needed, no indication for imaging at this visit.       Subcutaneous mass of toe of left foot  Small subcutaneous nodule on the inferior aspect of her left third toe, just proximal to the pad.  Mobile and nontender, transilluminates on exam.  Suspect this is most likely a simple cyst, no indication for imaging at this visit.       Mild intermittent asthma without complication  History of mild asthma, on Wixela as well as Albuterol PRN.  Overall well controlled.  Previously had PFT's in  which showed no evidence of obstructive defect, however DLCO was reduced.  Inhalers refilled at this visit.    Orders:    albuterol (PROVENTIL HFA,VENTOLIN HFA) 90 mcg/act inhaler; Inhale 2 puffs every 6 (six) hours as needed for wheezing    Pulmonary emphysema, unspecified emphysema type (HCC)  Predominantly centrilobular emphysema on recent lung cancer screening CT.  No active issues otherwise, continue to encourage  smoking cessation.    Orders:    Fluticasone-Salmeterol (Wixela Inhub) 100-50 mcg/dose inhaler; Inhale 1 puff 2 (two) times a day Rinse mouth after use.    montelukast (SINGULAIR) 10 mg tablet; Take 1 tablet (10 mg total) by mouth daily    Seasonal allergic rhinitis, unspecified trigger  Known allergic rhinitis on daily Zyrtec.  Refilled at this visit.    Orders:    cetirizine (ZyrTEC) 10 mg tablet; Take 1 tablet (10 mg total) by mouth daily    Smoking  History of tobacco use, has been trying to decrease amount of cigarettes smoked.  Used Chantix in the past without success, had minimal improvement with Nicotine patches before.    I have reviewed and verified the patient’s nicotine usage in the History section in the patient’s chart. I have provided face-to-face nicotine cessation counseling for a period of time greater than 3 minutes but less than 10 minutes (INTERMEDIATE) with the patient, during which time the patient was alert, oriented, and receptive to counseling. The patient was offered the following interventions: nicotine patch. She declined them at this visit.         Encounter for screening mammogram for breast cancer  Discussed with patient mammography for breast cancer screening.  She has had several mammograms over the last few years which have all been reassuring.  She notes her last mammogram caused her significant pain, to the point that it resulted in an emergency department visit.  Given she has had multiple negative breast cancer screenings, discussed that she is likely lower risk for breast cancer, but that lack of screening could result in the failure to detect an indolent mass or malignancy.    Shared decision making; patient is understanding that foregoing mammography could result in failure to detect breast cancer, but overall feels she would prefer not to repeat it.       Coronary artery calcification seen on CAT scan  Recent CT imaging done for lung cancer screening given her smoking  history.  On that scan noted to have mild atherosclerotic disease suggested by coronary artery calcifications.  Patient is already on daily aspirin, will continue this.  She also has had a nuclear stress test as well as echocardiogram within the last year.  Discussed that her major modifiable risk factor is her smoking currently.  Patient will continue to try to cut down on cigarettes.       Dyslipidemia  History of hyperlipidemia, on high intensity statin.  Medication refilled at this visit.  Orders:    atorvastatin (LIPITOR) 40 mg tablet; Take 1 tablet (40 mg total) by mouth daily    Dupuytren's disease of palm of right hand  History of Dupuytren's contracture of the right hand in 2022.  Over the last 1 to 2 years has noticed slightly worsening stiffening of some of the fingers in her right hand.  Discussed with patient that despite nodule excision, she may have recurrence of symptoms as well as regrowth.  Overall given she has adequate range of motion and no pain currently, will continue to monitor and no surgical intervention indicated.    Orders:    gabapentin (NEURONTIN) 600 MG tablet; Take 1 tablet (600 mg total) by mouth 3 (three) times a day    Prediabetes  Previously had A1c checked which was 6%.  Patient is overweight with BMI approximately 27.  Given her age and other risk factors as well as mild CAD, will recheck A1c.    Orders:    Hemoglobin A1C; Future      Immunizations and preventive care screenings were discussed with patient today. Appropriate education was printed on patient's after visit summary.    Counseling:  Alcohol/drug use: discussed moderation in alcohol intake, the recommendations for healthy alcohol use, and avoidance of illicit drug use.  Dental Health: discussed importance of regular tooth brushing, flossing, and dental visits.  Injury prevention: discussed safety/seat belts, safety helmets, smoke detectors, carbon monoxide detectors, and smoking near bedding or upholstery.  Sexual  health: discussed sexually transmitted diseases, partner selection, use of condoms, avoidance of unintended pregnancy, and contraceptive alternatives.  Exercise: the importance of regular exercise/physical activity was discussed. Recommend exercise 3-5 times per week for at least 30 minutes.     BMI Counseling: Body mass index is 27.59 kg/m². The BMI is above normal. Nutrition recommendations include decreasing portion sizes, encouraging healthy choices of fruits and vegetables, decreasing fast food intake, consuming healthier snacks, limiting drinks that contain sugar, moderation in carbohydrate intake, increasing intake of lean protein, reducing intake of saturated and trans fat and reducing intake of cholesterol. Exercise recommendations include moderate physical activity 150 minutes/week and exercising 3-5 times per week. No pharmacotherapy was ordered. Rationale for BMI follow-up plan is due to patient being overweight or obese.     Depression Screening and Follow-up Plan: Patient was screened for depression during today's encounter. They screened negative with a PHQ-2 score of 0.      History of Present Illness       Adult Annual Physical:  Patient presents for annual physical.     Diet and Physical Activity:  - Diet/Nutrition: well balanced diet.  - Exercise: walking.    Depression Screening:  - PHQ-2 Score: 0    General Health:  - Sleep: sleeps well.  - Hearing: requires use of hearing aids.  - Vision: wears glasses and most recent eye exam > 1 year ago.  - Dental: no dental visits for > 1 year. Advised to follow-up with a dentist.    /GYN Health:    - Menopause: postmenopausal.   - History of STDs: no    Advanced Care Planning:  - Has an advanced directive?: no    - Has a durable medical POA?: no    - ACP document given to patient?: no      Review of Systems   All other systems reviewed and are negative.        Objective   /78 (BP Location: Right arm, Patient Position: Sitting, Cuff Size: Large)    "Pulse 89   Temp 97.6 °F (36.4 °C) (Temporal)   Ht 5' 1\" (1.549 m)   Wt 66.2 kg (146 lb)   BMI 27.59 kg/m²     Physical Exam  Vitals reviewed.   Constitutional:       General: She is not in acute distress.  HENT:      Head: Normocephalic.      Mouth/Throat:      Mouth: Mucous membranes are moist.      Pharynx: Oropharynx is clear.   Eyes:      Pupils: Pupils are equal, round, and reactive to light.   Cardiovascular:      Rate and Rhythm: Normal rate and regular rhythm.   Pulmonary:      Effort: Pulmonary effort is normal. No respiratory distress.   Abdominal:      General: There is no distension.      Palpations: Abdomen is soft.      Tenderness: There is no abdominal tenderness.   Musculoskeletal:      Right lower leg: No edema.      Left lower leg: No edema.      Comments: Palpable mobile nontender small mass underneath left third toe, just proximal to the pad.  No overlying skin changes.   Skin:     General: Skin is warm and dry.   Neurological:      General: No focal deficit present.      Mental Status: She is alert and oriented to person, place, and time. Mental status is at baseline.      Gait: Gait normal.   Psychiatric:         Mood and Affect: Mood normal.       Administrative Statements   I have spent a total time of 30 minutes in caring for this patient on the day of the visit/encounter including Importance of tx compliance, Counseling / Coordination of care, Documenting in the medical record, Reviewing/placing orders in the medical record (including tests, medications, and/or procedures), Obtaining or reviewing history  , and Communicating with other healthcare professionals .  "

## 2025-02-19 NOTE — ASSESSMENT & PLAN NOTE
History of tobacco use, has been trying to decrease amount of cigarettes smoked.  Used Chantix in the past without success, had minimal improvement with Nicotine patches before.    I have reviewed and verified the patient’s nicotine usage in the History section in the patient’s chart. I have provided face-to-face nicotine cessation counseling for a period of time greater than 3 minutes but less than 10 minutes (INTERMEDIATE) with the patient, during which time the patient was alert, oriented, and receptive to counseling. The patient was offered the following interventions: nicotine patch. She declined them at this visit.

## 2025-02-19 NOTE — ASSESSMENT & PLAN NOTE
History of mild asthma, on Wixela as well as Albuterol PRN.  Overall well controlled.  Previously had PFT's in 2020 which showed no evidence of obstructive defect, however DLCO was reduced.  Inhalers refilled at this visit.    Orders:    albuterol (PROVENTIL HFA,VENTOLIN HFA) 90 mcg/act inhaler; Inhale 2 puffs every 6 (six) hours as needed for wheezing

## 2025-02-19 NOTE — ASSESSMENT & PLAN NOTE
History of Dupuytren's contracture of the right hand in 2022.  Over the last 1 to 2 years has noticed slightly worsening stiffening of some of the fingers in her right hand.  Discussed with patient that despite nodule excision, she may have recurrence of symptoms as well as regrowth.  Overall given she has adequate range of motion and no pain currently, will continue to monitor and no surgical intervention indicated.    Orders:    gabapentin (NEURONTIN) 600 MG tablet; Take 1 tablet (600 mg total) by mouth 3 (three) times a day

## 2025-02-20 ENCOUNTER — APPOINTMENT (OUTPATIENT)
Dept: LAB | Facility: CLINIC | Age: 65
End: 2025-02-20
Payer: COMMERCIAL

## 2025-02-20 DIAGNOSIS — Z13.9 SCREENING DUE: ICD-10-CM

## 2025-02-20 DIAGNOSIS — R73.03 PREDIABETES: ICD-10-CM

## 2025-02-20 LAB
ALBUMIN SERPL BCG-MCNC: 4.5 G/DL (ref 3.5–5)
ALP SERPL-CCNC: 93 U/L (ref 34–104)
ALT SERPL W P-5'-P-CCNC: 19 U/L (ref 7–52)
ANION GAP SERPL CALCULATED.3IONS-SCNC: 11 MMOL/L (ref 4–13)
AST SERPL W P-5'-P-CCNC: 19 U/L (ref 13–39)
BASOPHILS # BLD AUTO: 0.03 THOUSANDS/ΜL (ref 0–0.1)
BASOPHILS NFR BLD AUTO: 0 % (ref 0–1)
BILIRUB SERPL-MCNC: 0.6 MG/DL (ref 0.2–1)
BUN SERPL-MCNC: 17 MG/DL (ref 5–25)
CALCIUM SERPL-MCNC: 9.8 MG/DL (ref 8.4–10.2)
CHLORIDE SERPL-SCNC: 101 MMOL/L (ref 96–108)
CHOLEST SERPL-MCNC: 163 MG/DL (ref ?–200)
CO2 SERPL-SCNC: 26 MMOL/L (ref 21–32)
CREAT SERPL-MCNC: 0.83 MG/DL (ref 0.6–1.3)
EOSINOPHIL # BLD AUTO: 0.15 THOUSAND/ΜL (ref 0–0.61)
EOSINOPHIL NFR BLD AUTO: 2 % (ref 0–6)
ERYTHROCYTE [DISTWIDTH] IN BLOOD BY AUTOMATED COUNT: 12.7 % (ref 11.6–15.1)
EST. AVERAGE GLUCOSE BLD GHB EST-MCNC: 123 MG/DL
GFR SERPL CREATININE-BSD FRML MDRD: 74 ML/MIN/1.73SQ M
GLUCOSE P FAST SERPL-MCNC: 115 MG/DL (ref 65–99)
HBA1C MFR BLD: 5.9 %
HCT VFR BLD AUTO: 45.1 % (ref 34.8–46.1)
HDLC SERPL-MCNC: 54 MG/DL
HGB BLD-MCNC: 14.3 G/DL (ref 11.5–15.4)
IMM GRANULOCYTES # BLD AUTO: 0.01 THOUSAND/UL (ref 0–0.2)
IMM GRANULOCYTES NFR BLD AUTO: 0 % (ref 0–2)
LDLC SERPL CALC-MCNC: 93 MG/DL (ref 0–100)
LYMPHOCYTES # BLD AUTO: 1.93 THOUSANDS/ΜL (ref 0.6–4.47)
LYMPHOCYTES NFR BLD AUTO: 27 % (ref 14–44)
MCH RBC QN AUTO: 31.9 PG (ref 26.8–34.3)
MCHC RBC AUTO-ENTMCNC: 31.7 G/DL (ref 31.4–37.4)
MCV RBC AUTO: 101 FL (ref 82–98)
MONOCYTES # BLD AUTO: 0.91 THOUSAND/ΜL (ref 0.17–1.22)
MONOCYTES NFR BLD AUTO: 13 % (ref 4–12)
NEUTROPHILS # BLD AUTO: 4.17 THOUSANDS/ΜL (ref 1.85–7.62)
NEUTS SEG NFR BLD AUTO: 58 % (ref 43–75)
NRBC BLD AUTO-RTO: 0 /100 WBCS
PLATELET # BLD AUTO: 312 THOUSANDS/UL (ref 149–390)
PMV BLD AUTO: 11.4 FL (ref 8.9–12.7)
POTASSIUM SERPL-SCNC: 4.3 MMOL/L (ref 3.5–5.3)
PROT SERPL-MCNC: 7.5 G/DL (ref 6.4–8.4)
RBC # BLD AUTO: 4.48 MILLION/UL (ref 3.81–5.12)
SODIUM SERPL-SCNC: 138 MMOL/L (ref 135–147)
TRIGL SERPL-MCNC: 78 MG/DL (ref ?–150)
WBC # BLD AUTO: 7.2 THOUSAND/UL (ref 4.31–10.16)

## 2025-02-20 PROCEDURE — 80053 COMPREHEN METABOLIC PANEL: CPT

## 2025-02-20 PROCEDURE — 36415 COLL VENOUS BLD VENIPUNCTURE: CPT

## 2025-02-20 PROCEDURE — 80061 LIPID PANEL: CPT

## 2025-02-20 PROCEDURE — 83036 HEMOGLOBIN GLYCOSYLATED A1C: CPT

## 2025-02-20 PROCEDURE — 85025 COMPLETE CBC W/AUTO DIFF WBC: CPT

## 2025-02-21 ENCOUNTER — RESULTS FOLLOW-UP (OUTPATIENT)
Dept: INTERNAL MEDICINE CLINIC | Facility: CLINIC | Age: 65
End: 2025-02-21

## 2025-02-21 RX ORDER — CETIRIZINE HYDROCHLORIDE 10 MG/1
10 TABLET ORAL DAILY
Qty: 90 TABLET | Refills: 3 | Status: SHIPPED | OUTPATIENT
Start: 2025-02-21

## 2025-02-22 DIAGNOSIS — M17.12 OSTEOARTHRITIS OF LEFT KNEE, UNSPECIFIED OSTEOARTHRITIS TYPE: ICD-10-CM

## 2025-02-22 DIAGNOSIS — G90.511 COMPLEX REGIONAL PAIN SYNDROME TYPE 1 OF RIGHT UPPER EXTREMITY: ICD-10-CM

## 2025-02-22 DIAGNOSIS — M72.0 DUPUYTREN'S DISEASE OF PALM OF RIGHT HAND: ICD-10-CM

## 2025-02-24 DIAGNOSIS — J30.2 SEASONAL ALLERGIC RHINITIS, UNSPECIFIED TRIGGER: ICD-10-CM

## 2025-02-24 RX ORDER — GABAPENTIN 600 MG/1
600 TABLET ORAL 3 TIMES DAILY
Qty: 90 TABLET | Refills: 2 | OUTPATIENT
Start: 2025-02-24

## 2025-02-24 RX ORDER — CETIRIZINE HYDROCHLORIDE 10 MG/1
10 TABLET ORAL DAILY
Qty: 90 TABLET | Refills: 3 | Status: SHIPPED | OUTPATIENT
Start: 2025-02-24

## 2025-04-24 DIAGNOSIS — M54.42 CHRONIC RIGHT-SIDED LOW BACK PAIN WITH BILATERAL SCIATICA: ICD-10-CM

## 2025-04-24 DIAGNOSIS — G89.29 CHRONIC RIGHT-SIDED LOW BACK PAIN WITH BILATERAL SCIATICA: ICD-10-CM

## 2025-04-24 DIAGNOSIS — M54.41 CHRONIC RIGHT-SIDED LOW BACK PAIN WITH BILATERAL SCIATICA: ICD-10-CM

## 2025-04-24 RX ORDER — CELECOXIB 200 MG/1
200 CAPSULE ORAL DAILY
Qty: 30 CAPSULE | Refills: 2 | Status: SHIPPED | OUTPATIENT
Start: 2025-04-24

## 2025-05-06 DIAGNOSIS — M85.88 OSTEOPENIA OF LUMBAR SPINE: ICD-10-CM

## 2025-05-06 RX ORDER — IBUPROFEN 200 MG
1 CAPSULE ORAL DAILY
Qty: 90 TABLET | Refills: 3 | Status: SHIPPED | OUTPATIENT
Start: 2025-05-06

## 2025-06-18 DIAGNOSIS — G90.511 COMPLEX REGIONAL PAIN SYNDROME TYPE 1 OF RIGHT UPPER EXTREMITY: ICD-10-CM

## 2025-06-19 RX ORDER — DULOXETIN HYDROCHLORIDE 60 MG/1
60 CAPSULE, DELAYED RELEASE ORAL DAILY
Qty: 90 CAPSULE | Refills: 4 | Status: SHIPPED | OUTPATIENT
Start: 2025-06-19

## 2025-06-26 DIAGNOSIS — M85.88 OSTEOPENIA OF LUMBAR SPINE: ICD-10-CM

## 2025-06-26 DIAGNOSIS — R79.89 ELEVATED D-DIMER: ICD-10-CM

## 2025-06-27 RX ORDER — IBUPROFEN 200 MG
1 CAPSULE ORAL DAILY
Qty: 90 TABLET | Refills: 4 | Status: SHIPPED | OUTPATIENT
Start: 2025-06-27

## 2025-06-27 RX ORDER — ASPIRIN 81 MG
81 TABLET,CHEWABLE ORAL DAILY
Qty: 90 TABLET | Refills: 4 | Status: SHIPPED | OUTPATIENT
Start: 2025-06-27

## 2025-07-02 ENCOUNTER — HOSPITAL ENCOUNTER (OUTPATIENT)
Dept: RADIOLOGY | Facility: HOSPITAL | Age: 65
Discharge: HOME | End: 2025-07-02
Attending: ORTHOPAEDIC SURGERY
Payer: COMMERCIAL

## 2025-07-02 ENCOUNTER — OFFICE VISIT (OUTPATIENT)
Dept: OBGYN CLINIC | Facility: HOSPITAL | Age: 65
End: 2025-07-02
Payer: COMMERCIAL

## 2025-07-02 VITALS — BODY MASS INDEX: 27.56 KG/M2 | WEIGHT: 146 LBS | HEIGHT: 61 IN

## 2025-07-02 DIAGNOSIS — M72.0 DUPUYTREN'S DISEASE OF PALM OF RIGHT HAND: Primary | ICD-10-CM

## 2025-07-02 DIAGNOSIS — M65.341 TRIGGER FINGER, RIGHT RING FINGER: ICD-10-CM

## 2025-07-02 DIAGNOSIS — M72.0 DUPUYTREN'S DISEASE OF PALM OF RIGHT HAND: ICD-10-CM

## 2025-07-02 PROCEDURE — 99213 OFFICE O/P EST LOW 20 MIN: CPT | Performed by: ORTHOPAEDIC SURGERY

## 2025-07-02 PROCEDURE — 73130 X-RAY EXAM OF HAND: CPT

## 2025-07-02 PROCEDURE — 20550 NJX 1 TENDON SHEATH/LIGAMENT: CPT | Performed by: ORTHOPAEDIC SURGERY

## 2025-07-02 RX ADMIN — BETAMETHASONE SODIUM PHOSPHATE AND BETAMETHASONE ACETATE 6 MG: 3; 3 INJECTION, SUSPENSION INTRA-ARTICULAR; INTRALESIONAL; INTRAMUSCULAR; SOFT TISSUE at 10:30

## 2025-07-02 RX ADMIN — LIDOCAINE HYDROCHLORIDE 1 ML: 10 INJECTION, SOLUTION INFILTRATION; PERINEURAL at 10:30

## 2025-07-02 NOTE — ASSESSMENT & PLAN NOTE
Patient is s/p Right hand Dupuytren's nodule excision DOS: 12/13/2022  -Reviewed physical exam and imaging with patient at time of visit. Radiographic findings demonstrate no acute osseous abnormality, fracture, or dislocation, no significant change in osteoarthritis. Her symptoms are consistent with tightness of the palm versus right ring trigger finger of her Right hand.  - Patient was advised to continue with hand therapy exercises that she is performing  - If patient is still having symptoms, she will be referred to hand therapy for additional exercises  -The patient expresses understanding and is in agreement with today's treatment plan.       Orders:  •  XR hand 3+ vw right; Future

## 2025-07-02 NOTE — PROGRESS NOTES
ORTHOPAEDIC HAND, WRIST, AND ELBOW OFFICE  VISIT     Name: Camron Lui      : 1960      MRN: [id number]  Encounter Provider: Mike Guillen MD  Encounter Date: 2025   Encounter department: [facility] [place]  :  Assessment & Plan  Dupuytren's disease of palm of right hand  Patient is s/p Right hand Dupuytren's nodule excision DOS: 2022  -Reviewed physical exam and imaging with patient at time of visit. Radiographic findings demonstrate no acute osseous abnormality, fracture, or dislocation, no significant change in osteoarthritis. Her symptoms are consistent with tightness of the palm versus right ring trigger finger of her Right hand.  - Patient was advised to continue with hand therapy exercises that she is performing  - If patient is still having symptoms, she will be referred to hand therapy for additional exercises  -The patient expresses understanding and is in agreement with today's treatment plan.       Orders:  •  XR hand 3+ vw right; Future    Trigger finger, right ring finger  -Reviewed physical exam and imaging with patient at time of visit. Radiographic findings demonstrate no acute osseous abnormality, fracture or dislocation. Her symptoms are consistent with trigger finger of her Right ring finger.  -Discussed treatment options including continued observation, activity modification, bracing, anti-inflammatories, hand therapy, cortisone injection, versus surgical intervention.  -Patient was offered and accepted CS injection with Celestone at time of visit, she tolerated the procedure well  -She will follow-up in the office in 8 weeks for re-evaluation of symptoms  -The patient expresses understanding and is in agreement with today's treatment plan.       Orders:  •  Hand/upper extremity injection: R ring A1  •  lidocaine (XYLOCAINE) 1 % injection 1 mL  •  betamethasone acetate-betamethasone sodium phosphate (CELESTONE) injection 6  "mg      General Discussions:  Trigger FInger: The anatomy and physiology of trigger finger was discussed with the patient today in the office.  Edema and increased contact pressure within the flexor tendons at the A1 pulley can cause pain, crepitation, and limitation of function.  Treatment options include resting MP blocking splints, PIP blocking splints, or \"band-aid\" splints to decrease edema, oral anti-inflammatory medications, home or formal therapy exercises, up to 2 steroid injections, or surgical release.  While a majority of patients do respond to conservative treatment, up to 20% may require surgical release.     Operative Discussions:      History of Present Illness   HPI  Chief Complaint   Patient presents with   • Right Hand - Follow-up     S/P right hand dupuytrens nodule excision - 12/13/2022       Camron Lui is a 65 y.o. female who presents for follow-up s/p Dupuytren's nodules excision in 2022. Patient states that she still has tightness into the palm of her right hand in line with her ring finger. Patient states that she has difficulty with keeping her hand extended due to the tightness that she feels, as if a rubber band is stretched to maximum capacity.  She states that she noticed throughout the wintertime that her fingers would lock down in a flexed position and she would have to open them with her other hand.      REVIEW OF SYSTEMS:  General: no fever, no chills  HEENT:  No loss of hearing or eyesight problems  Eyes:  No red eyes  Respiratory:  No coughing, shortness of breath or wheezing  Cardiovascular:  No chest pain, no palpitations  GI:  Abdomen soft nontender, denies nausea  Endocrine:  No muscle weakness, no frequent urination, no excessive thirst  Urinary:  No dysuria, no incontinence  Musculoskeletal: see HPI and PE  SKIN:  No skin rash, no dry skin  Neurological:  No headaches, no confusion  Psychiatric:  No suicide thoughts, no anxiety, no depression  Review of all other " "systems is negative    Objective   Ht 5' 1\" (1.549 m)   Wt 66.2 kg (146 lb)   BMI 27.59 kg/m²      General: well developed and well nourished, alert, oriented times 3, and appears comfortable  Psychiatric: Normal  HEENT: Trachea Midline, No torticollis  Cardiovascular: No discernable arrhythmia  Pulmonary: No wheezing or stridor  Abdomen: No rebound or guarding  Extremities: No peripheral edema  Skin: No masses, erythema, lacerations, fluctation, ulcerations  Neurovascular: Sensation Intact to the Median, Ulnar, Radial Nerve, Motor Intact to the Median, Ulnar, Radial Nerve, and Pulses Intact    Musculoskeletal exam:  RIGHT SIDE:  Finger:  Full ROM, No Triggering  All Fingers, Tenderness at palmar aspect of hand in line with ring finger, Palpable clicking ring finger, Crepitation ring finger, Nodules  ring finger  FDS, FDP intact to all fingers      STUDIES REVIEWED:  Images were reviewed in [facility] and demonstrate: No acute osseous abnormality, fracture, or dislocation.  No significant advancement of arthritis      PROCEDURES PERFORMED:  Hand/upper extremity injection: R ring A1    Universal Protocol:  procedure performed by consultantConsent: Verbal consent obtained  Consent given by: patient  Patient understanding: patient states understanding of the procedure being performed  Site marked: the operative site was marked  Patient identity confirmed: verbally with patient  Supporting Documentation  Indications: joint swelling and pain   Procedure Details  Condition:trigger finger Location: ring finger - R ring A1   Preparation: Patient was prepped and draped in the usual sterile fashion  Needle size: 25 G  Ultrasound guidance: no  Approach: volar  Medications administered: 1 mL lidocaine 1 %; 6 mg betamethasone acetate-betamethasone sodium phosphate 6 (3-3) mg/mL  Patient tolerance: patient tolerated the procedure well with no immediate complications  Dressing:  Sterile dressing applied         -      Scribe " Attestation    I,:  Joan Houston am acting as a scribe while in the presence of the attending physician.:       I,:  Mike Guillen MD personally performed the services described in this documentation    as scribed in my presence.:

## 2025-07-06 RX ORDER — LIDOCAINE HYDROCHLORIDE 10 MG/ML
1 INJECTION, SOLUTION INFILTRATION; PERINEURAL
Status: COMPLETED | OUTPATIENT
Start: 2025-07-02 | End: 2025-07-02

## 2025-07-06 RX ORDER — BETAMETHASONE SODIUM PHOSPHATE AND BETAMETHASONE ACETATE 3; 3 MG/ML; MG/ML
6 INJECTION, SUSPENSION INTRA-ARTICULAR; INTRALESIONAL; INTRAMUSCULAR; SOFT TISSUE
Status: COMPLETED | OUTPATIENT
Start: 2025-07-02 | End: 2025-07-02

## 2025-07-21 ENCOUNTER — OFFICE VISIT (OUTPATIENT)
Dept: CARDIOLOGY CLINIC | Facility: CLINIC | Age: 65
End: 2025-07-21
Payer: COMMERCIAL

## 2025-07-21 VITALS
HEART RATE: 90 BPM | HEIGHT: 61 IN | SYSTOLIC BLOOD PRESSURE: 134 MMHG | BODY MASS INDEX: 26.24 KG/M2 | WEIGHT: 139 LBS | DIASTOLIC BLOOD PRESSURE: 80 MMHG

## 2025-07-21 DIAGNOSIS — E78.5 DYSLIPIDEMIA: ICD-10-CM

## 2025-07-21 DIAGNOSIS — R55 SYNCOPE, UNSPECIFIED SYNCOPE TYPE: Primary | ICD-10-CM

## 2025-07-21 PROCEDURE — 93000 ELECTROCARDIOGRAM COMPLETE: CPT | Performed by: INTERNAL MEDICINE

## 2025-07-21 PROCEDURE — 99214 OFFICE O/P EST MOD 30 MIN: CPT | Performed by: INTERNAL MEDICINE
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
## 2025-07-22 DIAGNOSIS — G89.29 CHRONIC RIGHT-SIDED LOW BACK PAIN WITH BILATERAL SCIATICA: ICD-10-CM

## 2025-07-22 DIAGNOSIS — M54.42 CHRONIC RIGHT-SIDED LOW BACK PAIN WITH BILATERAL SCIATICA: ICD-10-CM

## 2025-07-22 DIAGNOSIS — M54.41 CHRONIC RIGHT-SIDED LOW BACK PAIN WITH BILATERAL SCIATICA: ICD-10-CM

## 2025-07-22 RX ORDER — CELECOXIB 200 MG/1
200 CAPSULE ORAL DAILY
Qty: 30 CAPSULE | Refills: 2 | Status: SHIPPED | OUTPATIENT
Start: 2025-07-22

## 2025-07-29 ENCOUNTER — PATIENT OUTREACH (OUTPATIENT)
Dept: INTERNAL MEDICINE CLINIC | Facility: CLINIC | Age: 65
End: 2025-07-29

## (undated) DEVICE — ACE WRAP 4 IN UNSTERILE

## (undated) DEVICE — GAUZE SPONGES,16 PLY: Brand: CURITY

## (undated) DEVICE — GLOVE INDICATOR PI UNDERGLOVE SZ 8 BLUE

## (undated) DEVICE — CUFF TOURNIQUET 18 X 4 IN QUICK CONNECT DISP 1 BLADDER

## (undated) DEVICE — DISPOSABLE EQUIPMENT COVER: Brand: SMALL TOWEL DRAPE

## (undated) DEVICE — STERILE BETHLEHEM PLASTIC HAND: Brand: CARDINAL HEALTH

## (undated) DEVICE — PADDING CAST 4 IN  COTTON STRL

## (undated) DEVICE — GLOVE SRG BIOGEL 7.5

## (undated) DEVICE — OCCLUSIVE GAUZE STRIP,3% BISMUTH TRIBROMOPHENATE IN PETROLATUM BLEND: Brand: XEROFORM

## (undated) DEVICE — SUT PROLENE 4-0 PS-2 18 IN 8682G

## (undated) DEVICE — NEEDLE 25G X 1 1/2

## (undated) DEVICE — SPONGE PVP SCRUB WING STERILE